# Patient Record
Sex: MALE | Race: WHITE | NOT HISPANIC OR LATINO | Employment: OTHER | URBAN - METROPOLITAN AREA
[De-identification: names, ages, dates, MRNs, and addresses within clinical notes are randomized per-mention and may not be internally consistent; named-entity substitution may affect disease eponyms.]

---

## 2017-01-13 ENCOUNTER — AMBULATORY - HEALTHEAST (OUTPATIENT)
Dept: LAB | Facility: CLINIC | Age: 77
End: 2017-01-13

## 2017-01-13 ENCOUNTER — COMMUNICATION - HEALTHEAST (OUTPATIENT)
Dept: INTERNAL MEDICINE | Facility: CLINIC | Age: 77
End: 2017-01-13

## 2017-01-13 DIAGNOSIS — I26.99 PULMONARY EMBOLISM (H): ICD-10-CM

## 2017-02-03 ENCOUNTER — COMMUNICATION - HEALTHEAST (OUTPATIENT)
Dept: INTERNAL MEDICINE | Facility: CLINIC | Age: 77
End: 2017-02-03

## 2017-02-03 DIAGNOSIS — I26.99 PULMONARY EMBOLISM (H): ICD-10-CM

## 2017-02-08 ENCOUNTER — AMBULATORY - HEALTHEAST (OUTPATIENT)
Dept: LAB | Facility: CLINIC | Age: 77
End: 2017-02-08

## 2017-02-08 ENCOUNTER — COMMUNICATION - HEALTHEAST (OUTPATIENT)
Dept: INTERNAL MEDICINE | Facility: CLINIC | Age: 77
End: 2017-02-08

## 2017-02-08 DIAGNOSIS — I26.99 PULMONARY EMBOLISM (H): ICD-10-CM

## 2017-02-21 ENCOUNTER — COMMUNICATION - HEALTHEAST (OUTPATIENT)
Dept: INTERNAL MEDICINE | Facility: CLINIC | Age: 77
End: 2017-02-21

## 2017-02-21 DIAGNOSIS — I26.99 PULMONARY EMBOLISM (H): ICD-10-CM

## 2017-03-08 ENCOUNTER — COMMUNICATION - HEALTHEAST (OUTPATIENT)
Dept: INTERNAL MEDICINE | Facility: CLINIC | Age: 77
End: 2017-03-08

## 2017-03-08 ENCOUNTER — AMBULATORY - HEALTHEAST (OUTPATIENT)
Dept: LAB | Facility: CLINIC | Age: 77
End: 2017-03-08

## 2017-03-08 DIAGNOSIS — I26.99 PULMONARY EMBOLISM (H): ICD-10-CM

## 2017-04-01 ENCOUNTER — HOSPITAL ENCOUNTER (OUTPATIENT)
Dept: ULTRASOUND IMAGING | Facility: CLINIC | Age: 77
Discharge: HOME | End: 2017-04-01
Attending: FAMILY MEDICINE

## 2017-04-01 ENCOUNTER — OFFICE VISIT - HEALTHEAST (OUTPATIENT)
Dept: FAMILY MEDICINE | Facility: CLINIC | Age: 77
End: 2017-04-01

## 2017-04-01 DIAGNOSIS — R21 RASH: ICD-10-CM

## 2017-04-01 DIAGNOSIS — R60.0 LEG EDEMA, LEFT: ICD-10-CM

## 2017-04-01 DIAGNOSIS — Z79.01 ON WARFARIN THERAPY: ICD-10-CM

## 2017-04-10 ENCOUNTER — COMMUNICATION - HEALTHEAST (OUTPATIENT)
Dept: INTERNAL MEDICINE | Facility: CLINIC | Age: 77
End: 2017-04-10

## 2017-04-26 ENCOUNTER — COMMUNICATION - HEALTHEAST (OUTPATIENT)
Dept: INTERNAL MEDICINE | Facility: CLINIC | Age: 77
End: 2017-04-26

## 2017-04-27 ENCOUNTER — OFFICE VISIT - HEALTHEAST (OUTPATIENT)
Dept: INTERNAL MEDICINE | Facility: CLINIC | Age: 77
End: 2017-04-27

## 2017-04-27 DIAGNOSIS — R60.9 EDEMA: ICD-10-CM

## 2017-04-27 DIAGNOSIS — I10 ESSENTIAL HYPERTENSION: ICD-10-CM

## 2017-04-27 DIAGNOSIS — I26.99 PULMONARY EMBOLISM (H): ICD-10-CM

## 2017-05-01 ENCOUNTER — AMBULATORY - HEALTHEAST (OUTPATIENT)
Dept: LAB | Facility: CLINIC | Age: 77
End: 2017-05-01

## 2017-05-01 ENCOUNTER — COMMUNICATION - HEALTHEAST (OUTPATIENT)
Dept: INTERNAL MEDICINE | Facility: CLINIC | Age: 77
End: 2017-05-01

## 2017-05-01 DIAGNOSIS — I26.99 PULMONARY EMBOLISM (H): ICD-10-CM

## 2017-05-05 ENCOUNTER — OFFICE VISIT - HEALTHEAST (OUTPATIENT)
Dept: NURSING | Facility: CLINIC | Age: 77
End: 2017-05-05

## 2017-05-05 DIAGNOSIS — I10 ESSENTIAL HYPERTENSION WITH GOAL BLOOD PRESSURE LESS THAN 140/90: ICD-10-CM

## 2017-05-05 DIAGNOSIS — I26.99 OTHER PULMONARY EMBOLISM WITHOUT ACUTE COR PULMONALE, UNSPECIFIED CHRONICITY (H): ICD-10-CM

## 2017-05-15 ENCOUNTER — OFFICE VISIT - HEALTHEAST (OUTPATIENT)
Dept: INTERNAL MEDICINE | Facility: CLINIC | Age: 77
End: 2017-05-15

## 2017-05-15 ENCOUNTER — COMMUNICATION - HEALTHEAST (OUTPATIENT)
Dept: INTERNAL MEDICINE | Facility: CLINIC | Age: 77
End: 2017-05-15

## 2017-05-15 DIAGNOSIS — I10 ESSENTIAL HYPERTENSION: ICD-10-CM

## 2017-05-15 DIAGNOSIS — I26.99 PULMONARY EMBOLISM (H): ICD-10-CM

## 2017-05-30 ENCOUNTER — COMMUNICATION - HEALTHEAST (OUTPATIENT)
Dept: INTERNAL MEDICINE | Facility: CLINIC | Age: 77
End: 2017-05-30

## 2017-05-30 DIAGNOSIS — I26.99 PULMONARY EMBOLISM (H): ICD-10-CM

## 2017-06-02 ENCOUNTER — COMMUNICATION - HEALTHEAST (OUTPATIENT)
Dept: INTERNAL MEDICINE | Facility: CLINIC | Age: 77
End: 2017-06-02

## 2017-06-12 ENCOUNTER — AMBULATORY - HEALTHEAST (OUTPATIENT)
Dept: LAB | Facility: CLINIC | Age: 77
End: 2017-06-12

## 2017-06-12 ENCOUNTER — COMMUNICATION - HEALTHEAST (OUTPATIENT)
Dept: INTERNAL MEDICINE | Facility: CLINIC | Age: 77
End: 2017-06-12

## 2017-06-12 DIAGNOSIS — I26.99 PULMONARY EMBOLISM (H): ICD-10-CM

## 2017-07-17 ENCOUNTER — AMBULATORY - HEALTHEAST (OUTPATIENT)
Dept: LAB | Facility: CLINIC | Age: 77
End: 2017-07-17

## 2017-07-17 ENCOUNTER — COMMUNICATION - HEALTHEAST (OUTPATIENT)
Dept: INTERNAL MEDICINE | Facility: CLINIC | Age: 77
End: 2017-07-17

## 2017-07-17 DIAGNOSIS — I26.99 PULMONARY EMBOLISM (H): ICD-10-CM

## 2017-08-21 ENCOUNTER — COMMUNICATION - HEALTHEAST (OUTPATIENT)
Dept: NURSING | Facility: CLINIC | Age: 77
End: 2017-08-21

## 2017-08-21 ENCOUNTER — AMBULATORY - HEALTHEAST (OUTPATIENT)
Dept: LAB | Facility: CLINIC | Age: 77
End: 2017-08-21

## 2017-08-21 DIAGNOSIS — I26.99 PULMONARY EMBOLISM (H): ICD-10-CM

## 2017-09-04 ENCOUNTER — RECORDS - HEALTHEAST (OUTPATIENT)
Dept: ADMINISTRATIVE | Facility: OTHER | Age: 77
End: 2017-09-04

## 2017-09-11 ENCOUNTER — OFFICE VISIT - HEALTHEAST (OUTPATIENT)
Dept: PULMONOLOGY | Facility: OTHER | Age: 77
End: 2017-09-11

## 2017-09-11 DIAGNOSIS — G47.33 OBSTRUCTIVE SLEEP APNEA: ICD-10-CM

## 2017-09-11 DIAGNOSIS — I26.99 PULMONARY EMBOLISM (H): ICD-10-CM

## 2017-09-18 ENCOUNTER — COMMUNICATION - HEALTHEAST (OUTPATIENT)
Dept: INTERNAL MEDICINE | Facility: CLINIC | Age: 77
End: 2017-09-18

## 2017-09-18 ENCOUNTER — AMBULATORY - HEALTHEAST (OUTPATIENT)
Dept: LAB | Facility: CLINIC | Age: 77
End: 2017-09-18

## 2017-09-18 DIAGNOSIS — I26.99 PULMONARY EMBOLISM (H): ICD-10-CM

## 2017-10-25 ENCOUNTER — AMBULATORY - HEALTHEAST (OUTPATIENT)
Dept: LAB | Facility: CLINIC | Age: 77
End: 2017-10-25

## 2017-10-25 ENCOUNTER — COMMUNICATION - HEALTHEAST (OUTPATIENT)
Dept: INTERNAL MEDICINE | Facility: CLINIC | Age: 77
End: 2017-10-25

## 2017-10-25 DIAGNOSIS — I26.99 PULMONARY EMBOLISM (H): ICD-10-CM

## 2017-11-10 ENCOUNTER — COMMUNICATION - HEALTHEAST (OUTPATIENT)
Dept: INTERNAL MEDICINE | Facility: CLINIC | Age: 77
End: 2017-11-10

## 2017-11-25 ENCOUNTER — COMMUNICATION - HEALTHEAST (OUTPATIENT)
Dept: INTERNAL MEDICINE | Facility: CLINIC | Age: 77
End: 2017-11-25

## 2017-11-25 DIAGNOSIS — I26.99 PULMONARY EMBOLISM (H): ICD-10-CM

## 2017-11-25 DIAGNOSIS — I10 HYPERTENSION: ICD-10-CM

## 2017-12-04 ENCOUNTER — AMBULATORY - HEALTHEAST (OUTPATIENT)
Dept: LAB | Facility: CLINIC | Age: 77
End: 2017-12-04

## 2017-12-04 ENCOUNTER — COMMUNICATION - HEALTHEAST (OUTPATIENT)
Dept: INTERNAL MEDICINE | Facility: CLINIC | Age: 77
End: 2017-12-04

## 2017-12-04 DIAGNOSIS — I26.99 PULMONARY EMBOLISM (H): ICD-10-CM

## 2018-01-03 ENCOUNTER — COMMUNICATION - HEALTHEAST (OUTPATIENT)
Dept: INTERNAL MEDICINE | Facility: CLINIC | Age: 78
End: 2018-01-03

## 2018-01-03 ENCOUNTER — AMBULATORY - HEALTHEAST (OUTPATIENT)
Dept: LAB | Facility: CLINIC | Age: 78
End: 2018-01-03

## 2018-01-03 DIAGNOSIS — I26.99 PULMONARY EMBOLISM (H): ICD-10-CM

## 2018-01-03 LAB — INR PPP: 2.7 (ref 0.9–1.1)

## 2018-01-08 ENCOUNTER — RECORDS - HEALTHEAST (OUTPATIENT)
Dept: ADMINISTRATIVE | Facility: OTHER | Age: 78
End: 2018-01-08

## 2018-01-09 ENCOUNTER — COMMUNICATION - HEALTHEAST (OUTPATIENT)
Dept: INTERNAL MEDICINE | Facility: CLINIC | Age: 78
End: 2018-01-09

## 2018-01-09 DIAGNOSIS — I26.99 PULMONARY EMBOLISM (H): ICD-10-CM

## 2018-01-29 ENCOUNTER — AMBULATORY - HEALTHEAST (OUTPATIENT)
Dept: INTERNAL MEDICINE | Facility: CLINIC | Age: 78
End: 2018-01-29

## 2018-01-29 ENCOUNTER — OFFICE VISIT - HEALTHEAST (OUTPATIENT)
Dept: INTERNAL MEDICINE | Facility: CLINIC | Age: 78
End: 2018-01-29

## 2018-01-29 DIAGNOSIS — C61 MALIGNANT NEOPLASM OF PROSTATE (H): ICD-10-CM

## 2018-01-29 DIAGNOSIS — I10 HYPERTENSION: ICD-10-CM

## 2018-01-29 DIAGNOSIS — M89.9 DISORDER OF BONE AND CARTILAGE: ICD-10-CM

## 2018-01-29 DIAGNOSIS — G47.33 OBSTRUCTIVE SLEEP APNEA: ICD-10-CM

## 2018-01-29 DIAGNOSIS — M25.511 RIGHT SHOULDER PAIN: ICD-10-CM

## 2018-01-29 DIAGNOSIS — I26.99 PULMONARY EMBOLISM (H): ICD-10-CM

## 2018-01-29 DIAGNOSIS — M94.9 DISORDER OF BONE AND CARTILAGE: ICD-10-CM

## 2018-01-29 DIAGNOSIS — I10 ESSENTIAL HYPERTENSION: ICD-10-CM

## 2018-01-29 LAB
ALBUMIN SERPL-MCNC: 3.9 G/DL (ref 3.5–5)
ALBUMIN UR-MCNC: NEGATIVE MG/DL
ALP SERPL-CCNC: 100 U/L (ref 45–120)
ALT SERPL W P-5'-P-CCNC: 31 U/L (ref 0–45)
ANION GAP SERPL CALCULATED.3IONS-SCNC: 9 MMOL/L (ref 5–18)
APPEARANCE UR: CLEAR
AST SERPL W P-5'-P-CCNC: 26 U/L (ref 0–40)
BILIRUB SERPL-MCNC: 0.9 MG/DL (ref 0–1)
BILIRUB UR QL STRIP: NEGATIVE
BUN SERPL-MCNC: 13 MG/DL (ref 8–28)
CALCIUM SERPL-MCNC: 10.4 MG/DL (ref 8.5–10.5)
CHLORIDE BLD-SCNC: 101 MMOL/L (ref 98–107)
CO2 SERPL-SCNC: 31 MMOL/L (ref 22–31)
COLOR UR AUTO: YELLOW
CREAT SERPL-MCNC: 0.79 MG/DL (ref 0.7–1.3)
ERYTHROCYTE [DISTWIDTH] IN BLOOD BY AUTOMATED COUNT: 13.4 % (ref 11–14.5)
GFR SERPL CREATININE-BSD FRML MDRD: >60 ML/MIN/1.73M2
GLUCOSE BLD-MCNC: 106 MG/DL (ref 70–125)
GLUCOSE UR STRIP-MCNC: NEGATIVE MG/DL
HCT VFR BLD AUTO: 48.7 % (ref 40–54)
HGB BLD-MCNC: 16.2 G/DL (ref 14–18)
HGB UR QL STRIP: NEGATIVE
KETONES UR STRIP-MCNC: NEGATIVE MG/DL
LEUKOCYTE ESTERASE UR QL STRIP: NEGATIVE
MCH RBC QN AUTO: 29.4 PG (ref 27–34)
MCHC RBC AUTO-ENTMCNC: 33.4 G/DL (ref 32–36)
MCV RBC AUTO: 88 FL (ref 80–100)
NITRATE UR QL: NEGATIVE
PH UR STRIP: 5.5 [PH] (ref 5–8)
PLATELET # BLD AUTO: 233 THOU/UL (ref 140–440)
PMV BLD AUTO: 7.9 FL (ref 7–10)
POTASSIUM BLD-SCNC: 4.1 MMOL/L (ref 3.5–5)
PROT SERPL-MCNC: 6.8 G/DL (ref 6–8)
RBC # BLD AUTO: 5.53 MILL/UL (ref 4.4–6.2)
SODIUM SERPL-SCNC: 141 MMOL/L (ref 136–145)
SP GR UR STRIP: 1.02 (ref 1–1.03)
UROBILINOGEN UR STRIP-ACNC: NORMAL
WBC: 7 THOU/UL (ref 4–11)

## 2018-01-29 ASSESSMENT — MIFFLIN-ST. JEOR: SCORE: 1451.97

## 2018-01-31 ENCOUNTER — OFFICE VISIT - HEALTHEAST (OUTPATIENT)
Dept: PHYSICAL THERAPY | Facility: REHABILITATION | Age: 78
End: 2018-01-31

## 2018-01-31 DIAGNOSIS — M25.511 CHRONIC RIGHT SHOULDER PAIN: ICD-10-CM

## 2018-01-31 DIAGNOSIS — M77.8 DELTOID TENDONITIS OF RIGHT SHOULDER: ICD-10-CM

## 2018-01-31 DIAGNOSIS — M25.611 DECREASED RIGHT SHOULDER RANGE OF MOTION: ICD-10-CM

## 2018-01-31 DIAGNOSIS — G89.29 CHRONIC RIGHT SHOULDER PAIN: ICD-10-CM

## 2018-02-06 ENCOUNTER — AMBULATORY - HEALTHEAST (OUTPATIENT)
Dept: LAB | Facility: CLINIC | Age: 78
End: 2018-02-06

## 2018-02-06 ENCOUNTER — COMMUNICATION - HEALTHEAST (OUTPATIENT)
Dept: INTERNAL MEDICINE | Facility: CLINIC | Age: 78
End: 2018-02-06

## 2018-02-06 DIAGNOSIS — I26.99 PULMONARY EMBOLISM (H): ICD-10-CM

## 2018-02-06 LAB — INR PPP: 2.7 (ref 0.9–1.1)

## 2018-02-08 ENCOUNTER — OFFICE VISIT - HEALTHEAST (OUTPATIENT)
Dept: PHYSICAL THERAPY | Facility: REHABILITATION | Age: 78
End: 2018-02-08

## 2018-02-08 DIAGNOSIS — M25.511 CHRONIC RIGHT SHOULDER PAIN: ICD-10-CM

## 2018-02-08 DIAGNOSIS — M25.611 DECREASED RIGHT SHOULDER RANGE OF MOTION: ICD-10-CM

## 2018-02-08 DIAGNOSIS — M77.8 DELTOID TENDONITIS OF RIGHT SHOULDER: ICD-10-CM

## 2018-02-08 DIAGNOSIS — G89.29 CHRONIC RIGHT SHOULDER PAIN: ICD-10-CM

## 2018-02-15 ENCOUNTER — OFFICE VISIT - HEALTHEAST (OUTPATIENT)
Dept: PHYSICAL THERAPY | Facility: REHABILITATION | Age: 78
End: 2018-02-15

## 2018-02-15 DIAGNOSIS — M25.611 DECREASED RIGHT SHOULDER RANGE OF MOTION: ICD-10-CM

## 2018-02-15 DIAGNOSIS — M77.8 DELTOID TENDONITIS OF RIGHT SHOULDER: ICD-10-CM

## 2018-02-15 DIAGNOSIS — M25.511 CHRONIC RIGHT SHOULDER PAIN: ICD-10-CM

## 2018-02-15 DIAGNOSIS — G89.29 CHRONIC RIGHT SHOULDER PAIN: ICD-10-CM

## 2018-02-22 ENCOUNTER — OFFICE VISIT - HEALTHEAST (OUTPATIENT)
Dept: PHYSICAL THERAPY | Facility: REHABILITATION | Age: 78
End: 2018-02-22

## 2018-02-22 DIAGNOSIS — M25.511 CHRONIC RIGHT SHOULDER PAIN: ICD-10-CM

## 2018-02-22 DIAGNOSIS — G89.29 CHRONIC RIGHT SHOULDER PAIN: ICD-10-CM

## 2018-02-22 DIAGNOSIS — M77.8 DELTOID TENDONITIS OF RIGHT SHOULDER: ICD-10-CM

## 2018-02-22 DIAGNOSIS — M25.611 DECREASED RIGHT SHOULDER RANGE OF MOTION: ICD-10-CM

## 2018-03-01 ENCOUNTER — OFFICE VISIT - HEALTHEAST (OUTPATIENT)
Dept: PHYSICAL THERAPY | Facility: REHABILITATION | Age: 78
End: 2018-03-01

## 2018-03-01 DIAGNOSIS — G89.29 CHRONIC RIGHT SHOULDER PAIN: ICD-10-CM

## 2018-03-01 DIAGNOSIS — M25.511 CHRONIC RIGHT SHOULDER PAIN: ICD-10-CM

## 2018-03-01 DIAGNOSIS — M25.611 DECREASED RIGHT SHOULDER RANGE OF MOTION: ICD-10-CM

## 2018-03-01 DIAGNOSIS — M77.8 DELTOID TENDONITIS OF RIGHT SHOULDER: ICD-10-CM

## 2018-03-13 ENCOUNTER — OFFICE VISIT - HEALTHEAST (OUTPATIENT)
Dept: PHYSICAL THERAPY | Facility: REHABILITATION | Age: 78
End: 2018-03-13

## 2018-03-13 ENCOUNTER — COMMUNICATION - HEALTHEAST (OUTPATIENT)
Dept: INTERNAL MEDICINE | Facility: CLINIC | Age: 78
End: 2018-03-13

## 2018-03-13 ENCOUNTER — AMBULATORY - HEALTHEAST (OUTPATIENT)
Dept: LAB | Facility: CLINIC | Age: 78
End: 2018-03-13

## 2018-03-13 DIAGNOSIS — M25.611 DECREASED RIGHT SHOULDER RANGE OF MOTION: ICD-10-CM

## 2018-03-13 DIAGNOSIS — I26.99 PULMONARY EMBOLISM (H): ICD-10-CM

## 2018-03-13 DIAGNOSIS — M77.8 DELTOID TENDONITIS OF RIGHT SHOULDER: ICD-10-CM

## 2018-03-13 DIAGNOSIS — G89.29 CHRONIC RIGHT SHOULDER PAIN: ICD-10-CM

## 2018-03-13 DIAGNOSIS — M25.511 CHRONIC RIGHT SHOULDER PAIN: ICD-10-CM

## 2018-03-13 LAB — INR PPP: 2.2 (ref 0.9–1.1)

## 2018-03-29 ENCOUNTER — OFFICE VISIT - HEALTHEAST (OUTPATIENT)
Dept: PHYSICAL THERAPY | Facility: REHABILITATION | Age: 78
End: 2018-03-29

## 2018-03-29 DIAGNOSIS — G89.29 CHRONIC RIGHT SHOULDER PAIN: ICD-10-CM

## 2018-03-29 DIAGNOSIS — M25.611 DECREASED RIGHT SHOULDER RANGE OF MOTION: ICD-10-CM

## 2018-03-29 DIAGNOSIS — M77.8 DELTOID TENDONITIS OF RIGHT SHOULDER: ICD-10-CM

## 2018-03-29 DIAGNOSIS — M25.511 CHRONIC RIGHT SHOULDER PAIN: ICD-10-CM

## 2018-04-17 ENCOUNTER — COMMUNICATION - HEALTHEAST (OUTPATIENT)
Dept: INTERNAL MEDICINE | Facility: CLINIC | Age: 78
End: 2018-04-17

## 2018-04-17 ENCOUNTER — AMBULATORY - HEALTHEAST (OUTPATIENT)
Dept: LAB | Facility: CLINIC | Age: 78
End: 2018-04-17

## 2018-04-17 DIAGNOSIS — I26.99 PULMONARY EMBOLISM (H): ICD-10-CM

## 2018-04-17 LAB — INR PPP: 2.8 (ref 0.9–1.1)

## 2018-05-21 ENCOUNTER — AMBULATORY - HEALTHEAST (OUTPATIENT)
Dept: LAB | Facility: CLINIC | Age: 78
End: 2018-05-21

## 2018-05-21 ENCOUNTER — COMMUNICATION - HEALTHEAST (OUTPATIENT)
Dept: INTERNAL MEDICINE | Facility: CLINIC | Age: 78
End: 2018-05-21

## 2018-05-21 DIAGNOSIS — I26.99 PULMONARY EMBOLISM (H): ICD-10-CM

## 2018-05-21 LAB — INR PPP: 2.8 (ref 0.9–1.1)

## 2018-06-25 ENCOUNTER — COMMUNICATION - HEALTHEAST (OUTPATIENT)
Dept: INTERNAL MEDICINE | Facility: CLINIC | Age: 78
End: 2018-06-25

## 2018-06-25 ENCOUNTER — AMBULATORY - HEALTHEAST (OUTPATIENT)
Dept: LAB | Facility: CLINIC | Age: 78
End: 2018-06-25

## 2018-06-25 DIAGNOSIS — I26.99 PULMONARY EMBOLISM (H): ICD-10-CM

## 2018-06-25 LAB — INR PPP: 2.7 (ref 0.9–1.1)

## 2018-07-30 ENCOUNTER — COMMUNICATION - HEALTHEAST (OUTPATIENT)
Dept: INTERNAL MEDICINE | Facility: CLINIC | Age: 78
End: 2018-07-30

## 2018-07-30 ENCOUNTER — AMBULATORY - HEALTHEAST (OUTPATIENT)
Dept: LAB | Facility: CLINIC | Age: 78
End: 2018-07-30

## 2018-07-30 DIAGNOSIS — I26.99 PULMONARY EMBOLISM (H): ICD-10-CM

## 2018-07-30 LAB — INR PPP: 2.5 (ref 0.9–1.1)

## 2018-09-10 ENCOUNTER — AMBULATORY - HEALTHEAST (OUTPATIENT)
Dept: LAB | Facility: CLINIC | Age: 78
End: 2018-09-10

## 2018-09-10 ENCOUNTER — COMMUNICATION - HEALTHEAST (OUTPATIENT)
Dept: ANTICOAGULATION | Facility: CLINIC | Age: 78
End: 2018-09-10

## 2018-09-10 DIAGNOSIS — I26.99 PULMONARY EMBOLISM (H): ICD-10-CM

## 2018-09-10 LAB — INR PPP: 2.9 (ref 0.9–1.1)

## 2018-09-14 ENCOUNTER — COMMUNICATION - HEALTHEAST (OUTPATIENT)
Dept: INTERNAL MEDICINE | Facility: CLINIC | Age: 78
End: 2018-09-14

## 2018-09-14 ENCOUNTER — OFFICE VISIT - HEALTHEAST (OUTPATIENT)
Dept: INTERNAL MEDICINE | Facility: CLINIC | Age: 78
End: 2018-09-14

## 2018-09-14 DIAGNOSIS — M77.8 RIGHT SHOULDER TENDONITIS: ICD-10-CM

## 2018-09-14 DIAGNOSIS — I26.99 PULMONARY EMBOLISM (H): ICD-10-CM

## 2018-09-14 DIAGNOSIS — L60.0 INGROWN NAIL OF GREAT TOE OF RIGHT FOOT: ICD-10-CM

## 2018-09-14 DIAGNOSIS — I10 HYPERTENSION: ICD-10-CM

## 2018-09-14 LAB
ALBUMIN UR-MCNC: NEGATIVE MG/DL
ANION GAP SERPL CALCULATED.3IONS-SCNC: 8 MMOL/L (ref 5–18)
APPEARANCE UR: CLEAR
BACTERIA #/AREA URNS HPF: ABNORMAL HPF
BILIRUB UR QL STRIP: NEGATIVE
BUN SERPL-MCNC: 17 MG/DL (ref 8–28)
CALCIUM SERPL-MCNC: 10.3 MG/DL (ref 8.5–10.5)
CHLORIDE BLD-SCNC: 104 MMOL/L (ref 98–107)
CO2 SERPL-SCNC: 30 MMOL/L (ref 22–31)
COLOR UR AUTO: YELLOW
CREAT SERPL-MCNC: 0.94 MG/DL (ref 0.7–1.3)
GFR SERPL CREATININE-BSD FRML MDRD: >60 ML/MIN/1.73M2
GLUCOSE BLD-MCNC: 175 MG/DL (ref 70–125)
GLUCOSE UR STRIP-MCNC: NEGATIVE MG/DL
HGB UR QL STRIP: NEGATIVE
KETONES UR STRIP-MCNC: ABNORMAL MG/DL
LEUKOCYTE ESTERASE UR QL STRIP: NEGATIVE
MUCOUS THREADS #/AREA URNS LPF: ABNORMAL LPF
NITRATE UR QL: NEGATIVE
PH UR STRIP: 6 [PH] (ref 5–8)
POTASSIUM BLD-SCNC: 4 MMOL/L (ref 3.5–5)
RBC #/AREA URNS AUTO: ABNORMAL HPF
SODIUM SERPL-SCNC: 142 MMOL/L (ref 136–145)
SP GR UR STRIP: >=1.03 (ref 1–1.03)
SQUAMOUS #/AREA URNS AUTO: ABNORMAL LPF
UROBILINOGEN UR STRIP-ACNC: ABNORMAL
WBC #/AREA URNS AUTO: ABNORMAL HPF

## 2018-10-15 ENCOUNTER — COMMUNICATION - HEALTHEAST (OUTPATIENT)
Dept: LAB | Facility: CLINIC | Age: 78
End: 2018-10-15

## 2018-10-15 ENCOUNTER — AMBULATORY - HEALTHEAST (OUTPATIENT)
Dept: LAB | Facility: CLINIC | Age: 78
End: 2018-10-15

## 2018-10-15 ENCOUNTER — COMMUNICATION - HEALTHEAST (OUTPATIENT)
Dept: ANTICOAGULATION | Facility: CLINIC | Age: 78
End: 2018-10-15

## 2018-10-15 DIAGNOSIS — R31.29 MICROSCOPIC HEMATURIA: ICD-10-CM

## 2018-10-15 DIAGNOSIS — I26.99 PULMONARY EMBOLISM (H): ICD-10-CM

## 2018-10-15 LAB
ALBUMIN UR-MCNC: NEGATIVE MG/DL
APPEARANCE UR: CLEAR
BACTERIA #/AREA URNS HPF: ABNORMAL HPF
BILIRUB UR QL STRIP: NEGATIVE
COLOR UR AUTO: YELLOW
GLUCOSE UR STRIP-MCNC: NEGATIVE MG/DL
HGB UR QL STRIP: NEGATIVE
INR PPP: 2.2 (ref 0.9–1.1)
KETONES UR STRIP-MCNC: NEGATIVE MG/DL
LEUKOCYTE ESTERASE UR QL STRIP: NEGATIVE
MUCOUS THREADS #/AREA URNS LPF: ABNORMAL LPF
NITRATE UR QL: NEGATIVE
PH UR STRIP: 5.5 [PH] (ref 5–8)
RBC #/AREA URNS AUTO: ABNORMAL HPF
SP GR UR STRIP: >=1.03 (ref 1–1.03)
SQUAMOUS #/AREA URNS AUTO: ABNORMAL LPF
UROBILINOGEN UR STRIP-ACNC: ABNORMAL
WBC #/AREA URNS AUTO: ABNORMAL HPF

## 2018-10-24 ENCOUNTER — OFFICE VISIT - HEALTHEAST (OUTPATIENT)
Dept: FAMILY MEDICINE | Facility: CLINIC | Age: 78
End: 2018-10-24

## 2018-10-24 DIAGNOSIS — S29.9XXA CHEST WALL INJURY: ICD-10-CM

## 2018-11-14 ENCOUNTER — AMBULATORY - HEALTHEAST (OUTPATIENT)
Dept: LAB | Facility: CLINIC | Age: 78
End: 2018-11-14

## 2018-11-14 ENCOUNTER — COMMUNICATION - HEALTHEAST (OUTPATIENT)
Dept: ANTICOAGULATION | Facility: CLINIC | Age: 78
End: 2018-11-14

## 2018-11-14 DIAGNOSIS — I26.99 PULMONARY EMBOLISM (H): ICD-10-CM

## 2018-11-14 LAB — INR PPP: 4.6 (ref 0.9–1.1)

## 2018-11-17 ENCOUNTER — RECORDS - HEALTHEAST (OUTPATIENT)
Dept: ADMINISTRATIVE | Facility: OTHER | Age: 78
End: 2018-11-17

## 2018-11-26 ENCOUNTER — AMBULATORY - HEALTHEAST (OUTPATIENT)
Dept: LAB | Facility: CLINIC | Age: 78
End: 2018-11-26

## 2018-11-26 ENCOUNTER — COMMUNICATION - HEALTHEAST (OUTPATIENT)
Dept: ANTICOAGULATION | Facility: CLINIC | Age: 78
End: 2018-11-26

## 2018-11-26 DIAGNOSIS — I26.99 PULMONARY EMBOLISM (H): ICD-10-CM

## 2018-11-26 LAB — INR PPP: 3 (ref 0.9–1.1)

## 2018-12-05 ENCOUNTER — COMMUNICATION - HEALTHEAST (OUTPATIENT)
Dept: ANTICOAGULATION | Facility: CLINIC | Age: 78
End: 2018-12-05

## 2018-12-05 DIAGNOSIS — I26.99 PULMONARY EMBOLISM (H): ICD-10-CM

## 2018-12-10 ENCOUNTER — AMBULATORY - HEALTHEAST (OUTPATIENT)
Dept: LAB | Facility: CLINIC | Age: 78
End: 2018-12-10

## 2018-12-10 ENCOUNTER — COMMUNICATION - HEALTHEAST (OUTPATIENT)
Dept: ANTICOAGULATION | Facility: CLINIC | Age: 78
End: 2018-12-10

## 2018-12-10 DIAGNOSIS — I26.99 PULMONARY EMBOLISM (H): ICD-10-CM

## 2018-12-10 LAB — INR PPP: 2.8 (ref 0.9–1.1)

## 2018-12-20 ENCOUNTER — OFFICE VISIT - HEALTHEAST (OUTPATIENT)
Dept: PULMONOLOGY | Facility: OTHER | Age: 78
End: 2018-12-20

## 2018-12-20 DIAGNOSIS — I26.99 OTHER PULMONARY EMBOLISM WITHOUT ACUTE COR PULMONALE, UNSPECIFIED CHRONICITY (H): ICD-10-CM

## 2018-12-20 DIAGNOSIS — G47.33 OBSTRUCTIVE SLEEP APNEA: ICD-10-CM

## 2019-01-03 ENCOUNTER — AMBULATORY - HEALTHEAST (OUTPATIENT)
Dept: LAB | Facility: CLINIC | Age: 79
End: 2019-01-03

## 2019-01-03 ENCOUNTER — COMMUNICATION - HEALTHEAST (OUTPATIENT)
Dept: ANTICOAGULATION | Facility: CLINIC | Age: 79
End: 2019-01-03

## 2019-01-03 DIAGNOSIS — I26.99 PULMONARY EMBOLISM (H): ICD-10-CM

## 2019-01-03 LAB — INR PPP: 2.6 (ref 0.9–1.1)

## 2019-01-31 ENCOUNTER — COMMUNICATION - HEALTHEAST (OUTPATIENT)
Dept: ANTICOAGULATION | Facility: CLINIC | Age: 79
End: 2019-01-31

## 2019-01-31 ENCOUNTER — AMBULATORY - HEALTHEAST (OUTPATIENT)
Dept: LAB | Facility: CLINIC | Age: 79
End: 2019-01-31

## 2019-01-31 DIAGNOSIS — I26.99 PULMONARY EMBOLISM (H): ICD-10-CM

## 2019-01-31 LAB — INR PPP: 1.7 (ref 0.9–1.1)

## 2019-02-04 ENCOUNTER — COMMUNICATION - HEALTHEAST (OUTPATIENT)
Dept: NEUROSURGERY | Facility: CLINIC | Age: 79
End: 2019-02-04

## 2019-02-04 ENCOUNTER — PATIENT OUTREACH (OUTPATIENT)
Dept: CARE COORDINATION | Facility: CLINIC | Age: 79
End: 2019-02-04

## 2019-02-04 ENCOUNTER — COMMUNICATION - HEALTHEAST (OUTPATIENT)
Dept: SCHEDULING | Facility: CLINIC | Age: 79
End: 2019-02-04

## 2019-02-04 DIAGNOSIS — S06.5XAA SDH (SUBDURAL HEMATOMA) (H): ICD-10-CM

## 2019-02-04 NOTE — PROGRESS NOTES
Clinic Care Coordination Contact    Clinic Care Coordination Contact  OUTREACH    Referral Information:  Referral Source: IP Report    Primary Diagnosis: Injury/Fall    Chief Complaint   Patient presents with     Clinic Care Coordination - Post Hospital     DC'd from [facility] on 2/3/19      Chart Review Please        Universal Utilization: appropriate utilization  Clinic Utilization  Difficulty keeping appointments:: No  Compliance Concerns: No  No-Show Concerns: No    Clinical Concerns:  Current Medical Concerns:  Doing well since returning home.  His wife is watching him closely.  Slept well last night, appetite is fine, no pain, no mobility issues.     Current Behavioral Concerns: no concerns noted.     Education Provided to patient: Reviewed role of care coordinator.    Pain  Pain (GOAL):: No  Health Maintenance Reviewed: Up to date  Clinical Pathway: None    Medication Management:  No concerns identified.      Functional Status:  Dependent ADLs:: Independent, Ambulation-no assistive device  Dependent IADLs:: Independent  Bed or wheelchair confined:: No  Mobility Status: Independent  Fallen 2 or more times in the past year?: No  Any fall with injury in the past year?: Yes    Living Situation:  Current living arrangement:: I live in a private home with spouse  Type of residence:: Private home - stairs    Diet/Exercise/Sleep:  Diet:: Regular  Inadequate nutrition (GOAL):: No  Food Insecurity: No  Tube Feeding: No  Exercise:: Currently not exercising  Inadequate activity/exercise (GOAL):: No  Significant changes in sleep pattern (GOAL): No    Transportation:  Transportation concerns (GOAL):: No  Transportation means:: Regular car     Psychosocial:  Oriental orthodox or spiritual beliefs that impact treatment:: No  Mental health DX:: No  Mental health management concern (GOAL):: No  Informal Support system:: Children, Family, Spouse     Financial/Insurance:   Financial/Insurance concerns (GOAL):: No  [employer] for  Seniors, no financial concerns.      Resources and Interventions:  Current Resources:    ;   Community Resources: None  Supplies used at home:: None  Equipment Currently Used at Home: none    Advance Care Plan/Directive  Advanced Care Plans/Directives on file:: Yes  Type Advanced Care Plans/Directives: Advanced Directive - On File  Advanced Care Plan/Directive Status: Declined Further Information    Referrals Placed: None     Patient/Caregiver understanding: Has PCP appointment next week and talked to INR nurse this morning.  His kids will come over and take care of the snow.  His wife can provide support if needed. Denied need for care coordination.      Plan: CC will mail access letter and patient will call if concerns arise.  No further outreach by CC.    Theresa Cibuzar,   [facility]  [email or web address]  [phone]    Clinic Care Coordination Contact    Situation: Patient chart reviewed by care coordinator.    Background: Patient was in [facility] ED and transferred to [facility] for neurology follow up after fall.      Per chart review:  Admission Date: 2/3/2019    Discharge Provider: Nehal Moscoso Discharge Date: 2/4/2019   Diet: regular diet    Code Status: Prior   Activity: activity as tolerated           Condition at Discharge: Stable      REASON FOR PRESENTATION(See Admission Note for Details)   fall     PRINCIPAL & ACTIVE DISCHARGE DIAGNOSES      Principal Problem:    Subdural hematoma (H)  Active Problems:    Hypertension    Pulmonary embolism (H)    Obstructive sleep apnea       Assessment: Patient was discharged from hospital one hour ago.  Will call tomorrow. Has PCP appointment scheduled for 2-13.  Consent to contact on file for wife Barb.     Plan/Recommendations: Call patient to assess needs tomorrow.      Social Jazmin Hill  [facility]  [email or web address]  [phone]

## 2019-02-04 NOTE — LETTER
[place] CARE COORDINATION  [address] [address]    February 5, 2019    Edward Mendoza  [address] [address]      Dear Edward,    I am a clinic care coordinator who works with [facility] at [phone]. I wanted to thank you for spending the time to talk with me.  I wanted to introduce myself and provide you with my contact information so that you can call me with questions or concerns about your health care. Below is a description of clinic care coordination and how I can further assist you.     The clinic care coordinator is   who understand the health care system. The goal of clinic care coordination is to help you manage your health and improve access to the [facility] system in the most efficient manner. The  can assist you with financial, behavioral, psychosocial, chemical dependency, counseling, and/or psychiatric resources.    Please feel free to contact me at [phone], with any questions or concerns. We at [facility] are focused on providing you with the highest-quality healthcare experience possible and that all starts with you.     Sincerely,     Theresa Payan

## 2019-02-05 ENCOUNTER — COMMUNICATION - HEALTHEAST (OUTPATIENT)
Dept: ANTICOAGULATION | Facility: CLINIC | Age: 79
End: 2019-02-05

## 2019-02-05 ASSESSMENT — ACTIVITIES OF DAILY LIVING (ADL): DEPENDENT_IADLS:: INDEPENDENT

## 2019-02-06 ENCOUNTER — COMMUNICATION - HEALTHEAST (OUTPATIENT)
Dept: CARE COORDINATION | Facility: CLINIC | Age: 79
End: 2019-02-06

## 2019-02-13 ENCOUNTER — AMBULATORY - HEALTHEAST (OUTPATIENT)
Dept: LAB | Facility: CLINIC | Age: 79
End: 2019-02-13

## 2019-02-13 ENCOUNTER — OFFICE VISIT - HEALTHEAST (OUTPATIENT)
Dept: NEUROSURGERY | Facility: CLINIC | Age: 79
End: 2019-02-13

## 2019-02-13 ENCOUNTER — OFFICE VISIT - HEALTHEAST (OUTPATIENT)
Dept: INTERNAL MEDICINE | Facility: CLINIC | Age: 79
End: 2019-02-13

## 2019-02-13 ENCOUNTER — HOSPITAL ENCOUNTER (OUTPATIENT)
Dept: CT IMAGING | Facility: CLINIC | Age: 79
Discharge: HOME | End: 2019-02-13
Attending: SURGERY

## 2019-02-13 DIAGNOSIS — Z51.81 MEDICATION MONITORING ENCOUNTER: ICD-10-CM

## 2019-02-13 DIAGNOSIS — S06.5XAA SUBDURAL HEMATOMA (H): ICD-10-CM

## 2019-02-13 DIAGNOSIS — Z00.01 ENCOUNTER FOR GENERAL ADULT MEDICAL EXAMINATION WITH ABNORMAL FINDINGS: ICD-10-CM

## 2019-02-13 DIAGNOSIS — D17.30 LIPOMA OF SKIN AND SUBCUTANEOUS TISSUE: ICD-10-CM

## 2019-02-13 DIAGNOSIS — G47.33 OBSTRUCTIVE SLEEP APNEA: ICD-10-CM

## 2019-02-13 DIAGNOSIS — G89.29 CHRONIC RIGHT SHOULDER PAIN: ICD-10-CM

## 2019-02-13 DIAGNOSIS — S06.5XAA SDH (SUBDURAL HEMATOMA) (H): ICD-10-CM

## 2019-02-13 DIAGNOSIS — I10 ESSENTIAL HYPERTENSION: ICD-10-CM

## 2019-02-13 DIAGNOSIS — Z00.00 HEALTHCARE MAINTENANCE: ICD-10-CM

## 2019-02-13 DIAGNOSIS — Z86.711 HISTORY OF PULMONARY EMBOLISM: ICD-10-CM

## 2019-02-13 DIAGNOSIS — M25.511 CHRONIC RIGHT SHOULDER PAIN: ICD-10-CM

## 2019-02-13 LAB
ALBUMIN SERPL-MCNC: 4.2 G/DL (ref 3.5–5)
ALP SERPL-CCNC: 104 U/L (ref 45–120)
ALT SERPL W P-5'-P-CCNC: 25 U/L (ref 0–45)
ANION GAP SERPL CALCULATED.3IONS-SCNC: 10 MMOL/L (ref 5–18)
APTT PPP: 24 SECONDS (ref 24–37)
AST SERPL W P-5'-P-CCNC: 23 U/L (ref 0–40)
BILIRUB SERPL-MCNC: 0.9 MG/DL (ref 0–1)
BUN SERPL-MCNC: 21 MG/DL (ref 8–28)
CALCIUM SERPL-MCNC: 10.1 MG/DL (ref 8.5–10.5)
CHLORIDE BLD-SCNC: 102 MMOL/L (ref 98–107)
CHOLEST SERPL-MCNC: 208 MG/DL
CLOSURE TME COLL+EPINEP BLD: 80 SEC (ref 1–180)
CO2 SERPL-SCNC: 30 MMOL/L (ref 22–31)
CREAT SERPL-MCNC: 0.92 MG/DL (ref 0.7–1.3)
ERYTHROCYTE [DISTWIDTH] IN BLOOD BY AUTOMATED COUNT: 12.4 % (ref 11–14.5)
FASTING STATUS PATIENT QL REPORTED: ABNORMAL
GFR SERPL CREATININE-BSD FRML MDRD: >60 ML/MIN/1.73M2
GLUCOSE BLD-MCNC: 113 MG/DL (ref 70–125)
HCT VFR BLD AUTO: 47.9 % (ref 40–54)
HDLC SERPL-MCNC: 59 MG/DL
HGB BLD-MCNC: 16.3 G/DL (ref 14–18)
INR PPP: 0.93 (ref 0.9–1.1)
LDLC SERPL CALC-MCNC: 124 MG/DL
MCH RBC QN AUTO: 30.4 PG (ref 27–34)
MCHC RBC AUTO-ENTMCNC: 34.1 G/DL (ref 32–36)
MCV RBC AUTO: 89 FL (ref 80–100)
PLATELET # BLD AUTO: 238 THOU/UL (ref 140–440)
PMV BLD AUTO: 7.8 FL (ref 7–10)
POTASSIUM BLD-SCNC: 4.4 MMOL/L (ref 3.5–5)
PROT SERPL-MCNC: 6.8 G/DL (ref 6–8)
RBC # BLD AUTO: 5.36 MILL/UL (ref 4.4–6.2)
SODIUM SERPL-SCNC: 142 MMOL/L (ref 136–145)
TRIGL SERPL-MCNC: 124 MG/DL
WBC: 8 THOU/UL (ref 4–11)

## 2019-02-13 ASSESSMENT — MIFFLIN-ST. JEOR
SCORE: 1456.96
SCORE: 1456.96

## 2019-02-14 ENCOUNTER — COMMUNICATION - HEALTHEAST (OUTPATIENT)
Dept: INTERNAL MEDICINE | Facility: CLINIC | Age: 79
End: 2019-02-14

## 2019-02-14 ENCOUNTER — COMMUNICATION - HEALTHEAST (OUTPATIENT)
Dept: ANTICOAGULATION | Facility: CLINIC | Age: 79
End: 2019-02-14

## 2019-02-21 ENCOUNTER — OFFICE VISIT - HEALTHEAST (OUTPATIENT)
Dept: INTERNAL MEDICINE | Facility: CLINIC | Age: 79
End: 2019-02-21

## 2019-02-21 DIAGNOSIS — M25.511 CHRONIC RIGHT SHOULDER PAIN: ICD-10-CM

## 2019-02-21 DIAGNOSIS — S06.5XAA SUBDURAL HEMATOMA (H): ICD-10-CM

## 2019-02-21 DIAGNOSIS — I10 HYPERTENSION: ICD-10-CM

## 2019-02-21 DIAGNOSIS — G89.29 CHRONIC RIGHT SHOULDER PAIN: ICD-10-CM

## 2019-02-21 DIAGNOSIS — G47.33 OBSTRUCTIVE SLEEP APNEA: ICD-10-CM

## 2019-02-21 DIAGNOSIS — Z86.711 HISTORY OF PULMONARY EMBOLISM: ICD-10-CM

## 2019-03-01 ENCOUNTER — HOSPITAL ENCOUNTER (OUTPATIENT)
Dept: CT IMAGING | Facility: HOSPITAL | Age: 79
Discharge: HOME | End: 2019-03-01
Attending: SURGERY

## 2019-03-01 ENCOUNTER — OFFICE VISIT - HEALTHEAST (OUTPATIENT)
Dept: NEUROSURGERY | Facility: CLINIC | Age: 79
End: 2019-03-01

## 2019-03-01 DIAGNOSIS — S06.5XAA SUBDURAL HEMATOMA (H): ICD-10-CM

## 2019-03-01 ASSESSMENT — MIFFLIN-ST. JEOR: SCORE: 1475.1

## 2019-03-04 ENCOUNTER — COMMUNICATION - HEALTHEAST (OUTPATIENT)
Dept: ANTICOAGULATION | Facility: CLINIC | Age: 79
End: 2019-03-04

## 2019-03-06 ENCOUNTER — COMMUNICATION - HEALTHEAST (OUTPATIENT)
Dept: INTERNAL MEDICINE | Facility: CLINIC | Age: 79
End: 2019-03-06

## 2019-03-08 ENCOUNTER — OFFICE VISIT - HEALTHEAST (OUTPATIENT)
Dept: INTERNAL MEDICINE | Facility: CLINIC | Age: 79
End: 2019-03-08

## 2019-03-08 DIAGNOSIS — S06.5XAA SUBDURAL HEMATOMA (H): ICD-10-CM

## 2019-03-08 DIAGNOSIS — G47.33 OBSTRUCTIVE SLEEP APNEA: ICD-10-CM

## 2019-03-08 DIAGNOSIS — I10 ESSENTIAL HYPERTENSION: ICD-10-CM

## 2019-03-08 DIAGNOSIS — Z86.711 HISTORY OF PULMONARY EMBOLISM: ICD-10-CM

## 2019-03-15 ENCOUNTER — AMBULATORY - HEALTHEAST (OUTPATIENT)
Dept: LAB | Facility: CLINIC | Age: 79
End: 2019-03-15

## 2019-03-15 ENCOUNTER — COMMUNICATION - HEALTHEAST (OUTPATIENT)
Dept: ANTICOAGULATION | Facility: CLINIC | Age: 79
End: 2019-03-15

## 2019-03-15 DIAGNOSIS — I26.99 PULMONARY EMBOLISM (H): ICD-10-CM

## 2019-03-15 LAB — INR PPP: 1.7 (ref 0.9–1.1)

## 2019-03-22 ENCOUNTER — AMBULATORY - HEALTHEAST (OUTPATIENT)
Dept: LAB | Facility: CLINIC | Age: 79
End: 2019-03-22

## 2019-03-22 ENCOUNTER — COMMUNICATION - HEALTHEAST (OUTPATIENT)
Dept: ANTICOAGULATION | Facility: CLINIC | Age: 79
End: 2019-03-22

## 2019-03-22 DIAGNOSIS — I26.99 PULMONARY EMBOLISM (H): ICD-10-CM

## 2019-03-22 LAB — INR PPP: 2.2 (ref 0.9–1.1)

## 2019-04-03 ENCOUNTER — COMMUNICATION - HEALTHEAST (OUTPATIENT)
Dept: ANTICOAGULATION | Facility: CLINIC | Age: 79
End: 2019-04-03

## 2019-04-03 ENCOUNTER — AMBULATORY - HEALTHEAST (OUTPATIENT)
Dept: LAB | Facility: CLINIC | Age: 79
End: 2019-04-03

## 2019-04-03 DIAGNOSIS — I26.99 PULMONARY EMBOLISM (H): ICD-10-CM

## 2019-04-03 LAB — INR PPP: 2.4 (ref 0.9–1.1)

## 2019-04-22 ENCOUNTER — AMBULATORY - HEALTHEAST (OUTPATIENT)
Dept: LAB | Facility: CLINIC | Age: 79
End: 2019-04-22

## 2019-04-22 ENCOUNTER — COMMUNICATION - HEALTHEAST (OUTPATIENT)
Dept: ANTICOAGULATION | Facility: CLINIC | Age: 79
End: 2019-04-22

## 2019-04-22 DIAGNOSIS — I26.99 PULMONARY EMBOLISM (H): ICD-10-CM

## 2019-04-22 LAB — INR PPP: 2.3 (ref 0.9–1.1)

## 2019-04-25 ENCOUNTER — RECORDS - HEALTHEAST (OUTPATIENT)
Dept: ADMINISTRATIVE | Facility: OTHER | Age: 79
End: 2019-04-25

## 2019-05-21 ENCOUNTER — AMBULATORY - HEALTHEAST (OUTPATIENT)
Dept: LAB | Facility: CLINIC | Age: 79
End: 2019-05-21

## 2019-05-21 ENCOUNTER — COMMUNICATION - HEALTHEAST (OUTPATIENT)
Dept: ANTICOAGULATION | Facility: CLINIC | Age: 79
End: 2019-05-21

## 2019-05-21 DIAGNOSIS — I26.99 PULMONARY EMBOLISM (H): ICD-10-CM

## 2019-05-21 LAB — INR PPP: 2 (ref 0.9–1.1)

## 2019-06-17 ENCOUNTER — AMBULATORY - HEALTHEAST (OUTPATIENT)
Dept: LAB | Facility: CLINIC | Age: 79
End: 2019-06-17

## 2019-06-17 ENCOUNTER — COMMUNICATION - HEALTHEAST (OUTPATIENT)
Dept: ANTICOAGULATION | Facility: CLINIC | Age: 79
End: 2019-06-17

## 2019-06-17 DIAGNOSIS — I26.99 PULMONARY EMBOLISM (H): ICD-10-CM

## 2019-06-17 LAB — INR PPP: 2.4 (ref 0.9–1.1)

## 2019-07-22 ENCOUNTER — COMMUNICATION - HEALTHEAST (OUTPATIENT)
Dept: ANTICOAGULATION | Facility: CLINIC | Age: 79
End: 2019-07-22

## 2019-07-22 ENCOUNTER — RECORDS - HEALTHEAST (OUTPATIENT)
Dept: ADMINISTRATIVE | Facility: OTHER | Age: 79
End: 2019-07-22

## 2019-07-22 ENCOUNTER — AMBULATORY - HEALTHEAST (OUTPATIENT)
Dept: LAB | Facility: CLINIC | Age: 79
End: 2019-07-22

## 2019-07-22 DIAGNOSIS — I26.99 PULMONARY EMBOLISM (H): ICD-10-CM

## 2019-07-22 LAB — INR PPP: 2 (ref 0.9–1.1)

## 2019-08-01 ENCOUNTER — RECORDS - HEALTHEAST (OUTPATIENT)
Dept: ADMINISTRATIVE | Facility: OTHER | Age: 79
End: 2019-08-01

## 2019-08-28 ENCOUNTER — AMBULATORY - HEALTHEAST (OUTPATIENT)
Dept: LAB | Facility: CLINIC | Age: 79
End: 2019-08-28

## 2019-08-28 ENCOUNTER — COMMUNICATION - HEALTHEAST (OUTPATIENT)
Dept: ANTICOAGULATION | Facility: CLINIC | Age: 79
End: 2019-08-28

## 2019-08-28 DIAGNOSIS — I26.99 PULMONARY EMBOLISM (H): ICD-10-CM

## 2019-08-28 LAB — INR PPP: 2.1 (ref 0.9–1.1)

## 2019-09-09 ENCOUNTER — OFFICE VISIT - HEALTHEAST (OUTPATIENT)
Dept: INTERNAL MEDICINE | Facility: CLINIC | Age: 79
End: 2019-09-09

## 2019-09-09 DIAGNOSIS — G47.33 OBSTRUCTIVE SLEEP APNEA: ICD-10-CM

## 2019-09-09 DIAGNOSIS — I10 ESSENTIAL HYPERTENSION: ICD-10-CM

## 2019-09-09 DIAGNOSIS — Z51.81 MEDICATION MONITORING ENCOUNTER: ICD-10-CM

## 2019-09-09 DIAGNOSIS — Z86.711 HISTORY OF PULMONARY EMBOLISM: ICD-10-CM

## 2019-09-09 DIAGNOSIS — Z86.79 HISTORY OF SUBDURAL HEMATOMA: ICD-10-CM

## 2019-09-30 ENCOUNTER — COMMUNICATION - HEALTHEAST (OUTPATIENT)
Dept: INTERNAL MEDICINE | Facility: CLINIC | Age: 79
End: 2019-09-30

## 2019-09-30 ENCOUNTER — AMBULATORY - HEALTHEAST (OUTPATIENT)
Dept: LAB | Facility: CLINIC | Age: 79
End: 2019-09-30

## 2019-09-30 DIAGNOSIS — I26.99 PULMONARY EMBOLISM (H): ICD-10-CM

## 2019-09-30 LAB — INR PPP: 1.8 (ref 0.9–1.1)

## 2019-10-01 ENCOUNTER — RECORDS - HEALTHEAST (OUTPATIENT)
Dept: ADMINISTRATIVE | Facility: OTHER | Age: 79
End: 2019-10-01

## 2019-10-14 ENCOUNTER — AMBULATORY - HEALTHEAST (OUTPATIENT)
Dept: LAB | Facility: CLINIC | Age: 79
End: 2019-10-14

## 2019-10-14 ENCOUNTER — COMMUNICATION - HEALTHEAST (OUTPATIENT)
Dept: ANTICOAGULATION | Facility: CLINIC | Age: 79
End: 2019-10-14

## 2019-10-14 DIAGNOSIS — I26.99 PULMONARY EMBOLISM (H): ICD-10-CM

## 2019-10-14 LAB — INR PPP: 2.2 (ref 0.9–1.1)

## 2019-10-15 ENCOUNTER — COMMUNICATION - HEALTHEAST (OUTPATIENT)
Dept: INTERNAL MEDICINE | Facility: CLINIC | Age: 79
End: 2019-10-15

## 2019-10-15 DIAGNOSIS — I26.99 PULMONARY EMBOLISM (H): ICD-10-CM

## 2019-10-28 ENCOUNTER — COMMUNICATION - HEALTHEAST (OUTPATIENT)
Dept: ANTICOAGULATION | Facility: CLINIC | Age: 79
End: 2019-10-28

## 2019-10-28 ENCOUNTER — AMBULATORY - HEALTHEAST (OUTPATIENT)
Dept: LAB | Facility: CLINIC | Age: 79
End: 2019-10-28

## 2019-10-28 DIAGNOSIS — I26.99 PULMONARY EMBOLISM (H): ICD-10-CM

## 2019-10-28 LAB — INR PPP: 1.8 (ref 0.9–1.1)

## 2019-11-07 ENCOUNTER — COMMUNICATION - HEALTHEAST (OUTPATIENT)
Dept: ANTICOAGULATION | Facility: CLINIC | Age: 79
End: 2019-11-07

## 2019-11-07 ENCOUNTER — AMBULATORY - HEALTHEAST (OUTPATIENT)
Dept: LAB | Facility: CLINIC | Age: 79
End: 2019-11-07

## 2019-11-07 DIAGNOSIS — I26.99 PULMONARY EMBOLISM (H): ICD-10-CM

## 2019-11-07 LAB — INR PPP: 2.1 (ref 0.9–1.1)

## 2019-11-21 ENCOUNTER — AMBULATORY - HEALTHEAST (OUTPATIENT)
Dept: LAB | Facility: CLINIC | Age: 79
End: 2019-11-21

## 2019-11-21 ENCOUNTER — COMMUNICATION - HEALTHEAST (OUTPATIENT)
Dept: ANTICOAGULATION | Facility: CLINIC | Age: 79
End: 2019-11-21

## 2019-11-21 DIAGNOSIS — I26.99 PULMONARY EMBOLISM (H): ICD-10-CM

## 2019-11-21 LAB — INR PPP: 2.1 (ref 0.9–1.1)

## 2019-11-27 ENCOUNTER — COMMUNICATION - HEALTHEAST (OUTPATIENT)
Dept: ANTICOAGULATION | Facility: CLINIC | Age: 79
End: 2019-11-27

## 2019-11-27 DIAGNOSIS — I26.99 PULMONARY EMBOLISM (H): ICD-10-CM

## 2019-12-16 ENCOUNTER — AMBULATORY - HEALTHEAST (OUTPATIENT)
Dept: LAB | Facility: CLINIC | Age: 79
End: 2019-12-16

## 2019-12-16 ENCOUNTER — COMMUNICATION - HEALTHEAST (OUTPATIENT)
Dept: ANTICOAGULATION | Facility: CLINIC | Age: 79
End: 2019-12-16

## 2019-12-16 DIAGNOSIS — I26.99 PULMONARY EMBOLISM (H): ICD-10-CM

## 2019-12-16 LAB — INR PPP: 1.9 (ref 0.9–1.1)

## 2019-12-30 ENCOUNTER — COMMUNICATION - HEALTHEAST (OUTPATIENT)
Dept: ANTICOAGULATION | Facility: CLINIC | Age: 79
End: 2019-12-30

## 2019-12-30 ENCOUNTER — AMBULATORY - HEALTHEAST (OUTPATIENT)
Dept: LAB | Facility: CLINIC | Age: 79
End: 2019-12-30

## 2019-12-30 DIAGNOSIS — I26.99 PULMONARY EMBOLISM (H): ICD-10-CM

## 2019-12-30 LAB — INR PPP: 2 (ref 0.9–1.1)

## 2020-01-07 ENCOUNTER — OFFICE VISIT - HEALTHEAST (OUTPATIENT)
Dept: PULMONOLOGY | Facility: OTHER | Age: 80
End: 2020-01-07

## 2020-01-07 DIAGNOSIS — G47.33 OBSTRUCTIVE SLEEP APNEA: ICD-10-CM

## 2020-01-07 DIAGNOSIS — Z86.711 HISTORY OF PULMONARY EMBOLISM: ICD-10-CM

## 2020-01-07 DIAGNOSIS — I26.99 OTHER PULMONARY EMBOLISM WITHOUT ACUTE COR PULMONALE, UNSPECIFIED CHRONICITY (H): ICD-10-CM

## 2020-01-13 ENCOUNTER — AMBULATORY - HEALTHEAST (OUTPATIENT)
Dept: LAB | Facility: CLINIC | Age: 80
End: 2020-01-13

## 2020-01-13 ENCOUNTER — COMMUNICATION - HEALTHEAST (OUTPATIENT)
Dept: ANTICOAGULATION | Facility: CLINIC | Age: 80
End: 2020-01-13

## 2020-01-13 DIAGNOSIS — I26.99 PULMONARY EMBOLISM (H): ICD-10-CM

## 2020-01-13 LAB — INR PPP: 2.1 (ref 0.9–1.1)

## 2020-02-12 ENCOUNTER — COMMUNICATION - HEALTHEAST (OUTPATIENT)
Dept: ANTICOAGULATION | Facility: CLINIC | Age: 80
End: 2020-02-12

## 2020-02-12 ENCOUNTER — AMBULATORY - HEALTHEAST (OUTPATIENT)
Dept: LAB | Facility: CLINIC | Age: 80
End: 2020-02-12

## 2020-02-12 DIAGNOSIS — I26.99 PULMONARY EMBOLISM (H): ICD-10-CM

## 2020-02-12 LAB — INR PPP: 2.2 (ref 0.9–1.1)

## 2020-03-02 ENCOUNTER — COMMUNICATION - HEALTHEAST (OUTPATIENT)
Dept: INTERNAL MEDICINE | Facility: CLINIC | Age: 80
End: 2020-03-02

## 2020-03-02 DIAGNOSIS — I10 HYPERTENSION: ICD-10-CM

## 2020-03-09 ENCOUNTER — OFFICE VISIT - HEALTHEAST (OUTPATIENT)
Dept: INTERNAL MEDICINE | Facility: CLINIC | Age: 80
End: 2020-03-09

## 2020-03-09 ENCOUNTER — COMMUNICATION - HEALTHEAST (OUTPATIENT)
Dept: INTERNAL MEDICINE | Facility: CLINIC | Age: 80
End: 2020-03-09

## 2020-03-09 ENCOUNTER — COMMUNICATION - HEALTHEAST (OUTPATIENT)
Dept: ANTICOAGULATION | Facility: CLINIC | Age: 80
End: 2020-03-09

## 2020-03-09 DIAGNOSIS — Z00.00 HEALTHCARE MAINTENANCE: ICD-10-CM

## 2020-03-09 DIAGNOSIS — Z86.79 HISTORY OF SUBDURAL HEMATOMA: ICD-10-CM

## 2020-03-09 DIAGNOSIS — I10 HYPERTENSION: ICD-10-CM

## 2020-03-09 DIAGNOSIS — Z86.711 HISTORY OF PULMONARY EMBOLISM: ICD-10-CM

## 2020-03-09 DIAGNOSIS — I26.99 PULMONARY EMBOLISM (H): ICD-10-CM

## 2020-03-09 DIAGNOSIS — G47.33 OBSTRUCTIVE SLEEP APNEA: ICD-10-CM

## 2020-03-09 DIAGNOSIS — R21 RASH: ICD-10-CM

## 2020-03-09 DIAGNOSIS — Z51.81 MEDICATION MONITORING ENCOUNTER: ICD-10-CM

## 2020-03-09 DIAGNOSIS — Z85.46 HISTORY OF PROSTATE CANCER: ICD-10-CM

## 2020-03-09 DIAGNOSIS — Z00.00 ROUTINE GENERAL MEDICAL EXAMINATION AT A HEALTH CARE FACILITY: ICD-10-CM

## 2020-03-09 LAB
ALBUMIN SERPL-MCNC: 4.1 G/DL (ref 3.5–5)
ALP SERPL-CCNC: 94 U/L (ref 45–120)
ALT SERPL W P-5'-P-CCNC: 30 U/L (ref 0–45)
ANION GAP SERPL CALCULATED.3IONS-SCNC: 10 MMOL/L (ref 5–18)
AST SERPL W P-5'-P-CCNC: 25 U/L (ref 0–40)
BILIRUB SERPL-MCNC: 0.9 MG/DL (ref 0–1)
BUN SERPL-MCNC: 18 MG/DL (ref 8–28)
CALCIUM SERPL-MCNC: 10.6 MG/DL (ref 8.5–10.5)
CHLORIDE BLD-SCNC: 100 MMOL/L (ref 98–107)
CHOLEST SERPL-MCNC: 206 MG/DL
CO2 SERPL-SCNC: 28 MMOL/L (ref 22–31)
CREAT SERPL-MCNC: 0.83 MG/DL (ref 0.7–1.3)
ERYTHROCYTE [DISTWIDTH] IN BLOOD BY AUTOMATED COUNT: 12.4 % (ref 11–14.5)
FASTING STATUS PATIENT QL REPORTED: YES
GFR SERPL CREATININE-BSD FRML MDRD: >60 ML/MIN/1.73M2
GLUCOSE BLD-MCNC: 109 MG/DL (ref 70–125)
HCT VFR BLD AUTO: 51.8 % (ref 40–54)
HDLC SERPL-MCNC: 69 MG/DL
HGB BLD-MCNC: 17.5 G/DL (ref 14–18)
INR PPP: 2.4 (ref 0.9–1.1)
LDLC SERPL CALC-MCNC: 114 MG/DL
MCH RBC QN AUTO: 30.3 PG (ref 27–34)
MCHC RBC AUTO-ENTMCNC: 33.7 G/DL (ref 32–36)
MCV RBC AUTO: 90 FL (ref 80–100)
PLATELET # BLD AUTO: 241 THOU/UL (ref 140–440)
PMV BLD AUTO: 8.2 FL (ref 7–10)
POTASSIUM BLD-SCNC: 4 MMOL/L (ref 3.5–5)
PROT SERPL-MCNC: 6.6 G/DL (ref 6–8)
PSA SERPL-MCNC: <0.1 NG/ML (ref 0–6.5)
RBC # BLD AUTO: 5.77 MILL/UL (ref 4.4–6.2)
SODIUM SERPL-SCNC: 138 MMOL/L (ref 136–145)
TRIGL SERPL-MCNC: 113 MG/DL
WBC: 6.8 THOU/UL (ref 4–11)

## 2020-03-09 RX ORDER — TRIAMCINOLONE ACETONIDE 1 MG/G
1 CREAM TOPICAL PRN
Status: SHIPPED | COMMUNITY
Start: 2020-02-24 | End: 2021-08-24

## 2020-03-09 ASSESSMENT — MIFFLIN-ST. JEOR: SCORE: 1442.44

## 2020-03-11 ENCOUNTER — COMMUNICATION - HEALTHEAST (OUTPATIENT)
Dept: INTERNAL MEDICINE | Facility: CLINIC | Age: 80
End: 2020-03-11

## 2020-03-11 DIAGNOSIS — I26.99 PULMONARY EMBOLISM (H): ICD-10-CM

## 2020-04-13 ENCOUNTER — AMBULATORY - HEALTHEAST (OUTPATIENT)
Dept: LAB | Facility: CLINIC | Age: 80
End: 2020-04-13

## 2020-04-13 ENCOUNTER — COMMUNICATION - HEALTHEAST (OUTPATIENT)
Dept: ANTICOAGULATION | Facility: CLINIC | Age: 80
End: 2020-04-13

## 2020-04-13 DIAGNOSIS — I26.99 PULMONARY EMBOLISM (H): ICD-10-CM

## 2020-04-13 LAB — INR PPP: 1.9 (ref 0.9–1.1)

## 2020-04-15 ENCOUNTER — COMMUNICATION - HEALTHEAST (OUTPATIENT)
Dept: INTERNAL MEDICINE | Facility: CLINIC | Age: 80
End: 2020-04-15

## 2020-04-15 DIAGNOSIS — I26.99 PULMONARY EMBOLISM (H): ICD-10-CM

## 2020-05-04 ENCOUNTER — COMMUNICATION - HEALTHEAST (OUTPATIENT)
Dept: ANTICOAGULATION | Facility: CLINIC | Age: 80
End: 2020-05-04

## 2020-05-04 ENCOUNTER — AMBULATORY - HEALTHEAST (OUTPATIENT)
Dept: LAB | Facility: CLINIC | Age: 80
End: 2020-05-04

## 2020-05-04 DIAGNOSIS — I26.99 PULMONARY EMBOLISM (H): ICD-10-CM

## 2020-05-04 LAB — INR PPP: 2.4 (ref 0.9–1.1)

## 2020-05-26 ENCOUNTER — AMBULATORY - HEALTHEAST (OUTPATIENT)
Dept: LAB | Facility: CLINIC | Age: 80
End: 2020-05-26

## 2020-05-26 ENCOUNTER — COMMUNICATION - HEALTHEAST (OUTPATIENT)
Dept: ANTICOAGULATION | Facility: CLINIC | Age: 80
End: 2020-05-26

## 2020-05-26 DIAGNOSIS — I26.99 PULMONARY EMBOLISM (H): ICD-10-CM

## 2020-05-26 LAB — INR PPP: 2.2 (ref 0.9–1.1)

## 2020-06-22 ENCOUNTER — AMBULATORY - HEALTHEAST (OUTPATIENT)
Dept: LAB | Facility: CLINIC | Age: 80
End: 2020-06-22

## 2020-06-22 ENCOUNTER — COMMUNICATION - HEALTHEAST (OUTPATIENT)
Dept: ANTICOAGULATION | Facility: CLINIC | Age: 80
End: 2020-06-22

## 2020-06-22 DIAGNOSIS — I26.99 PULMONARY EMBOLISM (H): ICD-10-CM

## 2020-06-22 LAB — INR PPP: 2.4 (ref 0.9–1.1)

## 2020-07-02 ENCOUNTER — RECORDS - HEALTHEAST (OUTPATIENT)
Dept: ADMINISTRATIVE | Facility: OTHER | Age: 80
End: 2020-07-02

## 2020-07-20 ENCOUNTER — COMMUNICATION - HEALTHEAST (OUTPATIENT)
Dept: ANTICOAGULATION | Facility: CLINIC | Age: 80
End: 2020-07-20

## 2020-07-20 ENCOUNTER — AMBULATORY - HEALTHEAST (OUTPATIENT)
Dept: LAB | Facility: CLINIC | Age: 80
End: 2020-07-20

## 2020-07-20 DIAGNOSIS — I26.99 PULMONARY EMBOLISM (H): ICD-10-CM

## 2020-07-20 LAB — INR PPP: 1.8 (ref 0.9–1.1)

## 2020-08-03 ENCOUNTER — COMMUNICATION - HEALTHEAST (OUTPATIENT)
Dept: ANTICOAGULATION | Facility: CLINIC | Age: 80
End: 2020-08-03

## 2020-08-03 ENCOUNTER — AMBULATORY - HEALTHEAST (OUTPATIENT)
Dept: LAB | Facility: CLINIC | Age: 80
End: 2020-08-03

## 2020-08-03 DIAGNOSIS — I26.99 PULMONARY EMBOLISM (H): ICD-10-CM

## 2020-08-03 LAB — INR PPP: 2 (ref 0.9–1.1)

## 2020-08-26 ENCOUNTER — AMBULATORY - HEALTHEAST (OUTPATIENT)
Dept: LAB | Facility: CLINIC | Age: 80
End: 2020-08-26

## 2020-08-26 ENCOUNTER — COMMUNICATION - HEALTHEAST (OUTPATIENT)
Dept: ANTICOAGULATION | Facility: CLINIC | Age: 80
End: 2020-08-26

## 2020-08-26 DIAGNOSIS — I26.99 PULMONARY EMBOLISM (H): ICD-10-CM

## 2020-08-26 LAB — INR PPP: 2.3 (ref 0.9–1.1)

## 2020-09-14 ENCOUNTER — OFFICE VISIT - HEALTHEAST (OUTPATIENT)
Dept: INTERNAL MEDICINE | Facility: CLINIC | Age: 80
End: 2020-09-14

## 2020-09-14 ENCOUNTER — RECORDS - HEALTHEAST (OUTPATIENT)
Dept: ADMINISTRATIVE | Facility: OTHER | Age: 80
End: 2020-09-14

## 2020-09-14 ENCOUNTER — COMMUNICATION - HEALTHEAST (OUTPATIENT)
Dept: ANTICOAGULATION | Facility: CLINIC | Age: 80
End: 2020-09-14

## 2020-09-14 DIAGNOSIS — I10 ESSENTIAL HYPERTENSION: ICD-10-CM

## 2020-09-14 DIAGNOSIS — G47.33 OBSTRUCTIVE SLEEP APNEA: ICD-10-CM

## 2020-09-14 DIAGNOSIS — Z86.711 HISTORY OF PULMONARY EMBOLISM: ICD-10-CM

## 2020-09-14 DIAGNOSIS — I26.99 PULMONARY EMBOLISM (H): ICD-10-CM

## 2020-09-14 DIAGNOSIS — R42 VERTIGO: ICD-10-CM

## 2020-09-14 DIAGNOSIS — R26.81 UNSTEADY GAIT: ICD-10-CM

## 2020-09-14 LAB — INR PPP: 2.2 (ref 0.9–1.1)

## 2020-10-08 ENCOUNTER — COMMUNICATION - HEALTHEAST (OUTPATIENT)
Dept: INTERNAL MEDICINE | Facility: CLINIC | Age: 80
End: 2020-10-08

## 2020-10-08 DIAGNOSIS — Z86.711 HISTORY OF PULMONARY EMBOLISM: ICD-10-CM

## 2020-10-14 ENCOUNTER — AMBULATORY - HEALTHEAST (OUTPATIENT)
Dept: LAB | Facility: CLINIC | Age: 80
End: 2020-10-14

## 2020-10-14 ENCOUNTER — COMMUNICATION - HEALTHEAST (OUTPATIENT)
Dept: ANTICOAGULATION | Facility: CLINIC | Age: 80
End: 2020-10-14

## 2020-10-14 DIAGNOSIS — I26.99 PULMONARY EMBOLISM (H): ICD-10-CM

## 2020-10-14 LAB — INR PPP: 2.2 (ref 0.9–1.1)

## 2020-10-29 ENCOUNTER — AMBULATORY - HEALTHEAST (OUTPATIENT)
Dept: ANTICOAGULATION | Facility: CLINIC | Age: 80
End: 2020-10-29

## 2020-10-29 DIAGNOSIS — I26.99 PULMONARY EMBOLISM (H): ICD-10-CM

## 2020-11-25 ENCOUNTER — AMBULATORY - HEALTHEAST (OUTPATIENT)
Dept: LAB | Facility: CLINIC | Age: 80
End: 2020-11-25

## 2020-11-25 ENCOUNTER — COMMUNICATION - HEALTHEAST (OUTPATIENT)
Dept: ANTICOAGULATION | Facility: CLINIC | Age: 80
End: 2020-11-25

## 2020-11-25 DIAGNOSIS — I26.99 PULMONARY EMBOLISM (H): ICD-10-CM

## 2020-11-25 LAB — INR PPP: 2.3 (ref 0.9–1.1)

## 2020-12-30 ENCOUNTER — COMMUNICATION - HEALTHEAST (OUTPATIENT)
Dept: ANTICOAGULATION | Facility: CLINIC | Age: 80
End: 2020-12-30

## 2020-12-30 ENCOUNTER — AMBULATORY - HEALTHEAST (OUTPATIENT)
Dept: LAB | Facility: CLINIC | Age: 80
End: 2020-12-30

## 2020-12-30 DIAGNOSIS — I26.99 PULMONARY EMBOLISM (H): ICD-10-CM

## 2020-12-30 LAB — INR PPP: 2.4 (ref 0.9–1.1)

## 2021-01-24 ENCOUNTER — RECORDS - HEALTHEAST (OUTPATIENT)
Dept: ADMINISTRATIVE | Facility: OTHER | Age: 81
End: 2021-01-24

## 2021-01-26 ENCOUNTER — OFFICE VISIT - HEALTHEAST (OUTPATIENT)
Dept: PULMONOLOGY | Facility: OTHER | Age: 81
End: 2021-01-26

## 2021-01-26 DIAGNOSIS — G47.33 OBSTRUCTIVE SLEEP APNEA: ICD-10-CM

## 2021-01-26 DIAGNOSIS — I26.99 OTHER PULMONARY EMBOLISM WITHOUT ACUTE COR PULMONALE, UNSPECIFIED CHRONICITY (H): ICD-10-CM

## 2021-01-27 ENCOUNTER — AMBULATORY - HEALTHEAST (OUTPATIENT)
Dept: PULMONOLOGY | Facility: OTHER | Age: 81
End: 2021-01-27

## 2021-02-03 ENCOUNTER — COMMUNICATION - HEALTHEAST (OUTPATIENT)
Dept: ANTICOAGULATION | Facility: CLINIC | Age: 81
End: 2021-02-03

## 2021-02-03 ENCOUNTER — AMBULATORY - HEALTHEAST (OUTPATIENT)
Dept: LAB | Facility: CLINIC | Age: 81
End: 2021-02-03

## 2021-02-03 DIAGNOSIS — I26.99 PULMONARY EMBOLISM (H): ICD-10-CM

## 2021-02-03 LAB — INR PPP: 2.6 (ref 0.9–1.1)

## 2021-02-04 ENCOUNTER — AMBULATORY - HEALTHEAST (OUTPATIENT)
Dept: NURSING | Facility: CLINIC | Age: 81
End: 2021-02-04

## 2021-02-05 ENCOUNTER — RECORDS - HEALTHEAST (OUTPATIENT)
Dept: ADMINISTRATIVE | Facility: OTHER | Age: 81
End: 2021-02-05

## 2021-02-25 ENCOUNTER — AMBULATORY - HEALTHEAST (OUTPATIENT)
Dept: NURSING | Facility: CLINIC | Age: 81
End: 2021-02-25

## 2021-03-01 ENCOUNTER — RECORDS - HEALTHEAST (OUTPATIENT)
Dept: ADMINISTRATIVE | Facility: OTHER | Age: 81
End: 2021-03-01

## 2021-03-11 ENCOUNTER — COMMUNICATION - HEALTHEAST (OUTPATIENT)
Dept: ANTICOAGULATION | Facility: CLINIC | Age: 81
End: 2021-03-11

## 2021-03-11 ENCOUNTER — OFFICE VISIT - HEALTHEAST (OUTPATIENT)
Dept: INTERNAL MEDICINE | Facility: CLINIC | Age: 81
End: 2021-03-11

## 2021-03-11 DIAGNOSIS — Z51.81 ENCOUNTER FOR THERAPEUTIC DRUG MONITORING: ICD-10-CM

## 2021-03-11 DIAGNOSIS — I10 BENIGN ESSENTIAL HYPERTENSION: ICD-10-CM

## 2021-03-11 DIAGNOSIS — Z85.828 HX OF SKIN CANCER, BASAL CELL: ICD-10-CM

## 2021-03-11 DIAGNOSIS — G47.33 OBSTRUCTIVE SLEEP APNEA: ICD-10-CM

## 2021-03-11 DIAGNOSIS — R53.83 FATIGUE, UNSPECIFIED TYPE: ICD-10-CM

## 2021-03-11 DIAGNOSIS — G62.9 PERIPHERAL POLYNEUROPATHY: ICD-10-CM

## 2021-03-11 DIAGNOSIS — Z86.711 HISTORY OF PULMONARY EMBOLISM: ICD-10-CM

## 2021-03-11 DIAGNOSIS — Z00.00 ROUTINE GENERAL MEDICAL EXAMINATION AT A HEALTH CARE FACILITY: ICD-10-CM

## 2021-03-11 DIAGNOSIS — H35.30 MACULAR DEGENERATION, UNSPECIFIED LATERALITY, UNSPECIFIED TYPE: ICD-10-CM

## 2021-03-11 DIAGNOSIS — I26.99 PULMONARY EMBOLISM (H): ICD-10-CM

## 2021-03-11 DIAGNOSIS — Z85.46 HISTORY OF PROSTATE CANCER: ICD-10-CM

## 2021-03-11 DIAGNOSIS — Z00.00 ENCOUNTER FOR WELLNESS EXAMINATION IN ADULT: ICD-10-CM

## 2021-03-11 LAB
ALBUMIN SERPL-MCNC: 4.3 G/DL (ref 3.5–5)
ALP SERPL-CCNC: 101 U/L (ref 45–120)
ALT SERPL W P-5'-P-CCNC: 31 U/L (ref 0–45)
ANION GAP SERPL CALCULATED.3IONS-SCNC: 16 MMOL/L (ref 5–18)
AST SERPL W P-5'-P-CCNC: 25 U/L (ref 0–40)
BILIRUB SERPL-MCNC: 0.7 MG/DL (ref 0–1)
BUN SERPL-MCNC: 18 MG/DL (ref 8–28)
CALCIUM SERPL-MCNC: 10.7 MG/DL (ref 8.5–10.5)
CHLORIDE BLD-SCNC: 102 MMOL/L (ref 98–107)
CHOLEST SERPL-MCNC: 223 MG/DL
CO2 SERPL-SCNC: 24 MMOL/L (ref 22–31)
CREAT SERPL-MCNC: 0.89 MG/DL (ref 0.7–1.3)
ERYTHROCYTE [DISTWIDTH] IN BLOOD BY AUTOMATED COUNT: 12.9 % (ref 11–14.5)
FASTING STATUS PATIENT QL REPORTED: YES
GFR SERPL CREATININE-BSD FRML MDRD: >60 ML/MIN/1.73M2
GLUCOSE BLD-MCNC: 112 MG/DL (ref 70–125)
HCT VFR BLD AUTO: 51.6 % (ref 40–54)
HDLC SERPL-MCNC: 68 MG/DL
HGB BLD-MCNC: 17.1 G/DL (ref 14–18)
INR PPP: 2.6 (ref 0.9–1.1)
LDLC SERPL CALC-MCNC: 129 MG/DL
MCH RBC QN AUTO: 29.1 PG (ref 27–34)
MCHC RBC AUTO-ENTMCNC: 33.1 G/DL (ref 32–36)
MCV RBC AUTO: 88 FL (ref 80–100)
PLATELET # BLD AUTO: 247 THOU/UL (ref 140–440)
PMV BLD AUTO: 9.8 FL (ref 7–10)
POTASSIUM BLD-SCNC: 3.9 MMOL/L (ref 3.5–5)
PROT SERPL-MCNC: 6.8 G/DL (ref 6–8)
PSA SERPL-MCNC: <0.1 NG/ML (ref 0–6.5)
RBC # BLD AUTO: 5.87 MILL/UL (ref 4.4–6.2)
SODIUM SERPL-SCNC: 142 MMOL/L (ref 136–145)
TRIGL SERPL-MCNC: 132 MG/DL
TSH SERPL DL<=0.005 MIU/L-ACNC: 1.95 UIU/ML (ref 0.3–5)
WBC: 6.5 THOU/UL (ref 4–11)

## 2021-03-11 RX ORDER — CHLORTHALIDONE 25 MG/1
TABLET ORAL
Qty: 45 TABLET | Refills: 3 | Status: ON HOLD | OUTPATIENT
Start: 2021-03-11 | End: 2022-01-31

## 2021-03-11 RX ORDER — WARFARIN SODIUM 5 MG/1
TABLET ORAL
Qty: 90 TABLET | Refills: 1 | Status: SHIPPED | OUTPATIENT
Start: 2021-03-11 | End: 2021-10-07

## 2021-03-11 RX ORDER — WARFARIN SODIUM 2.5 MG/1
TABLET ORAL
Qty: 30 TABLET | Refills: 1 | Status: SHIPPED | OUTPATIENT
Start: 2021-03-11 | End: 2022-04-11

## 2021-03-11 ASSESSMENT — MIFFLIN-ST. JEOR: SCORE: 1447.89

## 2021-03-12 ENCOUNTER — COMMUNICATION - HEALTHEAST (OUTPATIENT)
Dept: INTERNAL MEDICINE | Facility: CLINIC | Age: 81
End: 2021-03-12

## 2021-04-21 ENCOUNTER — COMMUNICATION - HEALTHEAST (OUTPATIENT)
Dept: ANTICOAGULATION | Facility: CLINIC | Age: 81
End: 2021-04-21

## 2021-04-21 ENCOUNTER — AMBULATORY - HEALTHEAST (OUTPATIENT)
Dept: LAB | Facility: CLINIC | Age: 81
End: 2021-04-21

## 2021-04-21 DIAGNOSIS — I26.99 PULMONARY EMBOLISM (H): ICD-10-CM

## 2021-04-21 LAB — INR PPP: 3.1 (ref 0.9–1.1)

## 2021-04-26 ENCOUNTER — RECORDS - HEALTHEAST (OUTPATIENT)
Dept: ADMINISTRATIVE | Facility: OTHER | Age: 81
End: 2021-04-26

## 2021-05-05 ENCOUNTER — AMBULATORY - HEALTHEAST (OUTPATIENT)
Dept: LAB | Facility: CLINIC | Age: 81
End: 2021-05-05

## 2021-05-05 ENCOUNTER — COMMUNICATION - HEALTHEAST (OUTPATIENT)
Dept: ANTICOAGULATION | Facility: CLINIC | Age: 81
End: 2021-05-05

## 2021-05-05 DIAGNOSIS — I26.99 PULMONARY EMBOLISM (H): ICD-10-CM

## 2021-05-05 LAB — INR PPP: 2.1 (ref 0.9–1.1)

## 2021-05-19 ENCOUNTER — AMBULATORY - HEALTHEAST (OUTPATIENT)
Dept: LAB | Facility: CLINIC | Age: 81
End: 2021-05-19

## 2021-05-19 ENCOUNTER — COMMUNICATION - HEALTHEAST (OUTPATIENT)
Dept: ANTICOAGULATION | Facility: CLINIC | Age: 81
End: 2021-05-19

## 2021-05-19 DIAGNOSIS — I26.99 PULMONARY EMBOLISM (H): ICD-10-CM

## 2021-05-19 LAB — INR PPP: 2.5 (ref 0.9–1.1)

## 2021-05-26 NOTE — TELEPHONE ENCOUNTER
ANTICOAGULATION  MANAGEMENT    Assessment     Today's INR result of 2.2 is Therapeutic (goal INR of 2.0-3.0)        Warfarin taken as previously instructed    No new diet changes affecting INR    No new medication/supplements affecting INR    Continues to tolerate warfarin with no reported s/s of bleeding or thromboembolism     Previous INR was Subtherapeutic    Plan:     Spoke with Edward regarding INR result and instructed:     Warfarin Dosing Instructions:  Continue current warfarin dose 5 mg daily. (0 % change)    Instructed patient to follow up no later than: 1-2 weeks.    Education provided: importance of therapeutic range, importance of following up for INR monitoring at instructed interval and importance of taking warfarin as instructed    Edward verbalizes understanding and agrees to warfarin dosing plan.    Instructed to call the [facility] for any changes, questions or concerns. (#[phone])   ?   Sussy Lane RN    Subjective/Objective:      Edward Mendoza, a 78 y.o. male is on warfarin.     Edward reports:     Home warfarin dose: verbally confirmed home dose with Edward and updated on anticoagulation calendar     Missed doses: No     Medication changes:  No     S/S of bleeding or thromboembolism:  No     New Injury or illness:  No     Changes in diet or alcohol consumption:  No     Upcoming surgery, procedure or cardioversion:  No    Anticoagulation Episode Summary     Current INR goal:   2.0-3.0   TTR:   81.9 % (3.2 y)   Next INR check:   4/5/2019   INR from last check:   2.20 (3/22/2019)   Weekly max warfarin dose:      Target end date:      INR check location:      Preferred lab:      Send INR reminders to:   ANTICOAGULATION POOL A (WBY,WBE,MID,RSC)    Indications    Pulmonary emboli (H) (Resolved) [I26.99]           Comments:            Anticoagulation Care Providers     Provider Role Specialty Phone number    Daria Christopher MD Referring Internal Medicine [phone]    Dari Carroll MD  [facility] Internal Medicine [phone]

## 2021-05-27 NOTE — TELEPHONE ENCOUNTER
ANTICOAGULATION  MANAGEMENT    Assessment     Today's INR result of 2.4 is Therapeutic (goal INR of 2.0-3.0)        Warfarin taken as previously instructed    No new diet changes affecting INR    No new medication/supplements affecting INR    Continues to tolerate warfarin with no reported s/s of bleeding or thromboembolism     Previous INR was Therapeutic    Plan:     Spoke with dEward regarding INR result and instructed:     Warfarin Dosing Instructions:  Continue current warfarin dose 5 mg daily. (0 % change)    Instructed patient to follow up no later than: 2-3 weeks.    Education provided: importance of therapeutic range, importance of following up for INR monitoring at instructed interval and importance of taking warfarin as instructed    Edward verbalizes understanding and agrees to warfarin dosing plan.    Instructed to call the [facility] for any changes, questions or concerns. (#[phone])   ?   Sussy Lane RN    Subjective/Objective:      Edward Mendoza, a 78 y.o. male is on warfarin.     Edward reports:     Home warfarin dose: verbally confirmed home dose with Edward and updated on anticoagulation calendar     Missed doses: No     Medication changes:  No     S/S of bleeding or thromboembolism:  No     New Injury or illness:  No     Changes in diet or alcohol consumption:  No     Upcoming surgery, procedure or cardioversion:  No    Anticoagulation Episode Summary     Current INR goal:   2.0-3.0   TTR:   82.1 % (3.3 y)   Next INR check:   4/24/2019   INR from last check:   2.40 (4/3/2019)   Weekly max warfarin dose:      Target end date:      INR check location:      Preferred lab:      Send INR reminders to:   ANTICOAGULATION POOL A (WBY,WBE,MID,RSC)    Indications    Pulmonary emboli (H) (Resolved) [I26.99]           Comments:            Anticoagulation Care Providers     Provider Role Specialty Phone number    Daria Christopher MD Referring Internal Medicine [phone]    Dari Carroll MD Responsible  Internal Medicine [phone]

## 2021-05-28 NOTE — TELEPHONE ENCOUNTER
ANTICOAGULATION  MANAGEMENT    Assessment     Today's INR result of 2.3 is Therapeutic (goal INR of 2.0-3.0)        Warfarin taken as previously instructed    No new diet changes affecting INR    No new medication/supplements affecting INR    Continues to tolerate warfarin with no reported s/s of bleeding or thromboembolism     Previous INR was Therapeutic    Plan:     Spoke with Edward regarding INR result and instructed:     Warfarin Dosing Instructions:  Continue current warfarin dose 5 mg daily. (0 % change)    Instructed patient to follow up no later than: one month    Education provided: importance of therapeutic range, importance of following up for INR monitoring at instructed interval and importance of taking warfarin as instructed    Edward verbalizes understanding and agrees to warfarin dosing plan.    Instructed to call the [facility] for any changes, questions or concerns. (#[phone])   ?   Sussy Lane RN    Subjective/Objective:      Edward Mendoza, a 78 y.o. male is on warfarin.     Edward reports:     Home warfarin dose: verbally confirmed home dose with Edward and updated on anticoagulation calendar     Missed doses: No     Medication changes:  No     S/S of bleeding or thromboembolism:  No     New Injury or illness:  No     Changes in diet or alcohol consumption:  No     Upcoming surgery, procedure or cardioversion:  No    Anticoagulation Episode Summary     Current INR goal:   2.0-3.0   TTR:   82.4 % (3.3 y)   Next INR check:   5/20/2019   INR from last check:   2.30 (4/22/2019)   Weekly max warfarin dose:      Target end date:      INR check location:      Preferred lab:      Send INR reminders to:   ANTICOAGULATION POOL A (WBY,WBE,MID,RSC)    Indications    Pulmonary emboli (H) (Resolved) [I26.99]           Comments:            Anticoagulation Care Providers     Provider Role Specialty Phone number    Daria Christopher MD Referring Internal Medicine [phone]    Dari Carroll MD Responsible  Internal Medicine [phone]

## 2021-05-29 NOTE — TELEPHONE ENCOUNTER
ANTICOAGULATION  MANAGEMENT    Assessment     Today's INR result of 2.4 is Therapeutic (goal INR of 2.0-3.0)        Warfarin taken as previously instructed    No new diet changes affecting INR    No new medication/supplements affecting INR    Continues to tolerate warfarin with no reported s/s of bleeding or thromboembolism     Previous INR was Therapeutic    Plan:     Spoke with Edward regarding INR result and instructed:     Warfarin Dosing Instructions:  Continue current warfarin dose 5 mg daily.  (0 % change)    Instructed patient to follow up no later than: 4-6 weeks.    Education provided: importance of therapeutic range, importance of following up for INR monitoring at instructed interval and importance of taking warfarin as instructed    Edward verbalizes understanding and agrees to warfarin dosing plan.    Instructed to call the [facility] for any changes, questions or concerns. (#[phone])   ?   Sussy Lane RN    Subjective/Objective:      Edward Mendoza, a 79 y.o. male is on warfarin.     Edward reports:     Home warfarin dose: verbally confirmed home dose with Edward and updated on anticoagulation calendar     Missed doses: No     Medication changes:  No     S/S of bleeding or thromboembolism:  No     New Injury or illness:  No     Changes in diet or alcohol consumption:  No     Upcoming surgery, procedure or cardioversion:  No    Anticoagulation Episode Summary     Current INR goal:   2.0-3.0   TTR:   83.1 % (3.5 y)   Next INR check:   7/29/2019   INR from last check:   2.40 (6/17/2019)   Weekly max warfarin dose:      Target end date:      INR check location:      Preferred lab:      Send INR reminders to:   [facility]PS    Indications    Pulmonary emboli (H) (Resolved) [I26.99]           Comments:            Anticoagulation Care Providers     Provider Role Specialty Phone number    Daria Christopher MD Referring Internal Medicine [phone]    Dari Carroll MD Responsible Internal Medicine  [phone]

## 2021-05-29 NOTE — TELEPHONE ENCOUNTER
ANTICOAGULATION  MANAGEMENT    Assessment     Today's INR result of 2.0 is Therapeutic (goal INR of 2.0-3.0)        Warfarin taken as previously instructed    No new diet changes affecting INR    No new medication/supplements affecting INR    Continues to tolerate warfarin with no reported s/s of bleeding or thromboembolism     Previous INR was Therapeutic    Plan:     Spoke with Ewdard regarding INR result and instructed:     Warfarin Dosing Instructions:  Continue current warfarin dose 5 mg daily.  (0 % change)    Instructed patient to follow up no later than: 4-6 weeks.    Education provided: importance of therapeutic range, importance of following up for INR monitoring at instructed interval and importance of taking warfarin as instructed    Edward verbalizes understanding and agrees to warfarin dosing plan.    Instructed to call the [facility] for any changes, questions or concerns. (#[phone])   ?   Sussy Lane RN    Subjective/Objective:      Edward Mendoza, a 78 y.o. male is on warfarin.     Edward reports:     Home warfarin dose: verbally confirmed home dose with edward and updated on anticoagulation calendar     Missed doses: No     Medication changes:  No     S/S of bleeding or thromboembolism:  No     New Injury or illness:  No     Changes in diet or alcohol consumption:  No     Upcoming surgery, procedure or cardioversion:  No    Anticoagulation Episode Summary     Current INR goal:   2.0-3.0   TTR:   82.8 % (3.4 y)   Next INR check:   7/2/2019   INR from last check:   2.00 (5/21/2019)   Weekly max warfarin dose:      Target end date:      INR check location:      Preferred lab:      Send INR reminders to:   ANTICOAGULATION POOL A (WBY,WBE,MID,RSC)    Indications    Pulmonary emboli (H) (Resolved) [I26.99]           Comments:            Anticoagulation Care Providers     Provider Role Specialty Phone number    Daria Christopher MD Referring Internal Medicine [phone]    Dari Carroll MD  [facility] Internal Medicine [phone]

## 2021-05-30 VITALS — WEIGHT: 175.9 LBS | BODY MASS INDEX: 26.05 KG/M2

## 2021-05-30 VITALS — BODY MASS INDEX: 25.95 KG/M2 | WEIGHT: 175.2 LBS

## 2021-05-30 NOTE — TELEPHONE ENCOUNTER
ANTICOAGULATION  MANAGEMENT    Assessment     Today's INR result of 2.0 is Therapeutic (goal INR of 2.0-3.0)        Warfarin taken as previously instructed    No new diet changes affecting INR    No new medication/supplements affecting INR    Continues to tolerate warfarin with no reported s/s of bleeding or thromboembolism     Previous INR was Therapeutic    Plan:     Spoke with Edward regarding INR result and instructed:     Warfarin Dosing Instructions:  Continue current warfarin dose 5 mg daily. (0 % change)    Instructed patient to follow up no later than: 4-6 weeks.    Education provided: importance of therapeutic range, importance of following up for INR monitoring at instructed interval and importance of taking warfarin as instructed    Edward verbalizes understanding and agrees to warfarin dosing plan.    Instructed to call the [facility] for any changes, questions or concerns. (#[phone])   ?   Sussy Lane RN    Subjective/Objective:      Edward Mendoza, a 79 y.o. male is on warfarin.     Edward reports:     Home warfarin dose: verbally confirmed home dose with Edward and updated on anticoagulation calendar     Missed doses: No     Medication changes:  No     S/S of bleeding or thromboembolism:  No     New Injury or illness:  No     Changes in diet or alcohol consumption:  No     Upcoming surgery, procedure or cardioversion:  No    Anticoagulation Episode Summary     Current INR goal:   2.0-3.0   TTR:   83.6 % (3.6 y)   Next INR check:   9/2/2019   INR from last check:   2.00 (7/22/2019)   Weekly max warfarin dose:      Target end date:      INR check location:      Preferred lab:      Send INR reminders to:   [facility]    Indications    Pulmonary emboli (H) (Resolved) [I26.99]           Comments:            Anticoagulation Care Providers     Provider Role Specialty Phone number    Dari Carroll MD Responsible Internal Medicine [phone]    Ascencion Calvillo MD Responsible Internal Medicine  [phone]

## 2021-05-31 VITALS — HEIGHT: 69 IN | WEIGHT: 167.9 LBS | BODY MASS INDEX: 24.87 KG/M2

## 2021-05-31 VITALS — BODY MASS INDEX: 25.56 KG/M2 | WEIGHT: 172.6 LBS

## 2021-05-31 NOTE — TELEPHONE ENCOUNTER
ANTICOAGULATION  MANAGEMENT    Assessment     Today's INR result of 2.1 is Therapeutic (goal INR of 2.0-3.0)        Warfarin taken as previously instructed    No new diet changes affecting INR    No new medication/supplements affecting INR    Continues to tolerate warfarin with no reported s/s of bleeding or thromboembolism     Previous INR was Therapeutic    Plan:     Spoke with Edward regarding INR result and instructed:     Warfarin Dosing Instructions:  Continue current warfarin dose 5 mg daily. (0 % change)    Instructed patient to follow up no later than: 4-6 weeks.    Education provided: importance of therapeutic range, importance of following up for INR monitoring at instructed interval and importance of taking warfarin as instructed    Edward verbalizes understanding and agrees to warfarin dosing plan.    Instructed to call the [facility] for any changes, questions or concerns. (#[phone])   ?   Sussy Lane RN    Subjective/Objective:      Edward Mendoza, a 79 y.o. male is on warfarin.     Edward reports:     Home warfarin dose: verbally confirmed home dose with Edward and updated on anticoagulation calendar     Missed doses: No     Medication changes:  No     S/S of bleeding or thromboembolism:  No     New Injury or illness:  No     Changes in diet or alcohol consumption:  No     Upcoming surgery, procedure or cardioversion:  No    Anticoagulation Episode Summary     Current INR goal:   2.0-3.0   TTR:   84.1 % (3.7 y)   Next INR check:   10/9/2019   INR from last check:   2.10 (8/28/2019)   Weekly max warfarin dose:      Target end date:      INR check location:      Preferred lab:      Send INR reminders to:   [facility]HEL[facility]    Indications    Pulmonary emboli (H) (Resolved) [I26.99]           Comments:            Anticoagulation Care Providers     Provider Role Specialty Phone number    Dari Carroll MD Responsible Internal Medicine [phone]    Ascencion Calvillo MD Responsible Internal Medicine  [phone]

## 2021-06-01 ENCOUNTER — RECORDS - HEALTHEAST (OUTPATIENT)
Dept: ADMINISTRATIVE | Facility: CLINIC | Age: 81
End: 2021-06-01

## 2021-06-01 VITALS — BODY MASS INDEX: 24.53 KG/M2 | WEIGHT: 166.1 LBS

## 2021-06-01 NOTE — PROGRESS NOTES
[facility] Follow Up Note    Edward Mendoza   79 y.o. male    Date of Visit: 9/9/2019    Chief Complaint   Patient presents with     Follow-up     6 month checkup     Subjective  Edward is here for routine checkup on hypertension history of sleep apnea.    He had slipped on the ice every 2019 with a subdural hematoma.  March 2019 CT scan did not show any residual.  He restarted on the warfarin.  He has not had any new falls.  No new heart attacks or mental status changes.    He is on lifelong anticoagulation for history of bilateral pulmonary embolism December 2015.  Pulmonary medicine did see him December 2018 and did give him a plan of lifelong anticoagulation.  See previous discussion and bleeding risk.  No familial hypercoagulable state.    August 28 INR was 2.1.    Hypertension has been well controlled without orthostasis.  No edema.  On chlorthalidone 12.5 mg a day.    I did review lab work from February 2019 with normal potassium, kidney labs and blood sugar.  Normal hemogram.     and HDL 59.  No plans for statin drug.    2015- stress test.  2016 normal echo.    No history of vascular disease diagnosed.  No chest pain or chest pressure.  No palpitations or history of arrhythmia.    He quit smoking in 1960.  No new cough.    Prostatectomy 2011 without recurrence.  January 2019 PSA level was 0.    Status post cholecystectomy.  No abdominal pain complaints.    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    He did get the Shingrix vaccine at the [facility].    Bowels are normal.  He has a family history with colon cancer with his father.  2012 colonoscopy negative and no further colonoscopies planned, see plan discussion February 2019 documented.    No complaints of his right shoulder pain, chronic.    Patient has been doing yard work, fairly active but does not go to the gym very often.    He does plan to go hunting this fall.    He said some mild short-term memory  issues such as walking into a room and forgetting why he is there but otherwise functioning well and denies that memory is a major issue.  He feels his CPAP machine is working well for sleep apnea.    No history of stroke.  No history of palpitations or atrial fibrillation.  Lives independently and managing his own medication.    PMHx:    Past Medical History:   Diagnosis Date     Broken ankle, left      Hypertension      Prostatitis      PSHx:    Past Surgical History:   Procedure Laterality Date     CATARACT EXTRACTION  12/07/2015     CHOLECYSTECTOMY       HERNIA REPAIR  1987     TN THORACOTOMY,MAJOR,EXPLOR/BIOPSY  1983    VATs that found lipoma on chest wall, was concern for cancer.      PROSTATECTOMY N/A     W/ Bilat Pelvic Lymphadenectomy     VITRECTOMY Left 06/15/2015     Immunizations:   Immunization History   Administered Date(s) Administered     DT (pediatric) 12/04/2000     Influenza [id number], 01/15/2010     Influenza high dose,seasonal,PF, 65+ yrs 10/26/2015, 10/06/2016, 10/10/2017, 08/15/2018     Influenza, Seasonal, Inj PF IIV3 09/08/2011, 10/08/2012, 10/10/2013, 10/13/2014     Pneumo Conj 13-V (2010&after) 10/13/2014     Pneumo Polysac 23-V 10/02/2003     Td,adult,historic,unspecified 01/06/2011     ZOSTER, LIVE 09/08/2011       ROS A comprehensive review of systems was performed and was otherwise negative    Medications, allergies, and problem list were reviewed and updated    Exam  /70 (Patient Site: Right Arm, Patient Position: Sitting, Cuff Size: Adult Regular)   Pulse 72   Wt 170 lb (77.1 kg)   BMI 25.10 kg/m    Alert and oriented x3.  No neurologic changes.  No jaundice.  Gait is normal.  Lungs are clear.  Heart is regular without murmur rub or gallop.  Abdomen is nontender no hepatomegaly.  No ankle edema.    Assessment/Plan  1. Essential hypertension  Well-controlled with low-dose chlorthalidone.    2. Obstructive sleep apnea  Compliant with CPAP    3. History of subdural hematoma  No  new fall.  No headache or evidence of residual    4. History of pulmonary embolism  Long-term warfarin, lifelong.  Saw pulmonary medicine in December of last year.    5. Medication monitoring encounter  We will defer lab work until his physical exam in the spring, as he is stable and lab work was normal in February.    No further colon cancer screening.    To get a flu shot this fall at local pharmacy.    Mild short-term memory issue appears within normal limits for age.  Follow-up if that worsens.    Return in about 6 months (around 3/9/2020) for Annual physical.   Patient Instructions   Continue current medication.    Follow-up for fasting physical exam/adult wellness visit in 6 months.    Continue regular walking.  A 20-minute walk 3-4 times a week is recommended.    Get your flu shot this fall.    INR recheck within the next 4 weeks.    Ascencion Calvillo MD        Current Outpatient Medications   Medication Sig Dispense Refill     betamethasone, augmented, (DIPROLENE-AF) 0.05 % cream 1 APPLICATION TWICE A DAY TOPICALLY AS NEEDED  0     chlorthalidone (HYGROTEN) 25 MG tablet Take A 1/2 TABLETS (12.5 MG TOTAL) BY MOUTH DAILY. You are due for your physical;call 24/7 45 tablet 3     cholecalciferol, vitamin D3, 1,000 unit tablet Take 1,000 Units by mouth daily.       fexofenadine (ALLEGRA) 180 MG tablet Take 180 mg by mouth daily.       fluorouracil (EFUDEX) 5 % cream 1 APPLICATION TWICE A DAY TOPICALLY TO SCALP AS NEEDED  0     multivitamin therapeutic tablet Take 1 tablet by mouth daily.       vit C/E/Zn/coppr/lutein/zeaxan (PRESERVISION AREDS-2 ORAL) Take 1 tablet by mouth 2 (two) times a day.              warfarin (COUMADIN) 5 MG tablet Take 1 tablet by mouth daily. Adjust dose based on INR results as directed. 30 tablet 11     No current facility-administered medications for this visit.      Allergies   Allergen Reactions     Lisinopril Cough     Ace Inhibitors Cough     Penicillins Unknown     childhood      Chocolate Flavor Other (See Comments)     Pt gets sinus congestion from eating chocolate.     Corn Other (See Comments)     Pt gets sinus congestion from eating corn.     Fish Containing Products Other (See Comments)     Pt gets sinus congestion from eating fish.     Levofloxacin Rash     Red line up the arm after IV administration     Nuts - Unspecified Other (See Comments)     Pt gets sinus congestion from eating nuts.     Ragweed Pollen Other (See Comments)     Sinus and chest congestion.     Social History     Tobacco Use     Smoking status: Former Smoker     Packs/day: 0.50     Years: 5.00     Pack years: 2.50     Last attempt to quit: 1960     Years since quittin.7     Smokeless tobacco: Never Used   Substance Use Topics     Alcohol use: No     Drug use: No

## 2021-06-01 NOTE — PATIENT INSTRUCTIONS - HE
Continue current medication.    Follow-up for fasting physical exam/adult wellness visit in 6 months.    Continue regular walking.  A 20-minute walk 3-4 times a week is recommended.    Get your flu shot this fall.    INR recheck within the next 4 weeks.

## 2021-06-01 NOTE — TELEPHONE ENCOUNTER
ANTICOAGULATION  MANAGEMENT    Assessment     Today's INR result of 1.8 is Subtherapeutic (goal INR of 2.0-3.0)        Warfarin taken as previously instructed    No new diet changes affecting INR    No new medication/supplements affecting INR    Continues to tolerate warfarin with no reported s/s of bleeding or thromboembolism     Previous INR was Therapeutic    Plan:     Left a detailed message for Edward regarding INR result and instructed:     Warfarin Dosing Instructions:  Continue current warfarin dose 5 mg daily.  (0 % change)    Instructed patient to follow up no later than: 1-2 weeks.    Education provided:     Instructed to call the [facility] for any changes, questions or concerns. (#[phone])   ?   Sussy Lane RN    Subjective/Objective:      Edward Mendoza, a 79 y.o. male is on warfarin.     Edward reports:     Home warfarin dose: as updated on anticoagulation calendar per template     Missed doses: No     Medication changes:  No     S/S of bleeding or thromboembolism:  No     New Injury or illness:  No     Changes in diet or alcohol consumption:  No     Upcoming surgery, procedure or cardioversion:  No    Anticoagulation Episode Summary     Current INR goal:   2.0-3.0   TTR:   70.6 %   Next INR check:   10/14/2019   INR from last check:   1.80! (9/30/2019)   Weekly max warfarin dose:      Target end date:      INR check location:      Preferred lab:      Send INR reminders to:   CHRISTA PHELPS    Indications    Pulmonary emboli (H) (Resolved) [I26.99]           Comments:            Anticoagulation Care Providers     Provider Role Specialty Phone number    Dari Carroll MD Responsible Internal Medicine [phone]    Ascencion Calvillo MD Responsible Internal Medicine [phone]

## 2021-06-02 VITALS — WEIGHT: 173 LBS | BODY MASS INDEX: 25.62 KG/M2 | HEIGHT: 69 IN

## 2021-06-02 VITALS — BODY MASS INDEX: 25.03 KG/M2 | WEIGHT: 169 LBS | HEIGHT: 69 IN

## 2021-06-02 VITALS — BODY MASS INDEX: 25.55 KG/M2 | WEIGHT: 173 LBS

## 2021-06-02 VITALS — BODY MASS INDEX: 25.4 KG/M2 | WEIGHT: 172 LBS

## 2021-06-02 VITALS — WEIGHT: 174 LBS | BODY MASS INDEX: 25.7 KG/M2

## 2021-06-02 VITALS — WEIGHT: 169 LBS | BODY MASS INDEX: 25.03 KG/M2 | HEIGHT: 69 IN

## 2021-06-02 VITALS — WEIGHT: 169.9 LBS | BODY MASS INDEX: 25.09 KG/M2

## 2021-06-02 NOTE — TELEPHONE ENCOUNTER
ANTICOAGULATION  MANAGEMENT    Assessment     Today's INR result of 1.8 is Subtherapeutic (goal INR of 2.0-3.0)        Warfarin taken as previously instructed    Increased greens/vitamin K intake may be affecting INR    No new medication/supplements affecting INR    Continues to tolerate warfarin with no reported s/s of bleeding or thromboembolism     Previous INR was Therapeutic    Plan:     Spoke with Edward regarding INR result and instructed:     Warfarin Dosing Instructions:  Take 7.5 mg today then continue current warfarin dose 5 mg daily.  (0 % change)    Instructed patient to follow up no later than: 1-2 weeks. He will be going out of town again on 11/8 for a couple of weeks.    Education provided: importance of consistent vitamin K intake, impact of vitamin K foods on INR, importance of therapeutic range, importance of following up for INR monitoring at instructed interval and importance of taking warfarin as instructed    Edward verbalizes understanding and agrees to warfarin dosing plan.    Instructed to call the [facility] for any changes, questions or concerns. (#[phone])   ?   Sussy Lane RN    Subjective/Objective:      Edward Mendoza, a 79 y.o. male is on warfarin.     Edward reports:     Home warfarin dose: verbally confirmed home dose with Edward and updated on anticoagulation calendar     Missed doses: No     Medication changes:  No     S/S of bleeding or thromboembolism:  No     New Injury or illness:  No     Changes in diet or alcohol consumption:  Yes: He did eat more greens.     Upcoming surgery, procedure or cardioversion:  No    Anticoagulation Episode Summary     Current INR goal:   2.0-3.0   TTR:   67.5 %   Next INR check:   11/11/2019   INR from last check:   1.80! (10/28/2019)   Weekly max warfarin dose:      Target end date:      INR check location:      Preferred lab:      Send INR reminders to:   CHRISTA PHELPS    Indications    Pulmonary emboli (H) (Resolved) [I26.99]            Comments:            Anticoagulation Care Providers     Provider Role Specialty Phone number    Dari Carroll MD Responsible Internal Medicine [phone]    Ascencion Calvillo MD Responsible Internal Medicine [phone]

## 2021-06-02 NOTE — TELEPHONE ENCOUNTER
ANTICOAGULATION  MANAGEMENT    Assessment     Today's INR result of 2.2 is Therapeutic (goal INR of 2.0-3.0)        Warfarin taken as previously instructed    No new diet changes affecting INR    No new medication/supplements affecting INR    Continues to tolerate warfarin with no reported s/s of bleeding or thromboembolism     Previous INR was Subtherapeutic    Plan:     Spoke with Edward regarding INR result and instructed:     Warfarin Dosing Instructions:  Continue current warfarin dose 5 mg daily.  (0 % change)    Instructed patient to follow up no later than: two weeks. He will be out of town next week.    Education provided: importance of consistent vitamin K intake, impact of vitamin K foods on INR, importance of therapeutic range, importance of following up for INR monitoring at instructed interval and importance of taking warfarin as instructed    Edward verbalizes understanding and agrees to warfarin dosing plan.    Instructed to call the [facility] for any changes, questions or concerns. (#[phone])   ?   Sussy aLne RN    Subjective/Objective:      Edward Mendoza, a 79 y.o. male is on warfarin.     Edward reports:     Home warfarin dose: verbally confirmed home dose with Edward and updated on anticoagulation calendar     Missed doses: No     Medication changes:  No     S/S of bleeding or thromboembolism:  No     New Injury or illness:  No     Changes in diet or alcohol consumption:  No     Upcoming surgery, procedure or cardioversion:  No    Anticoagulation Episode Summary     Current INR goal:   2.0-3.0   TTR:   68.7 %   Next INR check:   10/28/2019   INR from last check:   2.20 (10/14/2019)   Weekly max warfarin dose:      Target end date:      INR check location:      Preferred lab:      Send INR reminders to:   CHRISTA PHELPS    Indications    Pulmonary emboli (H) (Resolved) [I26.99]           Comments:            Anticoagulation Care Providers     Provider Role Specialty Phone number    Glen  Dari CRUZ MD Responsible Internal Medicine [phone]    Ascencion Calvillo MD Responsible Internal Medicine [phone]

## 2021-06-03 VITALS
WEIGHT: 170 LBS | BODY MASS INDEX: 25.1 KG/M2 | SYSTOLIC BLOOD PRESSURE: 128 MMHG | DIASTOLIC BLOOD PRESSURE: 70 MMHG | HEART RATE: 72 BPM

## 2021-06-03 VITALS
SYSTOLIC BLOOD PRESSURE: 126 MMHG | RESPIRATION RATE: 24 BRPM | BODY MASS INDEX: 25.03 KG/M2 | HEART RATE: 72 BPM | WEIGHT: 169.5 LBS | DIASTOLIC BLOOD PRESSURE: 74 MMHG | OXYGEN SATURATION: 98 %

## 2021-06-03 NOTE — TELEPHONE ENCOUNTER
ANTICOAGULATION  MANAGEMENT    Assessment     Today's INR result of 2.1 is Therapeutic (goal INR of 2.0-3.0)        Warfarin taken as previously instructed    No new diet changes affecting INR    No new medication/supplements affecting INR    Continues to tolerate warfarin with no reported s/s of bleeding or thromboembolism     Previous INR was Subtherapeutic    Plan:     Spoke with Edward regarding INR result and instructed:     Warfarin Dosing Instructions:  Continue current warfarin dose 5 mg daily  (0 % change)    Instructed patient to follow up no later than: two weeks    Education provided: importance of therapeutic range, importance of following up for INR monitoring at instructed interval and importance of taking warfarin as instructed    Edward verbalizes understanding and agrees to warfarin dosing plan.    Instructed to call the [facility] for any changes, questions or concerns. (#[phone])   ?   Sussy Lane RN    Subjective/Objective:      Edward Mendoza, a 79 y.o. male is on warfarin.     Edward reports:     Home warfarin dose: verbally confirmed home dose with Edward and updated on anticoagulation calendar     Missed doses: No     Medication changes:  No     S/S of bleeding or thromboembolism:  No     New Injury or illness:  No     Changes in diet or alcohol consumption:  No     Upcoming surgery, procedure or cardioversion:  No    Anticoagulation Episode Summary     Current INR goal:   2.0-3.0   TTR:   68.4 %   Next INR check:   11/21/2019   INR from last check:   2.10 (11/7/2019)   Weekly max warfarin dose:      Target end date:      INR check location:      Preferred lab:      Send INR reminders to:   CHRISTA PHELPS    Indications    Pulmonary emboli (H) (Resolved) [I26.99]           Comments:            Anticoagulation Care Providers     Provider Role Specialty Phone number    Dari Carroll MD Responsible Internal Medicine [phone]    Ascencion Calvillo MD Responsible Internal Medicine  [phone]

## 2021-06-03 NOTE — TELEPHONE ENCOUNTER
Anticoagulation Annual Referral Renewal Review    Edward Mendoza's chart reviewed for annual renewal of referral to anticoagulation monitoring.        Criteria for anticoagulation nurse and/or pharmacist renewal met   Warfarin indication: PE Yes , DVT/PE with previous provider documentation patient to be on extended anticoagulation   Current with INR monitoring/compliant Yes Yes   Date of last office visit 9/9/19 Yes, had office visit within last year   Time in Therapeutic Range (TTR) 80.0 % Yes, TTR > 60%       Edward Mendoza met all criteria for anticoagulation management program initiated renewal.  New INR standing orders and anticoagulation referral renewal placed.      Sussy Lane RN  11:14 AM

## 2021-06-04 VITALS
BODY MASS INDEX: 24.56 KG/M2 | DIASTOLIC BLOOD PRESSURE: 72 MMHG | SYSTOLIC BLOOD PRESSURE: 136 MMHG | WEIGHT: 165.8 LBS | HEIGHT: 69 IN | HEART RATE: 68 BPM

## 2021-06-04 NOTE — TELEPHONE ENCOUNTER
ANTICOAGULATION  MANAGEMENT    Assessment     Today's INR result of 1.9 is Subtherapeutic (goal INR of 2.0-3.0)        Warfarin taken as previously instructed    No new diet changes affecting INR    No new medication/supplements affecting INR    Continues to tolerate warfarin with no reported s/s of bleeding or thromboembolism     Previous INR was Therapeutic    Plan:     Spoke with Edward regarding INR result and instructed:     Warfarin Dosing Instructions:  Change warfarin dose to 7.5 mg daily on Mondays; and 5 mg daily rest of week  (7.1 % change)    Instructed patient to follow up no later than: 1-2 weeks.    Education provided: importance of consistent vitamin K intake, impact of vitamin K foods on INR, importance of therapeutic range, importance of following up for INR monitoring at instructed interval and importance of taking warfarin as instructed    Edward verbalizes understanding and agrees to warfarin dosing plan.    Instructed to call the [facility] for any changes, questions or concerns. (#[phone])   ?   Sussy Lane RN    Subjective/Objective:      Edward Mendoza, a 79 y.o. male is on warfarin.     Edward reports:     Home warfarin dose: verbally confirmed home dose with Edward and updated on anticoagulation calendar     Missed doses: No     Medication changes:  No     S/S of bleeding or thromboembolism:  No     New Injury or illness:  No     Changes in diet or alcohol consumption:  No     Upcoming surgery, procedure or cardioversion:  No    Anticoagulation Episode Summary     Current INR goal:   2.0-3.0   TTR:   70.3 % (1 y)   Next INR check:   12/30/2019   INR from last check:   1.90! (12/16/2019)   Weekly max warfarin dose:      Target end date:      INR check location:      Preferred lab:      Send INR reminders to:   CHRISTA PHELPS    Indications    Pulmonary emboli (H) (Resolved) [I26.99]           Comments:            Anticoagulation Care Providers     Provider Role Specialty Phone number     Dari Carroll MD Responsible Internal Medicine [phone]    Ascencion Calvillo MD Responsible Internal Medicine [phone]

## 2021-06-04 NOTE — TELEPHONE ENCOUNTER
ANTICOAGULATION  MANAGEMENT    Assessment     Today's INR result of 2.0 is Therapeutic (goal INR of 2.0-3.0)        Warfarin taken as previously instructed    No new diet changes affecting INR    No new medication/supplements affecting INR    Continues to tolerate warfarin with no reported s/s of bleeding or thromboembolism     Previous INR was Subtherapeutic    Plan:     Spoke with Edward regarding INR result and instructed:     Warfarin Dosing Instructions:  Continue current warfarin dose 7.5 mg daily on Mondays; and 5 mg daily rest of week  (0 % change)    Instructed patient to follow up no later than: 2 weeks.    Education provided: importance of therapeutic range, importance of following up for INR monitoring at instructed interval and importance of taking warfarin as instructed    Edward verbalizes understanding and agrees to warfarin dosing plan.    Instructed to call the [facility] for any changes, questions or concerns. (#[phone])   ?   Sussy Lane RN    Subjective/Objective:      Edward Mendoza, a 79 y.o. male is on warfarin.     Edward reports:     Home warfarin dose: verbally confirmed home dose with edward and updated on anticoagulation calendar     Missed doses: No     Medication changes:  No     S/S of bleeding or thromboembolism:  No     New Injury or illness:  No     Changes in diet or alcohol consumption:  No     Upcoming surgery, procedure or cardioversion:  No    Anticoagulation Episode Summary     Current INR goal:   2.0-3.0   TTR:   66.5 % (1 y)   Next INR check:   1/13/2020   INR from last check:   2.00 (12/30/2019)   Weekly max warfarin dose:      Target end date:      INR check location:      Preferred lab:      Send INR reminders to:   CHRISTA PHELPS    Indications    Pulmonary emboli (H) (Resolved) [I26.99]           Comments:            Anticoagulation Care Providers     Provider Role Specialty Phone number    Ascencion Calvillo MD [facility] Internal Medicine [phone]

## 2021-06-05 ENCOUNTER — RECORDS - HEALTHEAST (OUTPATIENT)
Dept: INTERNAL MEDICINE | Facility: CLINIC | Age: 81
End: 2021-06-05

## 2021-06-05 VITALS
HEART RATE: 80 BPM | DIASTOLIC BLOOD PRESSURE: 72 MMHG | HEIGHT: 69 IN | SYSTOLIC BLOOD PRESSURE: 136 MMHG | BODY MASS INDEX: 24.73 KG/M2 | WEIGHT: 167 LBS

## 2021-06-05 DIAGNOSIS — N63.0 LUMP OR MASS IN BREAST: ICD-10-CM

## 2021-06-05 NOTE — TELEPHONE ENCOUNTER
ANTICOAGULATION  MANAGEMENT    Assessment     Today's INR result of 2.1 is Therapeutic (goal INR of 2.0-3.0)        Warfarin taken as previously instructed    No new diet changes affecting INR    No new medication/supplements affecting INR    Continues to tolerate warfarin with no reported s/s of bleeding or thromboembolism     Previous INR was Therapeutic    Plan:     Spoke with Edward regarding INR result and instructed:     Warfarin Dosing Instructions:  Continue current warfarin dose 7.5 mg daily on Mondays; and 5 mg daily rest of week  (0 % change)    Instructed patient to follow up no later than: one month.    Education provided: importance of therapeutic range, importance of following up for INR monitoring at instructed interval and importance of taking warfarin as instructed    Edward verbalizes understanding and agrees to warfarin dosing plan.    Instructed to call the [facility] for any changes, questions or concerns. (#[phone])   ?   Sussy Lane RN    Subjective/Objective:      Edward RUTHIE Reji, a 79 y.o. male is on warfarin.     Edward reports:     Home warfarin dose: verbally confirmed home dose with Edward and updated on anticoagulation calendar     Missed doses: No     Medication changes:  No     S/S of bleeding or thromboembolism:  No     New Injury or illness:  No     Changes in diet or alcohol consumption:  No     Upcoming surgery, procedure or cardioversion:  No    Anticoagulation Episode Summary     Current INR goal:   2.0-3.0   TTR:   66.5 % (1 y)   Next INR check:   2/10/2020   INR from last check:   2.10 (1/13/2020)   Weekly max warfarin dose:      Target end date:      INR check location:      Preferred lab:      Send INR reminders to:   CHRISTA PHELPS    Indications    Pulmonary emboli (H) (Resolved) [I26.99]           Comments:            Anticoagulation Care Providers     Provider Role Specialty Phone number    Ascencion Calvillo MD [facility] Internal Medicine [phone]

## 2021-06-05 NOTE — PROGRESS NOTES
Pulmonary Clinic Follow Up    Cc: Severe JAS, submassive PE    HPI: 79yoM with history of submassive PE (thought secondary to immobility while hunting) now on life-long anticoagulation here for follow up of JAS.    IN early 2019 had fall with head trauma and subdural. Reversed and monitored, this has completely resolved and anticoagulation resumed.     The patient underwent PSG in 3/2016 with AHI of 48, unable to achieve REM sleep until after CPAP was placed. He was started on CPAP at 7cmH2O. He received a new machine in spring 2016.    Compliance:  AHI 3.1, Leak median 6.4, max 16.2. CPAP 7cmH2O, 29/30 days, 97%. He does not bring it with when hunting  He continues to feel refreshed on the days he uses his CPAP and no longer needs an afternoon nap.     Regarding PE: He had submassive PE in December 2015. RV was down. Repeat echo done 1/2016 found normal RV size and function. He has continued on coumadin and plans to stay on anticoagulation for the rest of his life. He continues to hunt and be immobilized for hours at a time so ongoing risk for DVT/PE    Current Outpatient Medications   Medication Sig     betamethasone, augmented, (DIPROLENE-AF) 0.05 % cream 1 APPLICATION TWICE A DAY TOPICALLY AS NEEDED     chlorthalidone (HYGROTEN) 25 MG tablet Take A 1/2 TABLETS (12.5 MG TOTAL) BY MOUTH DAILY. You are due for your physical;call 24/7     cholecalciferol, vitamin D3, 1,000 unit tablet Take 1,000 Units by mouth daily.     fexofenadine (ALLEGRA) 180 MG tablet Take 180 mg by mouth daily.     fluorouracil (EFUDEX) 5 % cream 1 APPLICATION TWICE A DAY TOPICALLY TO SCALP AS NEEDED     multivitamin therapeutic tablet Take 1 tablet by mouth daily.     vit C/E/Zn/coppr/lutein/zeaxan (PRESERVISION AREDS-2 ORAL) Take 1 tablet by mouth 2 (two) times a day.            warfarin (COUMADIN) 5 MG tablet Take 1 tablet by mouth daily. Adjust dose based on INR results as directed.     warfarin (COUMADIN/JANTOVEN) 5 MG tablet Take 1  tablet daily (5 mg) by mouth as directed. Adjust dose per INR results.     Vitals:    01/07/20 1124   BP: 126/74   Pulse: 72   Resp: 24   SpO2: 98%   RA  Gen: NAD, Mallampati IV  C/V: RRR, S1 and S2  Resp: Clear bilaterally  Ext: No edema  Neuro: grossly normal.     ASSESSMENT/PLAN:  1) JAS, severe  -RTC 1 year for annual follow up, call sooner if needed  -Continues to use his CPAP and feels refreshed on nights he uses it    2) Submassive PE, provoked but given how much clot burden, recommend life-long anticoagulation  -Continue Coumadin  -Long discussion regarding use of DOAC. He has no CKD or obesity. We have more experience with reversal of these new agents and less incidents of clot and bleeding compared with coumadin. I recommended he consider this given the difficulty keeping his coumadin therapeutic. He will think it over.     Carmina Crowder MD  Electronically signed on 1/7/2020 9:20 AM

## 2021-06-06 NOTE — TELEPHONE ENCOUNTER
RN cannot approve Refill Request    RN can NOT refill this medication Protocol failed and NO refill given       Lidia Stone, [facility] Triage/Med Refill 3/13/2020    Requested Prescriptions   Pending Prescriptions Disp Refills     warfarin ANTICOAGULANT (COUMADIN/JANTOVEN) 2.5 MG tablet [Pharmacy Med Name: WARFARIN SODIUM 2.5 MG TABLET] 30 tablet 1     Sig: TAKE 1 TABLET (2.5 MG) BY MOUTH ON MONDAYS. ADJUST DOSE BASED ON INR RESULTS AS DIRECTED.       Warfarin Refill Protocol  Failed - 3/11/2020 12:34 PM        Failed -  Route to appropriate pool/provider     Last Anticoagulation Summary:   Anticoagulation Episode Summary     Current INR goal:   2.0-3.0   TTR:   79.3 % (1 y)   Next INR check:   4/20/2020   INR from last check:   2.40 (3/9/2020)   Weekly max warfarin dose:      Target end date:      INR check location:      Preferred lab:      Send INR reminders to:   CHRISTA PHELPS    Indications    Pulmonary emboli (H) (Resolved) [I26.99]           Comments:            Anticoagulation Care Providers     Provider Role Specialty Phone number    Ascencion Calvillo MD [facility] Internal Medicine [phone]                Passed - Provider visit in last year     Last office visit with prescriber/PCP: 9/9/2019 Ascencion Calvillo MD OR same dept: 9/9/2019 Ascencion Calvillo MD OR same specialty: 9/9/2019 Ascencion Calvillo MD  Last physical: 3/9/2020 Last MTM visit: Visit date not found    Next appt within 3 mo: Visit date not found Next physical within 3 mo: Visit date not found  Prescriber OR PCP: Ascencion Calvillo MD  Last diagnosis associated with med order: 1. Pulmonary embolism (H)  - warfarin ANTICOAGULANT (COUMADIN/JANTOVEN) 2.5 MG tablet [Pharmacy Med Name: WARFARIN SODIUM 2.5 MG TABLET]; Take 1 tablet (2.5  mg) by mouth on Mondays. Adjust dose based on INR results as directed.  Dispense: 30 tablet; Refill: 1    If protocol passes may refill for 6 months if within 3 months of last provider visit (or a total of 9  months).

## 2021-06-06 NOTE — TELEPHONE ENCOUNTER
RN cannot approve Refill Request    RN can NOT refill this medication Protocol failed and NO refill given.    Lidia Stone, [facility] Triage/Med Refill 3/2/2020    Requested Prescriptions   Pending Prescriptions Disp Refills     chlorthalidone (HYGROTEN) 25 MG tablet 45 tablet 3     Sig: Take A 1/2 TABLETS (12.5 MG TOTAL) BY MOUTH DAILY. You are due for your physical;call 24/7       Diuretics/Combination Diuretics Refill Protocol  Failed - 3/2/2020  3:26 PM        Failed - Serum Potassium in past 12 months      No results found for: LN-POTASSIUM          Failed - Serum Sodium in past 12 months      No results found for: LN-SODIUM          Failed - Serum Creatinine in past 12 months      Creatinine   Date Value Ref Range Status   02/13/2019 0.92 0.70 - 1.30 mg/dL Final             Passed - Visit with PCP or prescribing provider visit in past 12 months     Last office visit with prescriber/PCP: 9/9/2019 Ascencion Calvillo MD OR same dept: 9/9/2019 Ascencion Calvillo MD OR same specialty: 9/9/2019 Ascencion Calvillo MD  Last physical: 2/13/2019 Last MTM visit: Visit date not found   Next visit within 3 mo: Visit date not found  Next physical within 3 mo: Visit date not found  Prescriber OR PCP: Ascencion Calvillo MD  Last diagnosis associated with med order: 1. Hypertension  - chlorthalidone (HYGROTEN) 25 MG tablet; Take A 1/2 TABLETS (12.5 MG TOTAL) BY MOUTH DAILY. You are due for your physical;call 24/7  Dispense: 45 tablet; Refill: 3    If protocol passes may refill for 12 months if within 3 months of last provider visit (or a total of 15 months).             Passed - Blood pressure on file in past 12 months     BP Readings from Last 1 Encounters:   01/07/20 126/74

## 2021-06-06 NOTE — TELEPHONE ENCOUNTER
ANTICOAGULATION  MANAGEMENT    Assessment     Today's INR result of 2.2 is Therapeutic (goal INR of 2.0-3.0)        Warfarin taken as previously instructed    No new diet changes affecting INR    No new medication/supplements affecting INR    Continues to tolerate warfarin with no reported s/s of bleeding or thromboembolism     Previous INR was Therapeutic    Plan:     Spoke with Edward regarding INR result and instructed:     Warfarin Dosing Instructions:  Continue current warfarin dose 7.5 mg daily on Mondays; and 5 mg daily rest of week  (0 % change)    Instructed patient to follow up no later than: one month.    Education provided: importance of therapeutic range, importance of following up for INR monitoring at instructed interval and importance of taking warfarin as instructed    Edward verbalizes understanding and agrees to warfarin dosing plan.    Instructed to call the [facility] for any changes, questions or concerns. (#[phone])   ?   Sussy Lane RN    Subjective/Objective:      Edward RUTHIE Reji, a 79 y.o. male is on warfarin.     Edward reports:     Home warfarin dose: verbally confirmed home dose with Edward and updated on anticoagulation calendar     Missed doses: No     Medication changes:  No     S/S of bleeding or thromboembolism:  No     New Injury or illness:  No     Changes in diet or alcohol consumption:  No     Upcoming surgery, procedure or cardioversion:  No    Anticoagulation Episode Summary     Current INR goal:   2.0-3.0   TTR:   71.3 % (1 y)   Next INR check:   3/11/2020   INR from last check:   2.20 (2/12/2020)   Weekly max warfarin dose:      Target end date:      INR check location:      Preferred lab:      Send INR reminders to:   CHRISTA PHELPS    Indications    Pulmonary emboli (H) (Resolved) [I26.99]           Comments:            Anticoagulation Care Providers     Provider Role Specialty Phone number    Ascencion Calvillo MD [facility] Internal Medicine [phone]
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
## 2021-06-07 NOTE — TELEPHONE ENCOUNTER
ANTICOAGULATION  MANAGEMENT    Assessment     Today's INR result of 2.4 is Therapeutic (goal INR of 2.0-3.0)        Warfarin taken as previously instructed    No new diet changes affecting INR    No new medication/supplements affecting INR    Continues to tolerate warfarin with no reported s/s of bleeding or thromboembolism     Previous INR was Subtherapeutic    Plan:     Spoke with Edward regarding INR result and instructed:     Warfarin Dosing Instructions:  Continue current warfarin dose 7.5 mg daily on Mondays; and 5 mg daily rest of week  (0 % change)    Instructed patient to follow up no later than: 2-3 weeks.    Education provided: importance of therapeutic range, importance of following up for INR monitoring at instructed interval and importance of taking warfarin as instructed    Edward verbalizes understanding and agrees to warfarin dosing plan.    Instructed to call the [facility] for any changes, questions or concerns. (#[phone])   ?   Sussy Lane RN    Subjective/Objective:      Edward RUTHIE Reji, a 79 y.o. male is on warfarin.     Edward reports:     Home warfarin dose: verbally confirmed home dose with Edward and updated on anticoagulation calendar     Missed doses: No     Medication changes:  No     S/S of bleeding or thromboembolism:  No     New Injury or illness:  No     Changes in diet or alcohol consumption:  No     Upcoming surgery, procedure or cardioversion:  No    Anticoagulation Episode Summary     Current INR goal:   2.0-3.0   TTR:   78.0 % (1 y)   Next INR check:   5/26/2020   INR from last check:   2.40 (5/4/2020)   Weekly max warfarin dose:      Target end date:      INR check location:      Preferred lab:      Send INR reminders to:   CHRISTA PHELPS    Indications    Pulmonary emboli (H) (Resolved) [I26.99]           Comments:            Anticoagulation Care Providers     Provider Role Specialty Phone number    Acsencion Calvillo MD [facility] Internal Medicine [phone]

## 2021-06-07 NOTE — TELEPHONE ENCOUNTER
ANTICOAGULATION  MANAGEMENT    Assessment     Today's INR result of 1.9 is Subtherapeutic (goal INR of 2.0-3.0)        Warfarin taken as previously instructed    No new diet changes affecting INR    No new medication/supplements affecting INR    Continues to tolerate warfarin with no reported s/s of bleeding or thromboembolism     Previous INR was Therapeutic    Plan:     Spoke with Edward regarding INR result and instructed:     Warfarin Dosing Instructions:  Continue current warfarin dose 7.5 mg daily on Mondays; and 5 mg daily rest of week  (0 % change)    Instructed patient to follow up no later than: two weeks    Education provided: importance of therapeutic range, importance of following up for INR monitoring at instructed interval and importance of taking warfarin as instructed    Edward verbalizes understanding and agrees to warfarin dosing plan.    Instructed to call the [facility] for any changes, questions or concerns. (#[phone])   ?   Sussy Lane RN    Subjective/Objective:      Edward Mendoza, a 79 y.o. male is on warfarin.     Edward reports:     Home warfarin dose: verbally confirmed home dose with Edward and updated on anticoagulation calendar     Missed doses: No     Medication changes:  No     S/S of bleeding or thromboembolism:  No     New Injury or illness:  No     Changes in diet or alcohol consumption:  No     Upcoming surgery, procedure or cardioversion:  No    Anticoagulation Episode Summary     Current INR goal:   2.0-3.0   TTR:   79.1 % (1 y)   Next INR check:   4/27/2020   INR from last check:   1.90! (4/13/2020)   Weekly max warfarin dose:      Target end date:      INR check location:      Preferred lab:      Send INR reminders to:   CHRISTA PHELPS    Indications    Pulmonary emboli (H) (Resolved) [I26.99]           Comments:            Anticoagulation Care Providers     Provider Role Specialty Phone number    Ascencion Calvillo MD [facility] Internal Medicine [phone]

## 2021-06-07 NOTE — TELEPHONE ENCOUNTER
Refill request received from [facility] via fax for a refill of Warfarin. Order pended.

## 2021-06-08 NOTE — TELEPHONE ENCOUNTER
ANTICOAGULATION  MANAGEMENT    Assessment     Today's INR result of 2.2 is Therapeutic (goal INR of 2.0-3.0)        Warfarin taken as previously instructed    No new diet changes affecting INR    No new medication/supplements affecting INR    Continues to tolerate warfarin with no reported s/s of bleeding or thromboembolism     Previous INR was Therapeutic    Plan:     Spoke with Edward regarding INR result and instructed:     Warfarin Dosing Instructions:  Continue current warfarin dose 7.5 mg daily on Mondays; and 5 mg daily rest of week  (0 % change)    Instructed patient to follow up no later than: one month.    Education provided: importance of consistent vitamin K intake, impact of vitamin K foods on INR, importance of therapeutic range, importance of following up for INR monitoring at instructed interval and importance of taking warfarin as instructed    Edward verbalizes understanding and agrees to warfarin dosing plan.    Instructed to call the [facility] for any changes, questions or concerns. (#[phone])   ?   Sussy Lane RN    Subjective/Objective:      Edward Mendoza, a 79 y.o. male is on warfarin.     Edward reports:     Home warfarin dose: verbally confirmed home dose with Edward and updated on anticoagulation calendar     Missed doses: No     Medication changes:  No     S/S of bleeding or thromboembolism:  No     New Injury or illness:  No     Changes in diet or alcohol consumption:  No     Upcoming surgery, procedure or cardioversion:  No    Anticoagulation Episode Summary     Current INR goal:   2.0-3.0   TTR:   78.0 % (1 y)   Next INR check:   6/23/2020   INR from last check:   2.20 (5/26/2020)   Weekly max warfarin dose:      Target end date:      INR check location:      Preferred lab:      Send INR reminders to:   CHRISTA PHELPS    Indications    Pulmonary emboli (H) (Resolved) [I26.99]           Comments:            Anticoagulation Care Providers     Provider Role Specialty Phone number     Ascencion Calvillo MD [facility] Internal Medicine [phone]

## 2021-06-09 VITALS
BODY MASS INDEX: 24.25 KG/M2 | SYSTOLIC BLOOD PRESSURE: 136 MMHG | DIASTOLIC BLOOD PRESSURE: 74 MMHG | TEMPERATURE: 97.5 F | WEIGHT: 164.2 LBS | HEART RATE: 80 BPM

## 2021-06-09 NOTE — PROGRESS NOTES
Chief Complaint   Patient presents with     maria horse last week in left calf now rash & ankle swelli     History of Present Illness: Nursing notes reviewed. Patient noted some swelling to his medial left shin and ankle, and a rash that initially itched, starting last night. He has had similar symptoms in the past, but never this severe. The symptoms have improved after he applied an OTC skin treatment for itching to his left lower extremity. He also has some OTC Benadryl cream at home to use if needed. No recent injury to his left lower extremity. No new breathing concerns. He takes warfarin due to his prior history of pulmonary embolism. He also reports occasional maria horse like pain in his left calve, with pain and cramping sensations occurring at night. This is not present at time of exam.    Review of systems: See history of present illness, otherwise negative.     Current Outpatient Prescriptions   Medication Sig Dispense Refill     cholecalciferol, vitamin D3, 1,000 unit tablet Take 1,000 Units by mouth daily.       fexofenadine (ALLEGRA) 180 MG tablet Take 180 mg by mouth daily.       MULTIVITAMIN ORAL Take 1 tablet by mouth daily.       amLODIPine (NORVASC) 5 MG tablet Take 1 tablet (5 mg total) by mouth daily. 90 tablet 3     dabigatran etexilate (PRADAXA) 150 mg capsu Take 1 capsule (150 mg total) by mouth 2 (two) times a day. 180 capsule 3     warfarin (COUMADIN) 5 MG tablet Take 1-1 1/2 tablets (5-7.5 mg) by mouth daily as directed. Adjust dose per INR results. 120 tablet 1     No current facility-administered medications for this visit.        Past Medical History:   Diagnosis Date     Broken ankle, left      Prostatitis       Past Surgical History:   Procedure Laterality Date     CATARACT EXTRACTION  12/07/2015     CHOLECYSTECTOMY       HERNIA REPAIR  1987     VA THORACOTOMY,MAJOR,EXPLOR/BIOPSY  1983    VATs that found lipoma on chest wall, was concern for cancer.      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[employer], but mainly worked in an office     Social History Main Topics     Smoking status: Former Smoker     Packs/day: 0.50     Years: 5.00     Quit date: 1/1/1960     Smokeless tobacco: None     Alcohol use 8.4 oz/week     14 Cans of beer per week      Comment: about 2 beers a day     Drug use: No     Sexual activity: Not Asked     Other Topics Concern     None     Social History Narrative       History   Smoking Status     Former Smoker     Packs/day: 0.50     Years: 5.00     Quit date: 1/1/1960   Smokeless Tobacco     Not on file      Exam:   Blood pressure 122/64, pulse 80, temperature 97.9  F (36.6  C), temperature source Oral, resp. rate 20, weight 175 lb 3.2 oz (79.5 kg), SpO2 97 %.    EXAM:   General: Vital signs reviewed. Patient is in no acute appearing distress. Breathing is non labored appearing. Patient is alert and oriented x 3.   Exam of left lower extremity shows moderate edema to the medical malleolus region, and left medial/distal shin. He has a papular and urticarial like rash in this area of moderate intensity, which is a little warm to touch, and tender. No mid-calf tenderness with Homans test. No skin vesicles or scabs noted.    Recent Results (from the past 24 hour(s))   INR   Result Value Ref Range    INR 2.50 (H) 0.90 - 1.10   HM1 (CBC with Diff)   Result Value Ref Range    WBC 6.0 4.0 - 11.0 thou/uL    RBC 5.24 4.40 - 6.20 mill/uL    Hemoglobin 15.7 14.0 - 18.0 g/dL    Hematocrit 46.1 40.0 - 54.0 %    MCV 88 80 - 100 fL    MCH 29.9 27.0 - 34.0 pg    MCHC 34.1 32.0 - 36.0 g/dL    RDW 13.0 11.0 - 14.5 %    Platelets 201 140 - 440 thou/uL    MPV 7.6 7.0 - 10.0 fL    Neutrophils % 61 50 - 70 %    Lymphocytes % 23 20 - 40 %     Monocytes % 9 2 - 10 %    Eosinophils % 6 0 - 6 %    Basophils % 1 0 - 2 %    Neutrophils Absolute 3.6 2.0 - 7.7 thou/uL    Lymphocytes Absolute 1.4 0.8 - 4.4 thou/uL    Monocytes Absolute 0.6 0.0 - 0.9 thou/uL    Eosinophils Absolute 0.3 0.0 - 0.4 thou/uL    Basophils Absolute 0.0 0.0 - 0.2 thou/uL   Results from exam reviewed with patient via telephone while he was in the hospital after his ultrasound study.    Assessment/Plan   1. Leg edema, left  US Venous Leg Left    CANCELED: US Venous Insufficency Leg Left    CANCELED: US Venous Insufficency Leg Left   2. Rash  HM1(CBC and Differential)    HM1 (CBC with Diff)   3. On warfarin therapy  INR       Patient Instructions   No new treatment was recommended. He should consider what clothing he may have worn to cause his itchy, or consider any there environmental exposure which may have caused his itchy rash to lower leg. He can use Benadryl cream to his itchy skin as needed.     Sherman Centeno, DO

## 2021-06-09 NOTE — TELEPHONE ENCOUNTER
ANTICOAGULATION  MANAGEMENT    Assessment     Today's INR result of 2.4 is Therapeutic (goal INR of 2.0-3.0)        Warfarin taken as previously instructed    No new diet changes affecting INR    No new medication/supplements affecting INR    Continues to tolerate warfarin with no reported s/s of bleeding or thromboembolism     Previous INR was Therapeutic    Plan:     Spoke with Edward regarding INR result and instructed:     Warfarin Dosing Instructions:  Continue current warfarin dose 7.5 mg daily on Mondays; and 5 mg daily rest of week  (0 % change)    Instructed patient to follow up no later than: one month    Education provided: importance of consistent vitamin K intake, impact of vitamin K foods on INR, importance of therapeutic range, importance of following up for INR monitoring at instructed interval and importance of taking warfarin as instructed    Edward verbalizes understanding and agrees to warfarin dosing plan.    Instructed to call the [facility] for any changes, questions or concerns. (#[phone])   ?   Sussy Lane RN    Subjective/Objective:      Edward Mendoza, a 80 y.o. male is on warfarin.     Edward reports:     Home warfarin dose: verbally confirmed home dose with Edward and updated on anticoagulation calendar     Missed doses: No     Medication changes:  No     S/S of bleeding or thromboembolism:  No     New Injury or illness:  No     Changes in diet or alcohol consumption:  No-his wife wants him to eat more greens because they are healthy. He has not yet decided.      Upcoming surgery, procedure or cardioversion:  No    Anticoagulation Episode Summary     Current INR goal:   2.0-3.0   TTR:   78.0 % (1 y)   Next INR check:   7/20/2020   INR from last check:   2.40 (6/22/2020)   Weekly max warfarin dose:      Target end date:      INR check location:      Preferred lab:      Send INR reminders to:   CHRISTA PHELPS    Indications    Pulmonary emboli (H) (Resolved) [I26.99]            Comments:            Anticoagulation Care Providers     Provider Role Specialty Phone number    Ascencion Calvillo MD [facility] Internal Medicine [phone]

## 2021-06-09 NOTE — TELEPHONE ENCOUNTER
ANTICOAGULATION  MANAGEMENT    Assessment     Today's INR result of 1.8 is Subtherapeutic (goal INR of 2.0-3.0)        Warfarin taken as previously instructed    No new diet changes affecting INR    No new medication/supplements affecting INR    Continues to tolerate warfarin with no reported s/s of bleeding or thromboembolism     Previous INR was Therapeutic    Plan:     Spoke with Karyn regarding INR result and instructed:     Warfarin Dosing Instructions:  Continue current warfarin dose 7.5 mg daily on Mondays; and 5 mg daily rest of week  (0 % change)    Instructed patient to follow up no later than: two weeks    Education provided: importance of therapeutic range, importance of following up for INR monitoring at instructed interval and importance of taking warfarin as instructed    Karyn verbalizes understanding and agrees to warfarin dosing plan.    Instructed to call the [facility] for any changes, questions or concerns. (#[phone])   ?   Sussy Lane RN    Subjective/Objective:      Edward Mendoza, a 80 y.o. male is on warfarin.     Edward reports:     Home warfarin dose: verbally confirmed home dose with Karyn and updated on anticoagulation calendar     Missed doses: No     Medication changes:  No     S/S of bleeding or thromboembolism:  No     New Injury or illness:  No     Changes in diet or alcohol consumption:  No     Upcoming surgery, procedure or cardioversion:  No    Anticoagulation Episode Summary     Current INR goal:   2.0-3.0   TTR:   75.4 % (1 y)   Next INR check:   8/3/2020   INR from last check:   1.80! (7/20/2020)   Weekly max warfarin dose:      Target end date:      INR check location:      Preferred lab:      Send INR reminders to:   CHRISTA PHELPS    Indications    Pulmonary emboli (H) (Resolved) [I26.99]           Comments:            Anticoagulation Care Providers     Provider Role Specialty Phone number    Ascencion Calvillo MD [facility] Internal Medicine [phone]

## 2021-06-10 NOTE — TELEPHONE ENCOUNTER
ANTICOAGULATION  MANAGEMENT    Assessment     Today's INR result of 2.0 is Therapeutic (goal INR of 2.0-3.0)        missed a dose over a week ago    No new diet changes affecting INR    No new medication/supplements affecting INR    Continues to tolerate warfarin with no reported s/s of bleeding or thromboembolism     Previous INR was Subtherapeutic    Plan:     Spoke with Edward regarding INR result and instructed:     Warfarin Dosing Instructions:  Continue current warfarin dose 7.5 mg daily on Mondays; and 5 mg daily rest of week  (0 % change)    Instructed patient to follow up no later than: two weeks.    Education provided: importance of therapeutic range, importance of following up for INR monitoring at instructed interval and importance of taking warfarin as instructed    Yassine verbalizes understanding and agrees to warfarin dosing plan.    Instructed to call the [facility] for any changes, questions or concerns. (#[phone])   ?   Sussy Lane RN    Subjective/Objective:      Edward Mendoza, a 80 y.o. male is on warfarin.     Edward reports:     Home warfarin dose: verbally confirmed home dose with Yassine and updated on anticoagulation calendar     Missed doses: No he did over a week ago but not in past seven days.      Medication changes:  No     S/S of bleeding or thromboembolism:  No     New Injury or illness:  No     Changes in diet or alcohol consumption:  No     Upcoming surgery, procedure or cardioversion:  No    Anticoagulation Episode Summary     Current INR goal:   2.0-3.0   TTR:   71.6 % (1 y)   Next INR check:   8/17/2020   INR from last check:   2.00 (8/3/2020)   Weekly max warfarin dose:      Target end date:      INR check location:      Preferred lab:      Send INR reminders to:   CHRISTA PHELPS    Indications    Pulmonary emboli (H) (Resolved) [I26.99]           Comments:            Anticoagulation Care Providers     Provider Role Specialty Phone number    Ascencion Calvillo MD  [facility] Internal Medicine [phone]

## 2021-06-10 NOTE — PROGRESS NOTES
"Clinic Note    Assessment:     1. Essential hypertension     2. Edema     3. Pulmonary embolism  Ambulatory referral to Medication Management     Assessment and Plan:  #1 poorly controlled hypertension not on any medicine at this time.  I think the best option for him at this time with the recent edema and this question of \"chronic stasis dermatitis\"'s is likely to be chlorthalidone 12.5 mg daily.  He agrees to start this and follow-up with me appropriately in 2-3 weeks.  Will check a BMP at that time make sure his potassium is okay and see if his legs are doing fine what his blood pressure is.  He is to check his blood pressure 3 times per week and follow-up with me with his machine in 2 weeks.  Avoid salt and salty foods.  He thinks he Ardie is as well.    #2  Edema- there is no edema today.  I do not have a good explanation for the fluctuation but usually that has been related to fluid and salt indiscretion.  Certainly does not need a fluid restriction at this point.  The left leg is the one in which she has had an injury previously and this is the one that has edema at times.  It is likely related to venous insufficiency.  At this time I do not see what I would call stasis dermatitis.  There is some pigment change there however.    3.  Anticoagulation for previous pulmonary embolism-we had this previous discussion about Pradaxa versus Coumadin and thought we have decided on Pradaxa but he elected to stay with Coumadin.  Encouraged that he needs to let me know what his decisions are when he makes them so that I am aware of what he is doing.  I did suggest that he see our [facility] specialists to be able to help with this.  They will discuss and evaluate his pros and cons side effects and risks of the medications.  I appreciate their input ahead of time.     Patient Instructions   Avoid salt and salty foods    Start chlorthalidone 12.5 mg daily which is one half tablet every morning    Check blood pressure 3 times per " week and bring her machine when you return.    See our medication management team for discussion of Coumadin versus Pradaxa        Return in about 2 weeks (around 5/11/2017) for high bp.         Subjective:      Edward Mendoza is a 76 y.o. male comes in for follow-up of his hypertension.  It turns out that he did not follow the directions and changed his mind on a couple of factors.  I showed him that we hand him out a sheet with instructions every visit and if he decides to stop medication he is to let me know and not change this on his own.  It is important that he communicate these issues with us.  He agrees that he will do this in the future.  He went off of the Norvasc because he thought his blood pressure was under good control.  However recently has come up.  He had an episode of peripheral edema in this left leg and was seen by physician and determined to have stasis dermatitis.  He states that he has swelling in this left leg in all likelihood because he had some previous injury years ago in his left leg and it is the only one it swells on occasion.  He states that he does not add salt to anything and that is following a low-sodium diet.  Denies any dyspnea chest pain pressure.    The following portions of the patient's history were reviewed and updated as appropriate: Past medical history, allergies, recent medications changes.  Lab work.  Visit with the doctor about edema recently and his INRs in my previous note and discussion about what we are planning on doing.    Review of Systems:    Negative with the exception of above.  No bleeding difficulties or problems.  History   Smoking Status     Former Smoker     Packs/day: 0.50     Years: 5.00     Quit date: 1/1/1960   Smokeless Tobacco     Not on file         Objective:     Vitals:    04/27/17 1652   BP: 152/78   Pulse: 72   Weight: 175 lb 14.4 oz (79.8 kg)       Exam:  Vital signs are stable with the exception of his blood pressure being elevated.  I  repeated the blood pressure was 150/70.  He has  There are normal heart sounds  Lung sounds are normal  Extremities show no edema and there are some pigment changes in the left lower extremity and minimal erythema right at the ankle.  There is no warmth.  Mental status is normal and intact.  Psych-affect normal.  Seems clear mentally and making appropriate decisions.      Patient Active Problem List   Diagnosis     Prostate Cancer     Hypertension     Osteopenia     Pulmonary embolism     Obstructive sleep apnea     Current Outpatient Prescriptions on File Prior to Visit   Medication Sig Dispense Refill     cholecalciferol, vitamin D3, 1,000 unit tablet Take 1,000 Units by mouth daily.       fexofenadine (ALLEGRA) 180 MG tablet Take 180 mg by mouth daily.       warfarin (COUMADIN) 5 MG tablet Take 1-1 1/2 tablets (5-7.5 mg) by mouth daily as directed. Adjust dose per INR results. 120 tablet 1     [DISCONTINUED] amLODIPine (NORVASC) 5 MG tablet Take 1 tablet (5 mg total) by mouth daily. 90 tablet 3     [DISCONTINUED] dabigatran etexilate (PRADAXA) 150 mg capsu Take 1 capsule (150 mg total) by mouth 2 (two) times a day. 180 capsule 3     [DISCONTINUED] MULTIVITAMIN ORAL Take 1 tablet by mouth daily.       No current facility-administered medications on file prior to visit.          Kyle Landers    4/27/2017

## 2021-06-10 NOTE — TELEPHONE ENCOUNTER
ANTICOAGULATION  MANAGEMENT    Assessment     Today's INR result of 2.3 is Therapeutic (goal INR of 2.0-3.0)        Missed dose(s) may be affecting INR    No new diet changes affecting INR    No new medication/supplements affecting INR    Continues to tolerate warfarin with no reported s/s of bleeding or thromboembolism     Previous INR was Therapeutic    Plan:     Left detailed message for Edward regarding INR result and instructed:     Warfarin Dosing Instructions:  Continue current warfarin dose 7.5 mg daily on Mondays; and 5 mg daily rest of week  (0 % change)    Instructed patient to follow up no later than: 2-3 weeks       Instructed to call the [facility] for any changes, questions or concerns. (#[phone])   ?   Maritza Martines RN    Subjective/Objective:      Edward Mendoza, a 80 y.o. male is on warfarin.     Edward reports:     Home warfarin dose: as updated on anticoagulation calendar per template     Missed doses: Yes: 8/18 one dose      Medication changes:  No     S/S of bleeding or thromboembolism:  No     New Injury or illness:  No     Changes in diet or alcohol consumption:  No     Upcoming surgery, procedure or cardioversion:  No    Anticoagulation Episode Summary     Current INR goal:   2.0-3.0   TTR:   71.6 % (1 y)   Next INR check:   9/16/2020   INR from last check:   2.30 (8/26/2020)   Weekly max warfarin dose:      Target end date:      INR check location:      Preferred lab:      Send INR reminders to:   CHRISTA PHELPS    Indications    Pulmonary emboli (H) (Resolved) [I26.99]           Comments:            Anticoagulation Care Providers     Provider Role Specialty Phone number    Ascencion Calvillo MD [facility] Internal Medicine [phone]

## 2021-06-10 NOTE — PROGRESS NOTES
MTM Encounter    ASSESSMENT AND PLAN    1. Pulmonary Embolism  Appropriately on anticoagulation with warfarin for history of PE, but he is interested in other options. PCP referred him to speak with me for education on Pradaxa (dabigatran). Pradaxa is a direct thrombin inhibitor. It does not interact with any of the patient's medications and he has adequate renal function, so no dose adjustment would be required (150 mg BID). Explained differences between the medications to the patient, including twice a day dosing with Pradaxa, but no need for routine monitoring as with warfarin. Efficacy is considered similar to warfarin (non-inferiority in trials), although current CHEST guidelines do recommend in patients with DVT or PE and no cancer, as long-term anticoagulant therapy, dabigatran, rivaroxaban, apixaban, or edoxaban over vitamin K antagonist (VKA) therapy. This is a 2B recommendation - weak recommendation: benefits and risks closely balanced and/or uncertain with moderate-quality evidence.    Pradaxa is the one novel anticoagulant that does have an approved antidote, idarucizumab, unlike the direct factor Xa inhibitors (Xarelto and Eliquis). There are differences in adverse effects between warfarin and Pradaxa. In evaluating more than 134,000 Medicare patients ?65 years of age, new users of Pradaxa were shown to have a lower risk of clot-related strokes, bleeding in the brain, and death compared to new patients taking warfarin. The MI risk was similar for both medications. Pradaxa users had an increased risk of major GI bleeding vs. the warfarin users. Dyspepsia can be a common side effect for patients to be aware of with Pradaxa use. Cost is also a big difference between the medications, as Pradaxa is only available brand name.   In switching from warfarin to Pradaxa, it is recommended to stop warfarin and start Pradaxa after the INR is <2.   Plan   1. Education on anticoagulants - warfarin vs. Pradaxa. I don't  have a strong recommendation either way - both have essentially similar efficacy, and his INR has been stable within goal range, so it comes down to considering cost, medication adherence with BID dosing, convenience with/without INR testing, bleeding risks, etc.   He will discuss his decision further with PCP at follow-up.     2. Essential hypertension   Blood pressure has improved since addition of chlorthalidone. BP today is 118/58 mm Hg. He will be following up with PCP in two weeks for lab check to monitor electrolytes.         SUBJECTIVE AND OBJECTIVE  Edward Mendoza is a 76 y.o. male here for a medication therapy management (MTM) appointment.     1. Pulmonary Embolism  Edward is taking warfarin for chronic anticoagulation due to past PE in December 2015. He admits to occasionally missing doses, but his wife helps remind him and he's gotten better at medication adherence. He denies any s/sx of bleeding. His current dose is 5 mg on Tues, Thurs, Sat and 7.5 mg the rest of the week. He is interested in hearing about Pradaxa, an alternative oral anticoagulant.     Lab Results   Component Value Date    INR 2.40 (H) 05/01/2017    INR 2.50 (H) 04/01/2017    INR 2.40 (H) 03/08/2017       2. Essential hypertension   Edward was recently started on chlorthalidone 12.5 mg daily for uncontrolled hypertension and edema. He denies any swelling today. He cannot tolerate ACEi due to cough.      BP Readings from Last 3 Encounters:   05/05/17 118/58   04/27/17 152/78   04/01/17 122/64             Edward's medication list was reviewed with them, discussing reason for use, directions for use, and potential side effects of each medication as needed. Indication, safety, efficacy, and convenience was assessed for all medications addressed above.  No environmental factors were noted currently affecting patient.  This care plan was communicated via EMR with his primary care provider, Kyle Landers MD, who is the authorizing prescriber for  this visit.  Direct supervision was available by either the patient's PCP or other available provider.    Time Spent: 45 minutes  Time and complexity billing metrics are included in the doc-flowsheet linked to this visit.       Liz Ochoa, PharmD, BCACP    Current Outpatient Prescriptions   Medication Sig Dispense Refill     chlorthalidone (HYGROTEN) 25 MG tablet Take 0.5 tablets (12.5 mg total) by mouth daily. 45 tablet 0     cholecalciferol, vitamin D3, 1,000 unit tablet Take 1,000 Units by mouth daily.       fexofenadine (ALLEGRA) 180 MG tablet Take 180 mg by mouth daily.       warfarin (COUMADIN) 5 MG tablet Take 1-1 1/2 tablets (5-7.5 mg) by mouth daily as directed. Adjust dose per INR results. 120 tablet 1     No current facility-administered medications for this visit.

## 2021-06-10 NOTE — PROGRESS NOTES
ASSESSMENT/PLAN:  1. Essential hypertension  Well-controlled on current meds and will continue same.  His blood pressure may have been elevated related to diet and will need to monitor over time.  If it drops again and he is lightheaded word stays between 95 and 105 he can certainly contact me and we will contemplate stopping again.  He should be exercising more as well.  Fatigue and muscle weakness does not appear to be related to this medicine as he was have any symptoms prior to starting the chlorthalidone again.  He is not on a statin medication.  Suggested more activity please see the suggestions below.  - Basic Metabolic Panel    Anticoagulation discussion once again.  He did meet with Liz Suarez as well to discuss this and he will stick with Coumadin for now.    Patient Instructions   Stay active! Aim for aerobic exercise 3-4 times per week.     If you notice your blood pressure is consistently running low, let Dr. Landers know.       Return in about 6 months (around 11/15/2017) for Annual physical.    CHIEF COMPLAINT:  Chief Complaint   Patient presents with     Hypertension     started the chlorthalidone 12.5 mg daily - machine - 127/79       HISTORY OF PRESENT ILLNESS:  Edward Mendoza is a 76 y.o. male presenting to the clinic today for hypertension. His blood pressure was 128/72 in clinic today. He was started on 12.5 mg of chlorthalidone once daily as of an office visit on 4/27/17. He has been checking his blood pressure at home and his cuff is checked against out sphygmometer in clinic today. He has been on amlodipine and chlorthalidone in the past. He has been avoiding salt and salty foods.    Pulmonary Embolism: He met with Liz Ochoa, Pharm.D regarding medications for anticoagulation. He is undecided whether or not he wants to take Coumadin or Pradaxa. He states that he is out and about a lot and wants to make sure that he is on the safest medication for him.     REVIEW OF SYSTEMS:   He does his best  "to exercise weekly, however, familial circumstances at home have prevented him from going as much as he would like.   Comprehensive review of systems negative except as noted above.    PFSH:  Reviewed as above.     TOBACCO USE:  History   Smoking Status     Former Smoker     Packs/day: 0.50     Years: 5.00     Quit date: 1/1/1960   Smokeless Tobacco     Never Used       VITALS:  Vitals:    05/15/17 1004   BP: 128/72   Pulse: 72     Wt Readings from Last 3 Encounters:   04/27/17 175 lb 14.4 oz (79.8 kg)   04/01/17 175 lb 3.2 oz (79.5 kg)   11/09/16 165 lb 3.2 oz (74.9 kg)     Estimated body mass index is 26.05 kg/(m^2) as calculated from the following:    Height as of 11/9/16: 5' 8.9\" (1.75 m).    Weight as of 4/27/17: 175 lb 14.4 oz (79.8 kg).    PHYSICAL EXAM:  General Appearance: Alert, cooperative, no distress, appears stated age.  Psychiatric:  He has a normal mood and affect.     Notes Reviewed, additional history from source other than patient (2 TOTAL): Reviewed med management pharmacy note from 5/5/17; PE, Pradaxa vs. Coumadin.    Accessed Care Everywhere, Requested Records, Consult with Physician (1 TOTAL): None.     Radiology tests summarized or ordered (XR, CT, MRI, DXA, US): None.    Labs reviewed or ordered (1 TOTAL): Ordered BMP lab today.     Medicine tests reviewed or ordered (ECG, echocardiogram, colonoscopy, EGD, venous US) (1 TOTAL): None.    Independent review of ECG or XR (2 EACH): None.      The visit lasted a total of 11 minutes face to face with the patient. Over 50% of the time was spent counseling and educating the patient about HTN.    IAllyn, am scribing for and in the presence of, Dr. Landers.    I, Dr. Landers, personally performed the services described in this documentation, as scribed by Allyn Benedict in my presence, and it is both accurate and complete.    MEDICATIONS:  Current Outpatient Prescriptions   Medication Sig Dispense Refill     chlorthalidone (HYGROTEN) " 25 MG tablet Take 0.5 tablets (12.5 mg total) by mouth daily. 45 tablet 0     cholecalciferol, vitamin D3, 1,000 unit tablet Take 1,000 Units by mouth daily.       fexofenadine (ALLEGRA) 180 MG tablet Take 180 mg by mouth daily.       warfarin (COUMADIN) 5 MG tablet Take 1-1 1/2 tablets (5-7.5 mg) by mouth daily as directed. Adjust dose per INR results. 120 tablet 1     No current facility-administered medications for this visit.      Total data points: 3

## 2021-06-11 NOTE — PATIENT INSTRUCTIONS - HE
Return to clinic for further evaluation if you continue to have the vertigo, spinning spells.    For the mild unsteadiness, I suspect that is associated with peripheral neuropathy.  Wear good shoes at all times.  You can use a cane or walker to help with your unsteadiness.    Wear your CPAP machine every night, whenever you are sleeping.  You can get a referral to the sleep clinic if you have more daytime sleepiness.    If you are seeing blood pressures less than 110/60 or more low blood pressure feeling in the afternoon, contact me to consider adjustment of your chlorthalidone.  Continue on current medications at this time.    Follow-up in the spring for an adult wellness visit physical exam with fasting labs.    INR today.

## 2021-06-11 NOTE — PROGRESS NOTES
[facility] clinic Follow Up Note    Edward Mendoza   80 y.o. male    Date of Visit: 9/14/2020    Chief Complaint   Patient presents with     Follow-up     6 month blood pressure checkup     Subjective  Edward is here for follow-up on hypertension, sleep apnea and other medical issues.  He also had a complaint of a new episode of vertigo.  He had a spell 4 to 5 days ago he woke up in the morning and had less than a minute of a spinning sensation.  It resolved, he went to the bathroom and he again had less than a minute of a spinning sensation.  No nausea or other neurologic changes.  No headache.  No recurrence of the vertigo.    He has some mild unsteadiness but no falls.  He has likely some early mild peripheral neuropathy developing.    He has sleep apnea, is using the CPAP every night but not all night.  He has not wanted to go to the sleep clinic in the past for that.    No palpitations or history of arrhythmia.    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    He had a fall with subdural hemorrhage last year but no recurrent falls.  No headache.  No other underlying neurologic changes.    His blood pressure has been controlled.  He states in the morning sometimes it is on the low side near 110/60 but denies orthostasis or lower blood pressures during the day but has not checked his blood pressure generally during the day.    In clinic it is been well controlled generally in the 130s over 60s on chlorthalidone 12.5 mg a day.    No lower extremity edema or increasing shortness of breath.    Remains active with walking outside.    No foot sores.    No chest pain or chest pressure.    March 2020  and HDL 69 without medication for cholesterol.    Dry macular degeneration but stable on ophthalmology check from July 2020.  I did review that note today.  Eye pressures were okay and given a 1 year follow-up.    No new cough.  Quit smoking in 1960.    Prostatectomy in 2011.  His March  2020 PSA was less than 0.1.    Colonoscopy -2012.  No further planned with age.    No rectal complaints.  He had a sebaceous cyst to the 8 o'clock position in his rectum noted at the physical.        PMHx:    Past Medical History:   Diagnosis Date     Broken ankle, left      Hypertension      Prostatitis      PSHx:    Past Surgical History:   Procedure Laterality Date     CATARACT EXTRACTION  12/07/2015     CHOLECYSTECTOMY       HERNIA REPAIR  1987     [place] THORACOTOMY,MAJOR,EXPLOR/BIOPSY  1983    VATs that found lipoma on chest wall, was concern for cancer.      PROSTATECTOMY N/A     W/ Bilat Pelvic Lymphadenectomy     VITRECTOMY Left 06/15/2015     Immunizations:   Immunization History   Administered Date(s) Administered     DT (pediatric) 12/04/2000     Influenza R4i6-12, 01/15/2010     Influenza high dose,seasonal,PF, 65+ yrs 10/26/2015, 10/06/2016, 10/10/2017, 08/15/2018, 10/01/2019     Influenza, Seasonal, Inj PF IIV3 09/08/2011, 10/08/2012, 10/10/2013, 10/13/2014     Pneumo Conj 13-V (2010&after) 10/13/2014     Pneumo Polysac 23-V 10/02/2003     Td,adult,historic,unspecified 01/06/2011     ZOSTER, LIVE 09/08/2011       ROS A comprehensive review of systems was performed and was otherwise negative    Medications, allergies, and problem list were reviewed and updated    Exam  /74 (Patient Site: Right Arm, Patient Position: Sitting, Cuff Size: Adult Regular)   Pulse 80   Temp 97.5  F (36.4  C) (Other) Comment (Src): forehead  Wt 164 lb 3.2 oz (74.5 kg)   BMI 24.25 kg/m    Appears well.  No neurologic changes.  Gait within normal limits.  No parkinsonian type symptoms.  Lungs are clear.  Heart is regular without murmur.  No ankle edema.  No ataxia or nystagmus.    Assessment/Plan  1. Hypertension  Controlled.  Continue chlorthalidone 12.5 mg a day.  He does have lower blood pressures or worsening orthostasis, follow-up for further evaluation and consideration of every other day chlorthalidone or other  changes.    Otherwise follow-up in 6 months for physical exam.    2. History of pulmonary embolism  Long-term warfarin.  INR check today.    History of subdural hemorrhage last year.  No new falls.    3. Obstructive sleep apnea  Mostly compliant with CPAP.  Does not always wear the CPAP all night.  I stressed the importance of compliance with CPAP.  He declined referral to the sleep clinic.  He still has some intermittent moderate sleepiness days.    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  He declined getting a cane or walker.  I stressed the importance of compliance with CPAP.  Further evaluation if worsening.    4. Vertigo  2 minor episodes of fairly classic vertigo, lasting less than 1 minute.  Is not had previous episodes.  No other new neurologic changes.    Last head CT scan March 2019 with resolution of the subdural blood and no changes at that time.  Follow-up if worsening recurrent symptoms.    5. Unsteady gait  Mild peripheral neuropathy.  Gait within normal limits on today's exam.    History of dry macular degeneration and follow-up next summer with ophthalmology.    Sebaceous cyst rectum, no complaints today.    No evidence of recurrent prostate cancer.  Could recheck PSA next year then no further plans for surveillance after that.    Patient declined a flu shot.    Return in about 6 months (around 3/14/2021) for Annual physical.   Patient Instructions   Return to clinic for further evaluation if you continue to have the vertigo, spinning spells.    For the mild unsteadiness, I suspect that is associated with peripheral neuropathy.  Wear good shoes at all times.  You can use a cane or walker to help with your unsteadiness.    Wear your CPAP machine every night, whenever you are sleeping.  You can get a referral to the sleep clinic if you have more daytime sleepiness.    If you are seeing blood pressures less than 110/60 or more low blood pressure feeling in the afternoon, contact me to  consider adjustment of your chlorthalidone.  Continue on current medications at this time.    Follow-up in the spring for an adult wellness visit physical exam with fasting labs.    INR today.    Ascencion Calvillo MD        Current Outpatient Medications   Medication Sig Dispense Refill     betamethasone, augmented, (DIPROLENE-AF) 0.05 % cream 1 APPLICATION TWICE A DAY TOPICALLY AS NEEDED  0     chlorthalidone (HYGROTEN) 25 MG tablet Take A 1/2 TABLETS (12.5 MG TOTAL) BY MOUTH DAILY. 45 tablet 3     cholecalciferol, vitamin D3, 1,000 unit tablet Take 1,000 Units by mouth daily.       fexofenadine (ALLEGRA) 180 MG tablet Take 180 mg by mouth daily.       fluorouracil (EFUDEX) 5 % cream 1 APPLICATION TWICE A DAY TOPICALLY TO SCALP AS NEEDED  0     multivitamin therapeutic tablet Take 1 tablet by mouth daily.       triamcinolone (KENALOG) 0.1 % cream Apply 1 application topically as needed.        vit C/E/Zn/coppr/lutein/zeaxan (PRESERVISION AREDS-2 ORAL) Take 1 tablet by mouth 2 (two) times a day.              warfarin ANTICOAGULANT (COUMADIN/JANTOVEN) 2.5 MG tablet Take 1 tablet by mouth on Monday's. Adjust dose based on INR results as directed. 30 tablet 1     warfarin ANTICOAGULANT (COUMADIN/JANTOVEN) 5 MG tablet Take 1 tablet daily (5 mg) by mouth as directed. Adjust dose per INR results. 90 tablet 1     No current facility-administered medications for this visit.      Allergies   Allergen Reactions     Lisinopril Cough     Ace Inhibitors Cough     Penicillins Unknown     childhood     Chocolate Flavor Other (See Comments)     Pt gets sinus congestion from eating chocolate.     Corn Other (See Comments)     Pt gets sinus congestion from eating corn.     Fish Containing Products Other (See Comments)     Pt gets sinus congestion from eating fish.     Levofloxacin Rash     Red line up the arm after IV administration     Nuts - Unspecified Other (See Comments)     Pt gets sinus congestion from eating nuts.     Ragweed  Pollen Other (See Comments)     Sinus and chest congestion.     Social History     Tobacco Use     Smoking status: Former Smoker     Packs/day: 0.50     Years: 5.00     Pack years: 2.50     Last attempt to quit: 1960     Years since quittin.7     Smokeless tobacco: Never Used   Substance Use Topics     Alcohol use: No     Drug use: No

## 2021-06-11 NOTE — TELEPHONE ENCOUNTER
ANTICOAGULATION  MANAGEMENT    Assessment     Today's INR result of 2.2 is Therapeutic (goal INR of 2.0-3.0)        Warfarin taken as previously instructed    No new diet changes affecting INR    No new medication/supplements affecting INR    Continues to tolerate warfarin with no reported s/s of bleeding or thromboembolism     Previous INR was Therapeutic    Plan:     Spoke on phone with Edward regarding INR result and instructed:     Warfarin Dosing Instructions:  Continue current warfarin dose 7.5 mg daily on Mondays; and 5 mg daily rest of week  (0 % change)    Instructed patient to follow up no later than: 4 weeks.    Education provided: importance of therapeutic range, importance of following up for INR monitoring at instructed interval and importance of taking warfarin as instructed    Edward verbalizes understanding and agrees to warfarin dosing plan.    Instructed to call the [facility] for any changes, questions or concerns. (#[phone])   ?   Sussy Lane RN    Subjective/Objective:      Edward Mendoza, a 80 y.o. male is on warfarin.     Edward reports:     Home warfarin dose: verbally confirmed home dose with Edward and updated on anticoagulation calendar     Missed doses: No     Medication changes:  No     S/S of bleeding or thromboembolism:  No     New Injury or illness:  Yes-seen MD for some dizziness.     Changes in diet or alcohol consumption:  No     Upcoming surgery, procedure or cardioversion:  No    Anticoagulation Episode Summary     Current INR goal:   2.0-3.0   TTR:   73.4 % (1 y)   Next INR check:   10/12/2020   INR from last check:   2.20 (9/14/2020)   Weekly max warfarin dose:      Target end date:      INR check location:      Preferred lab:      Send INR reminders to:   CHRISTA PHELPS    Indications    Pulmonary emboli (H) (Resolved) [I26.99]           Comments:            Anticoagulation Care Providers     Provider Role Specialty Phone number    Ascencion Calvillo MD Responsible  Internal Medicine [phone]

## 2021-06-12 NOTE — PROGRESS NOTES
Anticoagulation Annual Referral Renewal Review    Edward Mendoza's chart reviewed for annual renewal of referral to anticoagulation monitoring.        Criteria for anticoagulation nurse and/or pharmacist renewal met   Warfarin indication: PE Yes , DVT/PE with previous provider documentation patient to be on extended anticoagulation   Current with INR monitoring/compliant Yes Yes   Date of last office visit 9/14/20 Yes, had office visit within last year   Time in Therapeutic Range (TTR) 79.6 % Yes, TTR > 60%       Edward Mendoza met all criteria for anticoagulation management program initiated renewal.  New INR standing orders and anticoagulation referral renewal placed.      Sussy Lane, RN  3:25 PM

## 2021-06-12 NOTE — TELEPHONE ENCOUNTER
Routed to the anticoagulation pool    Deya Patino, RN     [facility] Medication Refill and Triage Nurse  7:56 AM  10/8/2020

## 2021-06-12 NOTE — PROGRESS NOTES
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    The patient underwent PSG in 3/2016 with AHI of 48, unable to achieve REM sleep until after CPAP was placed. He was started on CPAP at 7cmH2O. He received a new machine in spring 2016.    Compliance:24/30 days >4 hours (80%). He wasn't sure why he missed some days, says he usually wears it every night. AHI 2.8, Leak 5. Issues with new nasal pillows causing leak and moving frequently. He has had AHI as high as 7 on Sunday so he doesn't like it and will switch back to his other Nasal CPAP mask. He continues to feel refreshed on the days he uses his CPAP and no longer needs an afternoon nap.     Regarding PE: He had submassive PE in December 2015. RV was down. Repeat echo done 1/2016 found normal RV size and function. He has continued on coumadin and plans to stay on anticoagulation for the rest of his life. Did meet with Pharmacist to discuss Novel anticoagulants versus coumadin and decided to stick with coumadin.     Current Outpatient Prescriptions   Medication Sig     chlorthalidone (HYGROTEN) 25 MG tablet Take 0.5 tablets (12.5 mg total) by mouth daily.     cholecalciferol, vitamin D3, 1,000 unit tablet Take 1,000 Units by mouth daily.     fexofenadine (ALLEGRA) 180 MG tablet Take 180 mg by mouth daily.     warfarin (COUMADIN) 2.5 MG tablet Take 1 tablet (2.5 mg) by mouth daily. Adjust dose based on INR results as directed.     warfarin (COUMADIN) 5 MG tablet Take 1-1 1/2 tablets (5-7.5 mg) by mouth daily as directed. Adjust dose per INR results.     Vitals:    09/11/17 0801   BP: 142/78   Pulse: 72   Resp: 20   SpO2: 98%   RA  Gen: NAD, Mallampati IV  C/V: RRR, S1 and S2  Resp: Clear bilaterally  Ext: No edema  Neuro: grossly normal.     ASSESSMENT/PLAN:  1) JAS, severe  -Change back to previous mask,  well treated currently  -RTC 1 year for annual follow up, call sooner if needed    2) Submassive PE, provoked but given how much clot burden, recommend life-long anticoagulation  -Continue Coumadin    Carmina Crowder MD  Electronically signed on 9/11/2017 9:20 AM

## 2021-06-12 NOTE — TELEPHONE ENCOUNTER
ANTICOAGULATION  MANAGEMENT    Assessment     Today's INR result of 2.2 is Therapeutic (goal INR of 2.0-3.0)        Warfarin taken as previously instructed    No new diet changes affecting INR    No new medication/supplements affecting INR    Continues to tolerate warfarin with no reported s/s of bleeding or thromboembolism     Previous INR was Therapeutic    Plan:     Spoke on phone with Edward regarding INR result and instructed:     Warfarin Dosing Instructions:  Continue current warfarin dose 7.5 mg daily on Mondays; and 5 mg daily rest of week  (0 % change)    Instructed patient to follow up no later than: 6 weeks.    Education provided: importance of therapeutic range, importance of following up for INR monitoring at instructed interval and importance of taking warfarin as instructed    Edward verbalizes understanding and agrees to warfarin dosing plan.    Instructed to call the [facility] for any changes, questions or concerns. (#[phone])   ?   Sussy Lane RN    Subjective/Objective:      Edward Mendoza, a 80 y.o. male is on warfarin.     Edward reports:     Home warfarin dose: verbally confirmed home dose with Edward and updated on anticoagulation calendar     Missed doses: No     Medication changes:  No     S/S of bleeding or thromboembolism:  No     New Injury or illness:  No     Changes in diet or alcohol consumption:  No     Upcoming surgery, procedure or cardioversion:  No    Anticoagulation Episode Summary     Current INR goal:  2.0-3.0   TTR:  79.6 % (1 y)   Next INR check:  11/25/2020   INR from last check:  2.20 (10/14/2020)   Weekly max warfarin dose:     Target end date:     INR check location:     Preferred lab:     Send INR reminders to:  CHRISTA PHELPS    Indications    Pulmonary emboli (H) (Resolved) [I26.99]           Comments:           Anticoagulation Care Providers     Provider Role Specialty Phone number    Ascencion Calvillo MD [facility] [phone]

## 2021-06-14 NOTE — PATIENT INSTRUCTIONS - HE
https://[place].gov/[place]id19/vaccine/find-vaccine/index.jsp    This is the link for the Pre-registration. Patients who are eligible should receive a call when they have a slot for you.

## 2021-06-14 NOTE — PROGRESS NOTES
DME Provider: [facility]  Date Faxed: 1/27/21  Ordering Provider: Dr. Crowder  Equipment ordered: CPAP supplies

## 2021-06-14 NOTE — PROGRESS NOTES
Edward Mendoza is a 80 y.o. male who is being evaluated via a billable video visit.      How would you like to obtain your AVS? MyChart.  If dropped from the video visit, the video invitation should be resent by: Text to cell phone: [phone]  Will anyone else be joining your video visit? 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     IN early 2019 had fall with head trauma and subdural. Reversed and monitored, this has completely resolved and anticoagulation resumed.      The patient underwent PSG in 3/2016 with AHI of 48, unable to achieve REM sleep until after CPAP was placed. He was started on CPAP at 7cmH2O. He received a new machine in spring 2016.    Does feel benefit from wearing CPAP and uses it religiously.  Notices recently elevated AHI over past few weeks. Was fine the rest of the year. Notes he has not replaced his mask or hose for 2 years. Didn't want to be sold anything by the [employer] so didn't touch base with them.    30/30 days >4 hours, 100%  CPAP 7cmH2O  AHI 4.7 average but 3 days ago, had 15  Leaks 95th % 18  Nasal mask          Objective       Vitals:  No vitals were obtained today due to virtual visit.    Physical Exam  Gen: pleasant male in no distress speaking in complete sentences  Resp: no audible wheezing or stridor.    ASSESSMENT/PLAN:  1) JAS, severe  -RTC 1 year for annual follow up, call sooner if needed  -Continues to use his CPAP and feels refreshed on nights he uses it  -Defers new machine currently  -recommended new equipment every 6 months for seal as well as sanitary reasons. He will reach out to [facility].      2) Submassive PE, provoked but given how much clot burden, recommend life-long anticoagulation  -Continue Coumadin    Video-Visit  Details    Type of service:  Video Visit    Video End Time (time video stopped): 10:43 AM  Originating Location (pt. Location): Home    Distant Location (provider location):  [facility]     Platform used for Video Visit: Aidan

## 2021-06-14 NOTE — TELEPHONE ENCOUNTER
ANTICOAGULATION  MANAGEMENT    Assessment     Today's INR result of 2.6 is Therapeutic (goal INR of 2.0-3.0)        Warfarin taken as previously instructed    No new diet changes affecting INR    No new medication/supplements affecting INR    Continues to tolerate warfarin with no reported s/s of bleeding or thromboembolism     Previous INR was Therapeutic    Plan:     Spoke on phone with Edward regarding INR result and instructed:      Warfarin Dosing Instructions:  Continue current warfarin dose 7. mg daily on Mon; and 5 mg daily rest of week  (0 % change)    Instructed patient to follow up no later than: 6 weeks    Education provided: importance of notifying clinic for changes in medications, monitoring for bleeding signs and symptoms and monitoring for clotting signs and symptoms    Edward verbalizes understanding and agrees to warfarin dosing plan.    Instructed to call the [facility] for any changes, questions or concerns. (#[phone])   ?   Deya Pineda RN    Subjective/Objective:      Edward Mendoza, a 80 y.o. male is on warfarin. Edward Leon reports:     Home warfarin dose: verbally confirmed home dose with Edward and updated on anticoagulation calendar     Missed doses: No     Medication changes:  No     S/S of bleeding or thromboembolism:  No     New Injury or illness:  No     Changes in diet or alcohol consumption:  No     Upcoming surgery, procedure or cardioversion:  No    Anticoagulation Episode Summary     Current INR goal:  2.0-3.0   TTR:  90.5 % (1 y)   Next INR check:  3/17/2021   INR from last check:  2.60 (2/3/2021)   Weekly max warfarin dose:     Target end date:     INR check location:     Preferred lab:     Send INR reminders to:  CHRISTA PHELPS    Indications    Pulmonary emboli (H) (Resolved) [I26.99]           Comments:           Anticoagulation Care Providers     Provider Role Specialty Phone number    Ascencion Calvillo MD [facility] [phone]        Deya  [place] RN, BSN  Anticoagulation Clinic

## 2021-06-14 NOTE — TELEPHONE ENCOUNTER
ANTICOAGULATION  MANAGEMENT    Assessment     Today's INR result of 2.4 is Therapeutic (goal INR of 2.0-3.0)        Warfarin taken as previously instructed    No new diet changes affecting INR    No new medication/supplements affecting INR    Continues to tolerate warfarin with no reported s/s of bleeding or thromboembolism     Previous INR was Therapeutic    Plan:     Spoke on phone with Edward regarding INR result and instructed:     Warfarin Dosing Instructions:  Continue current warfarin dose 7.5 mg daily on Mondays; and 5 mg daily rest of week  (0 % change)    Instructed patient to follow up no later than: 6 weeks.    Education provided: importance of therapeutic range, importance of following up for INR monitoring at instructed interval and importance of taking warfarin as instructed    Edward verbalizes understanding and agrees to warfarin dosing plan.    Instructed to call the [facility] for any changes, questions or concerns. (#[phone])   ?   Sussy Lane RN    Subjective/Objective:      Edward Mendoza, a 80 y.o. male is on warfarin.     Edward reports:     Home warfarin dose: verbally confirmed home dose with Edward and updated on anticoagulation calendar     Missed doses: No     Medication changes:  No     S/S of bleeding or thromboembolism:  No     New Injury or illness:  No     Changes in diet or alcohol consumption:  No     Upcoming surgery, procedure or cardioversion:  No    Anticoagulation Episode Summary     Current INR goal:  2.0-3.0   TTR:  90.5 % (1 y)   Next INR check:  2/10/2021   INR from last check:  2.40 (12/30/2020)   Weekly max warfarin dose:     Target end date:     INR check location:     Preferred lab:     Send INR reminders to:  CHRISTA PHELPS    Indications    Pulmonary emboli (H) (Resolved) [I26.99]           Comments:           Anticoagulation Care Providers     Provider Role Specialty Phone number    Ascencion Calvillo MD [facility] [phone]

## 2021-06-15 NOTE — TELEPHONE ENCOUNTER
ANTICOAGULATION  MANAGEMENT    Assessment     Today's INR result of 2.6 is Therapeutic (goal INR of 2.0-3.0)        Warfarin taken as previously instructed    No new diet changes affecting INR    No new medication/supplements affecting INR    Continues to tolerate warfarin with no reported s/s of bleeding or thromboembolism     Previous INR was Therapeutic    Plan:     Spoke on phone with Edward regarding INR result and instructed:      Warfarin Dosing Instructions:  Continue current warfarin dose 7.5 mg daily on Mondays; and 5 mg daily rest of week  (0 % change)    Instructed patient to follow up no later than: 8 weeks    Education provided: importance of therapeutic range, importance of following up for INR monitoring at instructed interval and importance of taking warfarin as instructed    Edward verbalizes understanding and agrees to warfarin dosing plan.    Instructed to call the [facility] for any changes, questions or concerns. (#[phone])   ?   Sussy Lane RN    Subjective/Objective:      Edward Mendoza, a 80 y.o. male is on warfarin. Edward Leon reports:     Home warfarin dose: verbally confirmed home dose with Edward and updated on anticoagulation calendar     Missed doses: No     Medication changes:  No     S/S of bleeding or thromboembolism:  No     New Injury or illness:  No     Changes in diet or alcohol consumption:  No     Upcoming surgery, procedure or cardioversion:  No    Anticoagulation Episode Summary     Current INR goal:  2.0-3.0   TTR:  90.5 % (1 y)   Next INR check:  5/6/2021   INR from last check:  2.60 (3/11/2021)   Weekly max warfarin dose:     Target end date:     INR check location:     Preferred lab:     Send INR reminders to:  CHRISTA PHELPS    Indications    Pulmonary emboli (H) (Resolved) [I26.99]           Comments:           Anticoagulation Care Providers     Provider Role Specialty Phone number    Ascencion Calvillo MD [facility] [phone]

## 2021-06-15 NOTE — PROGRESS NOTES
Assessment and Plan:     1. Hypertension  Well-controlled on current meds will continue same  - chlorthalidone (HYGROTEN) 25 MG tablet; Take A 1/2 TABLETS (12.5 MG TOTAL) BY MOUTH DAILY. You are due for your physical;call 24/7  Dispense: 45 tablet; Refill: 3    2. Pulmonary embolism  The distant past and on Coumadin.  No concerns or problems at this time.  - warfarin (COUMADIN) 2.5 MG tablet; Take 1 tablet (2.5 mg) by mouth daily on Mondays, Wednesdays, Fridays and Saturdays. Adjust dose per INR results.  Dispense: 60 tablet; Refill: 3  - warfarin (COUMADIN) 5 MG tablet; Take 1 tablet daily (5 mg) by mouth as directed. Adjust dose per INR results.  Dispense: 90 tablet; Refill: 3    3. Hypertension  Well-controlled  - Comprehensive Metabolic Panel  - HM2(CBC w/o Differential)  - Urinalysis    4. Obstructive sleep apnea  He is using his CPAP.    5. Osteopenia  No emergent matters or screening necessary at this time    6. Prostate Cancer  Following up with urology but should be through with follow-up soon.  - Comprehensive Metabolic Panel  - HM2(CBC w/o Differential)      The patient's current medical problems were reviewed.    I have had an Advance Directives discussion with the patient.  The following health maintenance schedule was reviewed with the patient and provided in printed form in the after visit summary:   Health Maintenance   Topic Date Due     INFLUENZA VACCINE RULE BASED (1) 08/01/2017     FALL RISK ASSESSMENT  01/29/2019     TD 18+ HE  01/06/2021     COLONOSCOPY  12/21/2022     ADVANCE DIRECTIVES DISCUSSED WITH PATIENT  01/29/2023     PNEUMOCOCCAL POLYSACCHARIDE VACCINE AGE 65 AND OVER  Completed     PNEUMOCOCCAL CONJUGATE VACCINE FOR ADULTS (PCV13 OR PREVNAR)  Completed     ZOSTER VACCINE  Completed        Subjective:   Chief Complaint: Edward Mendoza is an 77 y.o. male here for an Annual Wellness visit.   HPI:  He also has complaints of shoulder pain.     Shoulder Pain: His right shoulder has been  bothering him. He was seen by a chiropractor and an orthopedist, both of whom did X-rays but did not see anything. This has been bothering him for ten years, but it is slightly better now than it used to be. He inquires if he could get an MRI of the shoulder. He is unable to throw a ball. Taking Tylenol and putting heat on the shoulder helps the pain. Physical therapy seemed to make the pain worse.     Hx Prostate Cancer: He is seven years out from his prostate cancer and prostatectomy. He was told he did not need to be seen again for a year. His PSA's have all been undetectable.     JAS: He has sleep apnea and was recently seen by pulmonology.     Hypertension: His blood pressure is in a good range today.     Health Maintenance: His immunizations are up to date. He no longer needs colonoscopies.     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 He gets constipated when he eats cheese and has been constipated for the last week or so. He does not take a fiber supplement.   Please see above.  The rest of the review of systems are negative for all systems.    PFSH:  He attempted to go biking for exercise a while back but he fell and hurt his ribs. He is S/P cholecystectomy. He has not been exercising much lately because of his shoulder pain.     Patient Care Team:  Kyle Landers MD as PCP - General  Armando Jaquez MD as Physician (Urology)  Lidia Krause MD as Physician (Ophthalmology)     Patient Active Problem List   Diagnosis     Prostate Cancer     Hypertension     Osteopenia     Pulmonary embolism     Obstructive sleep apnea     Past Medical History:   Diagnosis Date     Broken ankle, left      Prostatitis       Past Surgical History:   Procedure Laterality Date     CATARACT EXTRACTION  12/07/2015     CHOLECYSTECTOMY       HERNIA REPAIR  1987     VA THORACOTOMY,MAJOR,EXPLOR/BIOPSY  1983    VATs that found lipoma on chest wall, was  "concern for cancer.      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 You are due for your physical;call 24/7 45 tablet 3     fexofenadine (ALLEGRA) 180 MG tablet Take 180 mg by mouth daily.       warfarin (COUMADIN) 2.5 MG tablet Take 1 tablet (2.5 mg) by mouth daily on Mondays, Wednesdays, Fridays and Saturdays. Adjust dose per INR results. 60 tablet 3     warfarin (COUMADIN) 5 MG tablet Take 1 tablet daily (5 mg) by mouth as directed. Adjust dose per INR results. 90 tablet 3     No current facility-administered medications for this visit.       Objective:   Vital Signs:   Visit Vitals     /62     Pulse 63     Ht 5' 9\" (1.753 m)     Wt 167 lb 14.4 oz (76.2 kg)     BMI 24.79 kg/m2        VisionScreening:  No exam data present     PHYSICAL EXAM  General Appearance: Alert, cooperative, no distress, appears stated age.  HEENT: " EMOI, fundi not observed, TMs normal, mouth and throat without lesions.  Neck: Supple without adenopathy or thyromegaly.  Back: No CVA tenderness or spinous process pain.  Lungs: Clear to auscultation bilaterally, good air movement.  Heart: Regular rate and rhythm, S1 and S2 normal, no murmur or bruit.  Abdomen: Soft, non-tender, no HSM or masses.   Genitourinary: Not done - follows with urology.   Rectal exam:  Not done - follows with urology.   Musculoskeletal: Significantly diminished ROM right shoulder. Tenderness at the insertion of the deltoid onto the humerus.   Extremities: No CCE, pulses II/IV and symmetric,   Skin: Right thoracotomy scar. RUQ scar. No worrisome lesions noted.   Lymph nodes: Cervical, supraclavicular, groin, and axillary nodes normal.  Neurologic: CNII-XII intact, some weakness in the right arm, strength is otherwise V/V and symmetric, DTRs absent in lower extremities I/IV in upper extremities, sensory grossly intact  Psychiatric:  He has a normal mood and affect.    Assessment Results 1/29/2018   Activities of Daily Living No help needed   Instrumental Activities of Daily Living No help needed   Mini Cog Total Score 4   Some recent data might be hidden     A Mini-Cog score of 0-2 suggests the possibility of dementia, score of 3-5 suggests no dementia    Identified Health Risks:     Patient's advanced directive was discussed and I am comfortable with the patient's wishes.    ADDITIONAL HISTORY SUMMARIZED (2): Reviewed January 2016 [facility] note regarding prostate cancer. Reviewed 2012 colonoscopy regarding 10 year clearance.   DECISION TO OBTAIN EXTRA INFORMATION (1): None.   RADIOLOGY TESTS (1): None.  LABS (1): Labs from [facility] and from 5/15/2017 reviewed. Labs ordered.   MEDICINE TESTS (1): None.  INDEPENDENT REVIEW (2 each): None.     The visit lasted a total of 18 minutes face to face with the patient. Over 50% of the time was spent counseling and educating the patient  about general health maintenance.    I, Randy Vega, am scribing for and in the presence of, Dr. Landers.    I, Zuhair  , personally performed the services described in this documentation, as scribed by Randy Vega in my presence, and it is both accurate and complete.    Dragon dictation was used for this note.  Speech recognition errors are a possibility.    Total data points: 3    The billing for this visit has been dictated to me, the physician, by our medical group and is out of my control. Any action taken against me by the patient or other group should be taken up with those who forced this process to be used by all clinicians within the group.

## 2021-06-15 NOTE — PROGRESS NOTES
Assessment and Plan:   Patient instructions:  Contact your sleep clinic to reevaluate your CPAP and mask.  Make sure your CPAP machine is working at peak effectiveness.  If your CPAP machine is not working well, you will be more fatigued and sleepy, and can damage nerves and contribute to peripheral neuropathy and memory loss.  Make sure you are wearing your CPAP every night.    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    See dermatology in 6 months for skin checkup, per the recommendation.    Walk and stretch on a daily basis.  You can request a referral to physical therapy if needed.    Routine follow-up with me in 6 months.      1. Encounter for wellness examination in adult  Patient has some chronic musculoskeletal pains with chronic right shoulder DJD.  Some left gluteal hip tightness and some neck DJD that is now better after some chiropractor adjustment.  He did get some intermittent positional vertigo getting out of bed occasionally after the neck adjustments, but that has resolved.  No falls.    He did confirm full CODE STATUS wishes.  - Full code    He did complete the COVID-19 vaccine series last month.    Family history of colon cancer with father but 2012 colonoscopy negative and no further colonoscopy planned with age.    Usually fairly active with walking on a regular basis.  He quit smoking in 1960.  March 2020  with HDL 69, not planning statin.  2015 - stress echo.  No no chest pain or exertional symptoms.    2. History of pulmonary embolism  Chronic warfarin since pulmonary embolism in 2015.    He did have a subdural hemorrhage with fall in 2019.  No new headache.  No new falls.  I did explain bleeding risk of warfarin.  If he does have a fall with head trauma he was told to go directly to the emergency room for evaluation.    He does have increasing unsteady gait with his peripheral neuropathy and further falls, may need to  consider discontinuation of warfarin.    No calf tenderness or edema or evidence of new pulmonary embolism.    He denies any blood in stool or other bleeding issues.      - warfarin ANTICOAGULANT (COUMADIN/JANTOVEN) 2.5 MG tablet; Take 1 tablet by mouth on Monday's. Adjust dose based on INR results as directed.  Dispense: 30 tablet; Refill: 1  - warfarin ANTICOAGULANT (COUMADIN/JANTOVEN) 5 MG tablet; Take 1 tablet daily (5 mg) by mouth as directed. Adjust dose per INR results.  Dispense: 90 tablet; Refill: 1  - INR    3. Benign essential hypertension  Well-controlled blood pressures in the 130s over 70s.  Occasional systolics in the 140s, but I will plan to have him improve regular exercise this spring, he is been less active over the winter.    He also has some concerns about the fit of his CPAP machine, and he will get that addressed.    He denies orthostatic type dizziness.  - chlorthalidone (HYGROTEN) 25 MG tablet; Take A 1/2 TABLETS (12.5 MG TOTAL) BY MOUTH DAILY.  Dispense: 45 tablet; Refill: 3  - Lipid Cascade    4. Obstructive sleep apnea  He has some concerns about the fit of his new mask.  He is unclear if the current CPAP machine is adequately fitting.  He does have some increased daytime sleepiness over the winter.  He sleeps up to 10 hours a night.  He denies significant daytime sleepiness but some fatigue.    No new palpitations or history of atrial fibrillation.  No increasing shortness of breath or edema or heart failure history.    No memory concerns.    He does have some mild peripheral neuropathy on his feet with decreased sensation on the balls of his feet.  No pain.  I did discuss the connection between peripheral neuropathy and sleep apnea.  He denies any alcohol.    5. Fatigue, unspecified type  We will address his CPAP for.  - Thyroid Stimulating Hormone (TSH)    6. Peripheral polyneuropathy  He has some cracking of his toe skin.  Patient was told to monitor his feet visually every evening.   He has some mild calluses, but not preulcerative.  He was told to use moisturizer cream on skin on a regular basis.    Address CPAP but as above.    7. History of prostate cancer  2011 prostatectomy.  PSA was 0 last year.  No new urinary symptoms.  No evidence of recurrence.  We will check PSA today, but not planning further PSA monitoring as its been over 10 years.  - PSA (Prostatic-Specific Antigen), Diagnostic    8. Hx of skin cancer, basal cell  He did get seen by dermatology earlier this month.  Biopsy of the left neck was benign.  He had a number of AK's frozen.  No follow-up in 6 months with dermatology.    He has sebaceous cyst of his back and was injected, that scarred down now.  He states he had a sebaceous cyst at his rectum in the past but that is also resolved, no issues there now.    9. Macular degeneration, unspecified laterality, unspecified type.  Reported as dry macular degeneration and is not using treatment  Routine eye exam will be due this summer.  He denies vision changes.    10. Encounter for therapeutic drug monitoring  =  - Comprehensive Metabolic Panel  - HM2(CBC w/o Differential)    11. Routine general medical examination at a health care facility       The patient's current medical problems were reviewed.    I have had an Advance Directives discussion with the patient.  The following health maintenance schedule was reviewed with the patient and provided in printed form in the after visit summary:   Health Maintenance Due   Topic Date Due     ZOSTER VACCINES (2 of 3) 11/03/2011     TD 18+ HE  01/06/2021        Subjective:   Chief Complaint: Edward Mendoza is an 80 y.o. male here for an Annual Wellness visit.   HPI: Lives independently.  No falls.    History outlined as above.    No new headache.  No mouth sores or swallowing issues.  Diet is stable, just gained 3 pounds over the winter but somewhat less active.    Issues with sleep apnea, peripheral neuropathy and hypertension as above.    No  abdominal pain or change in bowels.  No falls.  No new cough.    He had fallen off a dock many years ago with a left lateral abdominal wall fascia injury, that stable.    Review of Systems: Otherwise negative.  Please see above.  The rest of the review of systems are negative for all systems.    Patient Care Team:  Ascencion Calvillo MD as PCP - General (Internal Medicine)  Armando Jaquez MD as Physician (Urology)  Lidia Krause MD as Physician (Ophthalmology)  Ascencion Calvillo MD as Assigned PCP  Carmina Crowder MD as Assigned Pulmonology Provider     Patient Active Problem List   Diagnosis     Prostate Cancer     Hypertension     Osteopenia     Pulmonary embolism (H)     Obstructive sleep apnea     Right shoulder tendonitis     Subdural hematoma (H)     Chronic right shoulder pain     Lipoma of skin and subcutaneous tissue     Past Medical History:   Diagnosis Date     Broken ankle, left      Hypertension      Prostatitis       Past Surgical History:   Procedure Laterality Date     CATARACT EXTRACTION  12/07/2015     CHOLECYSTECTOMY       HERNIA REPAIR  1987     DE THORACOTOMY,MAJOR,EXPLOR/BIOPSY  1983    VATs that found lipoma on chest wall, was concern for cancer.      PROSTATECTOMY N/A     W/ Bilat Pelvic Lymphadenectomy     VITRECTOMY Left 06/15/2015      Family History   Problem Relation Age of Onset     Breast cancer Sister 55     Aortic aneurysm Brother      Sleep apnea Brother      Sleep apnea Sister      Colon cancer Father      Lung cancer Father      Dementia Maternal Grandfather      Pneumonia Paternal Grandfather       Social History     Socioeconomic History     Marital status:      Spouse name: Not on file     Number of children: Not on file     Years of education: Not on file     Highest education level: Not on file   Occupational History     Comment: Retired, 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[employer], but mainly worked in an office   Social Needs     Financial resource strain: Not on file     Food  insecurity     Worry: Not on file     Inability: Not on file     Transportation needs     Medical: Not on file     Non-medical: Not on file   Tobacco Use     Smoking status: Former Smoker     Packs/day: 0.50     Years: 5.00     Pack years: 2.50     Quit date: 1960     Years since quittin.2     Smokeless tobacco: Never Used   Substance and Sexual Activity     Alcohol use: No     Drug use: No     Sexual activity: Not on file   Lifestyle     Physical activity     Days per week: Not on file     Minutes per session: Not on file     Stress: Not on file   Relationships     Social connections     Talks on phone: Not on file     Gets together: Not on file     Attends [employer] service: Not on file     Active member of club or organization: Not on file     Attends meetings of clubs or organizations: Not on file     Relationship status: Not on file     Intimate partner violence     Fear of current or ex partner: Not on file     Emotionally abused: Not on file     Physically abused: Not on file     Forced sexual activity: Not on file   Other Topics Concern     Not on file   Social History Narrative     Not on file      Current Outpatient Medications   Medication Sig Dispense Refill     chlorthalidone (HYGROTEN) 25 MG tablet Take A 1/2 TABLETS (12.5 MG TOTAL) BY MOUTH DAILY. 45 tablet 3     cholecalciferol, vitamin D3, 1,000 unit tablet Take 1,000 Units by mouth daily.       fexofenadine (ALLEGRA) 180 MG tablet Take 180 mg by mouth daily.       multivitamin therapeutic tablet Take 1 tablet by mouth daily.       triamcinolone (KENALOG) 0.1 % cream Apply 1 application topically as needed.        vit C/E/Zn/coppr/lutein/zeaxan (PRESERVISION AREDS-2 ORAL) Take 1 tablet by mouth 2 (two) times a day.              warfarin ANTICOAGULANT (COUMADIN/JANTOVEN) 2.5 MG tablet Take 1 tablet by mouth on . Adjust dose based on INR results as directed. 30 tablet 1     warfarin ANTICOAGULANT (COUMADIN/JANTOVEN) 5 MG tablet Take 1  "tablet daily (5 mg) by mouth as directed. Adjust dose per INR results. 90 tablet 1     No current facility-administered medications for this visit.       Objective:   Vital Signs:   Visit Vitals  /72   Pulse 80   Ht 5' 9\" (1.753 m)   Wt 167 lb (75.8 kg)   BMI 24.66 kg/m           VisionScreening:  No exam data present     PHYSICAL EXAM  Alert and oriented x3.  Good mood and affect.  Clock face drawing normal and word recall normal.  Mobility exam is normal.  Gait is normal, no parkinsonian signs.  Pupils and irises equal and reactive.  Extraocular muscles intact.  No jaundice or conjunctivitis.  External ears and nose exam is normal.  Tympanic membranes are normal with hearing aid in right ear.  Scar on left neck is well-healed.  No cervical or supraclavicular or axillary adenopathy.  No JVD and no carotid bruits.  No thyromegaly or nodularity.  Lungs are clear to auscultation with good respiratory excursion.  Spine is straight.  Scar down small sebaceous cyst on his left posterior back without evidence of infection.  There is also a small sebaceous cyst on the right posterior shoulder with some very mild inflammation, he denies tenderness.  Sclerotic lesions on scalp with evidence of recent cryotherapy.  Heart is regular without murmur rub or gallop.  +1 pedal pulses and no ankle edema.  Toe skin was somewhat thickened and dry, cracked on the left great toe but no evidence of secondary infection.  Some calluses on the right great toe.  Abdomen is nonobese nontender no hepatosplenomegaly and no pulsatile abdominal mass.  He denies any  issues    Assessment Results 3/11/2021   Activities of Daily Living No help needed   Instrumental Activities of Daily Living No help needed   Mini Cog Total Score 5   Some recent data might be hidden     A Mini-Cog score of 0-2 suggests the possibility of dementia, score of 3-5 suggests no dementia    Identified Health Risks:     He is at risk for lack of exercise and has been " provided with information to increase physical activity for the benefit of his well-being.  The patient was provided with written information regarding signs of hearing loss.  Patient's advanced directive was discussed and I am comfortable with the patient's wishes.

## 2021-06-15 NOTE — PROGRESS NOTES
[facility] Daily Progress     Patient Name: Edward Mendoza  Date: 2/8/2018  Visit #: 2/12  Referral Diagnosis: R shoulder pain  Referring provider: Kyle Landers MD  Visit Diagnosis:     ICD-10-CM    1. Chronic right shoulder pain M25.511     G89.29    2. Decreased right shoulder range of motion M25.611    3. Deltoid tendonitis of right shoulder M75.81        Assessment:     Patient is a 77 y.o. male that presents with signs and symptoms consistent with chronic R shoulder pain secondary to Adhesive capsulitis right as well as tendonitis at insertion of the deltoid on the humerus per PT order. Patient demonstrates impairments including decreased right shoulder range of motion, joint mobility and flexibility with increased pain and impaired strength leading to impaired functional mobility. Patient's functional limitations include reaching overhead, lifting objects, performing daily activities, and laying on his right side.    Today patient reports he is feeling a little bit better since last visit, still demonstrates decreased range of motion in R shoulder, and is compensating with thoracolumbar flexion at end range of shoulder motion. Patient trialed shoulder isometrics today, however was unable to feel them so did not add to HEP. Patient will continue therapy 1x/week until feeling better and progress is met.    HEP/POC compliance is  good .  Patient demonstrates understanding/independence with home program.  Patient is appropriate to continue with skilled physical therapy intervention, as indicated by initial plan of care.    Goal Status:  Pt. will be independent with home exercise program in : 4 weeks  Pt. will improve posture : and demonstrate posture with minimal to no cuing;in sitting;in standing;in 6 weeks  Patient will reach / maintain arm movement: forward;overhead;for home chores;for dressing;with full ROM;with less pain;with less difficulty;in 6 weeks  Patient will perform repetitive movement  in : arm;forearm motions;for home;with less pain;with less difficulty;in 4 weeks  Patient will decrease : SPADI score;for improved quality of function;for improved quality of life;in 6 weeks  Pt will: demonstrate decreased TTP of anterior shoulder joint with improved AROM by 4 weeks.      Plan / Patient Education:     Continue with initial plan of care.  Progress with home program as tolerated.    Plan for next visit: pulleys, wall walks w/ hold, sidelying ER, scapular stabilization, quadruped    Subjective:     Pain Rating: discomfort   Patient reports he is feeling better, he is able to reach his arm up higher than normal. Still trying not to lay on his R side because that makes it worse. No difficulty with the exercises, he adapts exercises as needed.     Functional limitations are described as occurring with:   lifting  reaching at shoulder height and overhead  performing routine daily activities  Sleeping - if he lays on his R shoulder, it becomes stiff but no pain  transitional movements getting in  chair and getting out of  chair    Objective:     Shoulder/Elbow ROM             Date: 1/31/2018 2/8/18      Shoulder and Elbow ROM ( ) AROM in degrees AROM in degrees AROM in degrees    Right Left Right Left Right Left   Shoulder Flexion (0-180 ) 122 P around 90deg   133 P at end range - P with coming down   Does           Shoulder Abduction (0-180 ) 130 popping and stiffness   136 popping and pain when he comes down          Shoulder Extension (0-60 )               Shoulder ER (0-90 ) 60    60               Treatment Today       Patient Education: Patient was educated on continuing plan of care, progress and review of current HEP. Patient educated on importance of consistency with exercise and therapy, as well as activity modification in order to see change and improvements. Patient demonstrated and verbalized understanding.     Manual Therapy:  STM to anterior R shoulder joint, pectorals, subscap and lats  PROM  shoulder flexion in supine with grade 2-3 shoulder mobilizations    Exercises:  Exercise #1: codman's exercise - 2 minutes, 30 seconds each direction  Comment #1: ER doorway stretch - hold 10 sec x 3  Exercise #2: AAROM supine flexion and standing Abduction - 10 reps with 3 second hold  Comment #2: scapular retraction - hold 5 sec perform all day long  Exercise #3: lat stretch at parallel bars - hold 20 sec x 2  Comment #3: trialed shoulder isometrics - patient reported he did not feel any difference or activation      TREATMENT MINUTES COMMENTS   Evaluation     Self-care/ Home management     Manual therapy 15 STM to anterior R shoulder joint, pectorals, subscap and lats  PROM shoulder flexion in supine with grade 2-3 shoulder mobilizations   Neuromuscular Re-education     Therapeutic Activity     Therapeutic Exercises 15 See above flowsheet   Gait training     Modality__________________                Total 30    Blank areas are intentional and mean the treatment did not include these items.       Tamra Ramachandran, PT  2/8/2018

## 2021-06-15 NOTE — PROGRESS NOTES
[facility]   Shoulder Initial Evaluation    Patient Name: Edward Mendoza  Date of evaluation: 1/31/2018  Referral Diagnosis:  Right shoulder pain  Referring provider: Kyle Landers MD  Visit Diagnosis:     ICD-10-CM    1. Chronic right shoulder pain M25.511     G89.29    2. Decreased right shoulder range of motion M25.611    3. Deltoid tendonitis of right shoulder M75.81        Assessment:      Patient is a 77 y.o. male that presents with signs and symptoms consistent with chronic R shoulder pain secondary to Adhesive capsulitis right as well as tendonitis at insertion of the deltoid on the humerus per PT order. Patient demonstrates impairments including decreased right shoulder range of motion, joint mobility and flexibility with increased pain and impaired strength leading to impaired functional mobility. Patient's functional limitations include reaching overhead, lifting objects, performing daily activities, and laying on his right side. Today patient responded well to manual therapy and therapeutic exercise.    Patient educated on and demonstrated understanding of nature of impairment, plan of care, patient role and HEP. Patient compliant with PT and prognosis is good. Patient would benefit from skilled PT to progress and improve above impairments.      The POC is dynamic and will be modified on an ongoing basis.  Patient will return to clinic if symptoms persist.  Barriers to achieving goals as noted in the assessment section may affect outcome.  Prognosis to achieve goals is  fair   Pt. is appropriate for skilled PT intervention as outlined in the Plan of Care (POC).  Pt. is a good candidate for skilled PT services to improve pain levels and function.    Goals:  Pt. will be independent with home exercise program in : 4 weeks  Pt. will improve posture : and demonstrate posture with minimal to no cuing;in sitting;in standing;in 6 weeks  Patient will reach / maintain arm movement:  forward;overhead;for home chores;for dressing;with full ROM;with less pain;with less difficulty;in 6 weeks  Patient will perform repetitive movement in : arm;forearm motions;for home;with less pain;with less difficulty;in 4 weeks  Patient will decrease : SPADI score;for improved quality of function;for improved quality of life;in 6 weeks  Pt will: demonstrate decreased TTP of anterior shoulder joint with improved AROM by 4 weeks.    Patient's expectations/goals are realistic.    Barriers to Learning or Achieving Goals:  Non- adherence to the home exercise program.  Chronicity of the problem.       Plan / Patient Instructions:      Plan of Care:   Authorization / Certification Start Date: 01/31/18  Authorization / Certification End Date: 04/30/18  Authorization / Certification Number of Visits: 12  Communication with: Referral Source  Patient Related Instruction: Nature of Condition;Treatment plan and rationale;Self Care instruction;Basis of treatment;Body mechanics;Posture;Next steps;Expected outcome  Times per Week: 1-2  Number of Weeks: 6  Number of Visits: 12  Therapeutic Exercise: ROM;Stretching;Strengthening  Neuromuscular Reeducation: kinesio tape;posture;TNE  Manual Therapy: soft tissue mobilization;myofascial release;joint mobilization;muscle energy  Modalities: TENS;ultrasound;iontophoresis  Carrying, Moving and Handling Objects Goal Status (): CI    POC and pathology of condition were reviewed with patient.  Pt. is in agreement with the Plan of Care  A Home Exercise Program (HEP) was initiated today.  Pt. was instructed in exercises by PT and patient was given a handout with detailed instructions.    Plan for next visit: review HEP, lat stretch at parallel bars, shoulder isometrics, STM to anterior shoulder joint, wall walks with hold, pulleys  .   Subjective:       History of Present Illness:    Edward is a 77 y.o. male who presents to therapy today with complaints of R shoulder pain. Date of onset is  about 10 years ago and duration of symptoms is when he tries to do activity. Onset was gradual and symptoms are intermittent, getting better and not improving. Patient reports he hasn't been able to throw for about the last 10 years. He reports his son in law is a chiropractor and had said the joint and orthopedic doctor said there isn't anything wrong with the joint. Shoveling snow sometimes seems to help it but sometimes seems to hurt them. He can't lay on it, it hurts the most in the morning, the motion is restricted for sure, once he loosens up in the morning, he has a hard time taking a drink of water. He reports  a constant  history of similar symptoms. He describes their previous level of function as not limited. He doesn't feel that he has the strength he used to, he had been doing heavy weight training up until about 6 years ago. He used to play baseball a lot, he feels limited by stiffness and pain. He had been doing rehab about a year ago but he quit because it increased his pain symptoms.     Pain Ratin  Pain rating at best: 1  Pain description: aching, burning, dull, soreness and weakness    Functional limitations are described as occurring with:   lifting  reaching at shoulder height and overhead  performing routine daily activities  Sleeping - if he lays on his R shoulder, it becomes stiff but no pain  transitional movements getting in  chair and getting out of  chair    Patient reports benefit from:  anti-inflammatory, heat, massage       Objective:      Note: Items left blank indicates the item was not performed or not indicated at the time of the evaluation.    Patient Outcome Measures :    Shoulder Pain and Disability Index (SPADI) in %: 33   Scores range from 0-100%, where a score of 0% represents minimal pain and maximal function. The minimal clincically important difference is a score reduction of 10%.    Shoulder Examination  1. Chronic right shoulder pain     2. Decreased right shoulder  range of motion     3. Deltoid tendonitis of right shoulder       Involved side: Right  Posture Observation:      General sitting posture is  fair.  General standing posture is normal.  Cervical Clearing: Normal - neck stiffness when he turns to the L side    Joint Assessment:  Sternoclavicular Joint Assessment: WNL  Acromioclavicular Joint Assessment: WNL  Scapulothoracic Joint Assessment: WNL.  Glenohumeral Joint Assessment:Hypomobile., anterior capsule tightness    Flexibility/Tissue Extensibility: decreased of bilateral UT R>L, anterior R shoulder joint and deltoid tendon  Palpation: TTP of R pectoral, supraspinatus and deltoid attachment at humerus       Shoulder/Elbow ROM    Date: 1/31/2018     Shoulder and Elbow ROM ( )   AROM in degrees AROM in degrees AROM in degrees    Right Left Right Left Right Left   Shoulder Flexion (0-180 ) 122 P around 90deg        Shoulder Abduction (0-180 ) 130 popping and stiffness        Shoulder Extension (0-60 )         Shoulder ER (0-90 ) 60        Shoulder IR (0-70 )         Shoulder IR/Ext         Elbow Flexion (150 )         Elbow Extension (0 )          PROM in degrees PROM in degrees PROM in degrees    Right Left Right Left Right Left   Shoulder Flexion (0-180 )         Shoulder Abduction (0-180 )         Shoulder Extension (0-60 )         Shoulder ER (0-90 )         Shoulder IR (0-70 )         Elbow Flexion (150 )         Elbow Extension (0 )           Shoulder/Elbow Strength WFL unless noted  Date: 1/31/2018     Shoulder/Elbow Strength (/5)  Manual Muscle Test (MMT) MMT MMT MMT    Right Left Right Left Right Left   Shoulder Flexion 5        Supraspinatus         Shoulder Abduction 4 P        Shoulder Extension         Shoulder External Rotation 4 P        Shoulder Internal Rotation 4P        Elbow Flexion 5        Elbow Extension 5        Other:         Other:           Shoulder Special Tests   Impingement Cluster Right (+/-) Left (+/-) Rotator Cuff Tests Right (+/-)  Left (+/-)   Oh-Phi   Drop Arm Sign     Painful Arc +  Hornblowers     Infraspinatus Test   ERLS     AC Tests Right (+/-) Left (+/-) IRLS     Active Compression   Labral Tests Right (+/-) Left (+/-)   Crossbody Adduction -  Biceps Load Test II     AC Resisted Extension   Jerk Test     GH Instability Tests Right (+/-) Left (+/-) Dolores Test     Sulcus Sign   Biceps Tests Right (+/-) Left (+/-)   Apprehension   Speed     Relocation   Cristian castillo     Other:   Other:     Other:   Other:           Treatment Today      Patient Education: Patient educated on plan of care, prognosis, PT/patient role and HEP. Patient educated on impairments related to condition and reproduction of symptoms. Patient instructed to focus on the small goals and this may be a long process to recovery, and that exercises at home are just as important as coming to therapy. Patient was educated on importance of exercise consistency and . Patient demonstrated and verbalized understanding.     Manual therapy:  None performed today    Exercises:  Exercise #1: codman's exercise - 2 minutes, 30 seconds each direction  Comment #1: ER doorway stretch - hold 10 sec x 3  Exercise #2: AAROM supine flexion and standing Abduction - 10 reps with 3 second hold  Comment #2: scapular retraction - hold 5 sec perform all day long      TREATMENT MINUTES COMMENTS   Evaluation 20    Self-care/ Home management     Manual therapy     Neuromuscular Re-education     Therapeutic Activity     Therapeutic Exercises 25 See flowsheet; patient education and continuing plan of care   Gait training     Modality__________________                Total 45    Blank areas are intentional and mean the treatment did not include these items.     PT Evaluation Code: (Please list factors)  Patient History/Comorbidities: HTN, osteopenia, R shoulder pain  Examination: decreased R shoulder range of motion, joint mobility and increased pain  Clinical Presentation: uncomplicated  Clinical  Decision Making: low    Patient History/  Comorbidities Examination  (body structures and functions, activity limitations, and/or participation restrictions) Clinical Presentation Clinical Decision Making (Complexity)   No documented Comorbidities or personal factors 1-2 Elements Stable and/or uncomplicated Low   1-2 documented comorbidities or personal factor 3 Elements Evolving clinical presentation with changing characteristics Moderate   3-4 documented comorbidities or personal factors 4 or more Unstable and unpredictable High                Tamra Ramachandran, PT  1/31/2018  9:00 AM

## 2021-06-16 ENCOUNTER — AMBULATORY - HEALTHEAST (OUTPATIENT)
Dept: LAB | Facility: CLINIC | Age: 81
End: 2021-06-16

## 2021-06-16 ENCOUNTER — COMMUNICATION - HEALTHEAST (OUTPATIENT)
Dept: ANTICOAGULATION | Facility: CLINIC | Age: 81
End: 2021-06-16

## 2021-06-16 DIAGNOSIS — I26.99 PULMONARY EMBOLISM (H): ICD-10-CM

## 2021-06-16 PROBLEM — M77.8 RIGHT SHOULDER TENDONITIS: Status: ACTIVE | Noted: 2018-09-14

## 2021-06-16 PROBLEM — G89.29 CHRONIC RIGHT SHOULDER PAIN: Status: ACTIVE | Noted: 2019-02-13

## 2021-06-16 PROBLEM — S06.5XAA SUBDURAL HEMATOMA (H): Status: ACTIVE | Noted: 2019-02-03

## 2021-06-16 PROBLEM — D17.30 LIPOMA OF SKIN AND SUBCUTANEOUS TISSUE: Status: ACTIVE | Noted: 2019-02-13

## 2021-06-16 PROBLEM — M25.511 CHRONIC RIGHT SHOULDER PAIN: Status: ACTIVE | Noted: 2019-02-13

## 2021-06-16 LAB — INR PPP: 2.6 (ref 0.9–1.1)

## 2021-06-16 NOTE — PROGRESS NOTES
[facility] Daily Progress     Patient Name: Edward Mendoza  Date: 3/1/2018  Visit #: 5/12  Referral Diagnosis: R shoulder pain  Referring provider: Kyle Landers MD  Visit Diagnosis:     ICD-10-CM    1. Chronic right shoulder pain M25.511     G89.29    2. Decreased right shoulder range of motion M25.611    3. Deltoid tendonitis of right shoulder M75.81        Assessment:     Patient is a 77 y.o. male that presents with signs and symptoms consistent with chronic R shoulder pain secondary to Adhesive capsulitis right as well as tendonitis at insertion of the deltoid on the humerus per PT order. Patient demonstrates impairments including decreased right shoulder range of motion, joint mobility and flexibility with increased pain and impaired strength leading to impaired functional mobility. Patient's functional limitations include reaching overhead, lifting objects, performing daily activities, and laying on his right side.    Today patient reports he is feeling a little bit better since last visit, still demonstrates decreased range of motion in R shoulder, and is compensating with thoracolumbar flexion at end range of shoulder motion. Patient will continue therapy 1x/week until feeling better and progress is met.    HEP/POC compliance is  good .  Patient demonstrates understanding/independence with home program.  Patient is appropriate to continue with skilled physical therapy intervention, as indicated by initial plan of care.    Goal Status:  Pt. will be independent with home exercise program in : 4 weeks  Pt. will improve posture : and demonstrate posture with minimal to no cuing;in sitting;in standing;in 6 weeks  Patient will reach / maintain arm movement: forward;overhead;for home chores;for dressing;with full ROM;with less pain;with less difficulty;in 6 weeks  Patient will perform repetitive movement in : arm;forearm motions;for home;with less pain;with less difficulty;in 4 weeks  Patient will  decrease : SPADI score;for improved quality of function;for improved quality of life;in 6 weeks  Pt will: demonstrate decreased TTP of anterior shoulder joint with improved AROM by 4 weeks.      Plan / Patient Education:     Continue with initial plan of care.  Progress with home program as tolerated.    Plan for next visit: wall walks w/ hold, scapular stabilization, quadruped, sidelying STM to scapula    Subjective:     Pain Rating: discomfort, no pain but he can feel it  Patient feels that the new exercise hurt him for a day but then he took it out and he felt pretty good, he feels comfortable doing them now.      Functional limitations are described as occurring with:   lifting  reaching at shoulder height and overhead  performing routine daily activities  Sleeping - if he lays on his R shoulder, it becomes stiff but no pain  transitional movements getting in  chair and getting out of  chair    Objective:     Shoulder/Elbow ROM             Date: 1/31/2018 2/8/18      Shoulder and Elbow ROM ( ) AROM in degrees AROM in degrees AROM in degrees    Right Left Right Left Right Left   Shoulder Flexion (0-180 ) 122 P around 90deg   133 P at end range - P with coming down   Does           Shoulder Abduction (0-180 ) 130 popping and stiffness   136 popping and pain when he comes down          Shoulder Extension (0-60 )               Shoulder ER (0-90 ) 60    60               Treatment Today       Patient Education: Patient was educated on continuing plan of care, progress and review of current HEP. Patient educated on importance of consistency with exercise and therapy, as well as activity modification in order to see change and improvements. Patient demonstrated and verbalized understanding.     Manual Therapy:  STM to anterior R shoulder joint, pectorals, subscap and lats  PROM shoulder flexion in supine with grade 2-3 shoulder mobilizations    Exercises:  Exercise #1: codman's exercise - 2 minutes, 30 seconds each  direction  Comment #1: ER doorway stretch - hold 10 sec x 3  Exercise #2: AAROM supine flexion and standing Abduction - 10 reps with 3 second hold  Comment #2: scapular retraction - hold 5 sec perform all day long  Exercise #3: lat stretch at parallel bars - hold 20 sec x 2  Comment #3: trialed shoulder isometrics - patient reported he did not feel any difference or activation  Exercise #4: sidelying ER - 1-2lbs, 10-20 reps   Comment #4: pec doorway stretch - hold 30 sec x 2      TREATMENT MINUTES COMMENTS   Evaluation     Self-care/ Home management     Manual therapy 10 STM to anterior R shoulder joint, pectorals, subscap and lats  PROM shoulder flexion in supine with grade 2-3 shoulder mobilizations   Neuromuscular Re-education     Therapeutic Activity     Therapeutic Exercises 20 See above flowsheet; arm bike 3 minutes, pulleys flexion, abduction  Added theraband exercises for strengthening   Gait training     Modality__________________                Total 30    Blank areas are intentional and mean the treatment did not include these items.       Tamra Ramachandran, PT  3/1/2018

## 2021-06-16 NOTE — PROGRESS NOTES
[facility] Daily Progress     Patient Name: Edward Mendoza  Date: 3/13/2018  Visit #: 6/12  Referral Diagnosis: R shoulder pain  Referring provider: Kyle Landers MD  Visit Diagnosis:     ICD-10-CM    1. Chronic right shoulder pain M25.511     G89.29    2. Decreased right shoulder range of motion M25.611    3. Deltoid tendonitis of right shoulder M75.81        Assessment:     Patient is a 77 y.o. male that presents with signs and symptoms consistent with chronic R shoulder pain secondary to Adhesive capsulitis right as well as tendonitis at insertion of the deltoid on the humerus per PT order. Patient demonstrates impairments including decreased right shoulder range of motion, joint mobility and flexibility with increased pain and impaired strength leading to impaired functional mobility. Patient's functional limitations include reaching overhead, lifting objects, performing daily activities, and laying on his right side.    Today patient reports he is feeling a little bit better since last visit, still demonstrates decreased range of motion in R shoulder, and is compensating with thoracolumbar flexion at end range of shoulder motion. Patient will continue therapy 1x/week until feeling better and progress is met.    HEP/POC compliance is  good .  Patient demonstrates understanding/independence with home program.  Patient is appropriate to continue with skilled physical therapy intervention, as indicated by initial plan of care.    Goal Status:  Pt. will be independent with home exercise program in : 4 weeks  Pt. will improve posture : and demonstrate posture with minimal to no cuing;in sitting;in standing;in 6 weeks  Patient will reach / maintain arm movement: forward;overhead;for home chores;for dressing;with full ROM;with less pain;with less difficulty;in 6 weeks  Patient will perform repetitive movement in : arm;forearm motions;for home;with less pain;with less difficulty;in 4 weeks  Patient will  decrease : SPADI score;for improved quality of function;for improved quality of life;in 6 weeks  Pt will: demonstrate decreased TTP of anterior shoulder joint with improved AROM by 4 weeks.      Plan / Patient Education:     Continue with initial plan of care.  Progress with home program as tolerated.    Plan for next visit: wall walks w/ hold, scapular stabilization, quadruped, sidelying STM to scapula    Subjective:     Pain Rating: discomfort, no pain but he can feel it  Patient feels that his pain is no longer in deltoid area, but now a generalized pain. Feeling it more in the back of his arm, he feels maybe he over did it because then a day of rest helped. He has been able to lay on his R shoulder no problem the other night. Sleeping has been going well.     Functional limitations are described as occurring with:   lifting  reaching at shoulder height and overhead  performing routine daily activities  Sleeping - if he lays on his R shoulder, it becomes stiff but no pain  transitional movements getting in  chair and getting out of  chair    Objective:     Shoulder/Elbow ROM             Date: 1/31/2018 2/8/18      Shoulder and Elbow ROM ( ) AROM in degrees AROM in degrees AROM in degrees    Right Left Right Left Right Left   Shoulder Flexion (0-180 ) 122 P around 90deg   133 P at end range - P with coming down   Does           Shoulder Abduction (0-180 ) 130 popping and stiffness   136 popping and pain when he comes down          Shoulder Extension (0-60 )               Shoulder ER (0-90 ) 60    60               Treatment Today       Patient Education: Patient was educated on continuing plan of care, progress and review of current HEP. Patient educated on importance of consistency with exercise and therapy, as well as activity modification in order to see change and improvements. Patient demonstrated and verbalized understanding.     Manual Therapy:  STM to anterior R shoulder joint, pectorals, subscap and  lats  PROM shoulder flexion in supine with grade 2-3 shoulder mobilizations    Exercises:  Exercise #1: codman's exercise - 2 minutes, 30 seconds each direction  Comment #1: ER doorway stretch - hold 10 sec x 3  Exercise #2: AAROM supine flexion and standing Abduction - 10 reps with 3 second hold  Comment #2: scapular retraction - hold 5 sec perform all day long  Exercise #3: lat stretch at parallel bars - hold 20 sec x 2  Comment #3: trialed shoulder isometrics - patient reported he did not feel any difference or activation  Exercise #4: sidelying ER - 1-2lbs, 10-20 reps   Comment #4: pec doorway stretch - hold 30 sec x 2      TREATMENT MINUTES COMMENTS   Evaluation     Self-care/ Home management     Manual therapy 20 STM to anterior R shoulder joint, pectorals, subscap and lats  PROM shoulder flexion in supine with grade 2-3 shoulder mobilizations   Neuromuscular Re-education     Therapeutic Activity     Therapeutic Exercises 10 See above flowsheet; arm bike 3 minutes, pulleys flexion, abduction  Added posterior capsule stretch   Gait training     Modality__________________                Total 30    Blank areas are intentional and mean the treatment did not include these items.       Tamra Ramachandran, PT  3/13/2018

## 2021-06-16 NOTE — TELEPHONE ENCOUNTER
Telephone Encounter by Thaddeus Shaw RN at 1/31/2019  1:17 PM     Author: Thaddeus Shaw RN Service: -- Author Type: Registered Nurse    Filed: 1/31/2019  1:40 PM Encounter Date: 1/31/2019 Status: Attested    : Thaddeus Shaw RN (Registered Nurse) Cosigner: Dari Carroll MD at 2/4/2019 11:10 AM    Attestation signed by Dari Carroll MD at 2/4/2019 11:10 AM    Agree with the above plan.     Dari Carroll MD                  ANTICOAGULATION  MANAGEMENT    Assessment     Today's INR result of 1.7 is Therapeutic (goal INR of 2.0-3.0)        Missed dose(s) may be affecting INR missed tues    No new diet changes affecting INR    No new medication/supplements affecting INR    Continues to tolerate warfarin with no reported s/s of bleeding or thromboembolism     Previous INR was Therapeutic    Plan:     Spoke with Edward regarding INR result and instructed:     Warfarin Dosing Instructions:  have 7.5 mg tonight to boost then continue current warfarin dose 7.5 mg daily on sun/wed/fri; and 5 mg daily rest of week  (0 % change)    Instructed patient to follow up no later than: two weeks with ov      Edward verbalizes understanding and agrees to warfarin dosing plan.    Instructed to call the [facility] for any changes, questions or concerns. (#[phone])   ?   Thaddeus Shaw RN    Subjective/Objective:      Edward RUTHIE Reji, a 78 y.o. male is on warfarin.     Edward reports:     Home warfarin dose: as updated on anticoagulation calendar per template     Missed doses: tue     Medication changes:  No     S/S of bleeding or thromboembolism:  No     New Injury or illness:  No     Changes in diet or alcohol consumption:  No     Upcoming surgery, procedure or cardioversion:  No    Anticoagulation Episode Summary     Current INR goal:   2.0-3.0   TTR:   85.0 % (3.1 y)   Next INR check:   2/14/2019   INR from last check:   1.70! (1/31/2019)   Weekly max warfarin dose:      Target end date:      INR check location:      Preferred lab:       Send INR reminders to:   ANTICOAGULATION POOL A ([facility])    Indications    Pulmonary emboli (H) (Resolved) [I26.99]           Comments:            Anticoagulation Care Providers     Provider Role Specialty Phone number    Daria Christopher MD Referring Internal Medicine [phone]    Dari Carroll MD Responsible Internal Medicine [phone]

## 2021-06-16 NOTE — TELEPHONE ENCOUNTER
Telephone Encounter by Sussy Lane RN at 3/9/2020 11:24 AM     Author: Sussy Lane RN Service: -- Author Type: Registered Nurse    Filed: 3/9/2020 11:27 AM Encounter Date: 3/9/2020 Status: Attested    : Sussy Lane RN (Registered Nurse) Cosigner: Ascencion Calvillo MD at 3/9/2020 12:05 PM    Attestation signed by Ascencion Calvillo MD at 3/9/2020 12:05 PM    -                ANTICOAGULATION  MANAGEMENT    Assessment     Today's INR result of 2.4 is Therapeutic (goal INR of 2.0-3.0)        Warfarin taken as previously instructed    No new diet changes affecting INR    No new medication/supplements affecting INR    Continues to tolerate warfarin with no reported s/s of bleeding or thromboembolism     Previous INR was Therapeutic    Plan:     Spoke with Karyn regarding INR result and instructed:     Warfarin Dosing Instructions:  Continue current warfarin dose 7.5 mg daily on Mondays; and 5 mg daily rest of week  (0 % change)    Instructed patient to follow up no later than: 4-6 weeks.    Education provided: importance of therapeutic range, importance of following up for INR monitoring at instructed interval and importance of taking warfarin as instructed    Karyn verbalizes understanding and agrees to warfarin dosing plan.    Instructed to call the [facility] for any changes, questions or concerns. (#[phone])   ?   Sussy Lane RN    Subjective/Objective:      Edward RUTHIE Mendoza, a 79 y.o. male is on warfarin.     Edward reports:     Home warfarin dose: verbally confirmed home dose with Karyn and updated on anticoagulation calendar     Missed doses: No     Medication changes:  No     S/S of bleeding or thromboembolism:  No     New Injury or illness:  No     Changes in diet or alcohol consumption:  No     Upcoming surgery, procedure or cardioversion:  No    Anticoagulation Episode Summary     Current INR goal:   2.0-3.0   TTR:   78.4 % (1 y)   Next INR check:   4/20/2020   INR from  last check:   2.40 (3/9/2020)   Weekly max warfarin dose:      Target end date:      INR check location:      Preferred lab:      Send INR reminders to:   CHRISTA PHELPS    Indications    Pulmonary emboli (H) (Resolved) [I26.99]           Comments:            Anticoagulation Care Providers     Provider Role Specialty Phone number    Ascencion Calvillo MD [facility] Internal Medicine [phone]

## 2021-06-16 NOTE — TELEPHONE ENCOUNTER
Telephone Encounter by Sussy Lane RN at 1/3/2019 10:39 AM     Author: Sussy Lane RN Service: -- Author Type: Registered Nurse    Filed: 1/3/2019 11:00 AM Encounter Date: 1/3/2019 Status: Attested    : Sussy Lane RN (Registered Nurse) Cosigner: Dari Carroll MD at 1/3/2019  2:40 PM    Attestation signed by Dari Carroll MD at 1/3/2019  2:40 PM    Agree with the above plan.     Dari Carroll MD                  ANTICOAGULATION  MANAGEMENT    Assessment     Today's INR result of 2.6 is Therapeutic (goal INR of 2.0-3.0)        Warfarin taken as previously instructed    No new diet changes affecting INR    OTC medications that will not interact     Continues to tolerate warfarin with no reported s/s of bleeding or thromboembolism     Previous INR was Therapeutic    Plan:     Spoke with Galo regarding INR result and instructed:     Warfarin Dosing Instructions:  Continue current warfarin dose 7.5 mg daily on Sundays, Wednesdays and Fridays; and 5 mg daily rest of week  (0 % change)    Instructed patient to follow up no later than: 2-4 weeks pending how he feels.    Education provided: importance of therapeutic range, importance of following up for INR monitoring at instructed interval and importance of taking warfarin as instructed    Galo verbalizes understanding and agrees to warfarin dosing plan.    Instructed to call the [facility] for any changes, questions or concerns. (#[phone])   ?   Sussy Lane RN    Subjective/Objective:      Galo RUTHIE Reji, a 78 y.o. male is on warfarin.     Galo reports:     Home warfarin dose: verbally confirmed home dose with Galo and updated on anticoagulation calendar     Missed doses: No     Medication changes:  Yes: taking some OTC medications for a cold.     S/S of bleeding or thromboembolism:  No     New Injury or illness:  Yes: cold, sinus issues past few days.     Changes in diet or alcohol consumption:  No     Upcoming surgery,  procedure or cardioversion:  No    Anticoagulation Episode Summary     Current INR goal:   2.0-3.0   TTR:   85.5 % (3 y)   Next INR check:   1/31/2019   INR from last check:   2.60 (1/3/2019)   Weekly max warfarin dose:      Target end date:      INR check location:      Preferred lab:      Send INR reminders to:   ANTICOAGULATION POOL A (WBY,WBE,MID,RSC)    Indications    Pulmonary emboli (H) (Resolved) [I26.99]           Comments:            Anticoagulation Care Providers     Provider Role Specialty Phone number    Daria Christopher MD Referring Internal Medicine [phone]    Dari Carroll MD Responsible Internal Medicine [phone]

## 2021-06-16 NOTE — PROGRESS NOTES
[facility] Daily Progress     Patient Name: Edward Mendoza  Date: 2/15/2018  Visit #: 3/12  Referral Diagnosis: R shoulder pain  Referring provider: Kyle Landers MD  Visit Diagnosis:     ICD-10-CM    1. Chronic right shoulder pain M25.511     G89.29    2. Decreased right shoulder range of motion M25.611    3. Deltoid tendonitis of right shoulder M75.81        Assessment:     Patient is a 77 y.o. male that presents with signs and symptoms consistent with chronic R shoulder pain secondary to Adhesive capsulitis right as well as tendonitis at insertion of the deltoid on the humerus per PT order. Patient demonstrates impairments including decreased right shoulder range of motion, joint mobility and flexibility with increased pain and impaired strength leading to impaired functional mobility. Patient's functional limitations include reaching overhead, lifting objects, performing daily activities, and laying on his right side.    Today patient reports he is feeling a little bit better since last visit, still demonstrates decreased range of motion in R shoulder, and is compensating with thoracolumbar flexion at end range of shoulder motion. Patient will continue therapy 1x/week until feeling better and progress is met.    HEP/POC compliance is  good .  Patient demonstrates understanding/independence with home program.  Patient is appropriate to continue with skilled physical therapy intervention, as indicated by initial plan of care.    Goal Status:  Pt. will be independent with home exercise program in : 4 weeks  Pt. will improve posture : and demonstrate posture with minimal to no cuing;in sitting;in standing;in 6 weeks  Patient will reach / maintain arm movement: forward;overhead;for home chores;for dressing;with full ROM;with less pain;with less difficulty;in 6 weeks  Patient will perform repetitive movement in : arm;forearm motions;for home;with less pain;with less difficulty;in 4 weeks  Patient will  decrease : SPADI score;for improved quality of function;for improved quality of life;in 6 weeks  Pt will: demonstrate decreased TTP of anterior shoulder joint with improved AROM by 4 weeks.      Plan / Patient Education:     Continue with initial plan of care.  Progress with home program as tolerated.    Plan for next visit: pulleys, wall walks w/ hold, scapular stabilization, quadruped, sidelying STM to scapula    Subjective:     Pain Rating: discomfort, no pain but he can feel it  During the week, he started straightening the elbow and at first it hurt, but then it got better. He was doing it 2x/day but that seemed like it was too much so he went back down to 1x/day. Taking tylonol before he goes to bed seems to make a difference.     Functional limitations are described as occurring with:   lifting  reaching at shoulder height and overhead  performing routine daily activities  Sleeping - if he lays on his R shoulder, it becomes stiff but no pain  transitional movements getting in  chair and getting out of  chair    Objective:     Shoulder/Elbow ROM             Date: 1/31/2018 2/8/18      Shoulder and Elbow ROM ( ) AROM in degrees AROM in degrees AROM in degrees    Right Left Right Left Right Left   Shoulder Flexion (0-180 ) 122 P around 90deg   133 P at end range - P with coming down   Does           Shoulder Abduction (0-180 ) 130 popping and stiffness   136 popping and pain when he comes down          Shoulder Extension (0-60 )               Shoulder ER (0-90 ) 60    60               Treatment Today       Patient Education: Patient was educated on continuing plan of care, progress and review of current HEP. Patient educated on importance of consistency with exercise and therapy, as well as activity modification in order to see change and improvements. Patient demonstrated and verbalized understanding.     Manual Therapy:  STM to anterior R shoulder joint, pectorals, subscap and lats  PROM shoulder flexion in  supine with grade 2-3 shoulder mobilizations    Exercises:  Exercise #1: codman's exercise - 2 minutes, 30 seconds each direction  Comment #1: ER doorway stretch - hold 10 sec x 3  Exercise #2: AAROM supine flexion and standing Abduction - 10 reps with 3 second hold  Comment #2: scapular retraction - hold 5 sec perform all day long  Exercise #3: lat stretch at parallel bars - hold 20 sec x 2  Comment #3: trialed shoulder isometrics - patient reported he did not feel any difference or activation      TREATMENT MINUTES COMMENTS   Evaluation     Self-care/ Home management     Manual therapy 20 STM to anterior R shoulder joint, pectorals, subscap and lats  PROM shoulder flexion in supine with grade 2-3 shoulder mobilizations   Neuromuscular Re-education     Therapeutic Activity     Therapeutic Exercises 10 See above flowsheet   Gait training     Modality__________________                Total 30    Blank areas are intentional and mean the treatment did not include these items.       Tamra Ramachandran, PT  2/15/2018

## 2021-06-16 NOTE — PROGRESS NOTES
[facility] Daily Progress     Patient Name: Edward Mendoza  Date: 2/22/2018  Visit #: 4/12  Referral Diagnosis: R shoulder pain  Referring provider: Kyle Landers MD  Visit Diagnosis:     ICD-10-CM    1. Chronic right shoulder pain M25.511     G89.29    2. Decreased right shoulder range of motion M25.611    3. Deltoid tendonitis of right shoulder M75.81        Assessment:     Patient is a 77 y.o. male that presents with signs and symptoms consistent with chronic R shoulder pain secondary to Adhesive capsulitis right as well as tendonitis at insertion of the deltoid on the humerus per PT order. Patient demonstrates impairments including decreased right shoulder range of motion, joint mobility and flexibility with increased pain and impaired strength leading to impaired functional mobility. Patient's functional limitations include reaching overhead, lifting objects, performing daily activities, and laying on his right side.    Today patient reports he is feeling a little bit better since last visit, still demonstrates decreased range of motion in R shoulder, and is compensating with thoracolumbar flexion at end range of shoulder motion. Patient will continue therapy 1x/week until feeling better and progress is met.    HEP/POC compliance is  good .  Patient demonstrates understanding/independence with home program.  Patient is appropriate to continue with skilled physical therapy intervention, as indicated by initial plan of care.    Goal Status:  Pt. will be independent with home exercise program in : 4 weeks  Pt. will improve posture : and demonstrate posture with minimal to no cuing;in sitting;in standing;in 6 weeks  Patient will reach / maintain arm movement: forward;overhead;for home chores;for dressing;with full ROM;with less pain;with less difficulty;in 6 weeks  Patient will perform repetitive movement in : arm;forearm motions;for home;with less pain;with less difficulty;in 4 weeks  Patient will  decrease : SPADI score;for improved quality of function;for improved quality of life;in 6 weeks  Pt will: demonstrate decreased TTP of anterior shoulder joint with improved AROM by 4 weeks.      Plan / Patient Education:     Continue with initial plan of care.  Progress with home program as tolerated.    Plan for next visit: wall walks w/ hold, scapular stabilization, quadruped, sidelying STM to scapula    Subjective:     Pain Rating: discomfort, no pain but he can feel it  He feels that he has increased flexibility, he is still feeling some achiness in the biceps area. He was doing it 2x/day but that seemed like it was too much so he went back down to 1x/day.     Functional limitations are described as occurring with:   lifting  reaching at shoulder height and overhead  performing routine daily activities  Sleeping - if he lays on his R shoulder, it becomes stiff but no pain  transitional movements getting in  chair and getting out of  chair    Objective:     Shoulder/Elbow ROM             Date: 1/31/2018 2/8/18      Shoulder and Elbow ROM ( ) AROM in degrees AROM in degrees AROM in degrees    Right Left Right Left Right Left   Shoulder Flexion (0-180 ) 122 P around 90deg   133 P at end range - P with coming down   Does           Shoulder Abduction (0-180 ) 130 popping and stiffness   136 popping and pain when he comes down          Shoulder Extension (0-60 )               Shoulder ER (0-90 ) 60    60               Treatment Today       Patient Education: Patient was educated on continuing plan of care, progress and review of current HEP. Patient educated on importance of consistency with exercise and therapy, as well as activity modification in order to see change and improvements. Patient demonstrated and verbalized understanding.     Manual Therapy:  STM to anterior R shoulder joint, pectorals, subscap and lats  PROM shoulder flexion in supine with grade 2-3 shoulder mobilizations    Exercises:  Exercise #1:  codman's exercise - 2 minutes, 30 seconds each direction  Comment #1: ER doorway stretch - hold 10 sec x 3  Exercise #2: AAROM supine flexion and standing Abduction - 10 reps with 3 second hold  Comment #2: scapular retraction - hold 5 sec perform all day long  Exercise #3: lat stretch at parallel bars - hold 20 sec x 2  Comment #3: trialed shoulder isometrics - patient reported he did not feel any difference or activation  Exercise #4: sidelying ER - 1-2lbs, 10-20 reps       TREATMENT MINUTES COMMENTS   Evaluation     Self-care/ Home management     Manual therapy 20 STM to anterior R shoulder joint, pectorals, subscap and lats  PROM shoulder flexion in supine with grade 2-3 shoulder mobilizations   Neuromuscular Re-education     Therapeutic Activity     Therapeutic Exercises 10 See above flowsheet; arm bike 3 minutes, pulleys flexion, abduction   Gait training     Modality__________________                Total 30    Blank areas are intentional and mean the treatment did not include these items.       Tamra Ramachandran, PT  2/22/2018

## 2021-06-17 NOTE — TELEPHONE ENCOUNTER
ANTICOAGULATION  MANAGEMENT    Assessment     Today's INR result of 2.1 is Therapeutic (goal INR of 2.0-3.0)        Warfarin taken as previously instructed    No new diet changes affecting INR    No new medication/supplements affecting INR    Continues to tolerate warfarin with no reported s/s of bleeding or thromboembolism     Previous INR was Supratherapeutic    Plan:     Spoke on phone with Edward regarding INR result and instructed:      Warfarin Dosing Instructions:  Continue current warfarin dose 7.5 mg daily on Mon; and 5 mg daily rest of week      Instructed patient to follow up no later than: 2 weeks    Education provided: target INR goal and significance of current INR result    Edward verbalizes understanding and agrees to warfarin dosing plan.    Instructed to call the [facility] for any changes, questions or concerns. (#[phone])   ?   Nicole Chow RN    Subjective/Objective:      Edward Mendoza, a 80 y.o. male is on warfarin. Edward Leon reports:     Home warfarin dose: as updated on anticoagulation calendar per template     Missed doses: No     Medication changes:  No     S/S of bleeding or thromboembolism:  No     New Injury or illness:  No     Changes in diet or alcohol consumption:  No     Upcoming surgery, procedure or cardioversion:  No    Anticoagulation Episode Summary     Current INR goal:  2.0-3.0   TTR:  91.0 % (1 y)   Next INR check:  5/19/2021   INR from last check:  2.10 (5/5/2021)   Weekly max warfarin dose:     Target end date:     INR check location:     Preferred lab:     Send INR reminders to:  CHRISTA PHELPS    Indications    Pulmonary emboli (H) (Resolved) [I26.99]           Comments:           Anticoagulation Care Providers     Provider Role Specialty Phone number    Ascencion Calvillo MD [facility] Internal Medicine [phone]

## 2021-06-17 NOTE — TELEPHONE ENCOUNTER
ANTICOAGULATION  MANAGEMENT    Assessment     Today's INR result of 2.5 is Therapeutic (goal INR of 2.0-3.0)        Warfarin taken as previously instructed    No new diet changes affecting INR    No new medication/supplements affecting INR    Continues to tolerate warfarin with no reported s/s of bleeding or thromboembolism     Previous INR was Therapeutic    Plan:     Spoke on phone with Edward regarding INR result and instructed:      Warfarin Dosing Instructions:  Continue current warfarin dose 7.5 mg daily on Mondays; and 5 mg daily rest of week  (0 % change)    Instructed patient to follow up no later than: 4 weeks.    Education provided: importance of therapeutic range and target INR goal and significance of current INR result    Edward verbalizes understanding and agrees to warfarin dosing plan.    Instructed to call the [facility] for any changes, questions or concerns. (#[phone])   ?   Sussy Lane RN    Subjective/Objective:      Edward Mendoza, a 80 y.o. male is on warfarin. Edward Leon reports:     Home warfarin dose: verbally confirmed home dose with Edward and updated on anticoagulation calendar     Missed doses: No     Medication changes:  No     S/S of bleeding or thromboembolism:  No     New Injury or illness:  No     Changes in diet or alcohol consumption:  No     Upcoming surgery, procedure or cardioversion:  No    Anticoagulation Episode Summary     Current INR goal:  2.0-3.0   TTR:  91.0 % (1 y)   Next INR check:  6/16/2021   INR from last check:  2.50 (5/19/2021)   Weekly max warfarin dose:     Target end date:     INR check location:     Preferred lab:     Send INR reminders to:  CHRISTA PHELPS    Indications    Pulmonary emboli (H) (Resolved) [I26.99]           Comments:           Anticoagulation Care Providers     Provider Role Specialty Phone number    Ascencion Calvillo MD [facility] Internal Medicine [phone]

## 2021-06-17 NOTE — PROGRESS NOTES
[facility] Discharge Summary  Patient Name: Edward Mendoza  Date: 4/26/2018  Referral Diagnosis: R shoulder pain  Referring provider: Kyle Landers MD  Visit Diagnosis:   1. Chronic right shoulder pain     2. Decreased right shoulder range of motion     3. Deltoid tendonitis of right shoulder         Goals:  Pt. will be independent with home exercise program in : 4 weeks  Pt. will improve posture : and demonstrate posture with minimal to no cuing;in sitting;in standing;in 6 weeks  Patient will reach / maintain arm movement: forward;overhead;for home chores;for dressing;with full ROM;with less pain;with less difficulty;in 6 weeks  Patient will perform repetitive movement in : arm;forearm motions;for home;with less pain;with less difficulty;in 4 weeks  Patient will decrease : SPADI score;for improved quality of function;for improved quality of life;in 6 weeks  Pt will: demonstrate decreased TTP of anterior shoulder joint with improved AROM by 4 weeks.    Patient was seen for 7 visits for physical therapy of R shoulder pain from 1/31/18 to 3/29/18 with no follow up appointments.   The patient met goals and has demonstrated understanding of and independence in the home program for self-care, and progression to next steps.  He will initiate contact if questions or concerns arise.  The patient reports feeling better and did not wish to schedule further therapy at this time.    Therapy will be discontinued at this time.  The patient will need a new referral to resume physical therapy treatment. Please see below for patient's current status.    Thank you for your referral.  Tamra Ramachandran, PT, DPT  4/26/2018   8:40 AM      [facility] Daily Progress     Patient Name: Edward Mendoza  Date: 3/29/2018  Visit #: 7/12  Referral Diagnosis: R shoulder pain  Referring provider: Kyle Landers MD  Visit Diagnosis:     ICD-10-CM    1. Chronic right shoulder pain M25.511     G89.29    2. Decreased right  shoulder range of motion M25.611    3. Deltoid tendonitis of right shoulder M75.81        Assessment:     Patient is a 77 y.o. male that presents with signs and symptoms consistent with chronic R shoulder pain secondary to Adhesive capsulitis right as well as tendonitis at insertion of the deltoid on the humerus per PT order. Patient demonstrates impairments including decreased right shoulder range of motion, joint mobility and flexibility with increased pain and impaired strength leading to impaired functional mobility. Patient's functional limitations include reaching overhead, lifting objects, performing daily activities, and laying on his right side.    Today patient reports he is feeling a little bit better since last visit 3/13, still demonstrates decreased range of motion in R shoulder, and is compensating with thoracolumbar extension at end range of shoulder motion. Patient will trial independence.     HEP/POC compliance is  good .  Patient demonstrates understanding/independence with home program.  Patient is appropriate to continue with skilled physical therapy intervention, as indicated by initial plan of care.    Goal Status:  Pt. will be independent with home exercise program in : 4 weeks  Pt. will improve posture : and demonstrate posture with minimal to no cuing;in sitting;in standing;in 6 weeks  Patient will reach / maintain arm movement: forward;overhead;for home chores;for dressing;with full ROM;with less pain;with less difficulty;in 6 weeks  Patient will perform repetitive movement in : arm;forearm motions;for home;with less pain;with less difficulty;in 4 weeks  Patient will decrease : SPADI score;for improved quality of function;for improved quality of life;in 6 weeks  Pt will: demonstrate decreased TTP of anterior shoulder joint with improved AROM by 4 weeks.      Plan / Patient Education:     Continue with initial plan of care.  Progress with home program as tolerated.    Plan for next visit:  wall walks w/ hold, scapular stabilization, quadruped, sidelying STM to scapula    Subjective:     Pain Rating: discomfort, no pain but he can feel it  He feels that it is all getting better, but he had hit a wall so he has been pushing his exercises into the pain and he has noticed a difference in progress. He does lay off for a day or two because he was so sore from pushing into the pain.     Functional limitations are described as occurring with:   lifting  reaching at shoulder height and overhead  performing routine daily activities  Sleeping - if he lays on his R shoulder, it becomes stiff but no pain  transitional movements getting in  chair and getting out of  chair    Objective:     Shoulder/Elbow ROM             Date: 1/31/2018 2/8/18      Shoulder and Elbow ROM ( ) AROM in degrees AROM in degrees AROM in degrees    Right Left Right Left Right Left   Shoulder Flexion (0-180 ) 122 P around 90deg   133 P at end range - P with coming down   Does     120 with thoracic extension      Shoulder Abduction (0-180 ) 130 popping and stiffness   136 popping and pain when he comes down    138      Shoulder Extension (0-60 )               Shoulder ER (0-90 ) 60    60               Treatment Today       Patient Education: Patient was educated on continuing plan of care, progress and review of current HEP. Patient educated on importance of consistency with exercise and therapy, as well as activity modification in order to see change and improvements. Patient demonstrated and verbalized understanding.     Manual Therapy:  STM to anterior R shoulder joint, pectorals, subscap and lats  PROM shoulder flexion in supine with grade 2-3 shoulder mobilizations    Exercises:  Exercise #1: codman's exercise - 2 minutes, 30 seconds each direction  Comment #1: ER doorway stretch - hold 10 sec x 3  Exercise #2: AAROM supine flexion and standing Abduction - 10 reps with 3 second hold  Comment #2: scapular retraction - hold 5 sec perform  all day long  Exercise #3: lat stretch at parallel bars - hold 20 sec x 2  Comment #3: trialed shoulder isometrics - patient reported he did not feel any difference or activation  Exercise #4: sidelying ER - 1-2lbs, 10-20 reps   Comment #4: pec doorway stretch - hold 30 sec x 2      TREATMENT MINUTES COMMENTS   Evaluation     Self-care/ Home management     Manual therapy 20 STM to anterior R shoulder joint, pectorals, subscap and lats  PROM shoulder flexion in supine with grade 2-3 shoulder mobilizations   Neuromuscular Re-education     Therapeutic Activity     Therapeutic Exercises 10 See above flowsheet; arm bike 3 minutes, pulleys flexion, abduction  Added shoulder strengthening exercises   Gait training     Modality__________________                Total 30    Blank areas are intentional and mean the treatment did not include these items.       Tamra Ramachandran, PT  3/29/2018

## 2021-06-17 NOTE — TELEPHONE ENCOUNTER
Telephone Encounter by Sussy Lane RN at 4/21/2021 11:25 AM     Author: Sussy Lane RN Service: -- Author Type: Registered Nurse    Filed: 4/21/2021 11:38 AM Encounter Date: 4/21/2021 Status: Signed    : Sussy Lane RN (Registered Nurse)       ANTICOAGULATION  MANAGEMENT    Assessment     Today's INR result of 3.1 is Supratherapeutic (goal INR of 2.0-3.0)        Warfarin taken as previously instructed    Decreased greens/vitamin K intake may be affecting INR    No new medication/supplements affecting INR    Continues to tolerate warfarin with no reported s/s of bleeding or thromboembolism     Previous INR was Therapeutic    Plan:     Spoke on phone with Edward regarding INR result and instructed:      Warfarin Dosing Instructions:  Continue current warfarin dose 7.5 mg daily on Mondays; and 5 mg daily rest of week  (0 % change)    Instructed patient to follow up no later than: two weeks    Education provided: importance of therapeutic range and target INR goal and significance of current INR result    Edward verbalizes understanding and agrees to warfarin dosing plan.    Instructed to call the [facility] for any changes, questions or concerns. (#[phone])   ?   Sussy Lane RN    Subjective/Objective:      Edward HENDRICKS Reji, a 80 y.o. male is on warfarin. Edward Leon reports:     Home warfarin dose: verbally confirmed home dose with Edward and updated on anticoagulation calendar     Missed doses: No     Medication changes:  No     S/S of bleeding or thromboembolism:  No     New Injury or illness:  No     Changes in diet or alcohol consumption:  Yes: he has eaten less greens. He will get back to his usual     Upcoming surgery, procedure or cardioversion:  No    Anticoagulation Episode Summary     Current INR goal:  2.0-3.0   TTR:  91.4 % (1 y)   Next INR check:  5/5/2021   INR from last check:  3.10 (4/21/2021)   Weekly max warfarin dose:     Target end date:     INR check  location:     Preferred lab:     Send INR reminders to:  CHRISTA PHELPS    Indications    Pulmonary emboli (H) (Resolved) [I26.99]           Comments:           Anticoagulation Care Providers     Provider Role Specialty Phone number    Ascencion Calvillo MD [facility] [phone]

## 2021-06-17 NOTE — PATIENT INSTRUCTIONS - HE
Patient Instructions by Ascencion Calvillo MD at 2/13/2019 10:00 AM     Author: Ascencion Calvillo MD Service: -- Author Type: Physician    Filed: 2/13/2019 10:53 AM Encounter Date: 2/13/2019 Status: Addendum    : Ascencion Calvillo MD (Physician)    Related Notes: Original Note by Ascencion Calvillo MD (Physician) filed at 2/13/2019 10:53 AM       You will receive a letter with results from today's lab work.    See me next week to remove the staples from your scalp and for a follow-up appointment.    Continue on current medications.    You can restart your warfarin once the neurosurgery clinic is you clearance to restart it.  When you restarted, you will restart at your usual dose.    Regular walking exercises recommended 3-4 times a week, for at least 20 minutes.      Patient Education   Signs of Hearing Loss  Hearing loss is a problem shared by many people. In fact, it is one of the most common health conditions, particularly as people age. Most people over age 65 have some hearing loss, and by age 80, almost everyone does. Because hearing loss usually occurs slowly over the years, you may not realize your hearing ability has gotten worse.       Have your hearing checked  Contact your [facility] care provider if you:    Have to strain to hear normal conversation.    Have to watch other peoples faces very carefully to follow what theyre saying.    Need to ask people to repeat what theyve said.    Often misunderstand what people are saying.    Turn the volume of the television or radio up so high that others complain.    Feel that people are mumbling when theyre talking to you.    Find that the effort to hear leaves you feeling tired and irritated.    Notice, when using the phone, that you hear better with 1 ear than the other.    5036-5680 [employer]. [address] [place] [address], [place] [address]. All rights reserved. This information is not intended as a substitute for professional medical care. Always follow  your healthcare professional's instructions.         Patient Education   Preventing Falls in the Home  As you get older, falls are more likely. Thats because your reaction time slows. Your muscles and joints may also get stiffer, making them less flexible. Illness, medications, and vision changes can also affect your balance. A fall could leave you unable to live on your own. To make your home safer, follow these tips:    Floors    Put nonskid pads under area rugs.    Remove throw rugs.    Replace worn floor coverings.    Tack carpets firmly to each step on carpeted stairs. Put nonskid strips on the edges of uncarpeted stairs.    Keep floors and stairs free of clutter and cords.    Arrange furniture so there are clear pathways.    Clean up any spills right away.    Bathrooms    Install grab bars in the tub or shower.    Apply nonskid strips or put a nonskid rubber mat in the tub or shower.    Sit on a bath chair to bathe.    Use bathmats with nonskid backing.    Lighting    Keep a flashlight in each room.    Put a nightlight along the pathway between the bedroom and the bathroom.    5621-7928 [employer]. [address] [place] [address]. All rights reserved. This information is not intended as a substitute for professional medical care. Always follow your healthcare professional's instructions.           Advance Directive  Patients advance directive was discussed and I am comfortable with the patients wishes.  Patient Education   Personalized Prevention Plan  You are due for the preventive services outlined below.  Your care team is available to assist you in scheduling these services.  If you have already completed any of these items, please share that information with your care team to update in your medical record.  Health Maintenance   Topic Date Due   ? ZOSTER VACCINES (2 of 3) 11/03/2011   ? FALL RISK ASSESSMENT  01/29/2019   ? TD 18+ HE  01/06/2021   ? COLONOSCOPY  12/21/2022   ?  ADVANCE DIRECTIVES DISCUSSED WITH PATIENT  01/29/2023   ? PNEUMOCOCCAL POLYSACCHARIDE VACCINE AGE 65 AND OVER  Completed   ? INFLUENZA VACCINE RULE BASED  Completed   ? PNEUMOCOCCAL CONJUGATE VACCINE FOR ADULTS (PCV13 OR PREVNAR)  Completed

## 2021-06-18 NOTE — PATIENT INSTRUCTIONS - HE
Patient Instructions by Ascencion Calvillo MD at 3/11/2021  8:40 AM     Author: Ascencion Calvillo MD Service: -- Author Type: Physician    Filed: 3/11/2021  9:21 AM Encounter Date: 3/11/2021 Status: Addendum    : Ascencion Calvillo MD (Physician)    Related Notes: Original Note by Ascencion Calvillo MD (Physician) filed at 3/11/2021  9:21 AM       Contact your sleep clinic to reevaluate your CPAP and mask.  Make sure your CPAP machine is working at peak effectiveness.  If your CPAP machine is not working well, you will be more fatigued and sleepy, and can damage nerves and contribute to peripheral neuropathy and memory loss.  Make sure you are wearing your CPAP every night.    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    See dermatology in 6 months for skin checkup, per the recommendation.    Walk and stretch on a daily basis.  You can request a referral to physical therapy if needed.    Routine follow-up with me in 6 months.      Patient Education     Exercise for a Healthier Heart  You may wonder how you can improve the health of your heart. If youre thinking about exercise, youre on the right track. You dont need to become an athlete, but you do need a certain amount of brisk exercise to help strengthen your heart. If you have been diagnosed with a heart condition, your doctor may recommend exercise to help stabilize your condition. To help make exercise a habit, choose safe, fun activities.       Be sure to check with your health care provider before starting an exercise program.    Why exercise?  Exercising regularly offers many healthy rewards. It can help you do all of the following:    Improve your blood cholesterol levels to help prevent further heart trouble    Lower your blood pressure to help prevent a stroke or heart attack    Control diabetes, or reduce your risk of getting this disease    Improve your heart and lung function    Reach and maintain a  healthy weight    Make your muscles stronger and more limber so you can stay active    Prevent falls and fractures by slowing the loss of bone mass (osteoporosis)    Manage stress better  Exercise tips  Ease into your routine. Set small goals. Then build on them.  Exercise on most days. Aim for a total of 150 or more minutes of moderate to  vigorous intensity activity each week. Consider 40 minutes, 3 to 4 times a week. For best results, activity should last for 40 minutes on average. It is OK to work up to the 40 minute period over time. Examples of moderate-intensity activity is walking one mile in 15 minutes or 30 to 45 minutes of yard work.  Step up your daily activity level. Along with your exercise program, try being more active throughout the day. Walk instead of drive. Do more household tasks or yard work.  Choose one or more activities you enjoy. Walking is one of the easiest things you can do. You can also try swimming, riding a bike, or taking an exercise class.  Stop exercising and call your doctor if you:    Have chest pain or feel dizzy or lightheaded    Feel burning, tightness, pressure, or heaviness in your chest, neck, shoulders, back, or arms    Have unusual shortness of breath    Have increased joint or muscle pain    Have palpitations or an irregular heartbeat      9036-5914 [employer]. [address] [place] [address]. All rights reserved. This information is not intended as a substitute for professional medical care. Always follow your healthcare professional's instructions.         Patient Education   Signs of Hearing Loss  Hearing loss is a problem shared by many people. In fact, it is one of the most common health conditions, particularly as people age. Most people over age 65 have some hearing loss, and by age 80, almost everyone does. Because hearing loss usually occurs slowly over the years, you may not realize your hearing ability has gotten worse.       Have your  hearing checked  Contact your [facility] care provider if you:    Have to strain to hear normal conversation.    Have to watch other peoples faces very carefully to follow what theyre saying.    Need to ask people to repeat what theyve said.    Often misunderstand what people are saying.    Turn the volume of the television or radio up so high that others complain.    Feel that people are mumbling when theyre talking to you.    Find that the effort to hear leaves you feeling tired and irritated.    Notice, when using the phone, that you hear better with 1 ear than the other.    9327-1152 [employer]. [address] [place] [address]. All rights reserved. This information is not intended as a substitute for professional medical care. Always follow your healthcare professional's instructions.           Advance Directive  Patients advance directive was discussed and I am comfortable with the patients wishes.  Patient Education   Personalized Prevention Plan  You are due for the preventive services outlined below.  Your care team is available to assist you in scheduling these services.  If you have already completed any of these items, please share that information with your care team to update in your medical record.  Health Maintenance Due   Topic Date Due   ? ZOSTER VACCINES (2 of 3) 11/03/2011   ? TD 18+ HE  01/06/2021

## 2021-06-18 NOTE — LETTER
Letter by Ascencion Calvillo MD at      Author: Ascencion Calvillo MD Service: -- Author Type: --    Filed:  Encounter Date: 2/14/2019 Status: (Other)       Edward Mendoza  [address] [facility] [address]             February 14, 2019         Dear Mr. Mendoza,    Below are the results from your recent visit:    Resulted Orders   Comprehensive Metabolic Panel   Result Value Ref Range    Sodium 142 136 - 145 mmol/L    Potassium 4.4 3.5 - 5.0 mmol/L    Chloride 102 98 - 107 mmol/L    CO2 30 22 - 31 mmol/L    Anion Gap, Calculation 10 5 - 18 mmol/L    Glucose 113 70 - 125 mg/dL    BUN 21 8 - 28 mg/dL    Creatinine 0.92 0.70 - 1.30 mg/dL    GFR MDRD Af Amer >60 >60 mL/min/1.73m2    GFR MDRD Non Af Amer >60 >60 mL/min/1.73m2    Bilirubin, Total 0.9 0.0 - 1.0 mg/dL    Calcium 10.1 8.5 - 10.5 mg/dL    Protein, Total 6.8 6.0 - 8.0 g/dL    Albumin 4.2 3.5 - 5.0 g/dL    Alkaline Phosphatase 104 45 - 120 U/L    AST 23 0 - 40 U/L    ALT 25 0 - 45 U/L    Narrative    Fasting Glucose reference range is 70-99 mg/dL per  American Diabetes Association (ADA) guidelines.   HM2(CBC w/o Differential)   Result Value Ref Range    WBC 8.0 4.0 - 11.0 thou/uL    RBC 5.36 4.40 - 6.20 mill/uL    Hemoglobin 16.3 14.0 - 18.0 g/dL    Hematocrit 47.9 40.0 - 54.0 %    MCV 89 80 - 100 fL    MCH 30.4 27.0 - 34.0 pg    MCHC 34.1 32.0 - 36.0 g/dL    RDW 12.4 11.0 - 14.5 %    Platelets 238 140 - 440 thou/uL    MPV 7.8 7.0 - 10.0 fL   Lipid Cascade   Result Value Ref Range    Cholesterol 208 (H) <=199 mg/dL    Triglycerides 124 <=149 mg/dL    HDL Cholesterol 59 >=40 mg/dL    LDL Calculated 124 <=129 mg/dL    Patient Fasting > 8hrs? Unknown        Kidney and liver tests are normal.  Blood sugar is normal.    Hemoglobin and blood counts are normal.    Excellent cholesterol levels.    No change in treatment plan.    Please call with questions or contact us using MyChart.    Sincerely,        Electronically signed by Ascencion Calvillo MD

## 2021-06-18 NOTE — PATIENT INSTRUCTIONS - HE
Patient Instructions by Ascencion Calvillo MD at 3/9/2020  9:20 AM     Author: Ascencion Calvillo MD Service: -- Author Type: Physician    Filed: 3/9/2020 10:05 AM Encounter Date: 3/9/2020 Status: Addendum    : Ascencion Calvillo MD (Physician)    Related Notes: Original Note by Ascencion Calvillo MD (Physician) filed at 3/9/2020  9:55 AM         Patient Education   Understanding USDA MyPlate  The USDA (US Department of Agriculture) has guidelines to help you make healthy food choices. These are called MyPlate. MyPlate shows the food groups that make up healthy meals using the image of a place setting. Before you eat, think about the healthiest choices for what to put onto your plate or into your cup or bowl. To learn more about building a healthy plate, visit www.choosemyplate.gov.       The Food Groups    Fruits: Any fruit or 100% fruit juice counts as part of the Fruit Group. Fruits may be fresh, canned, frozen, or dried, and may be whole, cut-up, or pureed. Make half your plate fruits and vegetables.    Vegetables: Any vegetable or 100% vegetable juice counts as a member of the Vegetable Group. Vegetables may be fresh, frozen, canned, or dried. They can be served raw or cooked and may be whole, cut-up, or mashed. Make half your plate fruits and vegetables.     Grains: All foods made from grains are part of the Grains Group. These include wheat, rice, oats, cornmeal, and barley such as bread, pasta, oatmeal, cereal, tortillas, and grits. Grains should be no more than a quarter of your plate. At least half of your grains should be whole grains.    Protein: This group includes meat, poultry, seafood, beans and peas, eggs, processed soy products (like tofu), nuts (including nut butters), and seeds. Make protein choices no more than a quarter of your plate. Meat and poultry choices should be lean or low fat.    Dairy: All fluid milk products and foods made from milk that contain calcium, like yogurt and cheese are part of  the Dairy Group. (Foods that have little calcium, such as cream, butter, and cream cheese, are not part of the group.) Most dairy choices should be low-fat or fat-free.    Oils: These are fats that are liquid at room temperature. They include canola, corn, olive, soybean, and sunflower oil. Foods that are mainly oil include mayonnaise, certain salad dressings, and soft margarines. You should have only 5 to 7 teaspoons of oils a day. You probably already get this much from the food you eat.  Use [employer] to Help Build Your Meals  The [employer] can help you plan and track your meals and activity. You can look up individual foods to see or compare their nutritional value. You can get guidelines for what and how much you should eat. You can compare your food choices. And you can assess personal physical activities and see ways you can improve. Go to www.[employer].gov/Squabblercker/.    6243-2273 [employer]. [address] [place] [address]. All rights reserved. This information is not intended as a substitute for professional medical care. Always follow your healthcare professional's instructions.             Advance Directive  Patients advance directive was discussed and I am comfortable with the patients wishes.  Patient Education   Personalized Prevention Plan  You are due for the preventive services outlined below.  Your care team is available to assist you in scheduling these services.  If you have already completed any of these items, please share that information with your care team to update in your medical record.  Health Maintenance   Topic Date Due   ? ZOSTER VACCINES (2 of 3) 11/03/2011   ? INFLUENZA VACCINE RULE BASED (1) 08/01/2019   ? FALL RISK ASSESSMENT  02/13/2020   ? MEDICARE ANNUAL WELLNESS VISIT  02/13/2020   ? TD 18+ HE  01/06/2021   ? COLORECTAL CANCER SCREENING  12/21/2022   ? ADVANCE CARE PLANNING  02/13/2024   ? PNEUMOCOCCAL IMMUNIZATION 65+ LOW/MEDIUM RISK   Completed

## 2021-06-20 ENCOUNTER — HEALTH MAINTENANCE LETTER (OUTPATIENT)
Age: 81
End: 2021-06-20

## 2021-06-20 NOTE — PROGRESS NOTES
ASSESSMENT and Plan:  1. Hypertension  Well controlled. Continue   - chlorthalidone (HYGROTEN) 25 MG tablet; Take A 1/2 TABLETS (12.5 MG TOTAL) BY MOUTH DAILY. You are due for your physical;call 24/7  Dispense: 45 tablet; Refill: 3  - Basic Metabolic Panel  - Urinalysis-UC if Indicated  Recheck in 6 months.   2. Right shoulder tendonitis-chronic in nature. Continue home PT program for now. Discussed if he needs MRI; don't think it would change the plan at this time. He is agreeable to this to improve his ROM. Also recommended a little weight to increase muscle building     3. Ingrown nail of great toe of right foot  Let the nail grow out and cut across top. No signs of infection.  Recommended that he soak his foot and then put a little piece of cotton in between the nail and the skin and let the nail grow out.  If there is any drainage or redness or increasing pain he should call for antibiotics.    Patient Instructions     As a result of our visit today, here are the action plans for you:    1. Medication(s) to stop:   Medications Discontinued During This Encounter   Medication Reason     chlorthalidone (HYGROTEN) 25 MG tablet Reorder       2. Medication(s) to start or change:   Medications Ordered   Medications     chlorthalidone (HYGROTEN) 25 MG tablet     Sig: Take A 1/2 TABLETS (12.5 MG TOTAL) BY MOUTH DAILY. You are due for your physical;call 24/7     Dispense:  45 tablet     Refill:  3       3. Other recommendations for you:   continue your physical therapy program for your shoulder. Do a little weight for your arms and your legs to build muscles.     Dr. Carroll will let you know the results of your labs.   When you get home, you can send Dr. Carroll the name of the other medication you take that is not on your list.          Orders Placed This Encounter   Procedures     Basic Metabolic Panel     Urinalysis-UC if Indicated     Medications Discontinued During This Encounter   Medication Reason     chlorthalidone  (HYGROTEN) 25 MG tablet Reorder       Return in about 6 months (around 3/14/2019) for High Blood Pressure Recheck.    CHIEF COMPLAINT:  Chief Complaint   Patient presents with     Establish Care     Hypertension     Shoulder Pain     right shoulder for the last 15 years, went to ortho, chiropractor and physical therapy, still something wrong     Toe Pain       HISTORY OF PRESENT ILLNESS:  Edward is a 78 y.o. male presenting to the clinic today for establishment of care and has history of HTN and on chlorthalidone but his BP was low to 102 and he had taken himself off of it but then his BP was increasing again. He restarted it again at 12.5 mg daily. Also has right shoulder pain. Fell about 30 years ago and never bothered him. About 18 years ago, he started to not be able to throw a ball anymore with his right hand. Went to chiropractor and X-ray was taken and did not find anything; went to the orthopedics and could not find anything. Went to therapy for a while and it has helped but still sore. He felt a lump in right upper arm and since he has been doing his home therapy, the lump is better. He could not fully extend his arm without hurting. Also bothers him when he abducts his arm. No weakness in forearm and or hand. No numbness/tingling of arm or hand. Does not inhibit his ADLs but he has trouble throwing a ball with his grandkids. Occasionally he feels a pinch of pain and has some limitation of ROM. He feels like he is losing muscle strength.     REVIEW OF SYSTEMS:   11 system ROS were negative except as noted in HPI.     PFSH:  History   Smoking Status     Former Smoker     Packs/day: 0.50     Years: 5.00     Quit date: 1/1/1960   Smokeless Tobacco     Never Used       Family History   Problem Relation Age of Onset     Breast cancer Sister 55     Aortic aneurysm Brother      Sleep apnea Brother      Sleep apnea Sister      Colon cancer Father      Lung cancer Father      Dementia Maternal Grandfather       Pneumonia Paternal Grandfather        Social History     Social History     Marital status:      Spouse name: N/A     Number of children: N/A     Years of education: N/A     Occupational History           Retired, 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      PROSTATECTOMY N/A     W/ Bilat Pelvic Lymphadenectomy     VITRECTOMY Left 06/15/2015       Allergies   Allergen Reactions     Lisinopril Cough     Ace Inhibitors Cough     Penicillins Unknown     childhood     Chocolate Flavor Other (See Comments)     Pt gets sinus congestion from eating chocolate.     Corn Other (See Comments)     Pt gets sinus congestion from eating corn.     Fish Containing Products Other (See Comments)     Pt gets sinus congestion from eating fish.     Levofloxacin Rash     Red line up the arm after IV administration     Nuts - Unspecified Other (See Comments)     Pt gets sinus congestion from eating nuts.     Ragweed Pollen Other (See Comments)     Sinus and chest congestion.       Active Ambulatory Problems     Diagnosis Date Noted     Prostate Cancer      Hypertension      Osteopenia      Pulmonary embolism (H) 12/19/2015     Obstructive sleep apnea 04/20/2016     Resolved Ambulatory Problems     Diagnosis Date Noted     Prostate Cancer      Pulmonary emboli (H) 12/22/2015     Past Medical History:   Diagnosis Date     Broken ankle, left      Prostatitis         VITALS:  Vitals:    09/14/18 1028   BP: 132/72   Patient Site: Right Arm   Patient Position: Sitting   Cuff Size: Adult Regular   Pulse: 64   Weight: 166 lb 1.6 oz (75.3 kg)     Wt Readings from Last 3 Encounters:   09/14/18 166 lb 1.6 oz (75.3 kg)   01/29/18 167 lb 14.4 oz (76.2 kg)   09/11/17 172 lb 9.6 oz (78.3 kg)     Body mass index is 24.53 kg/(m^2).    PHYSICAL EXAM:  Constitutional:  In no distress.  Vitals:  Per nursing notes.  Ears: TMs and canals clear bilaterally.  Oropharynx:  Without posterior nasal drainage or thrush.  Neck:  Supple, thyroid not palpable and no LAD.  Cardiac:  Regular rate and rhythm without murmurs, rubs, or gallops. Carotids without bruits. Legs without edema. Palpation of the radial pulse regular.  Lungs: Clear bilaterally without wheezing or rales.  Respiratory effort normal.  Abdomen:   Bowel sounds positive, nontender, nondistended.  Neither liver nor spleen palpable. No masses.   Skin:   Without rash, bruise, or palpable lesions.  His right great toe at the lateral edge there is just a little piece of the nail that is poking into the flesh of the toe.  There is no drainage.  Little tender to palpation.  No redness.  No swelling.  Rheumatologic: Joints are normal except for his right shoulder has limited range of motion with external rotation and with abduction.  He does have some slight crepitus with range of motion.  He has no real pain to palpation other than slight tenderness to palpation of his posterior joint line.  He has good normal  strength.  Examination of the biceps muscles show slight apt atrophy of 1 of bellies of the bicep muscle.  Neurologic:  Cranial nerves II-XII intact. MS intact     Psychiatric:  Mood appropriate, memory intact.     MEDICATIONS:  Current Outpatient Prescriptions   Medication Sig Dispense Refill     chlorthalidone (HYGROTEN) 25 MG tablet Take A 1/2 TABLETS (12.5 MG TOTAL) BY MOUTH DAILY. You are due for your physical;call 24/7 45  tablet 3     cholecalciferol, vitamin D3, 1,000 unit tablet Take 1,000 Units by mouth daily.       fexofenadine (ALLEGRA) 180 MG tablet Take 180 mg by mouth daily.       multivitamin therapeutic tablet Take 1 tablet by mouth daily.       warfarin (COUMADIN) 2.5 MG tablet Take 1 tablet (2.5 mg) by mouth daily on Mondays, Wednesdays, Fridays and Saturdays. Adjust dose per INR results. 60 tablet 3     warfarin (COUMADIN) 5 MG tablet Take 1 tablet daily (5 mg) by mouth as directed. Adjust dose per INR results. 90 tablet 3     No current facility-administered medications for this visit.      Total time spent with patient and family was 25 minutes; greater than 50% of the time was on education regarding tendonitis and toe.     Dari Carroll MD

## 2021-06-20 NOTE — LETTER
Letter by Ascencion Calvillo MD at      Author: Ascencion Calvillo MD Service: -- Author Type: --    Filed:  Encounter Date: 3/9/2020 Status: (Other)         Edward Mendoza  [address] [facility] [address]             March 9, 2020         Dear Mr. Mendoza,    Below are the results from your recent visit:    Resulted Orders   Comprehensive Metabolic Panel   Result Value Ref Range    Sodium 138 136 - 145 mmol/L    Potassium 4.0 3.5 - 5.0 mmol/L    Chloride 100 98 - 107 mmol/L    CO2 28 22 - 31 mmol/L    Anion Gap, Calculation 10 5 - 18 mmol/L    Glucose 109 70 - 125 mg/dL    BUN 18 8 - 28 mg/dL    Creatinine 0.83 0.70 - 1.30 mg/dL    GFR MDRD Af Amer >60 >60 mL/min/1.73m2    GFR MDRD Non Af Amer >60 >60 mL/min/1.73m2    Bilirubin, Total 0.9 0.0 - 1.0 mg/dL    Calcium 10.6 (H) 8.5 - 10.5 mg/dL    Protein, Total 6.6 6.0 - 8.0 g/dL    Albumin 4.1 3.5 - 5.0 g/dL    Alkaline Phosphatase 94 45 - 120 U/L    AST 25 0 - 40 U/L    ALT 30 0 - 45 U/L    Narrative    Fasting Glucose reference range is 70-99 mg/dL per  American Diabetes Association (ADA) guidelines.   HM2(CBC w/o Differential)   Result Value Ref Range    WBC 6.8 4.0 - 11.0 thou/uL    RBC 5.77 4.40 - 6.20 mill/uL    Hemoglobin 17.5 14.0 - 18.0 g/dL    Hematocrit 51.8 40.0 - 54.0 %    MCV 90 80 - 100 fL    MCH 30.3 27.0 - 34.0 pg    MCHC 33.7 32.0 - 36.0 g/dL    RDW 12.4 11.0 - 14.5 %    Platelets 241 140 - 440 thou/uL    MPV 8.2 7.0 - 10.0 fL   PSA (Prostatic-Specific Antigen), Diagnostic   Result Value Ref Range    PSA <0.1 0.0 - 6.5 ng/mL    Narrative    Method is Abbott Prostate-Specific Antigen (PSA)  Standard-WHO 1st International (90:10)   Lipid Cascade   Result Value Ref Range    Cholesterol 206 (H) <=199 mg/dL    Triglycerides 113 <=149 mg/dL    HDL Cholesterol 69 >=40 mg/dL    LDL Calculated 114 <=129 mg/dL    Patient Fasting > 8hrs? Yes        Kidney and liver tests are normal.  Blood sugar is normal.    Calcium level is okay.    Hemoglobin and blood counts are  normal.    Prostate test is still 0.    Excellent cholesterol levels.    Labs look good.  No change in treatment plan.    Please call with questions or contact us using [employer].    Sincerely,        Electronically signed by Ascencion Calvillo MD

## 2021-06-21 NOTE — LETTER
Letter by Ascencion Calvillo MD at      Author: Ascencion Calvillo MD Service: -- Author Type: --    Filed:  Encounter Date: 3/12/2021 Status: (Other)         Edward Mendoza  [address] [facility] [address]             March 12, 2021         Dear Mr. Mendoza,    Below are the results from your recent visit:    Resulted Orders   Comprehensive Metabolic Panel   Result Value Ref Range    Sodium 142 136 - 145 mmol/L    Potassium 3.9 3.5 - 5.0 mmol/L    Chloride 102 98 - 107 mmol/L    CO2 24 22 - 31 mmol/L    Anion Gap, Calculation 16 5 - 18 mmol/L    Glucose 112 70 - 125 mg/dL    BUN 18 8 - 28 mg/dL    Creatinine 0.89 0.70 - 1.30 mg/dL    GFR MDRD Af Amer >60 >60 mL/min/1.73m2    GFR MDRD Non Af Amer >60 >60 mL/min/1.73m2    Bilirubin, Total 0.7 0.0 - 1.0 mg/dL    Calcium 10.7 (H) 8.5 - 10.5 mg/dL    Protein, Total 6.8 6.0 - 8.0 g/dL    Albumin 4.3 3.5 - 5.0 g/dL    Alkaline Phosphatase 101 45 - 120 U/L    AST 25 0 - 40 U/L    ALT 31 0 - 45 U/L    Narrative    Fasting Glucose reference range is 70-99 mg/dL per  American Diabetes Association (ADA) guidelines.   HM2(CBC w/o Differential)   Result Value Ref Range    WBC 6.5 4.0 - 11.0 thou/uL    RBC 5.87 4.40 - 6.20 mill/uL    Hemoglobin 17.1 14.0 - 18.0 g/dL    Hematocrit 51.6 40.0 - 54.0 %    MCV 88 80 - 100 fL    MCH 29.1 27.0 - 34.0 pg    MCHC 33.1 32.0 - 36.0 g/dL    RDW 12.9 11.0 - 14.5 %    Platelets 247 140 - 440 thou/uL    MPV 9.8 7.0 - 10.0 fL   PSA (Prostatic-Specific Antigen), Diagnostic   Result Value Ref Range    PSA <0.1 0.0 - 6.5 ng/mL    Narrative    Method is Abbott Prostate-Specific Antigen (PSA)  Standard-WHO 1st International (90:10)   Lipid Cascade   Result Value Ref Range    Cholesterol 223 (H) <=199 mg/dL    Triglycerides 132 <=149 mg/dL    HDL Cholesterol 68 >=40 mg/dL    LDL Calculated 129 <=129 mg/dL    Patient Fasting > 8hrs? Yes    Thyroid Stimulating Hormone (TSH)   Result Value Ref Range    TSH 1.95 0.30 - 5.00 uIU/mL       Kidney and liver tests are  normal.    Blood sugar is normal.    Calcium level is okay, and stable from previous.    Excellent cholesterol levels.    Thyroid test is normal.    PSA level is still undetectable.    Hemoglobin and blood counts are normal.    Labs look good.  No change in treatment plan.    Please call with questions or contact us using [employer].    Sincerely,        Electronically signed by Ascencion Calvillo MD

## 2021-06-21 NOTE — PROGRESS NOTES
Assessment/Plan:   Chest wall injury, right side  Large contusion and bruised ribs right side chest wall and flank after fall while hunting, tripped and landed on a large rock on his side. No new rib fractures on xray. Lungs clear. No visible blood in urine by history and abdomen benign on exam. Reviewed symptomatic measures and he will follow up as needed. Mild cough related to allergies. SUJIT AC given   - XR Ribs Right W PA Chest  - codeine-guaiFENesin (GUAIFENESIN AC)  mg/5 mL liquid; Take 5-10 mL by mouth every 6 (six) hours as needed for cough.  Dispense: 240 mL; Refill: 0  - tiZANidine (ZANAFLEX) 2 MG tablet; Take 1 tablet (2 mg total) by mouth every 6 (six) hours as needed (musc le spasm).  Dispense: 30 tablet; Refill: 0    Ice every couple hours to the right side for a couple days then alternate with heat  Tylenol 500mg - 1000mg every 4-6 hours as needed for pain max dose 4000mg per 24 hours  Tizanidine every 4 hours if needed for muscle spasm  Robitussin with codeine as needed for cough  Follow up if worse or no better, blood in urine, shortness of breath, cough or fever or abdominal pain.     Subjective:      Edward Mendoza is a 78 y.o. male who presents with pain along there right side chest and abdomen following a fall.  He is taking warfarin for history of PE. Yesterday he was in [place]. Hunting when he stepped in a hole and fell onto his right side, landing on a large rock. He denies head injury. He was able to get up and carry on but the pain worsened over a couple hours. He has chronic allergies and congestion with post nasal drainage and an occasional cough. It is very painful to cough at this time. Pain is also worse with a deep breath, no no blood in urine. No abdominal pain, N/V/D. No rash though a large bruise has developed. No fever.  No neck, back, hip, wrist and elbow and shoulder, knee and ankle pain. He drove back from [place] today since the pain was limiting his ability to  hunt. Former smoker.     Allergies   Allergen Reactions     Lisinopril Cough     Ace Inhibitors Cough     Penicillins Unknown     childhood     Chocolate Flavor Other (See Comments)     Pt gets sinus congestion from eating chocolate.     Corn Other (See Comments)     Pt gets sinus congestion from eating corn.     Fish Containing Products Other (See Comments)     Pt gets sinus congestion from eating fish.     Levofloxacin Rash     Red line up the arm after IV administration     Nuts - Unspecified Other (See Comments)     Pt gets sinus congestion from eating nuts.     Ragweed Pollen Other (See Comments)     Sinus and chest congestion.     Current Outpatient Prescriptions on File Prior to Visit   Medication Sig Dispense Refill     chlorthalidone (HYGROTEN) 25 MG tablet Take A 1/2 TABLETS (12.5 MG TOTAL) BY MOUTH DAILY. You are due for your physical;call 24/7 45 tablet 3     cholecalciferol, vitamin D3, 1,000 unit tablet Take 1,000 Units by mouth daily.       fexofenadine (ALLEGRA) 180 MG tablet Take 180 mg by mouth daily.       multivitamin therapeutic tablet Take 1 tablet by mouth daily.       warfarin (COUMADIN) 2.5 MG tablet Take 1 tablet (2.5 mg) by mouth daily on Mondays, Wednesdays, Fridays and Saturdays. Adjust dose per INR results. 60 tablet 3     warfarin (COUMADIN) 5 MG tablet Take 1 tablet daily (5 mg) by mouth as directed. Adjust dose per INR results. 90 tablet 3     No current facility-administered medications on file prior to visit.      Patient Active Problem List   Diagnosis     Prostate Cancer     Hypertension     Osteopenia     Pulmonary embolism (H)     Obstructive sleep apnea     Right shoulder tendonitis       Objective:     /62 (Patient Site: Right Arm, Patient Position: Sitting, Cuff Size: Adult Regular)  Pulse 69  Temp 98  F (36.7  C) (Oral)   Resp 16  Wt 172 lb (78 kg)  SpO2 97%  BMI 25.4 kg/m2    Physical  General Appearance: Alert, cooperative, no distress, moves gingerly  Head:  Normocephalic, without obvious abnormality, atraumatic  Eyes: Conjunctivae are normal.   Lungs: Clear to auscultation bilaterally, respirations unlabored - equal breath sounds, some pain with deep breaths right lower chest. No rub or loss of breath sounds.   Heart: Regular rate and rhythm  Abdomen: Soft, non-distended. Normal bowel sounds, nontender to palpate the abdomen itself. Tender over the lower right lateral ribs. There is a tender swelling RUQ consistent with a hematoma, no cellulitis or fluctuance. There is pain with palpation of the ribs right side.    Extremities: No lower extremity edema or calf pain, no hip or spine pain, no ankle or foot or knee pain. Normal neck range of motion, no wrist elbow or upper arm/shoulder pain.   Psychiatric: Patient has a normal mood and affect.      Rib xray, right and PA chest obtained and viewed by me - lungs clear, deferred rib detail to the radiologist.     Xr Ribs Right W Pa Chest    Result Date: 10/24/2018  XR RIBS RIGHT W PA CHEST 10/24/2018 6:04 PM INDICATION: Fell onto rock right side chest, on warfarin COMPARISON: 09/07/2016. FINDINGS: Chronic scarring of the lung apices similar to previous. Mild atelectasis or scarring at the left base. Normal heart size and pulmonary vascularity. Calcified plaque of the aorta. Old healed rib fractures on the right. No acute rib fracture detected. Degenerative changes of the glenohumeral joints, right worse than left. Postcholecystectomy.

## 2021-06-22 NOTE — PROGRESS NOTES
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    Went on hunting trip, fell on a rock and hurt his rib    The patient underwent PSG in 3/2016 with AHI of 48, unable to achieve REM sleep until after CPAP was placed. He was started on CPAP at 7cmH2O. He received a new machine in spring 2016.    Compliance:  AHI 3.4, Leak 4. CPAP 7cmH2O, 21/30 days, 70%. He does not bring it with when hunting  He continues to feel refreshed on the days he uses his CPAP and no longer needs an afternoon nap.     Regarding PE: He had submassive PE in December 2015. RV was down. Repeat echo done 1/2016 found normal RV size and function. He has continued on coumadin and plans to stay on anticoagulation for the rest of his life. He continues to hunt and be immobilized for hours at a time so ongoing risk for DVT/PE    Current Outpatient Medications   Medication Sig     chlorthalidone (HYGROTEN) 25 MG tablet Take A 1/2 TABLETS (12.5 MG TOTAL) BY MOUTH DAILY. You are due for your physical;call 24/7     cholecalciferol, vitamin D3, 1,000 unit tablet Take 1,000 Units by mouth daily.     fexofenadine (ALLEGRA) 180 MG tablet Take 180 mg by mouth daily.     multivitamin therapeutic tablet Take 1 tablet by mouth daily.     tiZANidine (ZANAFLEX) 2 MG tablet Take 1 tablet (2 mg total) by mouth every 6 (six) hours as needed (musc le spasm).     warfarin (COUMADIN) 2.5 MG tablet Take 1 tablet (2.5 mg) by mouth daily on Mondays, Wednesdays, Fridays and Saturdays. Adjust dose per INR results.     warfarin (COUMADIN) 5 MG tablet Take 1 tablet daily (5 mg) by mouth as directed. Adjust dose per INR results.     Vitals:    12/20/18 0834   BP: 128/72   Pulse: 80   Resp: 20   SpO2: 98%   RA  Gen: NAD, Mallampati IV  C/V: RRR, S1 and S2  Resp: Clear bilaterally  Ext: No edema  Neuro: grossly normal.      ASSESSMENT/PLAN:  1) JAS, severe  -RTC 1 year for annual follow up, call sooner if needed    2) Submassive PE, provoked but given how much clot burden, recommend life-long anticoagulation  -Continue Coumadin; ok from my end to switch to novel anticoagulants.     Carmina Crowder MD  Electronically signed on 12/20/2018 9:20 AM

## 2021-06-23 NOTE — PROGRESS NOTES
"TCM DISCHARGE FOLLOW UP CALL    Discharge Date:  2/4/2019  Reason for hospital stay (discharge diagnosis)::  Subdural Hematoma  Are you feeling better, the same or worse since your discharge?:  Patient is feeling better (States \"I'm doing well, no ill effects at all, getting around fine, & feeling alert.\")  Do you feel like you have a plan in the event of a health emergency?: Yes (Wife, adult children, call 911)    As part of your discharge plan, were  home care services ordered for you?: No    Did you receive any new medications, or was there a change to your medications?: Yes    Are you taking those medications, or do you have any established regiment?:  RN reviewed discharge medications w/pt.  Pt states he is taking Keppra 500 mg two times a day as prescribed, and has stopped warfarin as instructed.  Do you have any follow up visits scheduled with your PCP or Specialist?:  Yes, with PCP and Yes, with Specialist (Dr. Calvillo 2/13/19)  (RN) Is PCP appt scheduled soon enough (within 14 days of discharge date)?: Yes    Who are you seeing and when is it scheduled?:  Dr. Russell (neurosurgery) 2/13/19      Linda Egan RN Care Manager, Population Health     "

## 2021-06-23 NOTE — TELEPHONE ENCOUNTER
ANTICOAGULATION  MANAGEMENT: Discharge Continuity of Care Review    Hospital admission on  2/3/19 for fall with subdural hematoma.    Discharge disposition: Home    INR Results:       Recent labs: (last 7 days)     01/31/19  0949 02/03/19  0925 02/03/19  1128 02/03/19  1434   INR 1.70* 2.24* 1.07 1.05       Warfarin inpatient management: Held and reversed with Phytonadione IVPB 10 mg on 2/3/19. Warfarin resumed on ? Pending next CT result.    Warfarin discharge instructions: warfarin held until follow up visit on 7-10 days for next CT. Follow up on 2/13/19     Medication Changes Affecting Anticoagulation: Yes: stopping warfarin until further instructed.    Additional Factors Affecting Anticoagulation: No    Plan     Agree with discharge plan for follow up on 2/13/19 and CT 7-10 days.     Spoke with Edward    Anticoagulation calendar updated    Sussy Lane RN

## 2021-06-23 NOTE — TELEPHONE ENCOUNTER
Clinic Care Coordination Contact    Clinic Care Coordination Contact  OUTREACH    Referral Information:  Referral Source: IP Report    Primary Diagnosis: Injury/Fall    Chief Complaint   Patient presents with     Clinic Care Coordination - Post Hospital     DC'd from [facility] on 2/3/19      Chart Review Please        Universal Utilization: appropriate utilization  Clinic Utilization  Difficulty keeping appointments:: No  Compliance Concerns: No  No-Show Concerns: No    Clinical Concerns:  Current Medical Concerns:  Doing well since returning home.  His wife is watching him closely.  Slept well last night, appetite is fine, no pain, no mobility issues.     Current Behavioral Concerns: no concerns noted.     Education Provided to patient: Reviewed role of care coordinator.    Pain  Pain (GOAL):: No  Health Maintenance Reviewed: Up to date  Clinical Pathway: None    Medication Management:  No concerns identified.      Functional Status:  Dependent ADLs:: Independent, Ambulation-no assistive device  Dependent IADLs:: Independent  Bed or wheelchair confined:: No  Mobility Status: Independent  Fallen 2 or more times in the past year?: No  Any fall with injury in the past year?: Yes    Living Situation:  Current living arrangement:: I live in a private home with spouse  Type of residence:: Private home - stairs    Diet/Exercise/Sleep:  Diet:: Regular  Inadequate nutrition (GOAL):: No  Food Insecurity: No  Tube Feeding: No  Exercise:: Currently not exercising  Inadequate activity/exercise (GOAL):: No  Significant changes in sleep pattern (GOAL): No    Transportation:  Transportation concerns (GOAL):: No  Transportation means:: Regular car     Psychosocial:  Uatsdin or spiritual beliefs that impact treatment:: No  Mental health DX:: No  Mental health management concern (GOAL):: No  Informal Support system:: Children, Family, Spouse     Financial/Insurance:   Financial/Insurance concerns (GOAL):: No  UCARE for  Seniors, no financial concerns.      Resources and Interventions:  Current Resources:    ;   Community Resources: None  Supplies used at home:: None  Equipment Currently Used at Home: none    Advance Care Plan/Directive  Advanced Care Plans/Directives on file:: Yes  Type Advanced Care Plans/Directives: Advanced Directive - On File  Advanced Care Plan/Directive Status: Declined Further Information    Referrals Placed: None     Patient/Caregiver understanding: Has PCP appointment next week and talked to INR nurse this morning.  His kids will come over and take care of the snow.  His wife can provide support if needed. Denied need for care coordination.      Plan: CC will mail access letter and patient will call if concerns arise.  No further outreach by CC.    Theresa Cibuzar,   [facility]  [email or web address]  [phone]    Clinic Care Coordination Contact    Situation: Patient chart reviewed by care coordinator.    Background: Patient was in [facility] ED and transferred to [facility] for neurology follow up after fall.      Per chart review:  Admission Date: 2/3/2019    Discharge Provider: Nehal Moscoso Discharge Date: 2/4/2019   Diet: regular diet    Code Status: Prior   Activity: activity as tolerated           Condition at Discharge: Stable      REASON FOR PRESENTATION(See Admission Note for Details)   fall     PRINCIPAL & ACTIVE DISCHARGE DIAGNOSES      Principal Problem:    Subdural hematoma (H)  Active Problems:    Hypertension    Pulmonary embolism (H)    Obstructive sleep apnea       Assessment: Patient was discharged from hospital one hour ago.  Will call tomorrow. Has PCP appointment scheduled for 2-13.  Consent to contact on file for wife Barb.     Plan/Recommendations: Call patient to assess needs tomorrow.      Social Jazmin Hill  [facility]  [email or web address]  [phone]

## 2021-06-23 NOTE — TELEPHONE ENCOUNTER
PATIENT NAME:  Edward Mendoza  YOB: 1940  MRN: [id number]  SURGEON: Dr. Russell  DATE of CONSULT:  2-3-19  DIAGNOSIS:  fall, subdural hematoma on warfarin, he was reversed with kcentra and vitamin k. He has staPles over a posterior scalp laceration        FOLLOW-UP:    Post HOSPITAL CONSULT FU Visit: 7-10 DAYS   Post-op Provider: NP  DIAGNOSTICS:  radiology: CT scan: HEAD, WITHOUT  (ORDERED 2-4-19)  DISPOSITION:  HOME WITH WIFE 2-4-19    ADDITIONAL INSTRUCTIONS FOR MEDICAL STAFF:     -Keppra two times a day x 7 days total              -No aspirin, blood thinners, warfarin until seen for f/u

## 2021-06-24 NOTE — TELEPHONE ENCOUNTER
ANTICOAGULATION  MANAGEMENT PROGRAM    Edward Mendoza is overdue for INR check.  Reminder call made.    Patient continue to hold warfarin after subderal hematoma. Will discuss resumption at appointment with Dr Mcelroy on 3/8/19.    Sussy Lane RN

## 2021-06-24 NOTE — PROGRESS NOTES
Edward Mendoza is here for a post hospital visit regarding a SDH. He denies any concerns, states he is doing well.  Raquel Jiménez LPN

## 2021-06-24 NOTE — PATIENT INSTRUCTIONS - HE
Stay off warfarin.    Follow-up with neurosurgery on March 1 as planned with head CT scan.    See me the next week after neurosurgery for a checkup.    Keep scalp clean and dry.  You should not need any bandages after today.

## 2021-06-24 NOTE — PROGRESS NOTES
[facility] Follow Up Note    Edward Mendoza   78 y.o. male    Date of Visit: 2/21/2019    Chief Complaint   Patient presents with     Follow-up     Staple Removal     Subjective  Edward is here for follow-up after his initial establish care visit on February 13.    Patient had slipped on the ice on February 3 with small subdural hematoma.    Patient has a past history of pulmonary embolism after standing in a deer stand for a long period of time December 2015.    Initial head CT scan showed a 2-3 mm acute subdural hematoma in the interhemispheric falx, anteriorly.    On February 13 he had a follow-up with neurosurgery and repeat head CT scan.  The anterior hematoma resolved but new 2 mm posterior falx subdural, versus movement of the subdural hematoma from positioning.    He denies any headache.  Normal sensorium, no photophobia.  Eating normally.  Walking normally, does not feel unsteady on his feet.    Neurosurgery recommended he continue to hold the warfarin and recheck CT scan in 2 weeks on March 1.    No increased lower extremity edema.  No chest pain or increasing shortness of breath.    Hypertension still well controlled with chlorthalidone.  No orthostasis.    Chronic right shoulder pain is stable.    He did have a laceration on his scalp with staples, no bleeding or erythema or drainage from that.    His current CPAP machine is working well for sleep apnea.    History of prostatectomy in 2011, no evidence of recurrence    PMHx:    Past Medical History:   Diagnosis Date     Broken ankle, left      Hypertension      Prostatitis      PSHx:    Past Surgical History:   Procedure Laterality Date     CATARACT EXTRACTION  12/07/2015     CHOLECYSTECTOMY       HERNIA REPAIR  1987     AK THORACOTOMY,MAJOR,EXPLOR/BIOPSY  1983    VATs that found lipoma on chest wall, was concern for cancer.      PROSTATECTOMY N/A     W/ Bilat Pelvic Lymphadenectomy     VITRECTOMY Left 06/15/2015     Immunizations:    Immunization History   Administered Date(s) Administered     DT (pediatric) 12/04/2000     Influenza E6p2-53, 01/15/2010     Influenza high dose, seasonal 10/26/2015, 10/06/2016, 10/10/2017, 08/15/2018     Influenza, Seasonal, Inj PF IIV3 09/08/2011, 10/08/2012, 10/10/2013, 10/13/2014     Pneumo Conj 13-V (2010&after) 10/13/2014     Pneumo Polysac 23-V 10/02/2003     Td,adult,historic,unspecified 01/06/2011     ZOSTER, LIVE 09/08/2011       ROS A comprehensive review of systems was performed and was otherwise negative    Medications, allergies, and problem list were reviewed and updated    Exam  /70 (Patient Site: Right Arm, Patient Position: Sitting, Cuff Size: Adult Large)   Pulse 76   Resp 16   Wt 173 lb (78.5 kg)   BMI 25.55 kg/m    Scalp incision is well-healed.  Staples removed without complication.  No bleeding.  Sterile bandage with bacitracin ointment applied.    Lungs were clear.  Heart is regular without murmur.  No ankle edema.  Gait stable and no neurologic changes.    Assessment/Plan  1. Subdural hematoma (H)  Change in position of the hematoma but otherwise appears stable.    Recheck in 2 weeks prior to starting warfarin again.    Restart warfarin once he gets clearance from neurosurgery.    I will see him the week after the neurosurgery appointment, he can recheck an INR then if he is back on warfarin.    Directions for wound care of the scalp given below.    2. History of pulmonary embolism  1 previous DVT in 2015.  He will plan to stay off the Coumadin at this time.  No evidence of recurrent DVT or PE.    He had wanted to continue long-term warfarin despite bleeding risk, see previous discussion.    3. Hypertension  Controlled  - chlorthalidone (HYGROTEN) 25 MG tablet; Take A 1/2 TABLETS (12.5 MG TOTAL) BY MOUTH DAILY. You are due for your physical;call 24/7  Dispense: 45 tablet; Refill: 3    I reviewed the lab work from last visit with patient.  Excellent cholesterol levels with LDL of  124 and HDL 59.  Not planning statin drug.  Kidney labs and potassium and other labs are normal.    4. Chronic right shoulder pain  Stable 4.  If worsening can refer to orthopedic clinic for evaluation/physical therapy    5. Obstructive sleep apnea  Compliant with CPAP.    Yearly pulmonary clinic follow-up in December.    Return in about 2 weeks (around 3/7/2019) for Recheck.   Patient Instructions   Stay off warfarin.    Follow-up with neurosurgery on March 1 as planned with head CT scan.    See me the next week after neurosurgery for a checkup.    Keep scalp clean and dry.  You should not need any bandages after today.    Ascencion Calvillo MD        Current Outpatient Medications   Medication Sig Dispense Refill     chlorthalidone (HYGROTEN) 25 MG tablet Take A 1/2 TABLETS (12.5 MG TOTAL) BY MOUTH DAILY. You are due for your physical;call 24/7 45 tablet 3     cholecalciferol, vitamin D3, 1,000 unit tablet Take 1,000 Units by mouth daily.       fexofenadine (ALLEGRA) 180 MG tablet Take 180 mg by mouth daily.       multivitamin therapeutic tablet Take 1 tablet by mouth daily.       vit C/E/Zn/coppr/lutein/zeaxan (PRESERVISION AREDS-2 ORAL) Take 1 tablet by mouth 2 (two) times a day.              No current facility-administered medications for this visit.      Allergies   Allergen Reactions     Lisinopril Cough     Ace Inhibitors Cough     Penicillins Unknown     childhood     Chocolate Flavor Other (See Comments)     Pt gets sinus congestion from eating chocolate.     Corn Other (See Comments)     Pt gets sinus congestion from eating corn.     Fish Containing Products Other (See Comments)     Pt gets sinus congestion from eating fish.     Levofloxacin Rash     Red line up the arm after IV administration     Nuts - Unspecified Other (See Comments)     Pt gets sinus congestion from eating nuts.     Ragweed Pollen Other (See Comments)     Sinus and chest congestion.     Social History     Tobacco Use     Smoking status:  Former Smoker     Packs/day: 0.50     Years: 5.00     Pack years: 2.50     Last attempt to quit: 1960     Years since quittin.1     Smokeless tobacco: Never Used   Substance Use Topics     Alcohol use: No     Drug use: No

## 2021-06-24 NOTE — PROGRESS NOTES
Edward is here to f/u on head CT. He denies any neurological symptoms. Pt states he is feeling well.  YungCMA

## 2021-06-24 NOTE — TELEPHONE ENCOUNTER
Who is calling:  Patient  Reason for Call:  Patient was calling to speak with Sussy about going back on his warfarin. Patient said that he has been off of it and has gotten the okay to start again. Patient would like to discus this.   Date of last appointment with primary care: 02/21/19  Has the patient been recently seen:  Yes  Okay to leave a detailed message: Yes

## 2021-06-24 NOTE — PROGRESS NOTES
Patient is an 78-year-old man.  Approximately 10 days ago he slipped on ice and hit his head.  He had a small subdural hematoma.  The anterior subdural hematoma is resolved but he has developed a new posterior subdural hematoma.  There is no recent history of head injury.  He is not taking any anticoagulation medication.  Plan check coags and repeat CT in 2 weeks, sooner as needed patient and his wife are satisfied with the plan.  Total time 15 minutes, more than 50% spent counseling and/or coordinating care.

## 2021-06-24 NOTE — PROGRESS NOTES
Patient's coags are okay.  CT shows no residual blood.  He was instructed on activity, limitations, restrictions, complications to watch for.  He is going to return as needed.  He is going to talk to his primary doctor about resuming warfarin.   Total time 15 minutes, more than 50% spent counseling and/or coordinating care.

## 2021-06-24 NOTE — TELEPHONE ENCOUNTER
Spoke with Edward and explained that Dr Calvillo needs to be the one to say it is okay for restart the warfarin and at what does. He was cleared by neurosurgery to resume if okay with primary care. They were questioning why he is still on the warfarin with only having one episode of PE. We did discuss that he had a massive PE and this was part of reasoning he has stayed on the warfarin. He will discuss this with Dr Calvillo at his office visit on 3/8/19.

## 2021-06-24 NOTE — PROGRESS NOTES
Assessment and Plan:   You will receive a letter with results from today's lab work.    See me next week to remove the staples from your scalp and for a follow-up appointment.    Continue on current medications.    You can restart your warfarin once the neurosurgery clinic is you clearance to restart it.  When you restarted, you will restart at your usual dose.    Regular walking exercises recommended 3-4 times a week, for at least 20 minutes.    Patient did report a family history of colon cancer with his father, but his last colonoscopy was negative in 2012.  I did discuss option for another colonoscopy for surveillance, but also discussed risk of colonoscopy including perforation.  Also risk of being off the warfarin temporarily.  I discussed the risk of undiagnosed colon cancer with not doing a colonoscopy.  Patient accepts that risk and does not wish to proceed with further colonoscopy screening, now at the age of 78.    History of prostate cancer without evidence of recurrence.  Recent PSA check was 0.  He will recheck in 1 year with urology.    1. Healthcare maintenance  Low cardiovascular risk profile.  Active on a regular basis and no cardiac symptoms.    His main issue will be maintaining mobility, I recommended regular exercise at the gym and regular walking.  He does quite a bit of hunting.  Lives independently with wife at home.    Up-to-date on shots including flu shot.    2. Subdural hematoma (H)  Fell on the ice on February 3 with small subdural hematoma.  Warfarin on hold.    Has a follow-up CT scan today and sees neurosurgery later today.    Neurosurgery will give him clearance to restart the warfarin.    I will see him next week to remove the staples from his scalp.  He will keep the wound clean and dry.    3. Chronic right shoulder pain  He had fallen many years ago with possible injury.  He is to throw a ball frequently and may have caused a SLAP tear or chronic tendinitis.  Is relatively  stable.  He has not had cortisone shots.  He did do physical therapy last fall, does intermittent exercises.    If worsening pain, can refer back to orthopedics to consider a cortisone shot and/or further physical therapy.    4. Obstructive sleep apnea, diagnosed in 2016 sleep study  Compliant with CPAP.  Saw pulmonary clinic December 2018 with 1 year follow-up.  Denies significant daytime sleepiness.    5. Essential hypertension  Controlled with chlorthalidone 12.5 mg a day.  He has not checked his blood pressure recently.  Was 132/72 last September.    Denies lightheaded dizzy spells or orthostasis.    Low cardiac risk profile and excellent cholesterol back in 2016.  Not anticipating a statin  - Lipid Cascade    6. History of pulmonary embolism  Severe pulmonary embolism bilateral December 2015.  He was sitting in a deer stand for a long period of time which was likely precipitating cause.  He does sit in dear stands every fall in the future.    He plans to continue on warfarin long-term.  He does understand bleeding risk, which he did experience the subdural hematoma earlier this month.    We will plan to restart the warfarin once he gets clearance from neurosurgery.    7. Lipoma of skin and subcutaneous tissue  Chronic multiple lipomas, has had them removed in the past.  He does have a lipoma in his left abdomen that he wanted me to examine.    8. Medication monitoring encounter    - Comprehensive Metabolic Panel  - HM2(CBC w/o Differential)    9. Encounter for general adult medical examination with abnormal findings  Laceration on scalp with staples, healing well but the staples are not yet ready to come out.  No dehiscence or secondary infection.  He will keep clean and dry.    History of prostatectomy in 2011, no evidence of recurrent prostate cancer and PSA was 0-month ago with Dr. Jaquez.  Planning yearly checks.  He declined rectal exam.  He has urinary continence.        The patient's current medical  problems were reviewed.    I have had an Advance Directives discussion with the patient. full code  The following health maintenance schedule was reviewed with the patient and provided in printed form in the after visit summary:   Health Maintenance   Topic Date Due     ZOSTER VACCINES (2 of 3) 11/03/2011     FALL RISK ASSESSMENT  01/29/2019     TD 18+ HE  01/06/2021     COLONOSCOPY  12/21/2022     ADVANCE DIRECTIVES DISCUSSED WITH PATIENT  01/29/2023     PNEUMOCOCCAL POLYSACCHARIDE VACCINE AGE 65 AND OVER  Completed     INFLUENZA VACCINE RULE BASED  Completed     PNEUMOCOCCAL CONJUGATE VACCINE FOR ADULTS (PCV13 OR PREVNAR)  Completed        Subjective:   Chief Complaint: Edward Mendoza is an 78 y.o. male here for an Annual Wellness visit.   HPI: 78-year-old male, lives independently with wife.  Has been highly functional, used to go the gym on a regular basis, still Hun[place] regularly.    Hypertension has been well controlled with chlorthalidone.  He had a cough from lisinopril.  He is to use amlodipine, no side effects that I could see.    No chest pain or chest pressure.  No palpitations or history of arrhythmia.    No new cough or increasing shortness of breath.  Quit smoking in 1960.    No mouth sores or swallowing difficulty.    History of bilateral hernia repair, no recurrence.    Sleep study 2016 with sleep apnea and compliant with CPAP.  Minimal daytime sleepiness.    Occasional left neck pain, he thinks is from the CPAP strap.  That is positional, mainly bothersome at night.    Right shoulder tendinitis pain chronic, mainly just distal to the deltoid in the right upper arm.  No radicular type symptoms.    Patient slipped on the ice on February 3, hit his head with a subdural hematoma.  CT scan was stable and was discharged home, with warfarin on hold.  He has a CT scan of the head and neurosurgery appointment today.  No significant headache.  No vision symptoms.  No confusion or photophobia.  He has been eating  normal food without nausea.    He did have a mild left flank abdominal wall muscle strain with the fall that is slowly improving.    His bowels are normal.  Denies constipation or blood in stool.  He had a colonoscopy in 2012 that was negative.  No further colonoscopies recommended at that time.  He did have a father that had colon cancer, however.  Patient does not wish to have further colonoscopies at this time.    2015 pharmacologic nuclear medicine stress test was negative.  Ejection fraction 70%.  January 2016 echo normal and no heart valve problems.  Normal RV function at that time, that did recover after his pulmonary embolism.    February 3, normal creatinine, normal potassium and sodium.  Blood sugar 98.  Normal hemogram and platelets.    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    No significant alcohol.    Review of Systems:    Please see above.  The rest of the review of systems are negative for all systems.    Patient Care Team:  Dari Carroll MD as PCP - General (Internal Medicine)  Armando Jaquez MD as Physician (Urology)  Lidia Krause MD as Physician (Ophthalmology)     Patient Active Problem List   Diagnosis     Prostate Cancer     Hypertension     Osteopenia     Pulmonary embolism (H)     Obstructive sleep apnea     Right shoulder tendonitis     Subdural hematoma (H)     History of pulmonary embolism     Chronic right shoulder pain     Lipoma of skin and subcutaneous tissue     Past Medical History:   Diagnosis Date     Broken ankle, left      Hypertension      Prostatitis       Past Surgical History:   Procedure Laterality Date     CATARACT EXTRACTION  12/07/2015     CHOLECYSTECTOMY       HERNIA REPAIR  1987     SD THORACOTOMY,MAJOR,EXPLOR/BIOPSY  1983    VATs that found lipoma on chest wall, was concern for cancer.      PROSTATECTOMY N/A     W/ Bilat Pelvic Lymphadenectomy      VITRECTOMY Left 06/15/2015      Family History   Problem Relation Age of Onset     Breast cancer Sister 55     Aortic aneurysm Brother      Sleep apnea Brother      Sleep apnea Sister      Colon cancer Father      Lung cancer Father      Dementia Maternal Grandfather      Pneumonia Paternal Grandfather       Social History     Socioeconomic History     Marital status:      Spouse name: Not on file     Number of children: Not on file     Years of education: Not on file     Highest education level: Not on file   Social Needs     Financial resource strain: Not on file     Food insecurity - worry: Not on file     Food insecurity - inability: Not on file     Transportation needs - medical: Not on file     Transportation needs - non-medical: Not on file   Occupational History     Comment: Retired, 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 You are due for your physical;call  45 tablet 3     cholecalciferol, vitamin D3, 1,000 unit tablet Take 1,000 Units by mouth daily.       fexofenadine (ALLEGRA) 180 MG tablet Take 180 mg by mouth daily.       multivitamin therapeutic tablet Take 1 tablet by mouth daily.       vit C/E/Zn/coppr/lutein/zeaxan (PRESERVISION AREDS-2 ORAL) Take 1 tablet by mouth 2 (two) times a day.              No current facility-administered medications for this visit.       Objective:   Vital Signs:   Visit Vitals  /74 (Patient  "Site: Right Arm, Patient Position: Sitting, Cuff Size: Adult Regular)   Pulse 76   Resp 16   Ht 5' 9\" (1.753 m)   Wt 169 lb (76.7 kg)   BMI 24.96 kg/m         VisionScreening:  No exam data present     PHYSICAL EXAM  Laceration on posterior scalp with staples, no evidence of infection.  Patient is alert and oriented x3.  Good mood and affect.  Nonfocal neurologic exam.  Good mobility and able to clamp on the exam table.  Gait within normal limits.  He has hearing aids.  Tympanic membranes appear normal.  No significant cerumen.  Just mildly crowded pharynx.  No significant pharyngitis.  Remaining teeth in good condition.  No leukoplakia.  No cervical or supraclavicular or axillary adenopathy.  No JVD and no carotid bruits.  Lungs clear to auscultation with good respiratory excursion.  Heart is regular rate and rhythm no murmur rub or gallop.  +1 pedal pulses and no ankle edema.  Feet in good condition.  Abdomen is just mildly-moderate overweight, nontender.  No pulsatile mass.  No hepatosplenomegaly.  He does have a number of lipomas in his abdominal wall.  Including the left upper quadrant area where he wanted me to examine.  He has an outpouching of his abdominal fascia bilateral upper flanks consistent with his obesity.  It somewhat asymmetric and more on the left, but is not a deeper mass or tenderness.  I did not feel a distinct hernia.  He declined  exam.  He denies recurrent inguinal hernia.    Assessment Results 2/13/2019   Activities of Daily Living No help needed   Instrumental Activities of Daily Living No help needed   Mini Cog Total Score 5   Some recent data might be hidden     A Mini-Cog score of 0-2 suggests the possibility of dementia, score of 3-5 suggests no dementia    Identified Health Risks:     The patient was provided with written information regarding signs of hearing loss.  He is at risk for falling and has been provided with information to reduce the risk of falling at home.  Patient's " advanced directive was discussed and I am comfortable with the patient's wishes.

## 2021-06-24 NOTE — PROGRESS NOTES
[facility] Follow Up Note    Edward Mendoza   78 y.o. male    Date of Visit: 3/8/2019    Chief Complaint   Patient presents with     Follow-up     Head injury recheck; restart Coumadin     Subjective  Edward is returning to discuss his warfarin therapy after a recent subdural hematoma after falling on February 3.    Patient had a massive bilateral pulmonary embolism December 2015.  Was put on warfarin at that time and told to stay on warfarin lifelong.  He was seen by pulmonary medicine December 2018, with documentation for recommendation of lifelong anticoagulation.    He has not had a known previous DVT.  He had a previous left ankle fracture many years ago.    No familial history of hypercoagulable state.    History of prostatectomy 2011 but no recurrence.  No other malignancy history.    He does have a history of sleep apnea but controlled on CPAP.  Rotations are history of atrial fibrillation or stroke.    Well controlled on chlorthalidone.    Cholesterol levels have been okay and not on statin drug.   and HDL 59 last month.    Quit smoking in 1960.    No headache or neurologic symptoms.    I reviewed the CT scan of the head dated March 1 with patient.  No residual subdural hematoma.    Documentation from neurosurgery on March 1, gave him clearance to return to warfarin    Patient otherwise has a steady gait, is not had frequent falls.  He had slipped on the ice    PMHx:    Past Medical History:   Diagnosis Date     Broken ankle, left      Hypertension      Prostatitis      PSHx:    Past Surgical History:   Procedure Laterality Date     CATARACT EXTRACTION  12/07/2015     CHOLECYSTECTOMY       HERNIA REPAIR  1987     AR THORACOTOMY,MAJOR,EXPLOR/BIOPSY  1983    VATs that found lipoma on chest wall, was concern for cancer.      PROSTATECTOMY N/A     W/ Bilat Pelvic Lymphadenectomy     VITRECTOMY Left 06/15/2015     Immunizations:   Immunization History   Administered Date(s) Administered     DT  (pediatric) 12/04/2000     Influenza H0w6-56, 01/15/2010     Influenza high dose, seasonal 10/26/2015, 10/06/2016, 10/10/2017, 08/15/2018     Influenza, Seasonal, Inj PF IIV3 09/08/2011, 10/08/2012, 10/10/2013, 10/13/2014     Pneumo Conj 13-V (2010&after) 10/13/2014     Pneumo Polysac 23-V 10/02/2003     Td,adult,historic,unspecified 01/06/2011     ZOSTER, LIVE 09/08/2011       ROS A comprehensive review of systems was performed and was otherwise negative    Medications, allergies, and problem list were reviewed and updated    Exam  /78 (Patient Site: Right Arm, Patient Position: Sitting, Cuff Size: Adult Regular)   Pulse 80   Resp 16   Wt 174 lb (78.9 kg)   BMI 25.70 kg/m    Neurologically intact.  Scalp laceration is fully healed.  Heart is regular without murmur.  No ankle edema.  Lungs clear.  Mentation normal.    Assessment/Plan  1. Subdural hematoma (H)  Resolved.  Head CT scan reviewed with patient.    Steady gait, relatively low fall risk.    I did discuss risk of current subdural hematoma or other intracranial bleeding in the future with warfarin use.  Patient understands this risk and still wishes to resume warfarin.    2. History of pulmonary embolism  Only one documented episode of pulmonary embolism, he did have a previous ankle injury many years ago, however.    Pulmonary medicine did recommend lifelong anticoagulation to patient last year.    I did explain to patient that usual treatment would be 1 year of anticoagulation after a first pulmonary embolism.  Patient understands that lifelong anticoagulation would be outside this usual treatment course.    Patient accepts the risk of anticoagulation and wishes to proceed with restarting Coumadin with intention to stay on warfarin long-term.    Restart at slightly lower dose of warfarin than his usual dose.  Recheck INR in 1 week.  - warfarin (COUMADIN) 5 MG tablet; Take 1 tablet by mouth daily. Adjust dose based on INR results as directed.   Dispense: 30 tablet; Refill: 11    3. Essential hypertension  Controlled.  Chlorthalidone    4. Obstructive sleep apnea  Compliant with CPAP    Return for routine follow-up in the fall before hunting season.  Get his adult wellness visit physical exam next spring.    Return in about 6 months (around 9/8/2019) for Recheck.   Patient Instructions   Restart warfarin at 5 mg a day.    Set up an INR visit late next week in 6-7 days.    Routine follow-up with me in 6 months, if no other issues.    Ascencion Calvillo MD        Current Outpatient Medications   Medication Sig Dispense Refill     chlorthalidone (HYGROTEN) 25 MG tablet Take A 1/2 TABLETS (12.5 MG TOTAL) BY MOUTH DAILY. You are due for your physical;call 24/7 45 tablet 3     cholecalciferol, vitamin D3, 1,000 unit tablet Take 1,000 Units by mouth daily.       fexofenadine (ALLEGRA) 180 MG tablet Take 180 mg by mouth daily.       multivitamin therapeutic tablet Take 1 tablet by mouth daily.       vit C/E/Zn/coppr/lutein/zeaxan (PRESERVISION AREDS-2 ORAL) Take 1 tablet by mouth 2 (two) times a day.              warfarin (COUMADIN) 5 MG tablet Take 1 tablet by mouth daily. Adjust dose based on INR results as directed. 30 tablet 11     No current facility-administered medications for this visit.      Allergies   Allergen Reactions     Lisinopril Cough     Ace Inhibitors Cough     Penicillins Unknown     childhood     Chocolate Flavor Other (See Comments)     Pt gets sinus congestion from eating chocolate.     Corn Other (See Comments)     Pt gets sinus congestion from eating corn.     Fish Containing Products Other (See Comments)     Pt gets sinus congestion from eating fish.     Levofloxacin Rash     Red line up the arm after IV administration     Nuts - Unspecified Other (See Comments)     Pt gets sinus congestion from eating nuts.     Ragweed Pollen Other (See Comments)     Sinus and chest congestion.     Social History     Tobacco Use     Smoking status: Former  Smoker     Packs/day: 0.50     Years: 5.00     Pack years: 2.50     Last attempt to quit: 1960     Years since quittin.2     Smokeless tobacco: Never Used   Substance Use Topics     Alcohol use: No     Drug use: No

## 2021-06-24 NOTE — TELEPHONE ENCOUNTER
Have him stay off the warfarin until March 8 appointment.  We will discuss restarting the warfarin at that time.

## 2021-06-24 NOTE — TELEPHONE ENCOUNTER
Chart reviewed, INR results from yesterday were routed to [facility].    INR was checked at neuro appointment. Warfarin is on hold d/t subdural hematoma, INR result yesterday was low as expected. Patient will restart warfarin pending neuro clearance.     Per OV note 2/13/19 plan to repeat CT in 2 weeks.     Tracking calendar updated, will continue to monitor chart and resume monitoring when patient restarts warfarin.

## 2021-06-25 NOTE — TELEPHONE ENCOUNTER
ANTICOAGULATION  MANAGEMENT    Assessment     Today's INR result of 2.6 is Therapeutic (goal INR of 2.0-3.0)        Warfarin taken as previously instructed    No new diet changes affecting INR    No new medication/supplements affecting INR    Continues to tolerate warfarin with no reported s/s of bleeding or thromboembolism     Previous INR was Therapeutic    Plan:     Spoke on phone with Edward regarding INR result and instructed:      Warfarin Dosing Instructions:  Continue current warfarin dose 7.5 mg daily on Mondays; and 5 mg daily rest of week  (0 % change)    Instructed patient to follow up no later than: 5 weeks    Education provided: target INR goal and significance of current INR result    Edward verbalizes understanding and agrees to warfarin dosing plan.    Instructed to call the [facility] for any changes, questions or concerns. (#[phone])   ?   Bailey Rueda RN    Subjective/Objective:      Edward Mendoza, a 81 y.o. male is on warfarin. Edward Leon reports:     Home warfarin dose: as updated on anticoagulation calendar per template     Missed doses: No     Medication changes:  No     S/S of bleeding or thromboembolism:  No     New Injury or illness:  No     Changes in diet or alcohol consumption:  No     Upcoming surgery, procedure or cardioversion:  No    Anticoagulation Episode Summary     Current INR goal:  2.0-3.0   TTR:  91.0 % (1 y)   Next INR check:  7/21/2021   INR from last check:  2.60 (6/16/2021)   Weekly max warfarin dose:     Target end date:     INR check location:     Preferred lab:     Send INR reminders to:  CHRISTA PHELPS    Indications    Pulmonary emboli (H) (Resolved) [I26.99]           Comments:           Anticoagulation Care Providers     Provider Role Specialty Phone number    Ascencion Calvillo MD [facility] Internal Medicine [phone]

## 2021-06-25 NOTE — TELEPHONE ENCOUNTER
ANTICOAGULATION  MANAGEMENT    Assessment     Today's INR result of 1.7 is Subtherapeutic (goal INR of 2.0-3.0)        Warfarin taken as previously instructed    No new diet changes affecting INR    No new medication/supplements affecting INR    Continues to tolerate warfarin with no reported s/s of bleeding or thromboembolism     Previous INR was Subtherapeutic    Plan:     Spoke with Edward regarding INR result and instructed:     Warfarin Dosing Instructions:  Continue current warfarin dose 5 mg daily. (0 % change)    Instructed patient to follow up no later than: one week.    Education provided: importance of consistent vitamin K intake, impact of vitamin K foods on INR, importance of therapeutic range, importance of following up for INR monitoring at instructed interval and importance of taking warfarin as instructed    Edward verbalizes understanding and agrees to warfarin dosing plan.    Instructed to call the [facility] for any changes, questions or concerns. (#[phone])   ?   Sussy Lane RN    Subjective/Objective:      Edawrd Mendoza, a 78 y.o. male is on warfarin.     Edward reports:     Home warfarin dose: verbally confirmed home dose with Edward and updated on anticoagulation calendar     Missed doses: No     Medication changes:  No     S/S of bleeding or thromboembolism:  No     New Injury or illness:  No     Changes in diet or alcohol consumption:  No     Upcoming surgery, procedure or cardioversion:  No    Anticoagulation Episode Summary     Current INR goal:   2.0-3.0   TTR:   82.1 % (3.2 y)   Next INR check:   3/22/2019   INR from last check:   1.70! (3/15/2019)   Weekly max warfarin dose:      Target end date:      INR check location:      Preferred lab:      Send INR reminders to:   ANTICOAGULATION POOL A ([facility])    Indications    Pulmonary emboli (H) (Resolved) [I26.99]           Comments:            Anticoagulation Care Providers     Provider Role Specialty Phone number    Ashwin  Daria LUNSFORD MD Referring Internal Medicine [phone]    Dari Carroll MD Responsible Internal Medicine [phone]

## 2021-07-08 ENCOUNTER — RECORDS - HEALTHEAST (OUTPATIENT)
Dept: ADMINISTRATIVE | Facility: OTHER | Age: 81
End: 2021-07-08

## 2021-07-20 ENCOUNTER — TRANSFERRED RECORDS (OUTPATIENT)
Dept: HEALTH INFORMATION MANAGEMENT | Facility: CLINIC | Age: 81
End: 2021-07-20

## 2021-07-20 DIAGNOSIS — I26.99 PULMONARY EMBOLISM (H): Primary | ICD-10-CM

## 2021-07-21 ENCOUNTER — LAB (OUTPATIENT)
Dept: LAB | Facility: CLINIC | Age: 81
End: 2021-07-21
Payer: COMMERCIAL

## 2021-07-21 ENCOUNTER — ANTICOAGULATION THERAPY VISIT (OUTPATIENT)
Dept: ANTICOAGULATION | Facility: CLINIC | Age: 81
End: 2021-07-21

## 2021-07-21 DIAGNOSIS — I26.99 PULMONARY EMBOLISM (H): ICD-10-CM

## 2021-07-21 LAB — INR BLD: 2.4 (ref 0.9–1.1)

## 2021-07-21 PROCEDURE — 36416 COLLJ CAPILLARY BLOOD SPEC: CPT

## 2021-07-21 PROCEDURE — 85610 PROTHROMBIN TIME: CPT

## 2021-07-21 NOTE — PROGRESS NOTES
ANTICOAGULATION MANAGEMENT     Edward Mendoza JR 81 year old male is on warfarin with therapeutic INR result. (Goal INR 2.0-3.0)    Recent labs: (last 7 days)     07/21/21  0904   INR 2.4*       ASSESSMENT     Source(s): Patient/Caregiver Call       Warfarin doses taken: Warfarin taken as instructed    Diet: No new diet changes identified    New illness, injury, or hospitalization: No    Medication/supplement changes: None noted    Signs or symptoms of bleeding or clotting: No    Previous INR: Therapeutic last 2(+) visits    Additional findings: None     PLAN     Recommended plan for no diet, medication or health factor changes affecting INR     Dosing Instructions: Continue your current warfarin dose with next INR in 6 weeks       Summary  As of 7/21/2021    Full warfarin instructions:  7.5 mg every Mon; 5 mg all other days   Next INR check:  9/1/2021             Telephone call with Edward who verbalizes understanding and agrees to plan    Lab visit scheduled    Education provided: Importance of therapeutic range    Plan made per [facility] anticoagulation protocol    Sussy Lane RN  Anticoagulation Clinic  7/21/2021    _______________________________________________________________________     Anticoagulation Episode Summary     Current INR goal:  2.0-3.0   TTR:  93.7 % (1 y)   Target end date:     Send INR reminders to:  CHRISTA PHELPS       Comments:

## 2021-07-22 ENCOUNTER — TELEPHONE (OUTPATIENT)
Dept: INTERNAL MEDICINE | Facility: CLINIC | Age: 81
End: 2021-07-22

## 2021-07-22 NOTE — TELEPHONE ENCOUNTER
Reason for Call:  Pre op PCP     PCP: Ascencion Calvillo    Reason for visit: Pre op Rt Cataract 8/30 [facility]. Krause    Duration of symptoms: n/a    Have you been treated for this in the past? n/a    Additional comments: none    Can we leave a detailed message on this number? YES    Phone number patient can be reached at: Home number on file [phone] (home)    Best Time: any    Call taken on 7/22/2021 at 11:55 AM by Anais Rush

## 2021-08-24 ENCOUNTER — ANTICOAGULATION THERAPY VISIT (OUTPATIENT)
Dept: ANTICOAGULATION | Facility: CLINIC | Age: 81
End: 2021-08-24

## 2021-08-24 ENCOUNTER — OFFICE VISIT (OUTPATIENT)
Dept: INTERNAL MEDICINE | Facility: CLINIC | Age: 81
End: 2021-08-24
Payer: COMMERCIAL

## 2021-08-24 VITALS
WEIGHT: 167 LBS | HEIGHT: 69 IN | DIASTOLIC BLOOD PRESSURE: 80 MMHG | SYSTOLIC BLOOD PRESSURE: 134 MMHG | HEART RATE: 69 BPM | BODY MASS INDEX: 24.73 KG/M2 | OXYGEN SATURATION: 97 %

## 2021-08-24 DIAGNOSIS — G47.33 OBSTRUCTIVE SLEEP APNEA: ICD-10-CM

## 2021-08-24 DIAGNOSIS — Z86.711 HISTORY OF PULMONARY EMBOLISM: ICD-10-CM

## 2021-08-24 DIAGNOSIS — I10 ESSENTIAL HYPERTENSION: ICD-10-CM

## 2021-08-24 DIAGNOSIS — Z01.818 PREOP GENERAL PHYSICAL EXAM: Primary | ICD-10-CM

## 2021-08-24 DIAGNOSIS — H25.9 SENILE CATARACT OF RIGHT EYE, UNSPECIFIED AGE-RELATED CATARACT TYPE: ICD-10-CM

## 2021-08-24 LAB
ANION GAP SERPL CALCULATED.3IONS-SCNC: 11 MMOL/L (ref 5–18)
BUN SERPL-MCNC: 18 MG/DL (ref 8–28)
CALCIUM SERPL-MCNC: 10.2 MG/DL (ref 8.5–10.5)
CHLORIDE BLD-SCNC: 105 MMOL/L (ref 98–107)
CO2 SERPL-SCNC: 26 MMOL/L (ref 22–31)
CREAT SERPL-MCNC: 1.02 MG/DL (ref 0.7–1.3)
GFR SERPL CREATININE-BSD FRML MDRD: 68 ML/MIN/1.73M2
GLUCOSE BLD-MCNC: 88 MG/DL (ref 70–125)
INR BLD: 2.5 (ref 0.9–1.1)
POTASSIUM BLD-SCNC: 3.8 MMOL/L (ref 3.5–5)
SODIUM SERPL-SCNC: 142 MMOL/L (ref 136–145)

## 2021-08-24 PROCEDURE — 36416 COLLJ CAPILLARY BLOOD SPEC: CPT | Performed by: INTERNAL MEDICINE

## 2021-08-24 PROCEDURE — 80048 BASIC METABOLIC PNL TOTAL CA: CPT | Performed by: INTERNAL MEDICINE

## 2021-08-24 PROCEDURE — 36415 COLL VENOUS BLD VENIPUNCTURE: CPT | Performed by: INTERNAL MEDICINE

## 2021-08-24 PROCEDURE — 85610 PROTHROMBIN TIME: CPT | Performed by: INTERNAL MEDICINE

## 2021-08-24 PROCEDURE — 99214 OFFICE O/P EST MOD 30 MIN: CPT | Performed by: INTERNAL MEDICINE

## 2021-08-24 ASSESSMENT — MIFFLIN-ST. JEOR: SCORE: 1442.89

## 2021-08-24 NOTE — PATIENT INSTRUCTIONS

## 2021-08-24 NOTE — PROGRESS NOTES
Yassine Orr MD  [address] [address]  Phone: [phone]  Fax: [phone]  Primary Provider: Ascencion Calvillo  Pre-op Performing Provider: YASSINE ORR      PREOPERATIVE EVALUATION:  Today's date: 8/24/2021    Edward Mendoza JR is a 81 year old male who presents for a preoperative evaluation.    Surgical Information:  Surgery/Procedure: Right Cataract  Surgery Location: [facility] [place]  Surgeon: DR Krause  Surgery Date: 8-  Time of Surgery: ?  Where patient plans to recover: At home with family  Fax number for surgical facility: [phone]    Type of Anesthesia Anticipated: Local with MAC    Assessment & Plan     The proposed surgical procedure is considered LOW risk.    Preop general physical exam  Overall risk is low no contraindications to proceeding with cataract surgery and local with MAC anesthesia.    Senile cataract of right eye, unspecified age-related cataract type  Worsening vision with plans for right cataract surgery    Hypertension  Blood pressure well controlled.  Will check renal function electrolytes while on chlorthalidone.  He will hold his usual dose of the diuretic on the morning of surgery but can resume later in the day.  - Basic metabolic panel  (Ca, Cl, CO2, Creat, Gluc, K, Na, BUN)    History of pulmonary embolism  Chronically anticoagulated with warfarin.  INR is to be checked today.  He may continue to take his anticoagulation up until the day of surgery and continue thereafter    Obstructive sleep apnea  He wears CPAP nightly.               Medication Instructions:  Continue all of your usual medications up until the day of surgery    On the morning of surgery, hold the medications that you usually take in the morning including vitamins, Allegra, and chlorthalidone.  These can be restarted later in the day and make sure you continue with your  warfarin every night without missing any doses    RECOMMENDATION:  APPROVAL GIVEN to proceed with proposed procedure, without further diagnostic evaluation.                Subjective     HPI related to upcoming procedure: 81-year-old male with worsening vision with plans for right cataract extraction.    He has tolerated previous surgery and anesthesia without any complications    He does have a history of pulmonary embolism and is chronically anticoagulated with warfarin    Hypertension is well controlled with chlorthalidone.    No history of coronary artery disease or exertional chest pain    Preop Questions 8/22/2021   1. Have you ever had a heart attack or stroke? No   2. Have you ever had surgery on your heart or blood vessels, such as a stent placement, a coronary artery bypass, or surgery on an artery in your head, neck, heart, or legs? No   3. Do you have chest pain with activity? No   4. Do you have a history of  heart failure? No   5. Do you currently have a cold, bronchitis or symptoms of other infection? No   6. Do you have a cough, shortness of breath, or wheezing? No   7. Do you or anyone in your family have previous history of blood clots? No   8. Do you or does anyone in your family have a serious bleeding problem such as prolonged bleeding following surgeries or cuts? No   9. Have you ever had problems with anemia or been told to take iron pills? No   10. Have you had any abnormal blood loss such as black, tarry or bloody stools? No   11. Have you ever had a blood transfusion? No   12. Are you willing to have a blood transfusion if it is medically needed before, during, or after your surgery? Yes   13. Have you or any of your relatives ever had problems with anesthesia? No   14. Do you have sleep apnea, excessive snoring or daytime drowsiness? YES -sleep apnea using CPAP   14a. Do you have a CPAP machine? Yes   15. Do you have any artifical heart valves or other implanted medical devices like a  pacemaker, defibrillator, or continuous glucose monitor? No   16. Do you have artificial joints? No   17. Are you allergic to latex? No       Health Care Directive:  Patient does not have a Health Care Directive or Living Will:         Review of Systems  CONSTITUTIONAL: NEGATIVE for fever, chills, change in weight  RESP: NEGATIVE for significant cough or SOB  CV: NEGATIVE for chest pain, palpitations or peripheral edema  ROS otherwise negative    Patient Active Problem List    Diagnosis Date Noted     Chronic right shoulder pain 02/13/2019     Priority: Medium     Lipoma of skin and subcutaneous tissue 02/13/2019     Priority: Medium     Subdural hematoma (H) 02/03/2019     Priority: Medium     Hypertension      Priority: Medium     Started chlorthalidone 4/17 and seems to work well          Right shoulder tendonitis 09/14/2018     Priority: Medium     Osteopenia      Priority: Medium     Created by Conversion  Replacement Utility updated for latest IMO load         Obstructive sleep apnea 04/20/2016     Priority: Medium     Severe, AHI 48, CPAP 7cmH2O, PSG 3/2016         Pulmonary embolism (H) 12/19/2015     Priority: Medium     Bilateral extensive PE 12/19/15  Right DVT 12/19/15         Prostate Cancer      Priority: Medium     Created by Conversion          Past Medical History:   Diagnosis Date     Broken ankle, left, sequela      Hypertension      Prostatitis      Past Surgical History:   Procedure Laterality Date     C THORACOTOMY,MAJOR,EXPLOR/BIOPSY  1983    VATs that found lipoma on chest wall, was concern for cancer.      CATARACT EXTRACTION  12/07/2015     CHOLECYSTECTOMY       HERNIA REPAIR  1987     PROSTATECTOMY N/A     W/ Bilat Pelvic Lymphadenectomy     VITRECTOMY ANTERIOR Left 06/15/2015     Current Outpatient Medications   Medication Sig Dispense Refill     chlorthalidone (HYGROTEN) 25 MG tablet [CHLORTHALIDONE (HYGROTEN) 25 MG TABLET] Take A 1/2 TABLETS (12.5 MG TOTAL) BY MOUTH DAILY. 45 tablet 3      cholecalciferol, vitamin D3, 1,000 unit tablet [CHOLECALCIFEROL, VITAMIN D3, 1,000 UNIT TABLET] Take 1,000 Units by mouth daily.       fexofenadine (ALLEGRA) 180 MG tablet [FEXOFENADINE (ALLEGRA) 180 MG TABLET] Take 180 mg by mouth daily.       multivitamin therapeutic tablet [MULTIVITAMIN THERAPEUTIC TABLET] Take 1 tablet by mouth daily.       vit C/E/Zn/coppr/lutein/zeaxan (PRESERVISION AREDS-2 ORAL) [VIT C/E/ZN/COPPR/LUTEIN/ZEAXAN (PRESERVISION AREDS-2 ORAL)] Take 1 tablet by mouth 2 (two) times a day.              warfarin ANTICOAGULANT (COUMADIN/JANTOVEN) 2.5 MG tablet [WARFARIN ANTICOAGULANT (COUMADIN/JANTOVEN) 2.5 MG TABLET] Take 1 tablet by mouth on . Adjust dose based on INR results as directed. 30 tablet 1     warfarin ANTICOAGULANT (COUMADIN/JANTOVEN) 5 MG tablet [WARFARIN ANTICOAGULANT (COUMADIN/JANTOVEN) 5 MG TABLET] Take 1 tablet daily (5 mg) by mouth as directed. Adjust dose per INR results. 90 tablet 1       Allergies   Allergen Reactions     Lisinopril Cough     Ace Inhibitors Cough     Penicillins Unknown     childhood     Chocolate Flavor Other (See Comments)     Pt gets sinus congestion from eating chocolate.     Corn [Corn Oil] Other (See Comments)     Pt gets sinus congestion from eating corn.     Fish Containing Products [Fish-Derived Products] Other (See Comments)     Pt gets sinus congestion from eating fish.     Levofloxacin Rash     Red line up the arm after IV administration     Nuts - Unspecified [Nuts] Other (See Comments)     Pt gets sinus congestion from eating nuts.     Ragweed Pollen [Ragweeds] Other (See Comments)     Sinus and chest congestion.        Social History     Tobacco Use     Smoking status: Former Smoker     Packs/day: 0.50     Years: 5.00     Pack years: 2.50     Quit date: 1960     Years since quittin.6     Smokeless tobacco: Never Used   Substance Use Topics     Alcohol use: No     Family History   Problem Relation Age of Onset     Breast Cancer  "Sister 55.00     Aortic aneurysm Brother      Sleep Apnea Brother      Sleep Apnea Sister      Colon Cancer Father      Lung Cancer Father      Dementia Maternal Grandfather      Pneumonia Paternal Grandfather      History   Drug Use No         Objective     /80 (BP Location: Right arm, Patient Position: Sitting, Cuff Size: Adult Regular)   Pulse 69   Ht 1.753 m (5' 9\")   Wt 75.8 kg (167 lb)   SpO2 97%   BMI 24.66 kg/m      Physical Exam  GENERAL APPEARANCE: healthy, alert and no distress  HENT: Normal  RESP: lungs clear to auscultation - no rales, rhonchi or wheezes  CV: regular rate and rhythm, normal S1 S2, no S3 or S4 and no murmur, click or rub   ABDOMEN: soft, nontender, no HSM or masses and bowel sounds normal  NEURO: Normal strength and tone, sensory exam grossly normal, mentation intact and speech normal        Diagnostics:  Labs-  Potassium 3.8 creatinine 1.02      No EKG required for low risk surgery (cataract, skin procedure, breast biopsy, etc).    Revised Cardiac Risk Index (RCRI):  The patient has the following serious cardiovascular risks for perioperative complications:   - No serious cardiac risks = 0 points     RCRI Interpretation: 0 points: Class I (very low risk - 0.4% complication rate)           Signed Electronically by: Yassine Orr MD  Copy of this evaluation report is provided to requesting physician.    {Provider Resources  [facility] Guidelines  Revised Cardiac Risk Index :151701}  "

## 2021-08-24 NOTE — PROGRESS NOTES
ANTICOAGULATION MANAGEMENT     Edward Mendoza JR 81 year old male is on warfarin with therapeutic INR result. (Goal INR 2.0-3.0)    Recent labs: (last 7 days)     08/24/21  1607   INR 2.5*       ASSESSMENT     Source(s): Chart Review, Patient/Caregiver Call and Template       Warfarin doses taken: Warfarin taken as instructed    Diet: No new diet changes identified    New illness, injury, or hospitalization: No    Medication/supplement changes: None noted    Signs or symptoms of bleeding or clotting: No    Previous INR: Therapeutic last 2(+) visits    Additional findings: Upcoming surgery/procedure Catarct surgery 8/30, no warfarin hold needed.      PLAN     Recommended plan for no diet, medication or health factor changes affecting INR     Dosing Instructions: Continue your current warfarin dose with next INR in 6 weeks       Summary  As of 8/24/2021    Full warfarin instructions:  7.5 mg every Mon; 5 mg all other days   Next INR check:  10/5/21             Telephone call with Edward who verbalizes understanding and agrees to plan    Patient elected to schedule next visit 9/28/21    Education provided: Goal range and significance of current result    Plan made per [facility] anticoagulation protocol    Bailey Rueda RN  Anticoagulation Clinic  8/24/2021    _______________________________________________________________________     Anticoagulation Episode Summary     Current INR goal:  2.0-3.0   TTR:  97.4 % (1 y)   Target end date:     Send INR reminders to:  CHRISTA PHELPS       Comments:

## 2021-09-13 ENCOUNTER — OFFICE VISIT (OUTPATIENT)
Dept: INTERNAL MEDICINE | Facility: CLINIC | Age: 81
End: 2021-09-13
Payer: COMMERCIAL

## 2021-09-13 VITALS
HEART RATE: 76 BPM | SYSTOLIC BLOOD PRESSURE: 128 MMHG | DIASTOLIC BLOOD PRESSURE: 68 MMHG | BODY MASS INDEX: 24.2 KG/M2 | WEIGHT: 163.9 LBS

## 2021-09-13 DIAGNOSIS — I10 ESSENTIAL HYPERTENSION: Primary | ICD-10-CM

## 2021-09-13 DIAGNOSIS — Z86.711 HISTORY OF PULMONARY EMBOLISM: ICD-10-CM

## 2021-09-13 DIAGNOSIS — J32.9 CHRONIC SINUSITIS, UNSPECIFIED LOCATION: ICD-10-CM

## 2021-09-13 DIAGNOSIS — Z85.46 HISTORY OF PROSTATE CANCER: ICD-10-CM

## 2021-09-13 PROCEDURE — 99214 OFFICE O/P EST MOD 30 MIN: CPT | Performed by: INTERNAL MEDICINE

## 2021-09-13 RX ORDER — LATANOPROST 50 UG/ML
1 SOLUTION/ DROPS OPHTHALMIC AT BEDTIME
COMMUNITY
Start: 2021-07-30 | End: 2022-01-24

## 2021-09-13 NOTE — PATIENT INSTRUCTIONS
Consider a saline irrigation or Phyllis pot for nasal rinsing once or twice a day to reduce postnasal drip.  Continue Flonase and daily antihistamine with Allegra or Zyrtec.  Do not use any decongestants.    Schedule appointment with the ENT clinic to discuss sinusitis treatment further.    Make sure your CPAP mask and machine is working well.  If you feel you have a mask leak or problems with CPAP, contact your CPAP supply company or sleep clinic.    Continue to monitor blood pressure.  Goal blood pressure less than 135/85.  Higher blood pressures in the morning can signify problems with your CPAP machine.    Recheck your INR as planned later this month.    Get your COVID-19 booster vaccine as soon as possible this month or early October.    Get your flu shot later this fall.    See me for a adult wellness visit physical exam after March 11 of next year.

## 2021-09-13 NOTE — PROGRESS NOTES
[facility] clinic Follow Up Note    Edward Mendoza JR   81 year old male    Date of Visit: 9/13/2021    Chief Complaint   Patient presents with     Follow Up     BP Checkup     Subjective  Edward is an 81-year-old male here for blood pressure follow-up appointment, but he was just seen for a preop last month with a blood pressure of 134/80 and normal kidney labs at that visit.    He is on warfarin for history of pulmonary embolism in 2015.  No evidence of recurrence.  No new chest pain or shortness of breath.  He did have a subdural hemorrhage with fall in 2019.  No recent falls.  No new headache or neurologic changes.    He does have obstructive sleep apnea but highly compliant with CPAP.  He does not feel his mask is giving him problems.  He does occasionally note some higher blood pressures in the morning.  Denies significant daytime sleepiness.  He does have some peripheral neuropathy.  No history of palpitations or atrial fibrillation.    No increasing shortness of breath or lower extremity edema.    Stress echo -2015.  Active with regular walking.    He does complain of some increasing nasal congestion and postnasal drip, chronic.  Has had chronic sinusitis for many years.  Previous sinus surgery many years ago.  He is on Zyrtec and Flonase daily.  Use Phyllis pot.  No fever or purulent discharge.  No hemoptysis.    Patient did have cataract surgery on August 30 without complication.  Patient states he is also being managed for glaucoma with the eye doctor.    Past history of prostate cancer 2011 with prostatectomy without recurrence and PSA was less than 0.1 in March.    History of basal cell cancer skin.    I did review labs with creatinine which was normal in August 24.    PMHx:    Past Medical History:   Diagnosis Date     Broken ankle, left, sequela      Hypertension      Prostatitis      PSHx:    Past Surgical History:   Procedure Laterality Date     C THORACOTOMY,MAJOR,EXPLOR/BIOPSY  1983    VATs that  found lipoma on chest wall, was concern for cancer.      CATARACT EXTRACTION  12/07/2015     CHOLECYSTECTOMY       HERNIA REPAIR  1987     PROSTATECTOMY N/A     W/ Bilat Pelvic Lymphadenectomy     VITRECTOMY ANTERIOR Left 06/15/2015     Immunizations:   Immunization History   Administered Date(s) Administered     COVID-19,PF,Pfizer 02/04/2021, 02/25/2021     DT (PEDS <7y) 12/04/2000     FLU 6-35 months 09/08/2011, 10/08/2012, 10/10/2013, 10/13/2014     Influenza (H1N1) 01/15/2010     Influenza (High Dose) 3 valent vaccine 10/26/2015, 10/06/2016, 10/10/2017, 08/15/2018, 10/01/2019     Influenza, Quad, High Dose, Pf, 65yr+ (Fluzone HD) 10/05/2020, 10/06/2020     Pneumo Conj 13-V (2010&after) 10/13/2014     Pneumococcal 23 valent 10/02/2003     Td,adult,historic,unspecified 01/06/2011     Tdap (Adacel,Boostrix) 05/01/2019     Zoster vaccine, live 09/08/2011       ROS A comprehensive review of systems was performed and was otherwise negative    Medications, allergies, and problem list were reviewed and updated    Exam  /68   Pulse 76   Wt 74.3 kg (163 lb 14.4 oz)   BMI 24.20 kg/m    Lungs are clear.  Heart is regular without murmur.  No ankle edema.  Abdomen is thin nontender.  No cervical or supraclavicular adenopathy or neck mass.  No JVD.    Assessment/Plan  1. Essential hypertension  Well-controlled today.  Mostly well controlled blood pressures, occasionally high in the morning, possibly associated with his sleep apnea.  I did tell him to make sure his CPAP machine is working well and no leak.    Continue chlorthalidone 12.5 mg a day.  Follow-up in 6 months for his physical exam.  Kidney labs were normal last month    2. History of pulmonary embolism  Chronic warfarin.  INR therapeutic August 24.  No evidence of recurrence.  No bleeding issues.  No falls.    3. History of prostate cancer  No recurrence.  Recheck at physical next year.    4. Chronic sinusitis, unspecified location  Patient did want referral  to ENT.  I discussed Phyllis pot which she could add.  Continue antihistamine, discussed changing to Zyrtec.  Continue Flonase.  - Otolaryngology Referral; Future    We discussed immunizations with importance of getting the COVID-19 vaccine booster as soon as possible.  Patient was told to look into getting that on September 20      Return in about 6 months (around 3/13/2022) for Routine preventive.   Patient Instructions   Consider a saline irrigation or Phyllis pot for nasal rinsing once or twice a day to reduce postnasal drip.  Continue Flonase and daily antihistamine with Allegra or Zyrtec.  Do not use any decongestants.    Schedule appointment with the ENT clinic to discuss sinusitis treatment further.    Make sure your CPAP mask and machine is working well.  If you feel you have a mask leak or problems with CPAP, contact your CPAP supply company or sleep clinic.    Continue to monitor blood pressure.  Goal blood pressure less than 135/85.  Higher blood pressures in the morning can signify problems with your CPAP machine.    Recheck your INR as planned later this month.    Get your COVID-19 booster vaccine as soon as possible this month or early October.    Get your flu shot later this fall.    See me for a adult wellness visit physical exam after March 11 of next year.    Ascencion Calvillo MD, MD        Current Outpatient Medications   Medication Sig Dispense Refill     chlorthalidone (HYGROTEN) 25 MG tablet [CHLORTHALIDONE (HYGROTEN) 25 MG TABLET] Take A 1/2 TABLETS (12.5 MG TOTAL) BY MOUTH DAILY. 45 tablet 3     cholecalciferol, vitamin D3, 1,000 unit tablet [CHOLECALCIFEROL, VITAMIN D3, 1,000 UNIT TABLET] Take 1,000 Units by mouth daily.       fexofenadine (ALLEGRA) 180 MG tablet [FEXOFENADINE (ALLEGRA) 180 MG TABLET] Take 180 mg by mouth daily.       latanoprost (XALATAN) 0.005 % ophthalmic solution Place 1 drop into both eyes At Bedtime       multivitamin therapeutic tablet [MULTIVITAMIN THERAPEUTIC TABLET]  Take 1 tablet by mouth daily.       warfarin ANTICOAGULANT (COUMADIN/JANTOVEN) 2.5 MG tablet [WARFARIN ANTICOAGULANT (COUMADIN/JANTOVEN) 2.5 MG TABLET] Take 1 tablet by mouth on . Adjust dose based on INR results as directed. 30 tablet 1     warfarin ANTICOAGULANT (COUMADIN/JANTOVEN) 5 MG tablet [WARFARIN ANTICOAGULANT (COUMADIN/JANTOVEN) 5 MG TABLET] Take 1 tablet daily (5 mg) by mouth as directed. Adjust dose per INR results. 90 tablet 1     vit C/E/Zn/coppr/lutein/zeaxan (PRESERVISION AREDS-2 ORAL) [VIT C/E/ZN/COPPR/LUTEIN/ZEAXAN (PRESERVISION AREDS-2 ORAL)] Take 1 tablet by mouth 2 (two) times a day.        (Patient not taking: Reported on 2021)       Allergies   Allergen Reactions     Lisinopril Cough     Ace Inhibitors Cough     Penicillins Unknown     childhood     Chocolate Flavor Other (See Comments)     Pt gets sinus congestion from eating chocolate.     Corn [Corn Oil] Other (See Comments)     Pt gets sinus congestion from eating corn.     Fish Containing Products [Fish-Derived Products] Other (See Comments)     Pt gets sinus congestion from eating fish.     Levofloxacin Rash     Red line up the arm after IV administration     Nuts - Unspecified [Nuts] Other (See Comments)     Pt gets sinus congestion from eating nuts.     Ragweed Pollen [Ragweeds] Other (See Comments)     Sinus and chest congestion.     Social History     Tobacco Use     Smoking status: Former Smoker     Packs/day: 0.50     Years: 5.00     Pack years: 2.50     Quit date: 1960     Years since quittin.7     Smokeless tobacco: Never Used   Substance Use Topics     Alcohol use: No     Drug use: No

## 2021-09-17 ENCOUNTER — TRANSFERRED RECORDS (OUTPATIENT)
Dept: HEALTH INFORMATION MANAGEMENT | Facility: CLINIC | Age: 81
End: 2021-09-17

## 2021-09-20 ENCOUNTER — TRANSFERRED RECORDS (OUTPATIENT)
Dept: HEALTH INFORMATION MANAGEMENT | Facility: CLINIC | Age: 81
End: 2021-09-20

## 2021-09-28 ENCOUNTER — ANTICOAGULATION THERAPY VISIT (OUTPATIENT)
Dept: ANTICOAGULATION | Facility: CLINIC | Age: 81
End: 2021-09-28

## 2021-09-28 ENCOUNTER — LAB (OUTPATIENT)
Dept: LAB | Facility: CLINIC | Age: 81
End: 2021-09-28
Payer: COMMERCIAL

## 2021-09-28 LAB — INR BLD: 2.5 (ref 0.9–1.1)

## 2021-09-28 PROCEDURE — 36416 COLLJ CAPILLARY BLOOD SPEC: CPT

## 2021-09-28 PROCEDURE — 85610 PROTHROMBIN TIME: CPT

## 2021-09-28 NOTE — PROGRESS NOTES
ANTICOAGULATION MANAGEMENT     Edward Mendoza JR 81 year old male is on warfarin with therapeutic INR result. (Goal INR 2.0-3.0)    Recent labs: (last 7 days)     09/28/21  0940   INR 2.5*       ASSESSMENT     Source(s): Chart Review and Template       Warfarin doses taken: Missed dose(s) may be affecting INR    Diet: No new diet changes identified    New illness, injury, or hospitalization: No    Medication/supplement changes: None noted    Signs or symptoms of bleeding or clotting: No    Previous INR: Therapeutic last 2(+) visits    Additional findings: None     PLAN     Recommended plan for no diet, medication or health factor changes affecting INR     Dosing Instructions: Continue your current warfarin dose with next INR in 6 weeks       Summary  As of 9/28/2021    Full warfarin instructions:  7.5 mg every Mon; 5 mg all other days   Next INR check:  11/9/2021             Detailed voice message left for Edward with dosing instructions and follow up date.     Contact [phone] to schedule and with any changes, questions or concerns.     Education provided: None required    Plan made per [facility] anticoagulation protocol    Sussy Lane RN  Anticoagulation Clinic  9/28/2021    _______________________________________________________________________     Anticoagulation Episode Summary     Current INR goal:  2.0-3.0   TTR:  97.4 % (1 y)   Target end date:     Send INR reminders to:  CHRISTA PHELPS       Comments:

## 2021-10-04 ENCOUNTER — ANTICOAGULATION THERAPY VISIT (OUTPATIENT)
Dept: ANTICOAGULATION | Facility: CLINIC | Age: 81
End: 2021-10-04
Payer: COMMERCIAL

## 2021-10-04 ENCOUNTER — DOCUMENTATION ONLY (OUTPATIENT)
Dept: ANTICOAGULATION | Facility: CLINIC | Age: 81
End: 2021-10-04

## 2021-10-04 DIAGNOSIS — I26.99 PULMONARY EMBOLISM (H): Primary | ICD-10-CM

## 2021-10-04 DIAGNOSIS — Z91.199 FAILURE TO ATTEND APPOINTMENT: Primary | ICD-10-CM

## 2021-10-04 DIAGNOSIS — Z86.711 HISTORY OF PULMONARY EMBOLISM: ICD-10-CM

## 2021-10-04 NOTE — PROGRESS NOTES
ANTICOAGULATION MANAGEMENT      Edward Mendoza JR due for annual renewal of referral to anticoagulation monitoring. Order pended for your review and signature.      ANTICOAGULATION SUMMARY      Warfarin indication(s)     PE    Heart valve present?  NO       Current goal range   INR: 2.0-3.0     Goal appropriate for indication? Yes, INR 2-3 appropriate for hx of DVT, PE, hypercoagulable state, Afib, LVAD, or bileaflet AVR without risk factors     Current duration of therapy Indefinite/long term therapy   Time in Therapeutic Range (TTR)  (Goal > 60%) 97.4%       Office visit with referring provider's group within last year yes on 9/13/21       Sussy Lane RN

## 2021-10-07 DIAGNOSIS — Z86.711 HISTORY OF PULMONARY EMBOLISM: ICD-10-CM

## 2021-10-07 RX ORDER — WARFARIN SODIUM 5 MG/1
TABLET ORAL
Qty: 90 TABLET | Refills: 1 | Status: SHIPPED | OUTPATIENT
Start: 2021-10-07 | End: 2022-04-13

## 2021-10-07 NOTE — TELEPHONE ENCOUNTER
Pt states he has been having issues with filling this as [facility].    Yarely Virk on 10/7/2021 at 10:24 AM

## 2021-10-10 ENCOUNTER — HEALTH MAINTENANCE LETTER (OUTPATIENT)
Age: 81
End: 2021-10-10

## 2021-11-02 ENCOUNTER — LAB (OUTPATIENT)
Dept: LAB | Facility: CLINIC | Age: 81
End: 2021-11-02
Payer: COMMERCIAL

## 2021-11-02 ENCOUNTER — ANTICOAGULATION THERAPY VISIT (OUTPATIENT)
Dept: ANTICOAGULATION | Facility: CLINIC | Age: 81
End: 2021-11-02

## 2021-11-02 DIAGNOSIS — Z86.711 HISTORY OF PULMONARY EMBOLISM: Primary | ICD-10-CM

## 2021-11-02 LAB — INR BLD: 2.1 (ref 0.9–1.1)

## 2021-11-02 PROCEDURE — 36416 COLLJ CAPILLARY BLOOD SPEC: CPT

## 2021-11-02 PROCEDURE — 85610 PROTHROMBIN TIME: CPT

## 2021-11-02 NOTE — PROGRESS NOTES
ANTICOAGULATION MANAGEMENT     Edward Mendoza JR 81 year old male is on warfarin with therapeutic INR result. (Goal INR 2.0-3.0)    Recent labs: (last 7 days)     11/02/21  0937   INR 2.1*       ASSESSMENT     Source(s): Chart Review, Patient/Caregiver Call and Template       Warfarin doses taken: Warfarin taken as instructed    Diet: No new diet changes identified    New illness, injury, or hospitalization: No    Medication/supplement changes: None noted    Signs or symptoms of bleeding or clotting: No    Previous INR: Therapeutic last 2(+) visits    Additional findings: Upcoming surgery/procedure total shoulder on 1/4/22 Has pre op scheduled for 12/14. Will discussed hold instructions at that time.      PLAN     Recommended plan for no diet, medication or health factor changes affecting INR     Dosing Instructions: Continue your current warfarin dose with next INR in 6 weeks       Summary  As of 11/2/2021    Full warfarin instructions:  7.5 mg every Mon; 5 mg all other days   Next INR check:  12/14/2021             Telephone call with Edward who verbalizes understanding and agrees to plan    Check at provider office visit    Education provided: Importance of therapeutic range    Plan made per [facility] anticoagulation protocol    Sussy Lane RN  Anticoagulation Clinic  11/2/2021    _______________________________________________________________________     Anticoagulation Episode Summary     Current INR goal:  2.0-3.0   TTR:  97.4 % (1 y)   Target end date:  Indefinite   Send INR reminders to:  CHRISTA PHELPS    Indications    History of pulmonary embolism [Z86.711]           Comments:           Anticoagulation Care Providers     Provider Role Specialty Phone number    Ascencion Calvillo MD Referring Internal Medicine [phone]

## 2021-12-09 DIAGNOSIS — Z11.59 ENCOUNTER FOR SCREENING FOR OTHER VIRAL DISEASES: ICD-10-CM

## 2021-12-14 ENCOUNTER — OFFICE VISIT (OUTPATIENT)
Dept: INTERNAL MEDICINE | Facility: CLINIC | Age: 81
End: 2021-12-14
Payer: COMMERCIAL

## 2021-12-14 ENCOUNTER — ANTICOAGULATION THERAPY VISIT (OUTPATIENT)
Dept: ANTICOAGULATION | Facility: CLINIC | Age: 81
End: 2021-12-14

## 2021-12-14 VITALS
HEIGHT: 69 IN | BODY MASS INDEX: 24.59 KG/M2 | SYSTOLIC BLOOD PRESSURE: 122 MMHG | DIASTOLIC BLOOD PRESSURE: 64 MMHG | HEART RATE: 77 BPM | WEIGHT: 166 LBS | OXYGEN SATURATION: 96 %

## 2021-12-14 DIAGNOSIS — G47.33 OBSTRUCTIVE SLEEP APNEA ON CPAP: ICD-10-CM

## 2021-12-14 DIAGNOSIS — I10 ESSENTIAL HYPERTENSION: ICD-10-CM

## 2021-12-14 DIAGNOSIS — Z86.711 HISTORY OF PULMONARY EMBOLISM: ICD-10-CM

## 2021-12-14 DIAGNOSIS — M19.011 OSTEOARTHRITIS OF RIGHT SHOULDER, UNSPECIFIED OSTEOARTHRITIS TYPE: ICD-10-CM

## 2021-12-14 DIAGNOSIS — H40.9 GLAUCOMA, UNSPECIFIED GLAUCOMA TYPE, UNSPECIFIED LATERALITY: ICD-10-CM

## 2021-12-14 DIAGNOSIS — Z86.711 HISTORY OF PULMONARY EMBOLISM: Primary | ICD-10-CM

## 2021-12-14 DIAGNOSIS — Z01.818 PRE-OP EXAM: Primary | ICD-10-CM

## 2021-12-14 DIAGNOSIS — Z85.46 HISTORY OF PROSTATE CANCER: ICD-10-CM

## 2021-12-14 LAB
ERYTHROCYTE [DISTWIDTH] IN BLOOD BY AUTOMATED COUNT: 13.3 % (ref 10–15)
HCT VFR BLD AUTO: 48.1 % (ref 40–53)
HGB BLD-MCNC: 15.6 G/DL (ref 13.3–17.7)
INR BLD: 2.1 (ref 0.9–1.1)
MCH RBC QN AUTO: 29.1 PG (ref 26.5–33)
MCHC RBC AUTO-ENTMCNC: 32.4 G/DL (ref 31.5–36.5)
MCV RBC AUTO: 90 FL (ref 78–100)
PLATELET # BLD AUTO: 252 10E3/UL (ref 150–450)
RBC # BLD AUTO: 5.36 10E6/UL (ref 4.4–5.9)
WBC # BLD AUTO: 8.9 10E3/UL (ref 4–11)

## 2021-12-14 PROCEDURE — 80048 BASIC METABOLIC PNL TOTAL CA: CPT | Performed by: INTERNAL MEDICINE

## 2021-12-14 PROCEDURE — 99214 OFFICE O/P EST MOD 30 MIN: CPT | Performed by: INTERNAL MEDICINE

## 2021-12-14 PROCEDURE — 85610 PROTHROMBIN TIME: CPT | Performed by: INTERNAL MEDICINE

## 2021-12-14 PROCEDURE — 93010 ELECTROCARDIOGRAM REPORT: CPT | Performed by: INTERNAL MEDICINE

## 2021-12-14 PROCEDURE — 93005 ELECTROCARDIOGRAM TRACING: CPT | Performed by: INTERNAL MEDICINE

## 2021-12-14 PROCEDURE — 85027 COMPLETE CBC AUTOMATED: CPT | Performed by: INTERNAL MEDICINE

## 2021-12-14 PROCEDURE — 36415 COLL VENOUS BLD VENIPUNCTURE: CPT | Performed by: INTERNAL MEDICINE

## 2021-12-14 ASSESSMENT — MIFFLIN-ST. JEOR: SCORE: 1438.35

## 2021-12-14 NOTE — PROGRESS NOTES
[facility]  [address] [facility] [address]  Phone: [phone]  Fax: [phone]  Primary Provider: Cele Carreon  Pre-op Performing Provider: CELE CARREON      PREOPERATIVE EVALUATION:  Today's date: 12/14/2021    Edward Mendoza JR is a 81 year old male who presents for a preoperative evaluation.    Surgical Information:  Surgery/Procedure: Right shoulder surgery  Surgery Location: [facility]  Surgeon: Dr. Tobar  Surgery Date: 1/4/22  Time of Surgery:   Where patient plans to recover: At home with family  Fax number for surgical facility: [phone]    Type of Anesthesia Anticipated: General    Assessment & Plan     The proposed surgical procedure is considered INTERMEDIATE risk.    Pre-op exam  Patient instructions given to patient:  Stop warfarin 4 days prior to the surgery and do not take warfarin on day of surgery.  Restart warfarin day after surgery if no major bleeding complications.  Recheck your INR 5- 7 days after restarting your warfarin.    Get your COVID-19 test done prior to the surgery as instructed by your surgeon.    Do not take your chlorthalidone on the day of surgery.  You do not need to take your other vitamins on the day of surgery.  Nothing to eat after midnight for day of surgery.    Take your eyedrops the night before surgery as usual.    It is recommended that you bring your CPAP machine with you, in case needed in the recovery room.    Follow instructions from your surgeon about dental work after surgery, but is generally recommended you not have dental cleaning for least 2 months after the joint replacement.  And you will need antibiotic prophylaxis prior to any dental work after the joint replacement.    Follow-up with the surgeon for postoperative cares, and see me in the spring for your physical.    - EKG 12-lead, tracing only  - CBC with platelets  - Basic metabolic panel    Patient does still plan to undergo the  educational classes through the orthopedic clinic regarding his upcoming surgery and risks with surgery.  I did review some risk with patient including infection risk, blood clot risk, especially given his history of blood clots and he will be off warfarin temporarily, anesthesia risk, bleeding risks and other risks.  Patient accepts these risks and wishes to proceed with surgery.    Patient should not be managed with NSAIDs after surgery as he will go back on warfarin.  Avoid heavily sedating pain medications with his history of sleep apnea.    He does have a dental cleaning planned for later this week.  I did discuss need for dental prophylaxis after surgery, as instructed by his surgeon.    Osteoarthritis of right shoulder, unspecified osteoarthritis type  Progressive degenerative arthritis of the right shoulder over many years.  Slowly worsening over the past 6 months.  He did not improve with a cortisone shot in September of this year.    History of pulmonary embolism  Pulmonary embolism in 2015 on chronic warfarin.  No recent recurrence.  No leg swelling or evidence of current clot.  Does not need bridging warfarin.  Hold warfarin for 4 days and day of surgery, restart day after surgery if no major bleeding.  - INR    Obstructive sleep apnea on CPAP  Patient does not have severe sleep apnea, but I did recommend patient bring CPAP machine with him in case needed in the recovery room.    Glaucoma, unspecified glaucoma type, unspecified laterality  Take eyedrops at night as usual    History of prostate cancer  Prostatectomy in 2011, no evidence of recurrence.  PSA was 0 in March.    Essential hypertension  Chlorthalidone 12.5 mg every morning.  Blood pressure has been well controlled.  We will skip the chlorthalidone on day of surgery.    History of basal cell cancer of the skin.  Small skin lesion on right ear, but does appear to be a milium and benign.  Patient was told to monitor for any change in size or  shape and if there is any change to get evaluated by dermatology      RECOMMENDATION:  APPROVAL GIVEN to proceed with proposed procedure, without further diagnostic evaluation    Subjective     HPI related to upcoming procedure: Progressive right shoulder pain for many years, worse over the past 6 months.  Failed cortisone shot in September.    Patient is active with walking, but does not have a specific exercise routine.  He can walk up a flight of stairs at least 1 block without chest pain or dyspnea on exertion.    No history of coronary disease.  He had a negative stress echo in 2015.  Cholesterol levels well controlled earlier this year.  Blood pressure well controlled with chlorthalidone.    Non-smoker.    History of chronic sinusitis but without a current flare.  No purulent discharge or fever.  Uses Flonase Zyrtec and [place] pot.    He is fully immunized including booster.  No new cough or fever.    He has not had problems with anesthesia in the past.    Cataract surgery earlier this year without complications.  Glaucoma controlled.  History of macular degeneration.    History of basal cell cancer.  He had a small appears to be a milium in his right ear but has not been changing since he noticed that a number of months ago.    No history of GI bleeding, no new abdominal pain.  Bowels are regular.    No history of seizure.    No history of palpitations or atrial fibrillation.    Patient does have history of peripheral neuropathy with his sleep apnea, but denies alcohol use.    Patient did have a fall in 2019 with a subdural hemorrhage.    Patient lives independently      Preop Questions 8/22/2021   1. Have you ever had a heart attack or stroke? No   2. Have you ever had surgery on your heart or blood vessels, such as a stent placement, a coronary artery bypass, or surgery on an artery in your head, neck, heart, or legs? No   3. Do you have chest pain with activity? No   4. Do you have a history of  heart  failure? No   5. Do you currently have a cold, bronchitis or symptoms of other infection? No   6. Do you have a cough, shortness of breath, or wheezing? No   7. Do you or anyone in your family have previous history of blood clots? No   8. Do you or does anyone in your family have a serious bleeding problem such as prolonged bleeding following surgeries or cuts? No   9. Have you ever had problems with anemia or been told to take iron pills? No   10. Have you had any abnormal blood loss such as black, tarry or bloody stools? No   11. Have you ever had a blood transfusion? No   12. Are you willing to have a blood transfusion if it is medically needed before, during, or after your surgery? Yes   13. Have you or any of your relatives ever had problems with anesthesia? No   14. Do you have sleep apnea, excessive snoring or daytime drowsiness? YES -    14a. Do you have a CPAP machine? Yes   15. Do you have any artifical heart valves or other implanted medical devices like a pacemaker, defibrillator, or continuous glucose monitor? No   16. Do you have artificial joints? No   17. Are you allergic to latex? No     Health Care Directive:  Patient does not have a Health Care Directive or Living Will: Patient states has Advance Directive and will bring in a copy to clinic.    Preoperative Review of :   reviewed - no record of controlled substances prescribed.          Review of Systems  Constitutional, neuro, ENT, endocrine, pulmonary, cardiac, gastrointestinal, genitourinary, musculoskeletal, integument and psychiatric systems are negative, except as otherwise noted.    Patient Active Problem List    Diagnosis Date Noted     History of pulmonary embolism 10/04/2021     Priority: Medium     Chronic right shoulder pain 02/13/2019     Priority: Medium     Lipoma of skin and subcutaneous tissue 02/13/2019     Priority: Medium     Subdural hematoma (H) 02/03/2019     Priority: Medium     Hypertension      Priority: Medium      Started chlorthalidone 4/17 and seems to work well          Right shoulder tendonitis 09/14/2018     Priority: Medium     Osteopenia      Priority: Medium     Created by Conversion  Replacement Utility updated for latest IMO load         Obstructive sleep apnea 04/20/2016     Priority: Medium     Severe, AHI 48, CPAP 7cmH2O, PSG 3/2016         Pulmonary embolism (H) 12/19/2015     Priority: Medium     Bilateral extensive PE 12/19/15  Right DVT 12/19/15         Prostate Cancer      Priority: Medium     Created by Conversion          Past Medical History:   Diagnosis Date     Broken ankle, left, sequela      Hypertension      Prostatitis      Past Surgical History:   Procedure Laterality Date     C THORACOTOMY,MAJOR,EXPLOR/BIOPSY  1983    VATs that found lipoma on chest wall, was concern for cancer.      CATARACT EXTRACTION  12/07/2015     CHOLECYSTECTOMY       HERNIA REPAIR  1987     PROSTATECTOMY N/A     W/ Bilat Pelvic Lymphadenectomy     VITRECTOMY ANTERIOR Left 06/15/2015     Current Outpatient Medications   Medication Sig Dispense Refill     chlorthalidone (HYGROTEN) 25 MG tablet [CHLORTHALIDONE (HYGROTEN) 25 MG TABLET] Take A 1/2 TABLETS (12.5 MG TOTAL) BY MOUTH DAILY. 45 tablet 3     cholecalciferol, vitamin D3, 1,000 unit tablet [CHOLECALCIFEROL, VITAMIN D3, 1,000 UNIT TABLET] Take 1,000 Units by mouth daily.       fexofenadine (ALLEGRA) 180 MG tablet [FEXOFENADINE (ALLEGRA) 180 MG TABLET] Take 180 mg by mouth daily.       latanoprost (XALATAN) 0.005 % ophthalmic solution Place 1 drop into both eyes At Bedtime       multivitamin therapeutic tablet [MULTIVITAMIN THERAPEUTIC TABLET] Take 1 tablet by mouth daily.       vit C/E/Zn/coppr/lutein/zeaxan (PRESERVISION AREDS-2 ORAL) Take 1 tablet by mouth 2 times daily        warfarin ANTICOAGULANT (COUMADIN) 5 MG tablet [WARFARIN ANTICOAGULANT (COUMADIN/JANTOVEN) 5 MG TABLET] Take 1 tablet daily (5 mg) by mouth as directed. Adjust dose per INR results. 90 tablet 1  "    warfarin ANTICOAGULANT (COUMADIN/JANTOVEN) 2.5 MG tablet [WARFARIN ANTICOAGULANT (COUMADIN/JANTOVEN) 2.5 MG TABLET] Take 1 tablet by mouth on Monday's. Adjust dose based on INR results as directed. 30 tablet 1       Allergies   Allergen Reactions     Lisinopril Cough     Ace Inhibitors Cough     Penicillins Unknown     childhood     Chocolate Flavor Other (See Comments)     Pt gets sinus congestion from eating chocolate.     Corn [Corn Oil] Other (See Comments)     Pt gets sinus congestion from eating corn.     Fish Containing Products [Fish-Derived Products] Other (See Comments)     Pt gets sinus congestion from eating fish.     Levofloxacin Rash     Red line up the arm after IV administration     Nuts - Unspecified [Nuts] Other (See Comments)     Pt gets sinus congestion from eating nuts.     Ragweed Pollen [Ragweeds] Other (See Comments)     Sinus and chest congestion.        Social History     Tobacco Use     Smoking status: Former Smoker     Packs/day: 0.50     Years: 5.00     Pack years: 2.50     Quit date: 1960     Years since quittin.9     Smokeless tobacco: Never Used   Substance Use Topics     Alcohol use: No     Family History   Problem Relation Age of Onset     Breast Cancer Sister 55.00     Aortic aneurysm Brother      Sleep Apnea Brother      Sleep Apnea Sister      Colon Cancer Father      Lung Cancer Father      Dementia Maternal Grandfather      Pneumonia Paternal Grandfather      History   Drug Use No         Objective     /64 (BP Location: Right arm, Patient Position: Sitting, Cuff Size: Adult Regular)   Pulse 77   Ht 1.753 m (5' 9\")   Wt 75.3 kg (166 lb)   SpO2 96%   BMI 24.51 kg/m      Physical Exam  Alert and oriented x3.  Good mood and affect.  Normal cognition.  Gait within normal limits.  Pupils and irises equal and reactive.  No jaundice or conjunctivitis.  No cervical or supraclavicular adenopathy or neck mass.  No JVD or carotid bruits.  Lungs are clear to " auscultation with good respiratory excursion.  Heart is regular without murmur rub or gallop.  There is no ankle edema.  Abdomen is thin and nontender.  No hepatosplenomegaly.  No epigastric tenderness.  Skin of the shoulder appears intact without rash or bruising or dermatitis.  Normal  strength on right hand.  There is a small well-circumscribed lipid filled epidermal cyst on the right external ear on the top.  There is no central dimpling or irregularity of the border.  It appears to be more of a milium, low suspicion for basal cell cancer.    Recent Labs   Lab Test 11/02/21  0937 09/28/21  0940 08/24/21  1607 04/21/21  0944 03/11/21  0932 04/13/20  0818 03/09/20  1000   HGB  --   --   --   --  17.1  --  17.5   PLT  --   --   --   --  247  --  241   INR 2.1* 2.5* 2.5*   < > 2.60*   < > 2.40*   NA  --   --  142  --  142  --  138   POTASSIUM  --   --  3.8  --  3.9  --  4.0   CR  --   --  1.02  --  0.89  --  0.83    < > = values in this interval not displayed.        Diagnostics:  Labs pending at this time.  Results will be reviewed when available.  Recent Results (from the past 240 hour(s))   EKG 12-lead, tracing only    Collection Time: 12/14/21  5:22 PM   Result Value Ref Range    Systolic Blood Pressure  mmHg    Diastolic Blood Pressure  mmHg    Ventricular Rate 71 BPM    Atrial Rate 71 BPM    UT Interval 174 ms    QRS Duration 78 ms     ms    QTc 404 ms    P Axis 46 degrees    R AXIS 29 degrees    T Axis 12 degrees    Interpretation ECG       Sinus rhythm  Normal ECG  When compared with ECG of 19-DEC-2015 14:14,  Vent. rate has decreased BY  40 BPM  Nonspecific T wave abnormality has replaced inverted T waves in Inferior leads  T wave amplitude has increased in Anterior leads        EKG personally reviewed by me as above.    Revised Cardiac Risk Index (RCRI):  The patient has the following serious cardiovascular risks for perioperative complications:   - No serious cardiac risks = 0 points     RCRI  Interpretation: 0 points: Class I (very low risk - 0.4% complication rate)           Signed Electronically by: Ascencion Calvillo MD  Copy of this evaluation report is provided to requesting physician.

## 2021-12-14 NOTE — PATIENT INSTRUCTIONS
Stop warfarin 4 days prior to the surgery and do not take warfarin on day of surgery.  Restart warfarin day after surgery if no major bleeding complications.  Recheck your INR 5- 7 days after restarting your warfarin.    Get your COVID-19 test done prior to the surgery as instructed by your surgeon.    Do not take your chlorthalidone on the day of surgery.  You do not need to take your other vitamins on the day of surgery.  Nothing to eat after midnight for day of surgery.    Take your eyedrops the night before surgery as usual.    It is recommended that you bring your CPAP machine with you, in case needed in the recovery room.    Follow instructions from your surgeon about dental work after surgery, but is generally recommended you not have dental cleaning for least 2 months after the joint replacement.  And you will need antibiotic prophylaxis prior to any dental work after the joint replacement.    Follow-up with the surgeon for postoperative cares, and see me in the spring for your physical.

## 2021-12-14 NOTE — PROGRESS NOTES
ANTICOAGULATION MANAGEMENT     Edward Mendoza JR 81 year old male is on warfarin with therapeutic INR result. (Goal INR 2.0-3.0)    Recent labs: (last 7 days)     12/14/21  1756   INR 2.1*       ASSESSMENT     Source(s): Chart Review and Patient/Caregiver Call       Warfarin doses taken: Warfarin taken as instructed    Diet: No new diet changes identified    New illness, injury, or hospitalization: No    Medication/supplement changes: None noted    Signs or symptoms of bleeding or clotting: No    Previous INR: Therapeutic last 2(+) visits    Additional findings: Upcoming surgery/procedure 1/4 Shoulder Arthoplasy, pre op today. Per Dr Calvillo  Pre-op exam  Patient instructions given to patient:  Stop warfarin 4 days prior to the surgery and do not take warfarin on day of surgery.  Restart warfarin day after surgery if no major bleeding complications.  Recheck your INR 5- 7 days after restarting your warfarin.        PLAN     Recommended plan for no diet, medication or health factor changes affecting INR     Dosing Instructions: Continue your current warfarin dose until hold for surgery as below. Resume with usual dose with next INR in 5-7 days after resumption    Summary  As of 12/14/2021    Full warfarin instructions:  12/31: Hold; 1/1: Hold; 1/2: Hold; 1/3: Hold; 1/4: Hold; Otherwise 7.5 mg every Mon; 5 mg all other days   Next INR check:  1/12/2022             Telephone call with Edward who verbalizes understanding and agrees to plan    Lab visit scheduled    Education provided: None required    Plan made per [facility] anticoagulation protocol    Thaddeus Shaw RN  Anticoagulation Clinic  12/14/2021    _______________________________________________________________________     Anticoagulation Episode Summary     Current INR goal:  2.0-3.0   TTR:  97.4 % (1 y)   Target end date:  Indefinite   Send INR reminders to:  CHRISTA PHELPS    Indications    History of pulmonary embolism [Z86.711]           Comments:            Anticoagulation Care Providers     Provider Role Specialty Phone number    Ascencion Calvillo MD Referring Internal Medicine [phone]

## 2021-12-15 LAB
ANION GAP SERPL CALCULATED.3IONS-SCNC: 11 MMOL/L (ref 5–18)
ATRIAL RATE - MUSE: 71 BPM
BUN SERPL-MCNC: 22 MG/DL (ref 8–28)
CALCIUM SERPL-MCNC: 10.4 MG/DL (ref 8.5–10.5)
CHLORIDE BLD-SCNC: 104 MMOL/L (ref 98–107)
CO2 SERPL-SCNC: 28 MMOL/L (ref 22–31)
CREAT SERPL-MCNC: 1.11 MG/DL (ref 0.7–1.3)
DIASTOLIC BLOOD PRESSURE - MUSE: NORMAL MMHG
GFR SERPL CREATININE-BSD FRML MDRD: 61 ML/MIN/1.73M2
GLUCOSE BLD-MCNC: 118 MG/DL (ref 70–125)
INTERPRETATION ECG - MUSE: NORMAL
P AXIS - MUSE: 46 DEGREES
POTASSIUM BLD-SCNC: 4 MMOL/L (ref 3.5–5)
PR INTERVAL - MUSE: 174 MS
QRS DURATION - MUSE: 78 MS
QT - MUSE: 372 MS
QTC - MUSE: 404 MS
R AXIS - MUSE: 29 DEGREES
SODIUM SERPL-SCNC: 143 MMOL/L (ref 136–145)
SYSTOLIC BLOOD PRESSURE - MUSE: NORMAL MMHG
T AXIS - MUSE: 12 DEGREES
VENTRICULAR RATE- MUSE: 71 BPM

## 2021-12-20 ENCOUNTER — TRANSFERRED RECORDS (OUTPATIENT)
Dept: HEALTH INFORMATION MANAGEMENT | Facility: CLINIC | Age: 81
End: 2021-12-20
Payer: COMMERCIAL

## 2021-12-29 RX ORDER — TRIAMCINOLONE ACETONIDE 1 MG/G
CREAM TOPICAL DAILY PRN
COMMUNITY

## 2022-01-03 ENCOUNTER — TRANSFERRED RECORDS (OUTPATIENT)
Dept: HEALTH INFORMATION MANAGEMENT | Facility: CLINIC | Age: 82
End: 2022-01-03
Payer: COMMERCIAL

## 2022-01-12 ENCOUNTER — LAB (OUTPATIENT)
Dept: LAB | Facility: CLINIC | Age: 82
End: 2022-01-12
Payer: COMMERCIAL

## 2022-01-12 ENCOUNTER — ANTICOAGULATION THERAPY VISIT (OUTPATIENT)
Dept: ANTICOAGULATION | Facility: CLINIC | Age: 82
End: 2022-01-12

## 2022-01-12 DIAGNOSIS — Z86.711 HISTORY OF PULMONARY EMBOLISM: Primary | ICD-10-CM

## 2022-01-12 LAB — INR BLD: 2.4 (ref 0.9–1.1)

## 2022-01-12 PROCEDURE — 36416 COLLJ CAPILLARY BLOOD SPEC: CPT

## 2022-01-12 PROCEDURE — 85610 PROTHROMBIN TIME: CPT

## 2022-01-12 NOTE — PROGRESS NOTES
ANTICOAGULATION MANAGEMENT     Edward Mendoza JR 81 year old male is on warfarin with therapeutic INR result. (Goal INR 2.0-3.0)    Recent labs: (last 7 days)     01/12/22  0932   INR 2.4*       ASSESSMENT     Source(s): Chart Review, Patient/Caregiver Call and Template       Warfarin doses taken: Warfarin taken as instructed    Diet: No new diet changes identified    New illness, injury, or hospitalization: No    Medication/supplement changes: None noted    Signs or symptoms of bleeding or clotting: No    Previous INR: Therapeutic last 2(+) visits    Additional findings: Upcoming surgery/procedure total right shoulder is now resceduled for 2/1/21. Has a new pre op on 1/24 with Dr Kaplan. Previously he received hold orders per Dr Calvillo. Hold 4 day prior and day of surgery. Then resume and recheck INR in 5-7 days. Patient believes he will spend on night at the hospital.      PLAN     Recommended plan for no diet, medication or health factor changes affecting INR     Dosing Instructions: Continue your current warfarin dose with next INR in 2 weeks Will recheck at pre op prior to starting his hold.        Summary  As of 1/12/2022    Full warfarin instructions:  1/28: Hold; 1/29: Hold; 1/30: Hold; 1/31: Hold; 2/1: Hold; Otherwise 7.5 mg every Mon; 5 mg all other days   Next INR check:  1/26/2022             Telephone call with Edward who verbalizes understanding and agrees to plan    Check at provider office visit    Education provided: Importance of therapeutic range    Plan made per [facility] anticoagulation protocol    Sussy Lane, RN  Anticoagulation Clinic  1/12/2022    _______________________________________________________________________     Anticoagulation Episode Summary     Current INR goal:  2.0-3.0   TTR:  97.4 % (1 y)   Target end date:  Indefinite   Send INR reminders to:  CHRISTA PHELPS    Indications    History of pulmonary embolism [Z86.711]           Comments:           Anticoagulation Care Providers      Provider Role Specialty Phone number    Ascencion Calvillo MD Referring Internal Medicine [phone]

## 2022-01-18 DIAGNOSIS — Z11.59 ENCOUNTER FOR SCREENING FOR OTHER VIRAL DISEASES: Primary | ICD-10-CM

## 2022-01-24 ENCOUNTER — ANTICOAGULATION THERAPY VISIT (OUTPATIENT)
Dept: ANTICOAGULATION | Facility: CLINIC | Age: 82
End: 2022-01-24

## 2022-01-24 ENCOUNTER — OFFICE VISIT (OUTPATIENT)
Dept: FAMILY MEDICINE | Facility: CLINIC | Age: 82
End: 2022-01-24
Payer: COMMERCIAL

## 2022-01-24 VITALS
HEART RATE: 72 BPM | HEIGHT: 69 IN | BODY MASS INDEX: 24.29 KG/M2 | SYSTOLIC BLOOD PRESSURE: 130 MMHG | DIASTOLIC BLOOD PRESSURE: 72 MMHG | OXYGEN SATURATION: 98 % | WEIGHT: 164 LBS

## 2022-01-24 DIAGNOSIS — Z01.818 PREOP EXAMINATION: Primary | ICD-10-CM

## 2022-01-24 DIAGNOSIS — Z86.711 HISTORY OF PULMONARY EMBOLISM: Primary | ICD-10-CM

## 2022-01-24 DIAGNOSIS — G89.29 CHRONIC RIGHT SHOULDER PAIN: ICD-10-CM

## 2022-01-24 DIAGNOSIS — Z86.711 HISTORY OF PULMONARY EMBOLISM: ICD-10-CM

## 2022-01-24 DIAGNOSIS — M25.511 CHRONIC RIGHT SHOULDER PAIN: ICD-10-CM

## 2022-01-24 PROBLEM — M77.8 RIGHT SHOULDER TENDONITIS: Status: RESOLVED | Noted: 2018-09-14 | Resolved: 2022-01-24

## 2022-01-24 LAB
ANION GAP SERPL CALCULATED.3IONS-SCNC: 15 MMOL/L (ref 5–18)
BUN SERPL-MCNC: 19 MG/DL (ref 8–28)
CALCIUM SERPL-MCNC: 10.7 MG/DL (ref 8.5–10.5)
CHLORIDE BLD-SCNC: 105 MMOL/L (ref 98–107)
CO2 SERPL-SCNC: 24 MMOL/L (ref 22–31)
CREAT SERPL-MCNC: 0.95 MG/DL (ref 0.7–1.3)
ERYTHROCYTE [DISTWIDTH] IN BLOOD BY AUTOMATED COUNT: 12.9 % (ref 10–15)
GFR SERPL CREATININE-BSD FRML MDRD: 79 ML/MIN/1.73M2
GLUCOSE BLD-MCNC: 155 MG/DL (ref 70–125)
HCT VFR BLD AUTO: 49.1 % (ref 40–53)
HGB BLD-MCNC: 16.4 G/DL (ref 13.3–17.7)
INR BLD: 2.3 (ref 0.9–1.1)
MCH RBC QN AUTO: 29.5 PG (ref 26.5–33)
MCHC RBC AUTO-ENTMCNC: 33.4 G/DL (ref 31.5–36.5)
MCV RBC AUTO: 88 FL (ref 78–100)
PLATELET # BLD AUTO: 235 10E3/UL (ref 150–450)
POTASSIUM BLD-SCNC: 4.5 MMOL/L (ref 3.5–5)
RBC # BLD AUTO: 5.56 10E6/UL (ref 4.4–5.9)
SODIUM SERPL-SCNC: 144 MMOL/L (ref 136–145)
WBC # BLD AUTO: 6.3 10E3/UL (ref 4–11)

## 2022-01-24 PROCEDURE — 85027 COMPLETE CBC AUTOMATED: CPT | Performed by: FAMILY MEDICINE

## 2022-01-24 PROCEDURE — 36415 COLL VENOUS BLD VENIPUNCTURE: CPT | Performed by: FAMILY MEDICINE

## 2022-01-24 PROCEDURE — 36416 COLLJ CAPILLARY BLOOD SPEC: CPT | Performed by: FAMILY MEDICINE

## 2022-01-24 PROCEDURE — 99214 OFFICE O/P EST MOD 30 MIN: CPT | Performed by: FAMILY MEDICINE

## 2022-01-24 PROCEDURE — 80048 BASIC METABOLIC PNL TOTAL CA: CPT | Performed by: FAMILY MEDICINE

## 2022-01-24 PROCEDURE — 85610 PROTHROMBIN TIME: CPT | Performed by: FAMILY MEDICINE

## 2022-01-24 ASSESSMENT — MIFFLIN-ST. JEOR: SCORE: 1429.28

## 2022-01-24 NOTE — PROGRESS NOTES
ANTICOAGULATION MANAGEMENT     Edward Mendoza JR 81 year old male is on warfarin with therapeutic INR result. (Goal INR 2.0-3.0)    Recent labs: (last 7 days)     01/24/22  0952   INR 2.3*       ASSESSMENT     Source(s): Chart Review, Patient/Caregiver Call and Template       Warfarin doses taken: Warfarin taken as instructed    Diet: No new diet changes identified    New illness, injury, or hospitalization: No    Medication/supplement changes: None noted    Signs or symptoms of bleeding or clotting: No    Previous INR: Therapeutic last 2(+) visits    Additional findings: Upcoming surgery/procedure total right shoulder 2/1/22. Has been instructed to hold 4 days prior and day of. Rechecking INR 5-7 days after resumption.      PLAN     Recommended plan for no diet, medication or health factor changes affecting INR     Dosing Instructions: Continue your current warfarin dose with next INR in 5-7 days after resumption or as instructed at time of discharge.    Summary  As of 1/24/2022    Full warfarin instructions:  1/28: Hold; 1/29: Hold; 1/30: Hold; 1/31: Hold; 2/1: Hold; Otherwise 7.5 mg every Mon; 5 mg all other days   Next INR check:  2/9/2022             Telephone call with Edward who verbalizes understanding and agrees to plan and who agrees to plan and repeated back plan correctly    Patient offered & declined to schedule next visit    Education provided: Importance of therapeutic range and Importance of following up at instructed interval    Plan made per [facility] anticoagulation protocol    Sussy Lane RN  Anticoagulation Clinic  1/24/2022    _______________________________________________________________________     Anticoagulation Episode Summary     Current INR goal:  2.0-3.0   TTR:  97.4 % (1 y)   Target end date:  Indefinite   Send INR reminders to:  CHRISTA PHELPS    Indications    History of pulmonary embolism [Z86.711]           Comments:           Anticoagulation Care Providers     Provider Role  Specialty Phone number    Ascencion Calvillo MD Referring Internal Medicine [phone]

## 2022-01-24 NOTE — H&P (VIEW-ONLY)
[facility]  [address] [place] [id number]  Phone: [phone]  Fax: [phone]  Primary Provider: Ascencion Calvillo  Pre-op Performing Provider: ERNESTO FLORES        PREOPERATIVE EVALUATION:  Today's date: 1/24/2022    Edward Mendoza JR is a 81 year old male who presents for a preoperative evaluation.    Surgical Information:  Surgery/Procedure: Rt shoulder replacement, Reverse Total  Surgery Location: [facility]  Surgeon: Dr Tobar  Surgery Date: 2/1/22  Time of Surgery: na  Where patient plans to recover: At home with family  Fax number for surgical facility: Note does not need to be faxed, will be available electronically in Epic.    Type of Anesthesia Anticipated: General    Assessment & Plan     The proposed surgical procedure is considered LOW risk.    Preop examination  For right total reverse shoulder arthroplasty  - Basic metabolic panel  - CBC with platelets    Chronic right shoulder pain  Longstanding history of  - Basic metabolic panel  - CBC with platelets    PLAN:  1.  Twelve-lead EKG reviewed, from December 14, 2021, sinus rhythm  2.  CBC and basic metabolic profile reviewed, results normal see below.  3.  Patient's last dose of Coumadin will be on Thursday, January 20.  4.  He will resume Coumadin the day after surgery, and I told him to have his INR checked within a week after that.  5.  Patient will hold his chlorthalidone the morning of surgery.  6.  Patient is otherwise cleared for surgery       Risks and Recommendations:  The patient has the following additional risks and recommendations for perioperative complications:   - No identified additional risk factors other than previously addressed    Medication Instructions:  See under plan    RECOMMENDATION:  APPROVAL GIVEN to proceed with proposed procedure, without further diagnostic evaluation.       Subjective     HPI related to upcoming procedure: Who comes in for preoperative clearance  prior to right reverse total shoulder arthroplasty.  Patient reports about a 10-year history of right shoulder pain, secondary to osteoarthritis and rotator cuff pathology.  Patient has undergone physical therapy and cortisone injections none of which have provided significant or lasting relief.    Patient has had prior surgeries without difficulties or complications.  He reports no other change in his health status.    He does have underlying hypertension well controlled, he has a history of a pulmonary embolus on chronic Coumadin.    No other change in his health status      Preop Questions 1/21/2022   1. Have you ever had a heart attack or stroke? No   2. Have you ever had surgery on your heart or blood vessels, such as a stent placement, a coronary artery bypass, or surgery on an artery in your head, neck, heart, or legs? No   3. Do you have chest pain with activity? No   4. Do you have a history of  heart failure? No   5. Do you currently have a cold, bronchitis or symptoms of other infection? No   6. Do you have a cough, shortness of breath, or wheezing? No   7. Do you or anyone in your family have previous history of blood clots? YES -patient has had a history of a PE on Coumadin   8. Do you or does anyone in your family have a serious bleeding problem such as prolonged bleeding following surgeries or cuts? No   9. Have you ever had problems with anemia or been told to take iron pills? No   10. Have you had any abnormal blood loss such as black, tarry or bloody stools? No   11. Have you ever had a blood transfusion? No   12. Are you willing to have a blood transfusion if it is medically needed before, during, or after your surgery? Yes   13. Have you or any of your relatives ever had problems with anesthesia? No   14. Do you have sleep apnea, excessive snoring or daytime drowsiness? YES -patient has a known diagnosis of sleep apnea   14a. Do you have a CPAP machine? Yes   15. Do you have any artifical heart  valves or other implanted medical devices like a pacemaker, defibrillator, or continuous glucose monitor? No   16. Do you have artificial joints? No   17. Are you allergic to latex? No       Health Care Directive:  Patient does not have a Health Care Directive or Living Will: Advance Directive received and scanned. Click on Code in the patient header to view.    Preoperative Review of :   reviewed - no record of controlled substances prescribed.       Status of Chronic Conditions:  See problem list for active medical problems.  Problems all longstanding and stable, except as noted/documented.  See ROS for pertinent symptoms related to these conditions.      Review of Systems  CONSTITUTIONAL: NEGATIVE for fever, chills, change in weight  INTEGUMENTARY/SKIN: NEGATIVE for worrisome rashes, moles or lesions  EYES: NEGATIVE for vision changes or irritation  ENT/MOUTH: NEGATIVE for ear, mouth and throat problems  RESP: NEGATIVE for significant cough or SOB  CV: NEGATIVE for chest pain, palpitations or peripheral edema  GI: NEGATIVE for nausea, abdominal pain, heartburn, or change in bowel habits  : NEGATIVE for frequency, dysuria, or hematuria  MUSCULOSKELETAL: NEGATIVE for significant arthralgias or myalgia  NEURO: NEGATIVE for weakness, dizziness or paresthesias  ENDOCRINE: NEGATIVE for temperature intolerance, skin/hair changes  HEME: NEGATIVE for bleeding problems  PSYCHIATRIC: NEGATIVE for changes in mood or affect    Patient Active Problem List    Diagnosis Date Noted     History of pulmonary embolism 10/04/2021     Priority: Medium     Chronic right shoulder pain 02/13/2019     Priority: Medium     Lipoma of skin and subcutaneous tissue 02/13/2019     Priority: Medium     Subdural hematoma (H) 02/03/2019     Priority: Medium     Hypertension      Priority: Medium     Started chlorthalidone 4/17 and seems to work well          Right shoulder tendonitis 09/14/2018     Priority: Medium     Osteopenia       Priority: Medium     Created by Conversion  Replacement Utility updated for latest IMO load         Obstructive sleep apnea 04/20/2016     Priority: Medium     Severe, AHI 48, CPAP 7cmH2O, PSG 3/2016         Pulmonary embolism (H) 12/19/2015     Priority: Medium     Bilateral extensive PE 12/19/15  Right DVT 12/19/15         Prostate Cancer      Priority: Medium     Created by Conversion          Past Medical History:   Diagnosis Date     Broken ankle, left, sequela      Hypertension      Prostatitis      Past Surgical History:   Procedure Laterality Date     CATARACT EXTRACTION  12/07/2015     CHOLECYSTECTOMY       HERNIA REPAIR  1987     PROSTATECTOMY N/A     W/ Bilat Pelvic Lymphadenectomy     VITRECTOMY ANTERIOR Left 06/15/2015     [facility]C THORACOTOMY,MAJOR,EXPLOR/BIOPSY  1983    VATs that found lipoma on chest wall, was concern for cancer.      Current Outpatient Medications   Medication Sig Dispense Refill     chlorthalidone (HYGROTEN) 25 MG tablet [CHLORTHALIDONE (HYGROTEN) 25 MG TABLET] Take A 1/2 TABLETS (12.5 MG TOTAL) BY MOUTH DAILY. 45 tablet 3     cholecalciferol, vitamin D3, 1,000 unit tablet [CHOLECALCIFEROL, VITAMIN D3, 1,000 UNIT TABLET] Take 1,000 Units by mouth daily.       fexofenadine (ALLEGRA) 180 MG tablet [FEXOFENADINE (ALLEGRA) 180 MG TABLET] Take 180 mg by mouth daily.       multivitamin therapeutic tablet [MULTIVITAMIN THERAPEUTIC TABLET] Take 1 tablet by mouth daily.       triamcinolone (KENALOG) 0.1 % external cream Apply topically daily as needed for irritation       vit C/E/Zn/coppr/lutein/zeaxan (PRESERVISION AREDS-2 ORAL) Take 1 tablet by mouth 2 times daily        warfarin ANTICOAGULANT (COUMADIN) 5 MG tablet [WARFARIN ANTICOAGULANT (COUMADIN/JANTOVEN) 5 MG TABLET] Take 1 tablet daily (5 mg) by mouth as directed. Adjust dose per INR results. 90 tablet 1     warfarin ANTICOAGULANT (COUMADIN/JANTOVEN) 2.5 MG tablet [WARFARIN ANTICOAGULANT (COUMADIN/JANTOVEN) 2.5 MG TABLET] Take 1 tablet  by mouth on Monday's. Adjust dose based on INR results as directed. (Patient taking differently: Take 2.5 mg by mouth Additional 2.5 mg On ) 30 tablet 1       Allergies   Allergen Reactions     Lisinopril Cough     Ace Inhibitors Cough     Penicillins Unknown     childhood     Chocolate Flavor Other (See Comments)     Pt gets sinus congestion from eating chocolate.     Corn [Corn Oil] Other (See Comments)     Pt gets sinus congestion from eating corn.     Fish Containing Products [Fish-Derived Products] Other (See Comments)     Pt gets sinus congestion from eating fish.     Levofloxacin Rash     Red line up the arm after IV administration     Nuts - Unspecified [Nuts] Other (See Comments)     Pt gets sinus congestion from eating nuts.     Ragweed Pollen [Ragweeds] Other (See Comments)     Sinus and chest congestion.        Social History     Tobacco Use     Smoking status: Former Smoker     Packs/day: 0.50     Years: 5.00     Pack years: 2.50     Quit date: 1960     Years since quittin.1     Smokeless tobacco: Never Used   Substance Use Topics     Alcohol use: No     Family History   Problem Relation Age of Onset     Colon Cancer Father      Lung Cancer Father      Breast Cancer Sister 55     Sleep Apnea Sister      Aortic aneurysm Brother      Dementia Maternal Grandfather      Pneumonia Paternal Grandfather      History   Drug Use No         Objective     There were no vitals taken for this visit.    Physical Exam    GENERAL APPEARANCE: healthy, alert and no distress     EYES: EOMI,  PERRL     HENT: ear canals and TM's normal and nose and mouth without ulcers or lesions     NECK: no adenopathy, no asymmetry, masses, or scars and thyroid normal to palpation     RESP: lungs clear to auscultation - no rales, rhonchi or wheezes     CV: regular rates and rhythm, normal S1 S2, no S3 or S4 and no murmur, click or rub     ABDOMEN:  soft, nontender, no HSM or masses and bowel sounds normal     MS:  extremities normal- no gross deformities noted, no evidence of inflammation in joints, FROM in all extremities.     SKIN: no suspicious lesions or rashes     NEURO: Normal strength and tone, sensory exam grossly normal, mentation intact and speech normal     PSYCH: mentation appears normal. and affect normal/bright     LYMPHATICS: No cervical adenopathy    Recent Labs   Lab Test 01/12/22  0932 12/14/21  1756 09/28/21  0940 08/24/21  1607 04/21/21  0944 03/11/21  0932   HGB  --  15.6  --   --   --  17.1   PLT  --  252  --   --   --  247   INR 2.4* 2.1*   < > 2.5*   < > 2.60*   NA  --  143  --  142  --  142   POTASSIUM  --  4.0  --  3.8  --  3.9   CR  --  1.11  --  1.02  --  0.89    < > = values in this interval not displayed.        Diagnostics:  Recent Results (from the past 48 hour(s))   Basic metabolic panel    Collection Time: 01/24/22  9:37 AM   Result Value Ref Range    Sodium 144 136 - 145 mmol/L    Potassium 4.5 3.5 - 5.0 mmol/L    Chloride 105 98 - 107 mmol/L    Carbon Dioxide (CO2) 24 22 - 31 mmol/L    Anion Gap 15 5 - 18 mmol/L    Urea Nitrogen 19 8 - 28 mg/dL    Creatinine 0.95 0.70 - 1.30 mg/dL    Calcium 10.7 (H) 8.5 - 10.5 mg/dL    Glucose 155 (H) 70 - 125 mg/dL    GFR Estimate 79 >60 mL/min/1.73m2   CBC with platelets    Collection Time: 01/24/22  9:37 AM   Result Value Ref Range    WBC Count 6.3 4.0 - 11.0 10e3/uL    RBC Count 5.56 4.40 - 5.90 10e6/uL    Hemoglobin 16.4 13.3 - 17.7 g/dL    Hematocrit 49.1 40.0 - 53.0 %    MCV 88 78 - 100 fL    MCH 29.5 26.5 - 33.0 pg    MCHC 33.4 31.5 - 36.5 g/dL    RDW 12.9 10.0 - 15.0 %    Platelet Count 235 150 - 450 10e3/uL   INR point of care    Collection Time: 01/24/22  9:52 AM   Result Value Ref Range    INR 2.3 (H) 0.9 - 1.1      EKG: appears normal, NSR, normal axis, normal intervals, no acute ST/T changes c/w ischemia, no LVH by voltage criteria, unchanged from previous tracings    Revised Cardiac Risk Index (RCRI):  The patient has the following serious  cardiovascular risks for perioperative complications:   - No serious cardiac risks = 0 points     RCRI Interpretation: 0 points: Class I (very low risk - 0.4% complication rate)           Signed Electronically by: Everton Kaplan MD  Copy of this evaluation report is provided to requesting physician.

## 2022-01-24 NOTE — LETTER
January 25, 2022      Edward Mendoza JR  [address] [facility] [address]        Dear Mr.Erb WING,    We are writing to inform you of your test results.  Kidney tests are normal, blood counts are normal, no anemia.      Resulted Orders   Basic metabolic panel   Result Value Ref Range    Sodium 144 136 - 145 mmol/L    Potassium 4.5 3.5 - 5.0 mmol/L    Chloride 105 98 - 107 mmol/L    Carbon Dioxide (CO2) 24 22 - 31 mmol/L    Anion Gap 15 5 - 18 mmol/L    Urea Nitrogen 19 8 - 28 mg/dL    Creatinine 0.95 0.70 - 1.30 mg/dL    Calcium 10.7 (H) 8.5 - 10.5 mg/dL    Glucose 155 (H) 70 - 125 mg/dL    GFR Estimate 79 >60 mL/min/1.73m2      Comment:      Effective December 21, 2021 eGFRcr in adults is calculated using the 2021 CKD-EPI creatinine equation which includes age and gender (Cat et al., NE, DOI: 10.1056/TFOUud1783613)   CBC with platelets   Result Value Ref Range    WBC Count 6.3 4.0 - 11.0 10e3/uL    RBC Count 5.56 4.40 - 5.90 10e6/uL    Hemoglobin 16.4 13.3 - 17.7 g/dL    Hematocrit 49.1 40.0 - 53.0 %    MCV 88 78 - 100 fL    MCH 29.5 26.5 - 33.0 pg    MCHC 33.4 31.5 - 36.5 g/dL    RDW 12.9 10.0 - 15.0 %    Platelet Count 235 150 - 450 10e3/uL       If you have any questions or concerns, please call the clinic at the number listed above.       Sincerely,      Everton Kaplan MD

## 2022-01-24 NOTE — PROGRESS NOTES
[facility]  [address] [place] [id number]  Phone: [phone]  Fax: [phone]  Primary Provider: Ascencion Calvillo  Pre-op Performing Provider: ERNESTO FLORES        PREOPERATIVE EVALUATION:  Today's date: 1/24/2022    Edward Mendoza JR is a 81 year old male who presents for a preoperative evaluation.    Surgical Information:  Surgery/Procedure: Rt shoulder replacement, Reverse Total  Surgery Location: [facility]  Surgeon: Dr Tobar  Surgery Date: 2/1/22  Time of Surgery: na  Where patient plans to recover: At home with family  Fax number for surgical facility: Note does not need to be faxed, will be available electronically in Epic.    Type of Anesthesia Anticipated: General    Assessment & Plan     The proposed surgical procedure is considered LOW risk.    Preop examination  For right total reverse shoulder arthroplasty  - Basic metabolic panel  - CBC with platelets    Chronic right shoulder pain  Longstanding history of  - Basic metabolic panel  - CBC with platelets    PLAN:  1.  Twelve-lead EKG reviewed, from December 14, 2021, sinus rhythm  2.  CBC and basic metabolic profile reviewed, results normal see below.  3.  Patient's last dose of Coumadin will be on Thursday, January 20.  4.  He will resume Coumadin the day after surgery, and I told him to have his INR checked within a week after that.  5.  Patient will hold his chlorthalidone the morning of surgery.  6.  Patient is otherwise cleared for surgery       Risks and Recommendations:  The patient has the following additional risks and recommendations for perioperative complications:   - No identified additional risk factors other than previously addressed    Medication Instructions:  See under plan    RECOMMENDATION:  APPROVAL GIVEN to proceed with proposed procedure, without further diagnostic evaluation.       Subjective     HPI related to upcoming procedure: Who comes in for preoperative clearance  prior to right reverse total shoulder arthroplasty.  Patient reports about a 10-year history of right shoulder pain, secondary to osteoarthritis and rotator cuff pathology.  Patient has undergone physical therapy and cortisone injections none of which have provided significant or lasting relief.    Patient has had prior surgeries without difficulties or complications.  He reports no other change in his health status.    He does have underlying hypertension well controlled, he has a history of a pulmonary embolus on chronic Coumadin.    No other change in his health status      Preop Questions 1/21/2022   1. Have you ever had a heart attack or stroke? No   2. Have you ever had surgery on your heart or blood vessels, such as a stent placement, a coronary artery bypass, or surgery on an artery in your head, neck, heart, or legs? No   3. Do you have chest pain with activity? No   4. Do you have a history of  heart failure? No   5. Do you currently have a cold, bronchitis or symptoms of other infection? No   6. Do you have a cough, shortness of breath, or wheezing? No   7. Do you or anyone in your family have previous history of blood clots? YES -patient has had a history of a PE on Coumadin   8. Do you or does anyone in your family have a serious bleeding problem such as prolonged bleeding following surgeries or cuts? No   9. Have you ever had problems with anemia or been told to take iron pills? No   10. Have you had any abnormal blood loss such as black, tarry or bloody stools? No   11. Have you ever had a blood transfusion? No   12. Are you willing to have a blood transfusion if it is medically needed before, during, or after your surgery? Yes   13. Have you or any of your relatives ever had problems with anesthesia? No   14. Do you have sleep apnea, excessive snoring or daytime drowsiness? YES -patient has a known diagnosis of sleep apnea   14a. Do you have a CPAP machine? Yes   15. Do you have any artifical heart  valves or other implanted medical devices like a pacemaker, defibrillator, or continuous glucose monitor? No   16. Do you have artificial joints? No   17. Are you allergic to latex? No       Health Care Directive:  Patient does not have a Health Care Directive or Living Will: Advance Directive received and scanned. Click on Code in the patient header to view.    Preoperative Review of :   reviewed - no record of controlled substances prescribed.       Status of Chronic Conditions:  See problem list for active medical problems.  Problems all longstanding and stable, except as noted/documented.  See ROS for pertinent symptoms related to these conditions.      Review of Systems  CONSTITUTIONAL: NEGATIVE for fever, chills, change in weight  INTEGUMENTARY/SKIN: NEGATIVE for worrisome rashes, moles or lesions  EYES: NEGATIVE for vision changes or irritation  ENT/MOUTH: NEGATIVE for ear, mouth and throat problems  RESP: NEGATIVE for significant cough or SOB  CV: NEGATIVE for chest pain, palpitations or peripheral edema  GI: NEGATIVE for nausea, abdominal pain, heartburn, or change in bowel habits  : NEGATIVE for frequency, dysuria, or hematuria  MUSCULOSKELETAL: NEGATIVE for significant arthralgias or myalgia  NEURO: NEGATIVE for weakness, dizziness or paresthesias  ENDOCRINE: NEGATIVE for temperature intolerance, skin/hair changes  HEME: NEGATIVE for bleeding problems  PSYCHIATRIC: NEGATIVE for changes in mood or affect    Patient Active Problem List    Diagnosis Date Noted     History of pulmonary embolism 10/04/2021     Priority: Medium     Chronic right shoulder pain 02/13/2019     Priority: Medium     Lipoma of skin and subcutaneous tissue 02/13/2019     Priority: Medium     Subdural hematoma (H) 02/03/2019     Priority: Medium     Hypertension      Priority: Medium     Started chlorthalidone 4/17 and seems to work well          Right shoulder tendonitis 09/14/2018     Priority: Medium     Osteopenia       Priority: Medium     Created by Conversion  Replacement Utility updated for latest IMO load         Obstructive sleep apnea 04/20/2016     Priority: Medium     Severe, AHI 48, CPAP 7cmH2O, PSG 3/2016         Pulmonary embolism (H) 12/19/2015     Priority: Medium     Bilateral extensive PE 12/19/15  Right DVT 12/19/15         Prostate Cancer      Priority: Medium     Created by Conversion          Past Medical History:   Diagnosis Date     Broken ankle, left, sequela      Hypertension      Prostatitis      Past Surgical History:   Procedure Laterality Date     CATARACT EXTRACTION  12/07/2015     CHOLECYSTECTOMY       HERNIA REPAIR  1987     PROSTATECTOMY N/A     W/ Bilat Pelvic Lymphadenectomy     VITRECTOMY ANTERIOR Left 06/15/2015     ZC THORACOTOMY,MAJOR,EXPLOR/BIOPSY  1983    VATs that found lipoma on chest wall, was concern for cancer.      Current Outpatient Medications   Medication Sig Dispense Refill     chlorthalidone (HYGROTEN) 25 MG tablet [CHLORTHALIDONE (HYGROTEN) 25 MG TABLET] Take A 1/2 TABLETS (12.5 MG TOTAL) BY MOUTH DAILY. 45 tablet 3     cholecalciferol, vitamin D3, 1,000 unit tablet [CHOLECALCIFEROL, VITAMIN D3, 1,000 UNIT TABLET] Take 1,000 Units by mouth daily.       fexofenadine (ALLEGRA) 180 MG tablet [FEXOFENADINE (ALLEGRA) 180 MG TABLET] Take 180 mg by mouth daily.       multivitamin therapeutic tablet [MULTIVITAMIN THERAPEUTIC TABLET] Take 1 tablet by mouth daily.       triamcinolone (KENALOG) 0.1 % external cream Apply topically daily as needed for irritation       vit C/E/Zn/coppr/lutein/zeaxan (PRESERVISION AREDS-2 ORAL) Take 1 tablet by mouth 2 times daily        warfarin ANTICOAGULANT (COUMADIN) 5 MG tablet [WARFARIN ANTICOAGULANT (COUMADIN/JANTOVEN) 5 MG TABLET] Take 1 tablet daily (5 mg) by mouth as directed. Adjust dose per INR results. 90 tablet 1     warfarin ANTICOAGULANT (COUMADIN/JANTOVEN) 2.5 MG tablet [WARFARIN ANTICOAGULANT (COUMADIN/JANTOVEN) 2.5 MG TABLET] Take 1 tablet  by mouth on Monday's. Adjust dose based on INR results as directed. (Patient taking differently: Take 2.5 mg by mouth Additional 2.5 mg On ) 30 tablet 1       Allergies   Allergen Reactions     Lisinopril Cough     Ace Inhibitors Cough     Penicillins Unknown     childhood     Chocolate Flavor Other (See Comments)     Pt gets sinus congestion from eating chocolate.     Corn [Corn Oil] Other (See Comments)     Pt gets sinus congestion from eating corn.     Fish Containing Products [Fish-Derived Products] Other (See Comments)     Pt gets sinus congestion from eating fish.     Levofloxacin Rash     Red line up the arm after IV administration     Nuts - Unspecified [Nuts] Other (See Comments)     Pt gets sinus congestion from eating nuts.     Ragweed Pollen [Ragweeds] Other (See Comments)     Sinus and chest congestion.        Social History     Tobacco Use     Smoking status: Former Smoker     Packs/day: 0.50     Years: 5.00     Pack years: 2.50     Quit date: 1960     Years since quittin.1     Smokeless tobacco: Never Used   Substance Use Topics     Alcohol use: No     Family History   Problem Relation Age of Onset     Colon Cancer Father      Lung Cancer Father      Breast Cancer Sister 55     Sleep Apnea Sister      Aortic aneurysm Brother      Dementia Maternal Grandfather      Pneumonia Paternal Grandfather      History   Drug Use No         Objective     There were no vitals taken for this visit.    Physical Exam    GENERAL APPEARANCE: healthy, alert and no distress     EYES: EOMI,  PERRL     HENT: ear canals and TM's normal and nose and mouth without ulcers or lesions     NECK: no adenopathy, no asymmetry, masses, or scars and thyroid normal to palpation     RESP: lungs clear to auscultation - no rales, rhonchi or wheezes     CV: regular rates and rhythm, normal S1 S2, no S3 or S4 and no murmur, click or rub     ABDOMEN:  soft, nontender, no HSM or masses and bowel sounds normal     MS:  extremities normal- no gross deformities noted, no evidence of inflammation in joints, FROM in all extremities.     SKIN: no suspicious lesions or rashes     NEURO: Normal strength and tone, sensory exam grossly normal, mentation intact and speech normal     PSYCH: mentation appears normal. and affect normal/bright     LYMPHATICS: No cervical adenopathy    Recent Labs   Lab Test 01/12/22  0932 12/14/21  1756 09/28/21  0940 08/24/21  1607 04/21/21  0944 03/11/21  0932   HGB  --  15.6  --   --   --  17.1   PLT  --  252  --   --   --  247   INR 2.4* 2.1*   < > 2.5*   < > 2.60*   NA  --  143  --  142  --  142   POTASSIUM  --  4.0  --  3.8  --  3.9   CR  --  1.11  --  1.02  --  0.89    < > = values in this interval not displayed.        Diagnostics:  Recent Results (from the past 48 hour(s))   Basic metabolic panel    Collection Time: 01/24/22  9:37 AM   Result Value Ref Range    Sodium 144 136 - 145 mmol/L    Potassium 4.5 3.5 - 5.0 mmol/L    Chloride 105 98 - 107 mmol/L    Carbon Dioxide (CO2) 24 22 - 31 mmol/L    Anion Gap 15 5 - 18 mmol/L    Urea Nitrogen 19 8 - 28 mg/dL    Creatinine 0.95 0.70 - 1.30 mg/dL    Calcium 10.7 (H) 8.5 - 10.5 mg/dL    Glucose 155 (H) 70 - 125 mg/dL    GFR Estimate 79 >60 mL/min/1.73m2   CBC with platelets    Collection Time: 01/24/22  9:37 AM   Result Value Ref Range    WBC Count 6.3 4.0 - 11.0 10e3/uL    RBC Count 5.56 4.40 - 5.90 10e6/uL    Hemoglobin 16.4 13.3 - 17.7 g/dL    Hematocrit 49.1 40.0 - 53.0 %    MCV 88 78 - 100 fL    MCH 29.5 26.5 - 33.0 pg    MCHC 33.4 31.5 - 36.5 g/dL    RDW 12.9 10.0 - 15.0 %    Platelet Count 235 150 - 450 10e3/uL   INR point of care    Collection Time: 01/24/22  9:52 AM   Result Value Ref Range    INR 2.3 (H) 0.9 - 1.1      EKG: appears normal, NSR, normal axis, normal intervals, no acute ST/T changes c/w ischemia, no LVH by voltage criteria, unchanged from previous tracings    Revised Cardiac Risk Index (RCRI):  The patient has the following serious  cardiovascular risks for perioperative complications:   - No serious cardiac risks = 0 points     RCRI Interpretation: 0 points: Class I (very low risk - 0.4% complication rate)           Signed Electronically by: Everton Kaplan MD  Copy of this evaluation report is provided to requesting physician.

## 2022-01-28 ENCOUNTER — LAB (OUTPATIENT)
Dept: LAB | Facility: CLINIC | Age: 82
End: 2022-01-28
Attending: ORTHOPAEDIC SURGERY
Payer: COMMERCIAL

## 2022-01-28 DIAGNOSIS — Z11.59 ENCOUNTER FOR SCREENING FOR OTHER VIRAL DISEASES: ICD-10-CM

## 2022-01-28 PROCEDURE — U0003 INFECTIOUS AGENT DETECTION BY NUCLEIC ACID (DNA OR RNA); SEVERE ACUTE RESPIRATORY SYNDROME CORONAVIRUS 2 (SARS-COV-2) (CORONAVIRUS DISEASE [COVID-19]), AMPLIFIED PROBE TECHNIQUE, MAKING USE OF HIGH THROUGHPUT TECHNOLOGIES AS DESCRIBED BY CMS-2020-01-R: HCPCS

## 2022-01-28 PROCEDURE — U0005 INFEC AGEN DETEC AMPLI PROBE: HCPCS

## 2022-01-29 LAB — SARS-COV-2 RNA RESP QL NAA+PROBE: NEGATIVE

## 2022-01-30 NOTE — PHARMACY-ADMISSION MEDICATION HISTORY
Medication reconciliation interview completed by pre-admitting nurse     Last Reviewed by Kellee Schultz RN on 1/27/2022 at 12:34 PM    Reviewed by pharmacy. No further clarifications needed.       Prior to Admission medications    Medication Sig Last Dose Taking? Auth Provider   chlorthalidone (HYGROTEN) 25 MG tablet [CHLORTHALIDONE (HYGROTEN) 25 MG TABLET] Take A 1/2 TABLETS (12.5 MG TOTAL) BY MOUTH DAILY.  Yes Ascencion Calvillo MD   cholecalciferol, vitamin D3, 1,000 unit tablet [CHOLECALCIFEROL, VITAMIN D3, 1,000 UNIT TABLET] Take 1,000 Units by mouth daily.  Yes Provider, Historical   fexofenadine (ALLEGRA) 180 MG tablet [FEXOFENADINE (ALLEGRA) 180 MG TABLET] Take 180 mg by mouth daily.  Yes Provider, Historical   multivitamin therapeutic tablet [MULTIVITAMIN THERAPEUTIC TABLET] Take 1 tablet by mouth daily.  Yes Provider, Historical   triamcinolone (KENALOG) 0.1 % external cream Apply topically daily as needed for irritation  Yes Reported, Patient   vit C/E/Zn/coppr/lutein/zeaxan (PRESERVISION AREDS-2 ORAL) Take 1 tablet by mouth 2 times daily   Yes Provider, Historical   warfarin ANTICOAGULANT (COUMADIN) 5 MG tablet [WARFARIN ANTICOAGULANT (COUMADIN/JANTOVEN) 5 MG TABLET] Take 1 tablet daily (5 mg) by mouth as directed. Adjust dose per INR results.  Yes Ascencion Calvillo MD   warfarin ANTICOAGULANT (COUMADIN/JANTOVEN) 2.5 MG tablet [WARFARIN ANTICOAGULANT (COUMADIN/JANTOVEN) 2.5 MG TABLET] Take 1 tablet by mouth on Monday's. Adjust dose based on INR results as directed.  Patient taking differently: Take 2.5 mg by mouth Additional 2.5 mg On Mondays  Takes 5 mg daily, on Monday takes 7.5 mg  Yes Ascencion Calvillo MD

## 2022-01-31 DIAGNOSIS — I10 BENIGN ESSENTIAL HYPERTENSION: ICD-10-CM

## 2022-01-31 NOTE — TELEPHONE ENCOUNTER
Refill Request  Medication name: Pending Prescriptions:                       Disp   Refills    chlorthalidone (HYGROTON) 25 MG tablet    45 tab*3          Who prescribed the medication: Rajinder  Last refill on medication: 01/02/22  Requested Pharmacy: [facility]  Last appointment with PCP: 12/14/21  Next appointment: Appointment scheduled for 03/15/22

## 2022-02-01 ENCOUNTER — APPOINTMENT (OUTPATIENT)
Dept: GENERAL RADIOLOGY | Facility: CLINIC | Age: 82
End: 2022-02-01
Attending: ORTHOPAEDIC SURGERY
Payer: COMMERCIAL

## 2022-02-01 ENCOUNTER — ANESTHESIA (OUTPATIENT)
Dept: SURGERY | Facility: CLINIC | Age: 82
End: 2022-02-01
Payer: COMMERCIAL

## 2022-02-01 ENCOUNTER — ANESTHESIA EVENT (OUTPATIENT)
Dept: SURGERY | Facility: CLINIC | Age: 82
End: 2022-02-01
Payer: COMMERCIAL

## 2022-02-01 ENCOUNTER — HOSPITAL ENCOUNTER (OUTPATIENT)
Facility: CLINIC | Age: 82
Discharge: HOME | End: 2022-02-02
Attending: ORTHOPAEDIC SURGERY | Admitting: ORTHOPAEDIC SURGERY
Payer: COMMERCIAL

## 2022-02-01 DIAGNOSIS — M19.011 ARTHRITIS OF RIGHT SHOULDER REGION: Primary | ICD-10-CM

## 2022-02-01 LAB
ABO/RH(D): NORMAL
ANTIBODY SCREEN: NEGATIVE
INR PPP: 1.02 (ref 0.85–1.15)
SPECIMEN EXPIRATION DATE: NORMAL

## 2022-02-01 PROCEDURE — 250N000011 HC RX IP 250 OP 636: Performed by: ORTHOPAEDIC SURGERY

## 2022-02-01 PROCEDURE — 250N000009 HC RX 250: Performed by: ORTHOPAEDIC SURGERY

## 2022-02-01 PROCEDURE — 85610 PROTHROMBIN TIME: CPT | Performed by: ANESTHESIOLOGY

## 2022-02-01 PROCEDURE — 271N000001 HC OR GENERAL SUPPLY NON-STERILE: Performed by: ORTHOPAEDIC SURGERY

## 2022-02-01 PROCEDURE — 86850 RBC ANTIBODY SCREEN: CPT | Performed by: ANESTHESIOLOGY

## 2022-02-01 PROCEDURE — 250N000011 HC RX IP 250 OP 636: Performed by: NURSE ANESTHETIST, CERTIFIED REGISTERED

## 2022-02-01 PROCEDURE — 250N000013 HC RX MED GY IP 250 OP 250 PS 637: Performed by: ORTHOPAEDIC SURGERY

## 2022-02-01 PROCEDURE — 258N000003 HC RX IP 258 OP 636: Performed by: ANESTHESIOLOGY

## 2022-02-01 PROCEDURE — 86901 BLOOD TYPING SEROLOGIC RH(D): CPT | Performed by: ANESTHESIOLOGY

## 2022-02-01 PROCEDURE — 36415 COLL VENOUS BLD VENIPUNCTURE: CPT | Performed by: ANESTHESIOLOGY

## 2022-02-01 PROCEDURE — 250N000011 HC RX IP 250 OP 636: Performed by: PHYSICIAN ASSISTANT

## 2022-02-01 PROCEDURE — 710N000009 HC RECOVERY PHASE 1, LEVEL 1, PER MIN: Performed by: ORTHOPAEDIC SURGERY

## 2022-02-01 PROCEDURE — 250N000011 HC RX IP 250 OP 636: Performed by: ANESTHESIOLOGY

## 2022-02-01 PROCEDURE — 250N000009 HC RX 250: Performed by: NURSE ANESTHETIST, CERTIFIED REGISTERED

## 2022-02-01 PROCEDURE — 250N000013 HC RX MED GY IP 250 OP 250 PS 637: Performed by: PHYSICIAN ASSISTANT

## 2022-02-01 PROCEDURE — 999N000141 HC STATISTIC PRE-PROCEDURE NURSING ASSESSMENT: Performed by: ORTHOPAEDIC SURGERY

## 2022-02-01 PROCEDURE — 258N000003 HC RX IP 258 OP 636: Performed by: ORTHOPAEDIC SURGERY

## 2022-02-01 PROCEDURE — 258N000001 HC RX 258: Performed by: ORTHOPAEDIC SURGERY

## 2022-02-01 PROCEDURE — 360N000084 HC SURGERY LEVEL 4 W/ FLUORO, PER MIN: Performed by: ORTHOPAEDIC SURGERY

## 2022-02-01 PROCEDURE — 258N000003 HC RX IP 258 OP 636: Performed by: NURSE ANESTHETIST, CERTIFIED REGISTERED

## 2022-02-01 PROCEDURE — 370N000017 HC ANESTHESIA TECHNICAL FEE, PER MIN: Performed by: ORTHOPAEDIC SURGERY

## 2022-02-01 PROCEDURE — 272N000001 HC OR GENERAL SUPPLY STERILE: Performed by: ORTHOPAEDIC SURGERY

## 2022-02-01 PROCEDURE — C1713 ANCHOR/SCREW BN/BN,TIS/BN: HCPCS | Performed by: ORTHOPAEDIC SURGERY

## 2022-02-01 PROCEDURE — 999N000179 XR SURGERY CARM FLUORO LESS THAN 5 MIN W STILLS: Mod: TC

## 2022-02-01 PROCEDURE — C1776 JOINT DEVICE (IMPLANTABLE): HCPCS | Performed by: ORTHOPAEDIC SURGERY

## 2022-02-01 DEVICE — IMPLANTABLE DEVICE: Type: IMPLANTABLE DEVICE | Site: SHOULDER | Status: FUNCTIONAL

## 2022-02-01 RX ORDER — LIDOCAINE HYDROCHLORIDE 10 MG/ML
INJECTION, SOLUTION INFILTRATION; PERINEURAL PRN
Status: DISCONTINUED | OUTPATIENT
Start: 2022-02-01 | End: 2022-02-01

## 2022-02-01 RX ORDER — HYDROMORPHONE HCL IN WATER/PF 6 MG/30 ML
0.4 PATIENT CONTROLLED ANALGESIA SYRINGE INTRAVENOUS EVERY 5 MIN PRN
Status: DISCONTINUED | OUTPATIENT
Start: 2022-02-01 | End: 2022-02-01 | Stop reason: HOSPADM

## 2022-02-01 RX ORDER — PROPOFOL 10 MG/ML
INJECTION, EMULSION INTRAVENOUS PRN
Status: DISCONTINUED | OUTPATIENT
Start: 2022-02-01 | End: 2022-02-01

## 2022-02-01 RX ORDER — PHENYLEPHRINE HYDROCHLORIDE 10 MG/ML
INJECTION INTRAVENOUS PRN
Status: DISCONTINUED | OUTPATIENT
Start: 2022-02-01 | End: 2022-02-01

## 2022-02-01 RX ORDER — DEXAMETHASONE SODIUM PHOSPHATE 4 MG/ML
INJECTION, SOLUTION INTRA-ARTICULAR; INTRALESIONAL; INTRAMUSCULAR; INTRAVENOUS; SOFT TISSUE PRN
Status: DISCONTINUED | OUTPATIENT
Start: 2022-02-01 | End: 2022-02-01

## 2022-02-01 RX ORDER — TRAMADOL HYDROCHLORIDE 50 MG/1
50 TABLET ORAL EVERY 6 HOURS PRN
Status: DISCONTINUED | OUTPATIENT
Start: 2022-02-01 | End: 2022-02-02 | Stop reason: HOSPADM

## 2022-02-01 RX ORDER — ACETAMINOPHEN 325 MG/1
975 TABLET ORAL EVERY 8 HOURS
Status: DISCONTINUED | OUTPATIENT
Start: 2022-02-01 | End: 2022-02-02 | Stop reason: HOSPADM

## 2022-02-01 RX ORDER — NALOXONE HYDROCHLORIDE 0.4 MG/ML
0.4 INJECTION, SOLUTION INTRAMUSCULAR; INTRAVENOUS; SUBCUTANEOUS
Status: DISCONTINUED | OUTPATIENT
Start: 2022-02-01 | End: 2022-02-02 | Stop reason: HOSPADM

## 2022-02-01 RX ORDER — LIDOCAINE 40 MG/G
CREAM TOPICAL
Status: DISCONTINUED | OUTPATIENT
Start: 2022-02-01 | End: 2022-02-02 | Stop reason: HOSPADM

## 2022-02-01 RX ORDER — SODIUM CHLORIDE, SODIUM LACTATE, POTASSIUM CHLORIDE, CALCIUM CHLORIDE 600; 310; 30; 20 MG/100ML; MG/100ML; MG/100ML; MG/100ML
INJECTION, SOLUTION INTRAVENOUS CONTINUOUS
Status: DISCONTINUED | OUTPATIENT
Start: 2022-02-01 | End: 2022-02-01 | Stop reason: HOSPADM

## 2022-02-01 RX ORDER — BISACODYL 10 MG
10 SUPPOSITORY, RECTAL RECTAL DAILY PRN
Status: DISCONTINUED | OUTPATIENT
Start: 2022-02-01 | End: 2022-02-02 | Stop reason: HOSPADM

## 2022-02-01 RX ORDER — WARFARIN SODIUM 5 MG/1
5 TABLET ORAL
Status: DISCONTINUED | OUTPATIENT
Start: 2022-02-01 | End: 2022-02-01

## 2022-02-01 RX ORDER — CEFAZOLIN SODIUM/WATER 2 G/20 ML
2 SYRINGE (ML) INTRAVENOUS
Status: COMPLETED | OUTPATIENT
Start: 2022-02-01 | End: 2022-02-01

## 2022-02-01 RX ORDER — CEFAZOLIN SODIUM/WATER 2 G/20 ML
2 SYRINGE (ML) INTRAVENOUS SEE ADMIN INSTRUCTIONS
Status: DISCONTINUED | OUTPATIENT
Start: 2022-02-01 | End: 2022-02-01 | Stop reason: HOSPADM

## 2022-02-01 RX ORDER — SODIUM CHLORIDE, SODIUM LACTATE, POTASSIUM CHLORIDE, CALCIUM CHLORIDE 600; 310; 30; 20 MG/100ML; MG/100ML; MG/100ML; MG/100ML
INJECTION, SOLUTION INTRAVENOUS CONTINUOUS
Status: DISCONTINUED | OUTPATIENT
Start: 2022-02-01 | End: 2022-02-02 | Stop reason: HOSPADM

## 2022-02-01 RX ORDER — NEOSTIGMINE METHYLSULFATE 1 MG/ML
VIAL (ML) INJECTION PRN
Status: DISCONTINUED | OUTPATIENT
Start: 2022-02-01 | End: 2022-02-01

## 2022-02-01 RX ORDER — FENTANYL CITRATE 50 UG/ML
50 INJECTION, SOLUTION INTRAMUSCULAR; INTRAVENOUS EVERY 5 MIN PRN
Status: DISCONTINUED | OUTPATIENT
Start: 2022-02-01 | End: 2022-02-01 | Stop reason: HOSPADM

## 2022-02-01 RX ORDER — HYDROMORPHONE HCL IN WATER/PF 6 MG/30 ML
0.2 PATIENT CONTROLLED ANALGESIA SYRINGE INTRAVENOUS
Status: DISCONTINUED | OUTPATIENT
Start: 2022-02-01 | End: 2022-02-02 | Stop reason: HOSPADM

## 2022-02-01 RX ORDER — VANCOMYCIN HYDROCHLORIDE 1 G/20ML
INJECTION, POWDER, LYOPHILIZED, FOR SOLUTION INTRAVENOUS PRN
Status: DISCONTINUED | OUTPATIENT
Start: 2022-02-01 | End: 2022-02-01 | Stop reason: HOSPADM

## 2022-02-01 RX ORDER — CEFAZOLIN SODIUM 1 G/50ML
1 INJECTION, SOLUTION INTRAVENOUS EVERY 8 HOURS
Status: COMPLETED | OUTPATIENT
Start: 2022-02-01 | End: 2022-02-02

## 2022-02-01 RX ORDER — ACETAMINOPHEN 325 MG/1
650 TABLET ORAL EVERY 4 HOURS PRN
Status: DISCONTINUED | OUTPATIENT
Start: 2022-02-04 | End: 2022-02-02 | Stop reason: HOSPADM

## 2022-02-01 RX ORDER — CHLORTHALIDONE 25 MG/1
TABLET ORAL
Qty: 45 TABLET | Refills: 3 | Status: SHIPPED | OUTPATIENT
Start: 2022-02-01 | End: 2023-02-01

## 2022-02-01 RX ORDER — AMOXICILLIN 250 MG
1 CAPSULE ORAL 2 TIMES DAILY
Status: DISCONTINUED | OUTPATIENT
Start: 2022-02-01 | End: 2022-02-02 | Stop reason: HOSPADM

## 2022-02-01 RX ORDER — METOPROLOL TARTRATE 1 MG/ML
1-2 INJECTION, SOLUTION INTRAVENOUS EVERY 5 MIN PRN
Status: DISCONTINUED | OUTPATIENT
Start: 2022-02-01 | End: 2022-02-01 | Stop reason: HOSPADM

## 2022-02-01 RX ORDER — ACETAMINOPHEN 325 MG/1
975 TABLET ORAL ONCE
Status: COMPLETED | OUTPATIENT
Start: 2022-02-01 | End: 2022-02-01

## 2022-02-01 RX ORDER — TRANEXAMIC ACID 650 MG/1
1950 TABLET ORAL ONCE
Status: COMPLETED | OUTPATIENT
Start: 2022-02-01 | End: 2022-02-01

## 2022-02-01 RX ORDER — TRAMADOL HYDROCHLORIDE 50 MG/1
50 TABLET ORAL EVERY 6 HOURS PRN
Qty: 30 TABLET | Refills: 0 | Status: SHIPPED | OUTPATIENT
Start: 2022-02-01 | End: 2022-04-14

## 2022-02-01 RX ORDER — GLYCOPYRROLATE 0.2 MG/ML
INJECTION, SOLUTION INTRAMUSCULAR; INTRAVENOUS PRN
Status: DISCONTINUED | OUTPATIENT
Start: 2022-02-01 | End: 2022-02-01

## 2022-02-01 RX ORDER — NALOXONE HYDROCHLORIDE 0.4 MG/ML
0.2 INJECTION, SOLUTION INTRAMUSCULAR; INTRAVENOUS; SUBCUTANEOUS
Status: DISCONTINUED | OUTPATIENT
Start: 2022-02-01 | End: 2022-02-02 | Stop reason: HOSPADM

## 2022-02-01 RX ORDER — HYDROXYZINE HYDROCHLORIDE 10 MG/1
10 TABLET, FILM COATED ORAL EVERY 6 HOURS PRN
Status: DISCONTINUED | OUTPATIENT
Start: 2022-02-01 | End: 2022-02-02 | Stop reason: HOSPADM

## 2022-02-01 RX ORDER — HYDROXYZINE HYDROCHLORIDE 10 MG/1
10 TABLET, FILM COATED ORAL EVERY 6 HOURS PRN
Qty: 30 TABLET | Refills: 0 | Status: SHIPPED | OUTPATIENT
Start: 2022-02-01 | End: 2022-04-14

## 2022-02-01 RX ORDER — HYDROMORPHONE HCL IN WATER/PF 6 MG/30 ML
0.4 PATIENT CONTROLLED ANALGESIA SYRINGE INTRAVENOUS
Status: DISCONTINUED | OUTPATIENT
Start: 2022-02-01 | End: 2022-02-02 | Stop reason: HOSPADM

## 2022-02-01 RX ORDER — MAGNESIUM HYDROXIDE 1200 MG/15ML
LIQUID ORAL PRN
Status: DISCONTINUED | OUTPATIENT
Start: 2022-02-01 | End: 2022-02-01 | Stop reason: HOSPADM

## 2022-02-01 RX ORDER — LIDOCAINE 40 MG/G
CREAM TOPICAL
Status: DISCONTINUED | OUTPATIENT
Start: 2022-02-01 | End: 2022-02-01 | Stop reason: HOSPADM

## 2022-02-01 RX ORDER — OXYCODONE HYDROCHLORIDE 5 MG/1
5 TABLET ORAL EVERY 4 HOURS PRN
Status: DISCONTINUED | OUTPATIENT
Start: 2022-02-01 | End: 2022-02-01 | Stop reason: HOSPADM

## 2022-02-01 RX ORDER — POLYETHYLENE GLYCOL 3350 17 G/17G
17 POWDER, FOR SOLUTION ORAL DAILY
Status: DISCONTINUED | OUTPATIENT
Start: 2022-02-02 | End: 2022-02-02 | Stop reason: HOSPADM

## 2022-02-01 RX ORDER — EPHEDRINE SULFATE 50 MG/ML
INJECTION, SOLUTION INTRAVENOUS PRN
Status: DISCONTINUED | OUTPATIENT
Start: 2022-02-01 | End: 2022-02-01

## 2022-02-01 RX ORDER — FENTANYL CITRATE 50 UG/ML
INJECTION, SOLUTION INTRAMUSCULAR; INTRAVENOUS PRN
Status: DISCONTINUED | OUTPATIENT
Start: 2022-02-01 | End: 2022-02-01

## 2022-02-01 RX ORDER — ONDANSETRON 4 MG/1
4 TABLET, ORALLY DISINTEGRATING ORAL EVERY 6 HOURS PRN
Status: DISCONTINUED | OUTPATIENT
Start: 2022-02-01 | End: 2022-02-02 | Stop reason: HOSPADM

## 2022-02-01 RX ORDER — FENTANYL CITRATE 50 UG/ML
50 INJECTION, SOLUTION INTRAMUSCULAR; INTRAVENOUS
Status: DISCONTINUED | OUTPATIENT
Start: 2022-02-01 | End: 2022-02-01 | Stop reason: HOSPADM

## 2022-02-01 RX ORDER — MEPERIDINE HYDROCHLORIDE 25 MG/ML
12.5 INJECTION INTRAMUSCULAR; INTRAVENOUS; SUBCUTANEOUS
Status: DISCONTINUED | OUTPATIENT
Start: 2022-02-01 | End: 2022-02-01 | Stop reason: HOSPADM

## 2022-02-01 RX ORDER — AMOXICILLIN 250 MG
1-2 CAPSULE ORAL 2 TIMES DAILY
Qty: 30 TABLET | Refills: 0 | Status: SHIPPED | OUTPATIENT
Start: 2022-02-01 | End: 2022-04-14

## 2022-02-01 RX ORDER — ACETAMINOPHEN 160 MG
TABLET,DISINTEGRATING ORAL PRN
Status: DISCONTINUED | OUTPATIENT
Start: 2022-02-01 | End: 2022-02-01 | Stop reason: HOSPADM

## 2022-02-01 RX ORDER — ONDANSETRON 2 MG/ML
INJECTION INTRAMUSCULAR; INTRAVENOUS PRN
Status: DISCONTINUED | OUTPATIENT
Start: 2022-02-01 | End: 2022-02-01

## 2022-02-01 RX ORDER — ONDANSETRON 2 MG/ML
4 INJECTION INTRAMUSCULAR; INTRAVENOUS EVERY 6 HOURS PRN
Status: DISCONTINUED | OUTPATIENT
Start: 2022-02-01 | End: 2022-02-02 | Stop reason: HOSPADM

## 2022-02-01 RX ORDER — HYDRALAZINE HYDROCHLORIDE 20 MG/ML
2.5-5 INJECTION INTRAMUSCULAR; INTRAVENOUS EVERY 10 MIN PRN
Status: DISCONTINUED | OUTPATIENT
Start: 2022-02-01 | End: 2022-02-01 | Stop reason: HOSPADM

## 2022-02-01 RX ORDER — ONDANSETRON 4 MG/1
4 TABLET, ORALLY DISINTEGRATING ORAL EVERY 30 MIN PRN
Status: DISCONTINUED | OUTPATIENT
Start: 2022-02-01 | End: 2022-02-01 | Stop reason: HOSPADM

## 2022-02-01 RX ORDER — PROCHLORPERAZINE MALEATE 5 MG
5 TABLET ORAL EVERY 6 HOURS PRN
Status: DISCONTINUED | OUTPATIENT
Start: 2022-02-01 | End: 2022-02-02 | Stop reason: HOSPADM

## 2022-02-01 RX ORDER — ONDANSETRON 2 MG/ML
4 INJECTION INTRAMUSCULAR; INTRAVENOUS EVERY 30 MIN PRN
Status: DISCONTINUED | OUTPATIENT
Start: 2022-02-01 | End: 2022-02-01 | Stop reason: HOSPADM

## 2022-02-01 RX ADMIN — SODIUM CHLORIDE, POTASSIUM CHLORIDE, SODIUM LACTATE AND CALCIUM CHLORIDE: 600; 310; 30; 20 INJECTION, SOLUTION INTRAVENOUS at 08:42

## 2022-02-01 RX ADMIN — EPHEDRINE SULFATE 5 MG: 50 INJECTION, SOLUTION INTRAVENOUS at 08:05

## 2022-02-01 RX ADMIN — ACETAMINOPHEN 975 MG: 325 TABLET, FILM COATED ORAL at 13:50

## 2022-02-01 RX ADMIN — SODIUM CHLORIDE, POTASSIUM CHLORIDE, SODIUM LACTATE AND CALCIUM CHLORIDE 1000 ML: 600; 310; 30; 20 INJECTION, SOLUTION INTRAVENOUS at 06:52

## 2022-02-01 RX ADMIN — GLYCOPYRROLATE 0.2 MG: 0.2 INJECTION, SOLUTION INTRAMUSCULAR; INTRAVENOUS at 08:03

## 2022-02-01 RX ADMIN — CEFAZOLIN SODIUM 1 G: 1 INJECTION, SOLUTION INTRAVENOUS at 16:21

## 2022-02-01 RX ADMIN — PHENYLEPHRINE HYDROCHLORIDE 50 MCG: 10 INJECTION INTRAVENOUS at 07:56

## 2022-02-01 RX ADMIN — ONDANSETRON 4 MG: 2 INJECTION INTRAMUSCULAR; INTRAVENOUS at 13:11

## 2022-02-01 RX ADMIN — PHENYLEPHRINE HYDROCHLORIDE 0.2 MCG/KG/MIN: 10 INJECTION INTRAVENOUS at 08:08

## 2022-02-01 RX ADMIN — PROPOFOL 120 MG: 10 INJECTION, EMULSION INTRAVENOUS at 07:46

## 2022-02-01 RX ADMIN — ACETAMINOPHEN 975 MG: 325 TABLET, FILM COATED ORAL at 06:26

## 2022-02-01 RX ADMIN — TRANEXAMIC ACID 1950 MG: 650 TABLET ORAL at 06:22

## 2022-02-01 RX ADMIN — Medication 2 G: at 07:45

## 2022-02-01 RX ADMIN — LIDOCAINE HYDROCHLORIDE 50 MG: 10 INJECTION, SOLUTION INFILTRATION; PERINEURAL at 07:46

## 2022-02-01 RX ADMIN — WARFARIN SODIUM 7.5 MG: 5 TABLET ORAL at 18:34

## 2022-02-01 RX ADMIN — FENTANYL CITRATE 100 MCG: 50 INJECTION, SOLUTION INTRAMUSCULAR; INTRAVENOUS at 07:46

## 2022-02-01 RX ADMIN — EPHEDRINE SULFATE 5 MG: 50 INJECTION, SOLUTION INTRAVENOUS at 08:02

## 2022-02-01 RX ADMIN — SODIUM CHLORIDE, POTASSIUM CHLORIDE, SODIUM LACTATE AND CALCIUM CHLORIDE: 600; 310; 30; 20 INJECTION, SOLUTION INTRAVENOUS at 14:39

## 2022-02-01 RX ADMIN — ROCURONIUM BROMIDE 20 MG: 50 INJECTION, SOLUTION INTRAVENOUS at 08:06

## 2022-02-01 RX ADMIN — NEOSTIGMINE METHYLSULFATE 4 MG: 1 INJECTION, SOLUTION INTRAVENOUS at 10:13

## 2022-02-01 RX ADMIN — EPHEDRINE SULFATE 5 MG: 50 INJECTION, SOLUTION INTRAVENOUS at 08:07

## 2022-02-01 RX ADMIN — GLYCOPYRROLATE 0.6 MG: 0.2 INJECTION, SOLUTION INTRAMUSCULAR; INTRAVENOUS at 10:13

## 2022-02-01 RX ADMIN — DEXAMETHASONE SODIUM PHOSPHATE 4 MG: 4 INJECTION, SOLUTION INTRA-ARTICULAR; INTRALESIONAL; INTRAMUSCULAR; INTRAVENOUS; SOFT TISSUE at 07:46

## 2022-02-01 RX ADMIN — Medication 1 LOZENGE: at 15:08

## 2022-02-01 RX ADMIN — ONDANSETRON HYDROCHLORIDE 4 MG: 2 INJECTION, SOLUTION INTRAVENOUS at 08:07

## 2022-02-01 RX ADMIN — PHENYLEPHRINE HYDROCHLORIDE 50 MCG: 10 INJECTION INTRAVENOUS at 08:02

## 2022-02-01 RX ADMIN — ROCURONIUM BROMIDE 30 MG: 50 INJECTION, SOLUTION INTRAVENOUS at 07:46

## 2022-02-01 RX ADMIN — SODIUM CHLORIDE, POTASSIUM CHLORIDE, SODIUM LACTATE AND CALCIUM CHLORIDE: 600; 310; 30; 20 INJECTION, SOLUTION INTRAVENOUS at 07:42

## 2022-02-01 RX ADMIN — PHENYLEPHRINE HYDROCHLORIDE 100 MCG: 10 INJECTION INTRAVENOUS at 08:08

## 2022-02-01 RX ADMIN — MIDAZOLAM 2 MG: 1 INJECTION INTRAMUSCULAR; INTRAVENOUS at 07:21

## 2022-02-01 RX ADMIN — FENTANYL CITRATE 100 MCG: 50 INJECTION, SOLUTION INTRAMUSCULAR; INTRAVENOUS at 07:21

## 2022-02-01 RX ADMIN — ACETAMINOPHEN 975 MG: 325 TABLET, FILM COATED ORAL at 21:57

## 2022-02-01 RX ADMIN — HYDROMORPHONE HYDROCHLORIDE 0.5 MG: 1 INJECTION, SOLUTION INTRAMUSCULAR; INTRAVENOUS; SUBCUTANEOUS at 08:26

## 2022-02-01 ASSESSMENT — ACTIVITIES OF DAILY LIVING (ADL)
FALL_HISTORY_WITHIN_LAST_SIX_MONTHS: NO
TOILETING_ISSUES: NO
PATIENT_/_FAMILY_COMMUNICATION_STYLE: SPOKEN LANGUAGE (ENGLISH OR BILINGUAL)
DOING_ERRANDS_INDEPENDENTLY_DIFFICULTY: NO
DRESSING/BATHING_DIFFICULTY: NO
THE_FOLLOWING_AIDS_WERE_PROVIDED;: PATIENT DECLINED OFFER OF AUXILIARY AIDS
WALKING_OR_CLIMBING_STAIRS_DIFFICULTY: NO
DIFFICULTY_COMMUNICATING: NO
DIFFICULTY_EATING/SWALLOWING: NO
WERE_AUXILIARY_AIDS_OFFERED?: NO
VISION_MANAGEMENT: GLASSES HERE
DESCRIBE_HEARING_LOSS: BILATERAL HEARING LOSS
HEARING_DIFFICULTY_OR_DEAF: YES
PATIENT'S_PREFERRED_MEANS_OF_COMMUNICATION: VERBAL
CONCENTRATING,_REMEMBERING_OR_MAKING_DECISIONS_DIFFICULTY: NO
WEAR_GLASSES_OR_BLIND: YES
USE_OF_HEARING_ASSISTIVE_DEVICES: RIGHT HEARING AID
WHICH_OF_THE_ABOVE_FUNCTIONAL_RISKS_HAD_A_RECENT_ONSET_OR_CHANGE?: TOILETING;BATHING;DRESSING

## 2022-02-01 ASSESSMENT — LIFESTYLE VARIABLES: TOBACCO_USE: 1

## 2022-02-01 ASSESSMENT — MIFFLIN-ST. JEOR: SCORE: 1429.28

## 2022-02-01 NOTE — ANESTHESIA POSTPROCEDURE EVALUATION
Patient: Edward Mendoza JR    Procedure: Procedure(s):  Right reverse total shoulder arthroplasty       Diagnosis:Arthritis [M19.90]  Diagnosis Additional Information: No value filed.    Anesthesia Type:  General    Note:  Disposition: Outpatient   Postop Pain Control: Uneventful            Sign Out: Well controlled pain   PONV: No   Neuro/Psych: Uneventful            Sign Out: Acceptable/Baseline neuro status   Airway/Respiratory: Uneventful            Sign Out: AIRWAY IN SITU/Resp. Support   CV/Hemodynamics: Uneventful            Sign Out: Acceptable CV status; No obvious hypovolemia; No obvious fluid overload   Other NRE: NONE   DID A NON-ROUTINE EVENT OCCUR? No           Last vitals:  Vitals Value Taken Time   /87 02/01/22 1055   Temp 96.8  F (36  C) 02/01/22 1025   Pulse 84 02/01/22 1056   Resp 8 02/01/22 1056   SpO2 99 % 02/01/22 1056   Vitals shown include unvalidated device data.    Electronically Signed By: Jah Miller MD  February 1, 2022  10:58 AM

## 2022-02-01 NOTE — ANESTHESIA PREPROCEDURE EVALUATION
Anesthesia Pre-Procedure Evaluation    Patient: Edward Mendoza JR   MRN: [id number] : 1940        Preoperative Diagnosis: Arthritis [M19.90]    Procedure : Procedure(s):  Right reverse total shoulder arthroplasty          Past Medical History:   Diagnosis Date     Arthritis      Broken ankle, left, sequela      Hypertension      Prostatitis      Sleep apnea      Thrombosis       Past Surgical History:   Procedure Laterality Date     CATARACT EXTRACTION Bilateral 2015     CHOLECYSTECTOMY       HERNIA REPAIR Bilateral 1987    Inguinal     PROSTATECTOMY N/A     W/ Bilat Pelvic Lymphadenectomy     VITRECTOMY ANTERIOR Left 06/15/2015     ZZC THORACOTOMY,MAJOR,EXPLOR/BIOPSY  1983    VATs that found lipoma on chest wall, was concern for cancer.       Allergies   Allergen Reactions     Cats Other (See Comments)     Respiratory congestion     Lisinopril Cough     Latex Itching     Latex adhesive- caused itching     Ace Inhibitors Cough     Chocolate Flavor Other (See Comments)     Pt gets sinus congestion from eating chocolate.     Corn [Corn Oil] Other (See Comments)     Pt gets sinus congestion from eating corn.     Fish Containing Products [Fish-Derived Products] Other (See Comments)     Pt gets sinus congestion from eating fish.     Levofloxacin Rash     Red line up the arm after IV administration     Nuts - Unspecified [Nuts] Other (See Comments)     Pt gets sinus congestion from eating nuts.     Penicillins Unknown     childhood     Ragweed Pollen [Ragweeds] Other (See Comments)     Sinus and chest congestion.      Social History     Tobacco Use     Smoking status: Former Smoker     Packs/day: 0.50     Years: 5.00     Pack years: 2.50     Types: Cigarettes     Quit date: 1960     Years since quittin.1     Smokeless tobacco: Never Used   Substance Use Topics     Alcohol use: No      Wt Readings from Last 1 Encounters:   22 74.4 kg (164 lb)        Anesthesia Evaluation   Pt has had  prior anesthetic.         ROS/MED HX  ENT/Pulmonary:     (+) sleep apnea, uses CPAP, tobacco use, Past use,     Neurologic:       Cardiovascular:     (+) hypertension-----Taking blood thinners     METS/Exercise Tolerance:     Hematologic:     (+) History of blood clots,     Musculoskeletal:   (+) arthritis,     GI/Hepatic:    (-) GERD   Renal/Genitourinary:       Endo:       Psychiatric/Substance Use:       Infectious Disease:       Malignancy:   (+) Malignancy, History of Prostate.    Other:            Physical Exam    Airway        Mallampati: II   TM distance: > 3 FB   Neck ROM: limited     Respiratory Devices and Support         Dental           Cardiovascular          Rhythm and rate: regular and normal     Pulmonary           breath sounds clear to auscultation           OUTSIDE LABS:  CBC:   Lab Results   Component Value Date    WBC 6.3 01/24/2022    WBC 8.9 12/14/2021    HGB 16.4 01/24/2022    HGB 15.6 12/14/2021    HCT 49.1 01/24/2022    HCT 48.1 12/14/2021     01/24/2022     12/14/2021     BMP:   Lab Results   Component Value Date     01/24/2022     12/14/2021    POTASSIUM 4.5 01/24/2022    POTASSIUM 4.0 12/14/2021    CHLORIDE 105 01/24/2022    CHLORIDE 104 12/14/2021    CO2 24 01/24/2022    CO2 28 12/14/2021    BUN 19 01/24/2022    BUN 22 12/14/2021    CR 0.95 01/24/2022    CR 1.11 12/14/2021     (H) 01/24/2022     12/14/2021     COAGS:   Lab Results   Component Value Date    PTT 24 02/13/2019    INR 1.02 02/01/2022     POC: No results found for: BGM, HCG, HCGS  HEPATIC:   Lab Results   Component Value Date    ALBUMIN 4.3 03/11/2021    PROTTOTAL 6.8 03/11/2021    ALT 31 03/11/2021    AST 25 03/11/2021    ALKPHOS 101 03/11/2021    BILITOTAL 0.7 03/11/2021     OTHER:   Lab Results   Component Value Date    OLVIN 10.7 (H) 01/24/2022    TSH 1.95 03/11/2021       Anesthesia Plan    ASA Status:  3   NPO Status:  NPO Appropriate    Anesthesia Type: General.     - Airway: ETT    Induction: Intravenous.   Maintenance: Balanced.        Consents    Anesthesia Plan(s) and associated risks, benefits, and realistic alternatives discussed. Questions answered and patient/representative(s) expressed understanding.    - Discussed:     - Discussed with:  Patient         Postoperative Care    Pain management: IV analgesics, Oral pain medications, Peripheral nerve block (Single Shot).   PONV prophylaxis: Ondansetron (or other 5HT-3), Dexamethasone or Solumedrol     Comments:                Jha Miller MD

## 2022-02-01 NOTE — ANESTHESIA PROCEDURE NOTES
Brachial plexus (interscalene) Procedure Note    Pre-Procedure   Staff -        Anesthesiologist:  Jah Miller MD       Performed By: Anesthesiologist       Location: pre-op       Pre-Anesthestic Checklist: patient identified, IV checked, site marked, risks and benefits discussed, informed consent, monitors and equipment checked, pre-op evaluation, at physician/surgeon's request and post-op pain management  Timeout:       Correct Patient: Yes        Correct Procedure: Yes        Correct Site: Yes        Correct Position: Yes        Correct Laterality: Yes        Site Marked: Yes  Procedure Documentation  Procedure: Brachial plexus (interscalene)       Diagnosis: SHOULDER DJD       Patient Position: supine       Patient Prep/Sterile Barriers: sterile gloves, mask       Skin prep: Betadine (interscalene approach).       Needle Type: insulated and short bevel       Needle Gauge: 22.        Needle Length (Inches): 2        Ultrasound guided       1. Ultrasound was used to identify targeted nerve, plexus, vascular marker, or fascial plane and place a needle adjacent to it in real-time.       2. Ultrasound was used to visualize the spread of anesthetic in close proximity to the above referenced structure.       3. A permanent image is entered into the patient's record.    Assessment/Narrative         The placement was negative for: blood aspirated, painful injection and site bleeding       Paresthesias: No.     Bolus given via needle..        Secured via.        Insertion/Infusion Method: Single Shot       Complications: none       Injection made incrementally with aspirations every 5 mL.     Comments:  The surgeon has given a verbal order transferring care of this patient to me for the performance of a regional analgesia block for post-op pain control. It is requested of me because I am uniquely trained and qualified to perform this block and the surgeon is neither trained nor qualified to perform this  procedure.    30 ml 0.5% bupivacaine

## 2022-02-01 NOTE — PROGRESS NOTES
SPIRITUAL HEALTH SERVICES  [facility]  PRE-SURGERY VISIT    Pre-surgical visit with pt. and spouse, Karyn. Pt is Rastafarian.  Provided requested prayer.   Oriented to Spiritual Health Services and availability for emotional/spiritual support during hospital stay.         Ever Lazar MA  Staff   Pager: [phone]  Phone: [phone]

## 2022-02-01 NOTE — OP NOTE
[facility] Orthopedic Operative Note    Pre-operative diagnosis: Rotator cuff arthropathy with Osteoarthritis of the right shoulder   Post-operative diagnosis: same   Procedure: Reverse Total Shoulder Arthoplasty of the right shoulder   Surgeon: Abel Tobar MD   Assistant(s): VINAY Sierra who provided retraction and positioning assistance and was crucial for all parts of the case.   Anesthesia: General endotrachial anesthesia with scalene block.   Estimated blood loss: Less than 100 ml   Total IV fluids: (See anesthesia record)   Blood transfusion: No transfusion was given during surgery   Total urine output: (See anesthesia record)   Drains: None   Specimens: None   Implants: BIOMET COMPREHENSIVE REVERSE TOTAL SHOULDER SYSTEM SIZE 12 MICRO PRESS FIT HUMERAL STEM, STANDARD HUMERAL TRAY WITH NO OFFSET, STANDARD 40/36 HUMERAL INSERT AND 36 MM GLENOSPHERE WITH MINI LARGE SUPERIOR AUGMENTED GLENOID BASE PLATE      Findings: ARTHROPATHY SHOULDER   Complications: None   Condition: Stable       Activity: Activity as tolerated  Patient may move about with assist as indicated or with supervision     Indications:    Pre-Operative Antibiotics:    Post-Operative DVT Prophylaxis Arthropathy of the shoulder refractory to conservative treatment  Ancef 2 gram IV 30 minutes prior to incision along with 1.95 grams TRANSEXAMIC ACID    MG Q DAY     The patient was taken to the pre operative area, where a scalene block was placed in the  right shoulder.      The patient was then taken to the operating room where general anesthetic was obtained. A Green catheter was placed. They were placed in the beach chair position. The right arm was confirmed as the operative arm. Pre operative range of motion was external rotation 20, abduction  60 and elevation to 90 degrees. The arm was prepped and draped in the usual fashion. Timeout was performed, confirming the right shoulder as the operative shoulder. Approximately,  a 4 inch incision was made over the anterior aspect of the shoulder extending from the coracoid distal and laterally. Full-thickness skin flaps were created. The deltopectoral interval was identified and the cephalic vein was retracted medially with the pectoralis and the deltoid was retracted laterally. The subdeltoid space was entered and freed up. The conjoined tendon was identified and released off the subscapularis. The axillary nerve was palpated, identified and protected throughout the rest of the case. The biceps tendon was identified and tenodesed to the superior aspect of the pectoralis tendon. THE MAYKEL TISSUE PROTECTOR WAS INSERTED.  The subscapularis was then released from the humerus. The remaining rotator cuff was INTACT.  The subscapularis was tagged and retracted. The inferior aspect of the capsule was then released off the neck of the humerus, giving me excellent exposure. The osteophytes were removed.  The canal was reamed up to a size 11.  The humeral head was osteotomized in 30 degrees of retroversion using the cutting guide and the canal was broached to accept a size 12 humeral stem. With the trial in place, a metal cap was placed to protect the humerus throughout the rest of the glenoid exposure.   I then retracted the humeral head posteriorly. I circumferentially freed up the subscapularis. While retracting the axillary nerve inferiorly, I released the inferior capsule from its attachment on the inferior and posterior glenoid. I was able to obtain good visualization of the glenoid, which was completely devoid of any remaining articular cartilage. I then prepared it to accept the 25 MM LARGE augmented glenoid base plate securing it with 5 screws.  I then placed the real 36 mm glenosphere with 1.5 mm of inferior off set.  I then trialed the humerus with the NO offset base plate and STAMDARD humeral insert.  With the trial components in place, the shoulder was very stable and had excellent  range of motion. I confirmed the stem size and glenoid position with fluoroscopy. I then removed the trial components, prepared my real implants on the back table and then impacted the press-fit humeral stem with excellent fit and fill proximally and with good rotational stability. I placed the NON augmented humeral tray and STANDARD humeral insert.  I then reduced the shoulder.  I confirmed again the seating, sizing and orientation of the humeral implant with fluoroscopy. I was very satisfied. I then carefully repaired the subscapularis to the humerus WITH 1 STATAFIX. At the completion of the subscapularis repair, external rotation was 30, abduction 90, elevation 120 degrees. The wound was irrigated with copious normal saline. ONE gram of Vancomycin powder was placed in the sub deltoid and joint space.  The deltopectoral interval was then closed with interrupted 0 Vicryl and running 0-STRATAFIX plus suture. The skin was closed in layers with 0 vicryl in the deep tissues, followed by 2-0 Vicryl, 3-0 STRATAFIX and Steri-Strips.  The incision was covered with an Aquacel dressing.  The arm was placed in a sling. They were then awoken and transferred to the recovery room in stable condition.   There were no noted complications. Sponge and needle counts were correct.

## 2022-02-01 NOTE — PHARMACY-ANTICOAGULATION SERVICE
Clinical Pharmacy - Warfarin Dosing Consult     Pharmacy has been consulted to manage this patient s warfarin therapy.  Indication: DVT/PE Prophylaxis  Therapy Goal: INR 2-3  Warfarin Prior to Admission: Yes  Warfarin PTA Regimen: 7.5 mg Mondays, 5 mg AOD  Dose Comments: Has been on hold for surgery    INR   Date Value Ref Range Status   02/01/2022 1.02 0.85 - 1.15 Final   01/24/2022 2.3 (H) 0.9 - 1.1 Final       Recommend warfarin 7.5 mg today.  Pharmacy will monitor Edward Mendoza JR daily and order warfarin doses to achieve specified goal.      Please contact pharmacy as soon as possible if the warfarin needs to be held for a procedure or if the warfarin goals change.

## 2022-02-01 NOTE — PLAN OF CARE
Pt arrived from pacu on a cart, assisted into bed with air carol. IV infusing into left arm, O2 on at 3L NC piggybacked into capno tubing and all readings wnl. Aquacel dressing to the shoulder clean, dry and intact, block in effect so right arm numb but with movement and good pulses.Denied pain. Sling in place and ice bag to the incision. Pt c/o nausea, medicated with zofran. Scanned for 107 before sent up to the floor. Frequent VS started and pt instructed to do postop exercises with each check. Oriented to room, equipment, orders and floor routines. Probably discharge tomorrow after OT sessions.

## 2022-02-01 NOTE — ANESTHESIA CARE TRANSFER NOTE
Patient: Edward Menodza JR    Procedure: Procedure(s):  Right reverse total shoulder arthroplasty       Diagnosis: Arthritis [M19.90]  Diagnosis Additional Information: No value filed.    Anesthesia Type:   General     Note:    Oropharynx: oropharynx clear of all foreign objects  Level of Consciousness: awake  Oxygen Supplementation: nasal cannula    Independent Airway: airway patency satisfactory and stable  Dentition: dentition unchanged  Vital Signs Stable: post-procedure vital signs reviewed and stable  Report to RN Given: handoff report given  Patient transferred to: PACU    Handoff Report: Identifed the Patient, Identified the Reponsible Provider, Reviewed the pertinent medical history, Discussed the surgical course, Reviewed Intra-OP anesthesia mangement and issues during anesthesia, Set expectations for post-procedure period and Allowed opportunity for questions and acknowledgement of understanding      Vitals:  Vitals Value Taken Time   /78 02/01/22 1030   Temp 96.8  F (36  C) 02/01/22 1025   Pulse 71 02/01/22 1031   Resp 12 02/01/22 1031   SpO2 98 % 02/01/22 1031   Vitals shown include unvalidated device data.    Electronically Signed By: AJIT Ken CRNA  February 1, 2022  10:33 AM

## 2022-02-02 ENCOUNTER — APPOINTMENT (OUTPATIENT)
Dept: OCCUPATIONAL THERAPY | Facility: CLINIC | Age: 82
End: 2022-02-02
Attending: ORTHOPAEDIC SURGERY
Payer: COMMERCIAL

## 2022-02-02 VITALS
RESPIRATION RATE: 16 BRPM | HEART RATE: 62 BPM | DIASTOLIC BLOOD PRESSURE: 63 MMHG | BODY MASS INDEX: 24.29 KG/M2 | SYSTOLIC BLOOD PRESSURE: 132 MMHG | TEMPERATURE: 98.1 F | HEIGHT: 69 IN | WEIGHT: 164 LBS | OXYGEN SATURATION: 95 %

## 2022-02-02 LAB
FASTING STATUS PATIENT QL REPORTED: YES
GLUCOSE BLD-MCNC: 124 MG/DL (ref 70–99)
HGB BLD-MCNC: 14.6 G/DL (ref 13.3–17.7)
INR PPP: 1.08 (ref 0.85–1.15)

## 2022-02-02 PROCEDURE — 36415 COLL VENOUS BLD VENIPUNCTURE: CPT | Performed by: ORTHOPAEDIC SURGERY

## 2022-02-02 PROCEDURE — 250N000013 HC RX MED GY IP 250 OP 250 PS 637: Performed by: ORTHOPAEDIC SURGERY

## 2022-02-02 PROCEDURE — 82947 ASSAY GLUCOSE BLOOD QUANT: CPT | Performed by: ORTHOPAEDIC SURGERY

## 2022-02-02 PROCEDURE — 97530 THERAPEUTIC ACTIVITIES: CPT | Mod: GO | Performed by: REHABILITATION PRACTITIONER

## 2022-02-02 PROCEDURE — 97165 OT EVAL LOW COMPLEX 30 MIN: CPT | Mod: GO | Performed by: REHABILITATION PRACTITIONER

## 2022-02-02 PROCEDURE — 85018 HEMOGLOBIN: CPT | Performed by: ORTHOPAEDIC SURGERY

## 2022-02-02 PROCEDURE — 85610 PROTHROMBIN TIME: CPT | Performed by: ORTHOPAEDIC SURGERY

## 2022-02-02 PROCEDURE — 97535 SELF CARE MNGMENT TRAINING: CPT | Mod: GO | Performed by: REHABILITATION PRACTITIONER

## 2022-02-02 PROCEDURE — 258N000003 HC RX IP 258 OP 636: Performed by: ORTHOPAEDIC SURGERY

## 2022-02-02 PROCEDURE — 97110 THERAPEUTIC EXERCISES: CPT | Mod: GO | Performed by: REHABILITATION PRACTITIONER

## 2022-02-02 PROCEDURE — 250N000011 HC RX IP 250 OP 636: Performed by: ORTHOPAEDIC SURGERY

## 2022-02-02 RX ADMIN — CHLORTHALIDONE 12.5 MG: 25 TABLET ORAL at 09:12

## 2022-02-02 RX ADMIN — TRAMADOL HYDROCHLORIDE 50 MG: 50 TABLET, COATED ORAL at 11:52

## 2022-02-02 RX ADMIN — SENNOSIDES AND DOCUSATE SODIUM 1 TABLET: 50; 8.6 TABLET ORAL at 09:12

## 2022-02-02 RX ADMIN — ACETAMINOPHEN 975 MG: 325 TABLET, FILM COATED ORAL at 05:56

## 2022-02-02 RX ADMIN — TRAMADOL HYDROCHLORIDE 50 MG: 50 TABLET, COATED ORAL at 05:56

## 2022-02-02 RX ADMIN — ACETAMINOPHEN 975 MG: 325 TABLET, FILM COATED ORAL at 13:22

## 2022-02-02 RX ADMIN — HYDROXYZINE HYDROCHLORIDE 10 MG: 10 TABLET ORAL at 11:56

## 2022-02-02 RX ADMIN — CEFAZOLIN SODIUM 1 G: 1 INJECTION, SOLUTION INTRAVENOUS at 01:07

## 2022-02-02 RX ADMIN — SODIUM CHLORIDE, POTASSIUM CHLORIDE, SODIUM LACTATE AND CALCIUM CHLORIDE: 600; 310; 30; 20 INJECTION, SOLUTION INTRAVENOUS at 01:07

## 2022-02-02 NOTE — PLAN OF CARE
[id number] RN    Patient vital signs are at baseline: No, 2L NC  Patient able to ambulate as they were prior to admission or with assist devices provided by therapies during their stay:  Yes, SBA with gait belt  Patient MUST void prior to discharge:  Yes  Patient able to tolerate oral intake:  Yes  Pain has adequate pain control using Oral analgesics:  Yes, tramadol at 0600    A&O.  Some numbness in fingers.  Dressing CDI.  Will continue to monitor.

## 2022-02-02 NOTE — PROGRESS NOTES
[facility]  Daily Orthopedic Post-Op Note         Assessment and Plan:    Assessment:   Post-operative day #1  Procedure: right reverse TSA   Doing well.  Clean wound without signs of infection.  Pain well-controlled.  Tolerating physical therapy and rehabilitation well.  Nausea has resolved  DVT prophylaxis: coumadin  Pain: 5/10      Plan:   -Start or continue physical therapy  -Pain control measures  -discharge today     Assessment:  Stable post op R reverse TSA  Plan:  Mobilize  Disposition: Home  Anticipated date of discharge: today         Interval History:   Doing well.  Continues to improve.  Pain is well-controlled.  No fevers.                   Physical Exam:   127/60, 97.8, 63, 17  Dressing clean, dry and intact  Calves soft and non tender  Distal neurovascular exam normal           Data:      Hemoglobin:   Hemoglobin   Date Value Ref Range Status   02/02/2022 14.6 13.3 - 17.7 g/dL Final   01/24/2022 16.4 13.3 - 17.7 g/dL Final       Kylie Alicea PA-C   [phone]

## 2022-02-02 NOTE — PROGRESS NOTES
[facility]      OUTPATIENT OCCUPATIONAL THERAPY  EVALUATION  PLAN OF TREATMENT FOR OUTPATIENT REHABILITATION  (COMPLETE FOR INITIAL CLAIMS ONLY)  Patient's Last Name, First Name, M.I.  YOB: 1940  Edward Mendoza JR                          Provider's Name  [facility] Medical Record No.  [id number]                               Onset Date:  02/01/22   Start of Care Date:  02/02/22     Type:     ___PT   _X_OT   ___SLP Medical Diagnosis:  R reverse TSA                        OT Diagnosis:  decreased ADLs   Visits from SOC:  1   _________________________________________________________________________________  Plan of Treatment/Functional Goals    Planned Interventions: ADL retraining,transfer training,progressive activity/exercise   Goals: See Occupational Therapy Goals on Care Plan in [employer] electronic health record.    Therapy Frequency: 2x/day  Predicted Duration of Therapy Intervention: 1 day  _________________________________________________________________________________    I CERTIFY THE NEED FOR THESE SERVICES FURNISHED UNDER        THIS PLAN OF TREATMENT AND WHILE UNDER MY CARE     (Physician co-signature of this document indicates review and certification of the therapy plan).                Certification date from: 02/02/22, Certification date to: 02/02/22    Referring Physician: Abel Tobar MD            Initial Assessment        See Occupational Therapy evaluation dated 02/02/22 in Epic electronic health record.

## 2022-02-02 NOTE — PROGRESS NOTES
02/02/22 0731   Quick Adds   Type of Visit Initial Occupational Therapy Evaluation   Living Environment   People in home spouse   Current Living Arrangements house  (split level home)   Home Accessibility stairs within home   Number of Stairs, Within Home, Primary greater than 10 stairs  (to access bedroom and bathroom )   Transportation Anticipated family or friend will provide;car, drives self   Living Environment Comments Patient lives in a split level home with his spouse. No MIGUEL. Patient's bedroom and bathroom up flight of 14 stairs. Patient has a walk in shower with no shower chair or bars; elevated toilet with vanity on L side.    Self-Care   Usual Activity Tolerance good   Current Activity Tolerance moderate   Regular Exercise No   Activity/Exercise/Self-Care Comment Ind in all ADLs at baseline. Patient R hand dominant. No assistive device used for ambulation.   Instrumental Activities of Daily Living (IADL)   IADL Comments Spouse will assist with cooking, home management, shopping, driving.   Disability/Function   Hearing Difficulty or Deaf yes   Patient's preferred means of communication verbal   Describe hearing loss bilateral hearing loss   Use of hearing assistive devices right hearing aid   Wear Glasses or Blind yes   Vision Management glasses here   Concentrating, Remembering or Making Decisions Difficulty no   Difficulty Communicating no   Fall history within last six months no   Change in Functional Status Since Onset of Current Illness/Injury yes   General Information   Onset of Illness/Injury or Date of Surgery 02/01/22   Referring Physician Abel Tobar MD   Patient/Family Therapy Goal Statement (OT) return home    Additional Occupational Profile Info/Pertinent History of Current Problem POD #1 right reverse TSA. Per chart review: patient diagnosed with rotator cuff arthropathy with Osteoarthritis of the right shoulder.   Existing Precautions/Restrictions fall;shoulder;weight  bearing  (sling )   Right Upper Extremity (Weight-bearing Status) non weight-bearing (NWB)   General Observations and Info Patient supine in bed with R UE in sling and pillow supporting R UE. Spouse arrived mid session, engaged and supportive.   Cognitive Status Examination   Orientation Status orientation to person, place and time   Affect/Mental Status (Cognitive) WNL   Follows Commands WFL   Visual Perception   Visual Impairment/Limitations corrective lenses for reading   Sensory   Sensory Comments No deficits in R UE sensation; patient notes neuropathy of toes of B LE at baseline   Pain Assessment   Patient Currently in Pain Yes, see Vital Sign flowsheet   Integumentary/Edema   Integumentary/Edema no deficits were identifed   Posture   Posture not impaired   Range of Motion Comprehensive   Comment, General Range of Motion R UE ROM limited s/p R reverse TSA, otherwise WFL   Strength Comprehensive (MMT)   Comment, General Manual Muscle Testing (MMT) Assessment R UE strength limited s/p R reverse TSA   Muscle Tone Assessment   Muscle Tone Quick Adds No deficits were identified   Coordination   Coordination Comments R UE in sling s/p R reverse TSA, able to use R hand to A L as needed with light tasks   Bed Mobility   Bed Mobility No deficits identified   Comment (Bed Mobility) SBA supine <> sit   Transfers   Transfer Comments SBA sit <> stand   Balance   Balance Comments no LOB; general unsteadiness to be expected   Clinical Impression   Criteria for Skilled Therapeutic Interventions Met (OT) yes;meets criteria;skilled treatment is necessary   OT Diagnosis decreased ADLs   OT Problem List-Impairments impacting ADL activity tolerance impaired;range of motion (ROM);strength;post-surgical precautions;pain   Assessment of Occupational Performance 5 or more Performance Deficits   Identified Performance Deficits dressing, toileting, bathing, grooming/hygine, feeding, home management, using computer, driving   Planned  Therapy Interventions (OT) ADL retraining;transfer training;progressive activity/exercise   Clinical Decision Making Complexity (OT) low complexity   Therapy Frequency (OT) 2x/day   Predicted Duration of Therapy 1 day   Risk & Benefits of therapy have been explained evaluation/treatment results reviewed;care plan/treatment goals reviewed;current/potential barriers reviewed;participants voiced agreement with care plan;patient;spouse/significant other   OT Discharge Planning    OT Rationale for DC Rec defer to ortho MD- anticipate patient will meet needed goals for safe discharge home with spouse support.    Therapy Certification   Start of Care Date 02/02/22   Certification date from 02/02/22   Certification date to 02/02/22   Medical Diagnosis R reverse TSA   Total Evaluation Time (Minutes)   Total Evaluation Time (Minutes) 8

## 2022-02-02 NOTE — PHARMACY-ANTICOAGULATION SERVICE
Clinical Pharmacy- Warfarin Discharge Note    Warfarin PTA Regimen: 7.5 mg Mondays, 5 mg AOD      Anticoagulation Dose History     Recent Dosing and Labs Latest Ref Rng & Units 9/28/2021 11/2/2021 12/14/2021 1/12/2022 1/24/2022 2/1/2022 2/2/2022    [facility] IMS TEMPLATE - - - - - - 7.5 mg -    INR 0.85 - 1.15 2.5(H) 2.1(H) 2.1(H) 2.4(H) 2.3(H) 1.02 1.08          Patient on warfarin pta - held for scheduled surgery.  Restarted post op.   Agree with plan to resume home dosing. Talked with RN - she will have patient follow up with INR check early next week    The patient should have an INR checked Mon or Tue next week.

## 2022-02-02 NOTE — PLAN OF CARE
Occupational Therapy Discharge Summary    Reason for therapy discharge:    All goals and outcomes met, no further needs identified.    Progress towards therapy goal(s). See goals on Care Plan in [facility] electronic health record for goal details.  Goals met    Therapy recommendation(s):    No further therapy is recommended.  Continue home exercise program.

## 2022-02-02 NOTE — PROGRESS NOTES
SPIRITUAL HEALTH SERVICES Progress Note   Ortho/Spine Unit    Attempted to see pt Edward per an admission request.  Edward discharged shortly before this author arrived to his room.    Kurtis Max M.Div., [facility]  Staff   Phone [phone]

## 2022-02-02 NOTE — PLAN OF CARE
"/57 (BP Location: Left arm)   Pulse 75   Temp 97.1  F (36.2  C) (Temporal)   Resp 23   Ht 1.753 m (5' 9\")   Wt 74.4 kg (164 lb)   SpO2 97%   BMI 24.22 kg/m    Orientation: A&O x4, [place]  Resp:LS clear, 2L O2/capno  Neuro: CMS intact, R thumb numbness, BLE neuropathy  Pain: managed with Tylenol  Dressing:  CDI  Activity: up to bathroom A x1  Diet: tolerated regular  Voiding: void @ 1630 for 200.  Second void @ 2115 unmeasured, PVR was 250  Discharge Plan: 2/2 with wife     "

## 2022-02-03 ENCOUNTER — DOCUMENTATION ONLY (OUTPATIENT)
Dept: ANTICOAGULATION | Facility: CLINIC | Age: 82
End: 2022-02-03
Payer: COMMERCIAL

## 2022-02-03 DIAGNOSIS — Z86.711 HISTORY OF PULMONARY EMBOLISM: Primary | ICD-10-CM

## 2022-02-03 NOTE — PROGRESS NOTES
ANTICOAGULATION  MANAGEMENT: Discharge Review    Edward Mendoza JR chart reviewed for anticoagulation continuity of care    Hospital Admission on right reverse total shoulder for 2/1.    Discharge disposition: Home    Results:    Recent labs: (last 7 days)     02/01/22  0624 02/02/22  0553   INR 1.02 1.08     Anticoagulation inpatient management:     home regimen continued    Anticoagulation discharge instructions:     Warfarin dosing: home regimen continued   Bridging: No   INR goal change: No      Medication changes affecting anticoagulation: Yes: Tramadol    Additional factors affecting anticoagulation: Yes: recent surgery and pain    Plan     Agree with discharge plan for follow up on 2/7    Recommended follow up is scheduled    Anticoagulation Calendar updated    Sussy Lane RN

## 2022-02-07 ENCOUNTER — ANTICOAGULATION THERAPY VISIT (OUTPATIENT)
Dept: ANTICOAGULATION | Facility: CLINIC | Age: 82
End: 2022-02-07

## 2022-02-07 ENCOUNTER — LAB (OUTPATIENT)
Dept: LAB | Facility: CLINIC | Age: 82
End: 2022-02-07
Payer: COMMERCIAL

## 2022-02-07 ENCOUNTER — TRANSFERRED RECORDS (OUTPATIENT)
Dept: HEALTH INFORMATION MANAGEMENT | Facility: CLINIC | Age: 82
End: 2022-02-07

## 2022-02-07 DIAGNOSIS — Z86.711 HISTORY OF PULMONARY EMBOLISM: Primary | ICD-10-CM

## 2022-02-07 PROCEDURE — 85610 PROTHROMBIN TIME: CPT | Performed by: INTERNAL MEDICINE

## 2022-02-07 PROCEDURE — 36416 COLLJ CAPILLARY BLOOD SPEC: CPT | Performed by: INTERNAL MEDICINE

## 2022-02-07 NOTE — PROGRESS NOTES
ANTICOAGULATION MANAGEMENT     Edward Mendoza JR 81 year old male is on warfarin with therapeutic INR result. (Goal INR 2.0-3.0)    Recent labs: (last 7 days)     02/07/22  0913   INR 2.10*       ASSESSMENT     Source(s): Chart Review and Template       Warfarin doses taken: Warfarin taken as instructed    Diet: No new diet changes identified    New illness, injury, or hospitalization: No    Medication/supplement changes: None noted    Signs or symptoms of bleeding or clotting: No    Previous INR: Subtherapeutic    Additional findings: Warfarin held last week for shoulder surgery     PLAN     Recommended plan for no diet, medication or health factor changes affecting INR     Dosing Instructions: Continue your current warfarin dose with next INR in 2 weeks       Summary  As of 2/7/2022    Full warfarin instructions:  7.5 mg every Mon; 5 mg all other days   Next INR check:  2/21/2022             Detailed voice message left for Edward with dosing instructions and follow up date.     Contact [phone] to schedule and with any changes, questions or concerns.     Education provided: Goal range and significance of current result and Contact [phone] with any changes, questions or concerns.     Plan made per [facility] anticoagulation protocol    Nicole Chow RN  Anticoagulation Clinic  2/7/2022    _______________________________________________________________________     Anticoagulation Episode Summary     Current INR goal:  2.0-3.0   TTR:  94.2 % (1 y)   Target end date:  Indefinite   Send INR reminders to:  CHRISTA PHELPS    Indications    History of pulmonary embolism [Z86.711]           Comments:           Anticoagulation Care Providers     Provider Role Specialty Phone number    Ascencion Calvillo MD Referring Internal Medicine [phone]

## 2022-02-11 ENCOUNTER — TRANSFERRED RECORDS (OUTPATIENT)
Dept: HEALTH INFORMATION MANAGEMENT | Facility: CLINIC | Age: 82
End: 2022-02-11
Payer: COMMERCIAL

## 2022-02-21 ENCOUNTER — ANTICOAGULATION THERAPY VISIT (OUTPATIENT)
Dept: ANTICOAGULATION | Facility: CLINIC | Age: 82
End: 2022-02-21

## 2022-02-21 ENCOUNTER — LAB (OUTPATIENT)
Dept: LAB | Facility: CLINIC | Age: 82
End: 2022-02-21
Payer: COMMERCIAL

## 2022-02-21 DIAGNOSIS — Z86.711 HISTORY OF PULMONARY EMBOLISM: Primary | ICD-10-CM

## 2022-02-21 DIAGNOSIS — Z86.711 HISTORY OF PULMONARY EMBOLISM: ICD-10-CM

## 2022-02-21 LAB — INR BLD: 2.6 (ref 0.9–1.1)

## 2022-02-21 PROCEDURE — 85610 PROTHROMBIN TIME: CPT

## 2022-02-21 PROCEDURE — 36416 COLLJ CAPILLARY BLOOD SPEC: CPT

## 2022-02-21 NOTE — PROGRESS NOTES
ANTICOAGULATION MANAGEMENT     Edward HENDRICKS Reji JR 81 year old male is on warfarin with therapeutic INR result. (Goal INR 2.0-3.0)    Recent labs: (last 7 days)     02/21/22  0913   INR 2.6*       ASSESSMENT     Source(s): Chart Review, Patient/Caregiver Call and Template       Warfarin doses taken: Warfarin taken as instructed    Diet: No new diet changes identified    New illness, injury, or hospitalization: No He is recovering very well after his total shoulder surgery.    Medication/supplement changes: None noted    Signs or symptoms of bleeding or clotting: No    Previous INR: Therapeutic last visit; previously outside of goal range    Additional findings: None     PLAN     Recommended plan for no diet, medication or health factor changes affecting INR     Dosing Instructions: Continue your current warfarin dose with next INR in 3 weeks       Summary  As of 2/21/2022    Full warfarin instructions:  7.5 mg every Mon; 5 mg all other days   Next INR check:  3/14/2022             Telephone call with Edward who verbalizes understanding and agrees to plan    Lab visit scheduled    Education provided: Importance of therapeutic range    Plan made per [facility] anticoagulation protocol    Sussy Lane RN  Anticoagulation Clinic  2/21/2022    _______________________________________________________________________     Anticoagulation Episode Summary     Current INR goal:  2.0-3.0   TTR:  94.2 % (1 y)   Target end date:  Indefinite   Send INR reminders to:  CHRISTA PHELPS    Indications    History of pulmonary embolism [Z86.711]           Comments:           Anticoagulation Care Providers     Provider Role Specialty Phone number    Ascencion Calvillo MD Referring Internal Medicine [phone]

## 2022-03-15 ENCOUNTER — ANTICOAGULATION THERAPY VISIT (OUTPATIENT)
Dept: ANTICOAGULATION | Facility: CLINIC | Age: 82
End: 2022-03-15

## 2022-03-15 ENCOUNTER — LAB (OUTPATIENT)
Dept: LAB | Facility: CLINIC | Age: 82
End: 2022-03-15

## 2022-03-15 DIAGNOSIS — Z86.711 HISTORY OF PULMONARY EMBOLISM: ICD-10-CM

## 2022-03-15 DIAGNOSIS — Z86.711 HISTORY OF PULMONARY EMBOLISM: Primary | ICD-10-CM

## 2022-03-15 LAB — INR BLD: 2.4 (ref 0.9–1.1)

## 2022-03-15 PROCEDURE — 85610 PROTHROMBIN TIME: CPT

## 2022-03-15 PROCEDURE — 36416 COLLJ CAPILLARY BLOOD SPEC: CPT

## 2022-03-15 NOTE — PROGRESS NOTES
ANTICOAGULATION MANAGEMENT     Edward Mendoza JR 81 year old male is on warfarin with therapeutic INR result. (Goal INR 2.0-3.0)    Recent labs: (last 7 days)     03/15/22  0938   INR 2.4*       ASSESSMENT       Source(s): Chart Review and Template       Warfarin doses taken: Warfarin taken as instructed    Diet: No new diet changes identified    New illness, injury, or hospitalization: No    Medication/supplement changes: None noted    Signs or symptoms of bleeding or clotting: No    Previous INR: Therapeutic last 2(+) visits    Additional findings: None       PLAN     Recommended plan for no diet, medication or health factor changes affecting INR     Dosing Instructions: Continue your current warfarin dose with next INR in 5 weeks       Summary  As of 3/15/2022    Full warfarin instructions:  7.5 mg every Mon; 5 mg all other days   Next INR check:  4/19/2022             Detailed voice message left for Edward with dosing instructions and follow up date.     Contact [phone] to schedule and with any changes, questions or concerns.     Education provided: Please call back if any changes to your diet, medications or how you've been taking warfarin    Plan made per [facility] anticoagulation protocol    Reagan Grewal RN  Anticoagulation Clinic  3/15/2022    _______________________________________________________________________     Anticoagulation Episode Summary     Current INR goal:  2.0-3.0   TTR:  94.2 % (1 y)   Target end date:  Indefinite   Send INR reminders to:  CHRISTA PHELPS    Indications    History of pulmonary embolism [Z86.711]           Comments:           Anticoagulation Care Providers     Provider Role Specialty Phone number    Ascencion Calvillo MD Referring Internal Medicine [phone]

## 2022-03-21 ENCOUNTER — TRANSFERRED RECORDS (OUTPATIENT)
Dept: HEALTH INFORMATION MANAGEMENT | Facility: CLINIC | Age: 82
End: 2022-03-21
Payer: COMMERCIAL

## 2022-04-11 DIAGNOSIS — Z86.711 HISTORY OF PULMONARY EMBOLISM: ICD-10-CM

## 2022-04-11 RX ORDER — WARFARIN SODIUM 2.5 MG/1
TABLET ORAL
Qty: 30 TABLET | Refills: 1 | Status: SHIPPED | OUTPATIENT
Start: 2022-04-11 | End: 2022-05-19

## 2022-04-13 ENCOUNTER — TELEPHONE (OUTPATIENT)
Dept: INTERNAL MEDICINE | Facility: CLINIC | Age: 82
End: 2022-04-13
Payer: COMMERCIAL

## 2022-04-13 DIAGNOSIS — Z86.711 HISTORY OF PULMONARY EMBOLISM: ICD-10-CM

## 2022-04-13 RX ORDER — WARFARIN SODIUM 5 MG/1
5 TABLET ORAL DAILY
Qty: 90 TABLET | Refills: 1 | Status: SHIPPED | OUTPATIENT
Start: 2022-04-13 | End: 2022-09-26

## 2022-04-13 ASSESSMENT — ACTIVITIES OF DAILY LIVING (ADL): CURRENT_FUNCTION: NO ASSISTANCE NEEDED

## 2022-04-13 NOTE — TELEPHONE ENCOUNTER
ANTICOAGULATION MANAGEMENT:  Medication Refill    Anticoagulation Summary  As of 3/15/2022    Warfarin maintenance plan:  7.5 mg (5 mg x 1.5) every Mon; 5 mg (5 mg x 1) all other days   Next INR check:  4/19/2022   Target end date:  Indefinite    Indications    History of pulmonary embolism [Z86.711]             Anticoagulation Care Providers     Provider Role Specialty Phone number    Ascencion Calvillo MD Referring Internal Medicine [phone]          Visit with referring provider/group within last year: Yes    [facility] referral signed within last year: Yes    Edward meets all criteria for refill (current [facility] referral, office visit with referring provider/group in last year, lab monitoring up to date or not exceeding 2 weeks overdue). Rx instructions and quantity supplied updated to match patient's current dosing plan. Warfarin 90 day supply with 1 refill granted per [facility] protocol     Sussy Lane RN  Anticoagulation Clinic

## 2022-04-13 NOTE — TELEPHONE ENCOUNTER
Reason for Call:  Other prescription    Detailed comments: FOR PT'S WARFARIN PRESCRIPTION REFILL, THE PHARMACY DID NOT DO THE RIGHT DOSE. PHARMACY SAID THEY WOULD CONTACT PROVIDER REGARDING THAT. PT HAS NOT HEARD ANYTHING BACK REGARDING THIS    Phone Number Patient can be reached at: Home number on file [phone] (home)    Best Time: ANY TIME    Can we leave a detailed message on this number? YES    Call taken on 4/13/2022 at 12:34 PM by Azul Sánchez

## 2022-04-14 ENCOUNTER — OFFICE VISIT (OUTPATIENT)
Dept: INTERNAL MEDICINE | Facility: CLINIC | Age: 82
End: 2022-04-14
Payer: COMMERCIAL

## 2022-04-14 ENCOUNTER — ANTICOAGULATION THERAPY VISIT (OUTPATIENT)
Dept: ANTICOAGULATION | Facility: CLINIC | Age: 82
End: 2022-04-14

## 2022-04-14 VITALS
SYSTOLIC BLOOD PRESSURE: 138 MMHG | DIASTOLIC BLOOD PRESSURE: 78 MMHG | HEART RATE: 64 BPM | HEIGHT: 69 IN | WEIGHT: 166.7 LBS | BODY MASS INDEX: 24.69 KG/M2

## 2022-04-14 DIAGNOSIS — Z85.828 HISTORY OF BASAL CELL CARCINOMA (BCC) OF SKIN: ICD-10-CM

## 2022-04-14 DIAGNOSIS — Z86.711 HISTORY OF PULMONARY EMBOLISM: ICD-10-CM

## 2022-04-14 DIAGNOSIS — Z86.711 HISTORY OF PULMONARY EMBOLISM: Primary | ICD-10-CM

## 2022-04-14 DIAGNOSIS — Z96.611 S/P REVERSE TOTAL SHOULDER ARTHROPLASTY, RIGHT: ICD-10-CM

## 2022-04-14 DIAGNOSIS — G47.33 OBSTRUCTIVE SLEEP APNEA ON CPAP: ICD-10-CM

## 2022-04-14 DIAGNOSIS — Z79.899 ENCOUNTER FOR LONG-TERM (CURRENT) USE OF MEDICATIONS: ICD-10-CM

## 2022-04-14 DIAGNOSIS — Z00.00 ENCOUNTER FOR WELLNESS EXAMINATION IN ADULT: Primary | ICD-10-CM

## 2022-04-14 DIAGNOSIS — Z85.46 HISTORY OF PROSTATE CANCER: ICD-10-CM

## 2022-04-14 DIAGNOSIS — I10 ESSENTIAL HYPERTENSION: ICD-10-CM

## 2022-04-14 DIAGNOSIS — H40.9 GLAUCOMA, UNSPECIFIED GLAUCOMA TYPE, UNSPECIFIED LATERALITY: ICD-10-CM

## 2022-04-14 LAB
ALBUMIN SERPL-MCNC: 4.1 G/DL (ref 3.5–5)
ALP SERPL-CCNC: 106 U/L (ref 45–120)
ALT SERPL W P-5'-P-CCNC: 20 U/L (ref 0–45)
ANION GAP SERPL CALCULATED.3IONS-SCNC: 12 MMOL/L (ref 5–18)
AST SERPL W P-5'-P-CCNC: 24 U/L (ref 0–40)
BILIRUB SERPL-MCNC: 0.8 MG/DL (ref 0–1)
BUN SERPL-MCNC: 14 MG/DL (ref 8–28)
CALCIUM SERPL-MCNC: 10.6 MG/DL (ref 8.5–10.5)
CHLORIDE BLD-SCNC: 102 MMOL/L (ref 98–107)
CHOLEST SERPL-MCNC: 214 MG/DL
CO2 SERPL-SCNC: 27 MMOL/L (ref 22–31)
CREAT SERPL-MCNC: 0.87 MG/DL (ref 0.7–1.3)
ERYTHROCYTE [DISTWIDTH] IN BLOOD BY AUTOMATED COUNT: 13.3 % (ref 10–15)
FASTING STATUS PATIENT QL REPORTED: ABNORMAL
GFR SERPL CREATININE-BSD FRML MDRD: 86 ML/MIN/1.73M2
GLUCOSE BLD-MCNC: 103 MG/DL (ref 70–125)
HCT VFR BLD AUTO: 49.1 % (ref 40–53)
HDLC SERPL-MCNC: 63 MG/DL
HGB BLD-MCNC: 16.3 G/DL (ref 13.3–17.7)
INR BLD: 3 (ref 0.9–1.1)
LDLC SERPL CALC-MCNC: 124 MG/DL
MCH RBC QN AUTO: 29.3 PG (ref 26.5–33)
MCHC RBC AUTO-ENTMCNC: 33.2 G/DL (ref 31.5–36.5)
MCV RBC AUTO: 88 FL (ref 78–100)
PLATELET # BLD AUTO: 268 10E3/UL (ref 150–450)
POTASSIUM BLD-SCNC: 4.3 MMOL/L (ref 3.5–5)
PROT SERPL-MCNC: 6.8 G/DL (ref 6–8)
PSA SERPL-MCNC: <0.1 UG/L (ref 0–6.5)
RBC # BLD AUTO: 5.57 10E6/UL (ref 4.4–5.9)
SODIUM SERPL-SCNC: 141 MMOL/L (ref 136–145)
TRIGL SERPL-MCNC: 134 MG/DL
WBC # BLD AUTO: 8.5 10E3/UL (ref 4–11)

## 2022-04-14 PROCEDURE — 85027 COMPLETE CBC AUTOMATED: CPT | Performed by: INTERNAL MEDICINE

## 2022-04-14 PROCEDURE — 36415 COLL VENOUS BLD VENIPUNCTURE: CPT | Performed by: INTERNAL MEDICINE

## 2022-04-14 PROCEDURE — 99397 PER PM REEVAL EST PAT 65+ YR: CPT | Performed by: INTERNAL MEDICINE

## 2022-04-14 PROCEDURE — 84153 ASSAY OF PSA TOTAL: CPT | Performed by: INTERNAL MEDICINE

## 2022-04-14 PROCEDURE — 80061 LIPID PANEL: CPT | Performed by: INTERNAL MEDICINE

## 2022-04-14 PROCEDURE — 80053 COMPREHEN METABOLIC PANEL: CPT | Performed by: INTERNAL MEDICINE

## 2022-04-14 PROCEDURE — 85610 PROTHROMBIN TIME: CPT | Performed by: INTERNAL MEDICINE

## 2022-04-14 ASSESSMENT — ACTIVITIES OF DAILY LIVING (ADL): CURRENT_FUNCTION: NO ASSISTANCE NEEDED

## 2022-04-14 NOTE — PATIENT INSTRUCTIONS
No change in medication treatment plan.    You are due for dermatology skin exam in June.    You are due for an ophthalmology glaucoma follow-up in August.    20 minutes of walking type exercise aerobic type exercise on a daily basis is recommended.  Daily stretching and moving can help reduce stiffness and achiness.    Goal blood pressure less than 140/85, but not less than 110/60.    Follow-up in 6 months for routine nonfasting blood pressure checkup appointment.

## 2022-04-14 NOTE — PROGRESS NOTES
SUBJECTIVE:   Edward Mendoza JR is a 81 year old male who presents for Preventive Visit.  Lives independently and cognitively intact.  He had been doing regular walking exercise, but has been getting less exercise since his right reverse total shoulder arthroplasty done in February.    The shoulder replacement was successful and he is not having significant pain now.  He is still doing some physical therapy and range of motion exercises for that.    Hypertension has been controlled on chlorthalidone 12.5 mg a day.  He does not have any lower extremity edema issues.  He is not checked his blood pressure recently.  Blood pressure has been controlled in the past.  No orthostatic type dizziness.    March 2021  with HDL 68 without medication.  He quit smoking back in 1960.  No strong family history of coronary disease.  2015 - stress echo.  He has not had chest pain or chest pressure.  No palpitations or history of arrhythmia.    He does have obstructive sleep apnea but highly compliant with CPAP and that is working well.  He does have peripheral neuropathy but no foot sores.    He did have pulmonary embolism in 2015 and is now on chronic warfarin.  He did have a subarachnoid hemorrhage with fall in 2019.  But no recent falls.    He has a history of chronic sinusitis with previous sinus surgery at age 16.  He does have some postnasal drip of mucus intermittently, but not severe.  No active sinusitis symptoms now.  He is on Nasacort.  Not using [place] pot or antihistamine currently.    Prostate cancer in 2011 with prostatectomy without recurrence.  PSA level was less than 0.1 last year.  No new urinary symptoms.    Past history basal cell cancer, I did review the October 2021 dermatology note, negative exam and follow-up in June.  No new skin lesions currently.    Bowels are regular no blood in stool.  No abdominal pain complaints.  2012 he had a negative colonoscopy.  No further plan with age.    He just had a  "COVID-19 vaccine booster 2 weeks ago.    Past history of glaucoma, I did review the eye exam from February, glaucoma controlled.  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  6-month follow-up given.    He had cataract surgery last August.    No new headache complaints.  No mouth sores.    Occasional choking on phlegm, but no escalating or new swallowing issues.    Patient has been advised of split billing requirements and indicates understanding: Yes  Are you in the first 12 months of your Medicare coverage?  No    Healthy Habits:     In general, how would you rate your overall health?  Excellent    Frequency of exercise:  6-7 days/week    Duration of exercise:  15-30 minutes    Do you usually eat at least 4 servings of fruit and vegetables a day, include whole grains    & fiber and avoid regularly eating high fat or \"junk\" foods?  Yes    Taking medications regularly:  Yes    Barriers to taking medications:  None    Medication side effects:  None    Ability to successfully perform activities of daily living:  No assistance needed    Home Safety:  Throw rugs in the hallway and lack of grab bars in the bathroom    Hearing Impairment:  Difficulty following a conversation in a noisy restaurant or crowded room and difficult to understand a speaker at a public meeting or [employer] service    In the past 6 months, have you been bothered by leaking of urine?  No    In general, how would you rate your overall mental or emotional health?  Good      PHQ-2 Total Score: 0    Additional concerns today:  No    Do you feel safe in your environment? Yes    Have you ever done Advance Care Planning? (For example, a Health Directive, POLST, or a discussion with a medical provider or your loved ones about your wishes): Yes, advance care planning is on file.      Fall risk  Fallen 2 or more times in the past year?: No  Any fall with injury in the past year?: No    Cognitive Screening   1) Repeat 3 items (Leader, Season, Table)    2) Clock " draw: NORMAL  3) 3 item recall: Recalls 3 objects  Results: 3 items recalled: COGNITIVE IMPAIRMENT LESS LIKELY    Mini-CogTM Copyright S Abdifatah. Licensed by the author for use in [facility]; reprinted with permission (ike@[facility]). All rights reserved.      Do you have sleep apnea, excessive snoring or daytime drowsiness?: yes    Reviewed and updated as needed this visit by clinical staff   Tobacco  Allergies  Meds                Reviewed and updated as needed this visit by Provider     Meds               Social History     Tobacco Use     Smoking status: Former Smoker     Packs/day: 0.50     Years: 5.00     Pack years: 2.50     Types: Cigarettes     Quit date: 1960     Years since quittin.3     Smokeless tobacco: Never Used   Substance Use Topics     Alcohol use: No         Alcohol Use 2022   Prescreen: >3 drinks/day or >7 drinks/week? Not Applicable         Current providers sharing in care for this patient include:   Patient Care Team:  Ascencion Calvillo MD as PCP - General  Ascencion Calvillo MD as Assigned PCP  Carmina Crowder MD as Assigned Pulmonology Provider    The following health maintenance items are reviewed in Epic and correct as of today:  Health Maintenance Due   Topic Date Due     URINE DRUG SCREEN  Never done     ZOSTER IMMUNIZATION (3 of 3) 2019     FALL RISK ASSESSMENT  2022     Lab work is in process  Patient Active Problem List   Diagnosis     Prostate Cancer     Hypertension     Osteopenia     Pulmonary embolism (H)     Obstructive sleep apnea     Subdural hematoma (H)     Chronic right shoulder pain     Lipoma of skin and subcutaneous tissue     History of pulmonary embolism     Arthritis of shoulder region, right     Past Surgical History:   Procedure Laterality Date     CATARACT EXTRACTION Bilateral 2015     CHOLECYSTECTOMY       HERNIA REPAIR Bilateral 1987    Inguinal     PROSTATECTOMY N/A     W/ Bilat Pelvic Lymphadenectomy     REVERSE  ARTHROPLASTY SHOULDER Right 2022    Procedure: Right reverse total shoulder arthroplasty;  Surgeon: Abel Tobar MD;  Location: [facility] OR     VITRECTOMY ANTERIOR Left 06/15/2015     ZZC THORACOTOMY,MAJOR,EXPLOR/BIOPSY  1983    VATs that found lipoma on chest wall, was concern for cancer.        Social History     Tobacco Use     Smoking status: Former Smoker     Packs/day: 0.50     Years: 5.00     Pack years: 2.50     Types: Cigarettes     Quit date: 1960     Years since quittin.3     Smokeless tobacco: Never Used   Substance Use Topics     Alcohol use: No     Family History   Problem Relation Age of Onset     Colon Cancer Father      Lung Cancer Father      Breast Cancer Sister 55     Sleep Apnea Sister      Aortic aneurysm Brother      Dementia Maternal Grandfather      Pneumonia Paternal Grandfather          Current Outpatient Medications   Medication Sig Dispense Refill     chlorthalidone (HYGROTON) 25 MG tablet [CHLORTHALIDONE (HYGROTEN) 25 MG TABLET] Take A 1/2 TABLETS (12.5 MG TOTAL) BY MOUTH DAILY. 45 tablet 3     cholecalciferol, vitamin D3, 1,000 unit tablet [CHOLECALCIFEROL, VITAMIN D3, 1,000 UNIT TABLET] Take 1,000 Units by mouth daily.       fexofenadine (ALLEGRA) 180 MG tablet [FEXOFENADINE (ALLEGRA) 180 MG TABLET] Take 180 mg by mouth daily.       multivitamin therapeutic tablet [MULTIVITAMIN THERAPEUTIC TABLET] Take 1 tablet by mouth daily.       triamcinolone (KENALOG) 0.1 % external cream Apply topically daily as needed for irritation       vit C/E/Zn/coppr/lutein/zeaxan (PRESERVISION AREDS-2 ORAL) Take 1 tablet by mouth 2 times daily        warfarin ANTICOAGULANT (COUMADIN) 2.5 MG tablet [WARFARIN ANTICOAGULANT (COUMADIN/JANTOVEN) 2.5 MG TABLET] Take 1 tablet by mouth on . Adjust dose based on INR results as directed. 30 tablet 1     warfarin ANTICOAGULANT (COUMADIN) 5 MG tablet Take 1 tablet (5 mg) by mouth daily [WARFARIN ANTICOAGULANT (COUMADIN/JANTOVEN) 5 MG TABLET]   "Adjust dose per INR results. 90 tablet 1     Allergies   Allergen Reactions     Cats Other (See Comments)     Respiratory congestion     Lisinopril Cough     Latex Itching     Latex adhesive- caused itching     Ace Inhibitors Cough     Chocolate Flavor Other (See Comments)     Pt gets sinus congestion from eating chocolate.     Corn [Corn Oil] Other (See Comments)     Pt gets sinus congestion from eating corn.     Fish Containing Products [Fish-Derived Products] Other (See Comments)     Pt gets sinus congestion from eating fish.     Levofloxacin Rash     Red line up the arm after IV administration     Nuts - Unspecified [Nuts] Other (See Comments)     Pt gets sinus congestion from eating nuts.     Penicillins Unknown     childhood     Ragweed Pollen [Ragweeds] Other (See Comments)     Sinus and chest congestion.         Review of Systems  Constitutional, HEENT, cardiovascular, pulmonary, GI, , musculoskeletal, neuro, skin, endocrine and psych systems are negative, except as otherwise noted.    OBJECTIVE:   /78 (BP Location: Right arm, Patient Position: Sitting, Cuff Size: Adult Regular)   Pulse 64   Ht 1.74 m (5' 8.5\")   Wt 75.6 kg (166 lb 11.2 oz)   BMI 24.98 kg/m   Estimated body mass index is 24.98 kg/m  as calculated from the following:    Height as of this encounter: 1.74 m (5' 8.5\").    Weight as of this encounter: 75.6 kg (166 lb 11.2 oz).  Physical Exam  Alert and oriented x3 with good mood and affect.  Clock face drawing normal and word recall normal.  Pupils NOSs equal and reactive.  Extraocular muscles intact, no jaundice or conjunctivitis.  External ears and nose exam is normal.  Wears hearing aids.  Minimal cerumen.  Normal tympanic membranes.  No cervical or supraclavicular or axillary adenopathy.  No JVD and no carotid bruits.  No thyromegaly or nodularity.  Lungs are clear to auscultation with good respiratory excursion.  Heart is regular with no murmur rub or gallop.  +1 pedal pulses.  " No ankle edema.  Mild hammertoes and dry skin of his big toes.  But no preulcerative calluses.  Mild decrease sensation on feet.  Abdomen is nonobese nontender no hepatosplenomegaly and no pulsatile abdominal mass.  He declined  exam.  Skin exam showed multiple benign-appearing moles, but no suspicious skin lesions noted.  Gait within normal limits and no parkinsonian signs.  Good range of motion on right shoulder.  ASSESSMENT / PLAN:   (Z00.00) Encounter for wellness examination in adult  (primary encounter diagnosis)  Comment: We discussed maintaining mobility and functionality with a regular exercise routine.  We discussed the least a 20-minute daily exercise, including regular walking.    Low cardiovascular risk, no evidence of vascular disease and excellent cholesterol levels with high HDL in the past.    He just got his COVID-19 vaccine booster 2 weeks ago otherwise up-to-date on immunizations  Plan: REVIEW OF HEALTH MAINTENANCE PROTOCOL ORDERS        Full code is already been documented    (I10) Essential hypertension  Comment: Controlled on chlorthalidone 12.5 mg a day  Plan: Lipid Profile        Not planning statin    (G47.33,  Z99.89) Obstructive sleep apnea on CPAP  Comment: Compliant with CPAP  Plan: Peripheral neuropathy associated with sleep apnea.  We discussed importance of foot care.  He does wear good orthotic shoes and does not have any preulcerative calluses.    (Z86.711) History of pulmonary embolism  Comment: Chronic warfarin.  Plan: INR            (Z85.46) History of prostate cancer  Comment: Prostatectomy 2011, not anticipated to recur.  No longer sees urology  Plan: PSA tumor marker, patient can choose no longer to check his PSA in the future            (Z85.828) History of basal cell carcinoma (BCC) of skin  Comment:   Plan: No evidence of recurrence, follow-up with dermatology in June    (H40.9) Glaucoma, unspecified glaucoma type, unspecified laterality  Comment: Controlled and managed  "by ophthalmology  Plan: Ophthalmology follow-up in August    (Z79.899) Encounter for long-term (current) use of medications  Comment:   Plan: CBC with platelets, Comprehensive metabolic         panel            (Z96.611) S/P reverse total shoulder arthroplasty, right  Comment: Doing well, continue physical therapy.  Plan:    History of chronic sinusitis, no major flare at this time.  Occasional postnasal drip.  Can continue on the Nasacort.  We did discuss saline irrigation or [place] pot, but he is not doing that at this time.  If he has increasing postnasal drip could consider increasing saline irrigation to once or twice a day.    Patient has been advised of split billing requirements and indicates understanding: No    COUNSELING:  Reviewed preventive health counseling, as reflected in patient instructions       Regular exercise       Healthy diet/nutrition       Vision screening    Estimated body mass index is 24.98 kg/m  as calculated from the following:    Height as of this encounter: 1.74 m (5' 8.5\").    Weight as of this encounter: 75.6 kg (166 lb 11.2 oz).        He reports that he quit smoking about 62 years ago. His smoking use included cigarettes. He has a 2.50 pack-year smoking history. He has never used smokeless tobacco.      Appropriate preventive services were discussed with this patient, including applicable screening as appropriate for cardiovascular disease, diabetes, osteopenia/osteoporosis, and glaucoma.  As appropriate for age/gender, discussed screening for colorectal cancer, prostate cancer, breast cancer, and cervical cancer. Checklist reviewing preventive services available has been given to the patient.    Reviewed patients plan of care and provided an AVS. The Basic Care Plan (routine screening as documented in Health Maintenance) for Edward meets the Care Plan requirement. This Care Plan has been established and reviewed with the Patient.    Counseling Resources:  ATP IV Guidelines  Pooled " Cohorts Equation Calculator  Breast Cancer Risk Calculator  Breast Cancer: Medication to Reduce Risk  FRAX Risk Assessment  ICSI Preventive Guidelines  Dietary Guidelines for Americans, 2010  USDA's MyPlate  ASA Prophylaxis  Lung CA Screening    Ascencion Calvillo MD  [facility]    Identified Health Risks:

## 2022-04-14 NOTE — PROGRESS NOTES
ANTICOAGULATION MANAGEMENT     Edward Mendoza JR 81 year old male is on warfarin with therapeutic INR result. (Goal INR 2.0-3.0)    Recent labs: (last 7 days)     04/14/22  1515   INR 3.0*       ASSESSMENT       Source(s): Chart Review, Patient/Caregiver Call and Template       Warfarin doses taken: Warfarin taken as instructed    Diet: Decreased greens/vitamin K in diet; plans to resume previous intake    New illness, injury, or hospitalization: No    Medication/supplement changes: None noted    Signs or symptoms of bleeding or clotting: No    Previous INR: Therapeutic last 2(+) visits    Additional findings: None       PLAN     Recommended plan for temporary change(s) affecting INR     Dosing Instructions: continue your current warfarin dose with next INR in 6 weeks       Summary  As of 4/14/2022    Full warfarin instructions:  7.5 mg every Mon; 5 mg all other days   Next INR check:  5/26/2022             Telephone call with Edward who verbalizes understanding and agrees to plan    Lab visit scheduled    Education provided: Please call back if any changes to your diet, medications or how you've been taking warfarin, Importance of consistent vitamin K intake and Importance of therapeutic range    Plan made per [facility] anticoagulation protocol    Sussy Lane RN  Anticoagulation Clinic  4/14/2022    _______________________________________________________________________     Anticoagulation Episode Summary     Current INR goal:  2.0-3.0   TTR:  94.4 % (1 y)   Target end date:  Indefinite   Send INR reminders to:  CHRISTA PHELPS    Indications    History of pulmonary embolism [Z86.711]           Comments:           Anticoagulation Care Providers     Provider Role Specialty Phone number    Ascencion Calvillo MD Referring Internal Medicine [phone]

## 2022-05-02 ENCOUNTER — TRANSFERRED RECORDS (OUTPATIENT)
Dept: HEALTH INFORMATION MANAGEMENT | Facility: CLINIC | Age: 82
End: 2022-05-02
Payer: COMMERCIAL

## 2022-05-19 ENCOUNTER — ANTICOAGULATION THERAPY VISIT (OUTPATIENT)
Dept: ANTICOAGULATION | Facility: CLINIC | Age: 82
End: 2022-05-19

## 2022-05-19 ENCOUNTER — LAB (OUTPATIENT)
Dept: LAB | Facility: CLINIC | Age: 82
End: 2022-05-19
Payer: COMMERCIAL

## 2022-05-19 DIAGNOSIS — Z86.711 HISTORY OF PULMONARY EMBOLISM: Primary | ICD-10-CM

## 2022-05-19 DIAGNOSIS — Z86.711 HISTORY OF PULMONARY EMBOLISM: ICD-10-CM

## 2022-05-19 LAB — INR BLD: 2.6 (ref 0.9–1.1)

## 2022-05-19 PROCEDURE — 36416 COLLJ CAPILLARY BLOOD SPEC: CPT

## 2022-05-19 PROCEDURE — 85610 PROTHROMBIN TIME: CPT

## 2022-05-19 RX ORDER — WARFARIN SODIUM 2.5 MG/1
TABLET ORAL
Qty: 60 TABLET | Refills: 0 | Status: SHIPPED | OUTPATIENT
Start: 2022-05-19 | End: 2023-04-17

## 2022-05-19 NOTE — PROGRESS NOTES
ANTICOAGULATION MANAGEMENT     Edward Mendoza JR 81 year old male is on warfarin with therapeutic INR result. (Goal INR 2.0-3.0)    Recent labs: (last 7 days)     05/19/22  0904   INR 2.6*       ASSESSMENT       Source(s): Chart Review, Patient/Caregiver Call and Template       Warfarin doses taken: Warfarin taken as instructed    Diet: No new diet changes identified    New illness, injury, or hospitalization: No    Medication/supplement changes: None noted    Signs or symptoms of bleeding or clotting: No    Previous INR: Therapeutic last 2(+) visits    Additional findings: None       PLAN     Recommended plan for no diet, medication or health factor changes affecting INR     Dosing Instructions: continue your current warfarin dose with next INR in 6 weeks       Summary  As of 5/19/2022    Full warfarin instructions:  7.5 mg every Mon; 5 mg all other days   Next INR check:  6/30/2022             Telephone call with Edward who verbalizes understanding and agrees to plan    Lab visit scheduled    Education provided: Please call back if any changes to your diet, medications or how you've been taking warfarin    Plan made per [facility] anticoagulation protocol    Elizabeth Barclay RN  Anticoagulation Clinic  5/19/2022    _______________________________________________________________________     Anticoagulation Episode Summary     Current INR goal:  2.0-3.0   TTR:  96.8 % (1 y)   Target end date:  Indefinite   Send INR reminders to:  CHRISTA PHELPS    Indications    History of pulmonary embolism [Z86.711]           Comments:           Anticoagulation Care Providers     Provider Role Specialty Phone number    Ascencion Calvillo MD Referring Internal Medicine [phone]

## 2022-05-19 NOTE — PROGRESS NOTES
ANTICOAGULATION MANAGEMENT:  Medication Refill  Patient asked for Warfarin 2.5 mg tabs to be filled.    Anticoagulation Summary  As of 5/19/2022    Warfarin maintenance plan:  7.5 mg (5 mg x 1.5) every Mon; 5 mg (5 mg x 1) all other days   Next INR check:  6/30/2022   Target end date:  Indefinite    Indications    History of pulmonary embolism [Z86.711]             Anticoagulation Care Providers     Provider Role Specialty Phone number    Ascencion Calvillo MD Referring Internal Medicine [phone]          Visit with referring provider/group within last year: Yes    [facility] referral signed within last year: Yes    Edward meets all criteria for refill (current [facility] referral, office visit with referring provider/group in last year, lab monitoring up to date or not exceeding 2 weeks overdue). Rx instructions and quantity supplied updated to match patient's current dosing plan. 60 day supply with 0 refills granted per patient request and per [facility] protocol     Elizabeth Barclay RN  Anticoagulation Clinic

## 2022-05-20 ENCOUNTER — NURSE TRIAGE (OUTPATIENT)
Dept: NURSING | Facility: CLINIC | Age: 82
End: 2022-05-20
Payer: COMMERCIAL

## 2022-05-20 NOTE — TELEPHONE ENCOUNTER
"Triage Call:    Pt and his wife calling to report the following:     Pt thought he was having allergy problems   Post nasal drip  Sore throat  Feeling \"warn-out\"  Slight headache  T: 97.5    Covid-19 test was positive yesterday    Pt is in the high risk category for developing complication from COVID-19 and would like to speak with a provider about paxlovid.     Disposition: Virtual Appt. Pt was given care advice and warm-transferred to scheduling to make this virtual appt.     Saida Baker RN  [facility] Nurse Advisor 11:45 AM 5/20/2022    How can I protect others?    These guidelines are for isolating before returning to work, school or .       If you DO have symptoms:  o Stay home and away from others  - For at least 5 days after your symptoms started, AND   - You are fever free for 24 hours (with no medicine that reduces fever), AND  - Your other symptoms are better.  o Wear a mask for 10 full days any time you are around others.    If you DON'T have symptoms:  o Stay at home and away from others for at least 5 days after your positive test.  o Wear a mask for 10 full days any time you are around others.    Reason for Disposition    HIGH RISK for severe COVID complications (e.g., weak immune system, age > 64 years, obesity with BMI > 25, pregnant, chronic lung disease or other chronic medical condition) (Exception: Already seen by PCP and no new or worsening symptoms.)    Additional Information    Negative: SEVERE difficulty breathing (e.g., struggling for each breath, speaks in single words)    Negative: Difficult to awaken or acting confused (e.g., disoriented, slurred speech)    Negative: Bluish (or gray) lips or face now    Negative: Shock suspected (e.g., cold/pale/clammy skin, too weak to stand, low BP, rapid pulse)    Negative: Sounds like a life-threatening emergency to the triager    Negative: [1] Diagnosed or suspected COVID-19 AND [2] symptoms lasting 3 or more weeks    Negative: [1] " COVID-19 exposure AND [2] no symptoms    Negative: COVID-19 vaccine reaction suspected (e.g., fever, headache, muscle aches) occurring 1 to 3 days after getting vaccine    Negative: COVID-19 vaccine, questions about    Negative: [1] Lives with someone known to have influenza (flu test positive) AND [2] flu-like symptoms (e.g., cough, runny nose, sore throat, SOB; with or without fever)    Negative: [1] Adult with possible COVID-19 symptoms AND [2] triager concerned about severity of symptoms or other causes    Negative: COVID-19 and breastfeeding, questions about    Negative: SEVERE or constant chest pain or pressure  (Exception: Mild central chest pain, present only when coughing.)    Negative: MODERATE difficulty breathing (e.g., speaks in phrases, SOB even at rest, pulse 100-120)    Negative: Headache and stiff neck (can't touch chin to chest)    Negative: Oxygen level (e.g., pulse oximetry) 90 percent or lower    Negative: Chest pain or pressure    Negative: Patient sounds very sick or weak to the triager    Negative: MILD difficulty breathing (e.g., minimal/no SOB at rest, SOB with walking, pulse <100)    Negative: Fever > 103 F (39.4 C)    Negative: [1] Fever > 101 F (38.3 C) AND [2] over 60 years of age    Negative: [1] Fever > 100.0 F (37.8 C) AND [2] bedridden (e.g., nursing home patient, CVA, chronic illness, recovering from surgery)    Protocols used: CORONAVIRUS (COVID-19) DIAGNOSED OR [id number] 1.18.2022

## 2022-05-22 ENCOUNTER — VIRTUAL VISIT (OUTPATIENT)
Dept: URGENT CARE | Facility: CLINIC | Age: 82
End: 2022-05-22
Payer: COMMERCIAL

## 2022-05-22 DIAGNOSIS — U07.1 CLINICAL DIAGNOSIS OF COVID-19: Primary | ICD-10-CM

## 2022-05-22 PROCEDURE — 99441 PR PHYSICIAN TELEPHONE EVALUATION 5-10 MIN: CPT | Mod: 95

## 2022-05-22 NOTE — PROGRESS NOTES
"Edward is a 81 year old who is being evaluated via a billable telephone visit.        Assessment & Plan     Clinical diagnosis of COVID-19    Treat with Paxlovid.    - nirmatrelvir and ritonavir (PAXLOVID) therapy pack; Take 3 tablets by mouth 2 times daily for 5 days Take 2 Nirmatrelvir tablets and 1 Ritonavir tablet twice daily for 5 days.             COVID-19 positive patient.  Encounter for consideration of medication intervention. Patient does qualify for a prescription. Full discussion with patient including medication options, risks and benefits. Potential drug interactions reviewed with patient.     Treatment Planned Paxlovid, Rx sent to [place] pharmacy- [facility]    Temporary change to home medications:  None - monitor INR    Estimated body mass index is 24.98 kg/m  as calculated from the following:    Height as of 4/14/22: 1.74 m (5' 8.5\").    Weight as of 4/14/22: 75.6 kg (166 lb 11.2 oz).  GFR Estimate   Date Value Ref Range Status   04/14/2022 86 >60 mL/min/1.73m2 Final     Comment:     Effective December 21, 2021 eGFRcr in adults is calculated using the 2021 CKD-EPI creatinine equation which includes age and gender (Cat et al., NEJM, DOI: 10.1056/CIULre4170767)   03/11/2021 >60 >60 mL/min/1.73m2 Final     Lab Results   Component Value Date    EBFSR07KPD Negative 01/28/2022       No follow-ups on file.    Virtual Urgent Care  [facility]    Subjective   Edward is a 81 year old who presents for the following health issues     HPI       COVID-19 Symptom Review  How many days ago did these symptoms start? 5/18    Are any of the following symptoms significant for you?    New or worsening difficulty breathing? No    Worsening cough? Yes, it's a dry cough.     Fever or chills? No    Headache: YES    Sore throat: YES    Chest pain: no    Diarrhea: no    Body aches? YES- and fatigue    Nasal congestion    What treatments has patient tried? Antihistamines    Does patient live in a " nursing home, group home, or shelter? no  Does patient have a way to get food/medications during quarantined? Yes, I have a friend or family member who can help me.            Review of Systems         Objective           Vitals:  No vitals were obtained today due to virtual visit.    Physical Exam   healthy, alert and no distress  PSYCH: Alert and oriented times 3; coherent speech, normal   rate and volume, able to articulate logical thoughts, able   to abstract reason, no tangential thoughts, no hallucinations   or delusions  His affect is normal and pleasant  RESP: No cough, no audible wheezing, able to talk in full sentences  Remainder of exam unable to be completed due to telephone visits                Phone call duration: 8 minutes

## 2022-05-22 NOTE — PATIENT INSTRUCTIONS
"  Discharge Instructions for COVID-19 Patients  You have--or may have--COVID-19. Please follow the instructions listed below.   If you have a weakened immune system, discuss with your doctor any other actions you need to take.  How can I protect others?  If you have symptoms (fever, cough, body aches or trouble breathing):    Stay home and away from others (self-isolate) until:  ? Your other symptoms have resolved (gotten better). And   ? You've had no fever--and no medicine that reduces fever--for 1 full day (24 hours). And   ? At least 10 days have passed since your symptoms started. (You may need to wait 20 days. Follow the advice of your care team.)  If you don't show symptoms, but testing showed that you have COVID-19:    Stay home and away from others (self-isolate) until at least 10 days have passed since the date of your first positive COVID-19 test.  During this time    Stay in your own room, even for meals. Use your own bathroom if you can.    Stay away from others in your home. No hugging, kissing or shaking hands. No visitors.    Don't go to work, school or anywhere else.    Clean \"high touch\" surfaces often (doorknobs, counters, handles). Use household cleaning spray or wipes.    You'll find a full list of  on the EPA website: www.epa.gov/pesticide-registration/list-n-disinfectants-use-against-sars-cov-2.    Cover your mouth and nose with a mask or other face covering to avoid spreading germs.    Wash your hands and face often. Use soap and water.    Caregivers in these groups are at risk for severe illness due to COVID-19:  ? People 65 years and older  ? People who live in a nursing home or long-term care facility  ? People with chronic disease (lung, heart, cancer, diabetes, kidney, liver, immunologic)  ? People who have a weakened immune system, including those who:    Are in cancer treatment    Take medicine that weakens the immune system, such as corticosteroids    Had a bone marrow or " organ transplant    Have an immune deficiency    Have poorly controlled HIV or AIDS    Are obese (body mass index of 40 or higher)    Smoke regularly    Caregivers should wear gloves while washing dishes, handling laundry and cleaning bedrooms and bathrooms.    Use caution when washing and drying laundry: Don't shake dirty laundry and use the warmest water setting that you can.    For more tips on managing your health at home, go to www.cdc.gov/coronavirus/2019-ncov/downloads/10Things.pdf.  How can I take care of myself at home?  1. Get lots of rest. Drink extra fluids (unless a doctor has told you not to).  2. Take Tylenol (acetaminophen) for fever or pain. If you have liver or kidney problems, ask your family doctor if it's okay to take Tylenol.   Adults can take either:   ? 650 mg (two 325 mg pills) every 4 to 6 hours, or   ? 1,000 mg (two 500 mg pills) every 8 hours as needed.  ? Note: Don't take more than 3,000 mg in one day. Acetaminophen is found in many medicines (both prescribed and over-the-counter medicines). Read all labels to be sure you don't take too much.   For children, check the Tylenol bottle for the right dose. The dose is based on the child's age or weight.  3. If you have other health problems (like cancer, heart failure, an organ transplant or severe kidney disease): Call your specialty clinic if you don't feel better in the next 2 days.  4. Know when to call 911. Emergency warning signs include:  ? Trouble breathing or shortness of breath  ? Pain or pressure in the chest that doesn't go away  ? Feeling confused like you haven't felt before, or not being able to wake up  ? Bluish-colored lips or face  5. Your doctor may have prescribed a blood thinner medicine. Follow their instructions.  Where can I get more information?     [facility] - About COVID-19:   [email or web address]    CDC - What to Do If You're Sick:  www.cdc.gov/coronavirus/2019-ncov/about/steps-when-sick.html    CDC - Ending Home Isolation: www.cdc.gov/coronavirus/2019-ncov/hcp/disposition-in-home-patients.html    CDC - Caring for Someone: www.cdc.gov/coronavirus/2019-ncov/if-you-are-sick/care-for-someone.html    [facility] - Interim Guidance for Hospital Discharge to Home: www.health.[facility].[place]./diseases/coronavirus/hcp/hospdischarge.pdf    Below are the COVID-19 hotlines at the [place] Department of Health ([facility]). Interpreters are available.  ? For health questions: Call 929-877-5787 or 1-959.639.3850 (7 a.m. to 7 p.m.)  ? For questions about schools and childcare: Call 143-853-4198 or 1-878.537.4525 (7 a.m. to 7 p.m.)    For informational purposes only. Not to replace the advice of your health care provider. Clinically reviewed by Dr. Mustapha Kiser.   Copyright   2020 [facility]. All rights reserved. [employer] [id number] - REV 01/05/21.

## 2022-05-23 ENCOUNTER — TELEPHONE (OUTPATIENT)
Dept: INTERNAL MEDICINE | Facility: CLINIC | Age: 82
End: 2022-05-23
Payer: COMMERCIAL

## 2022-05-23 DIAGNOSIS — Z86.711 HISTORY OF PULMONARY EMBOLISM: Primary | ICD-10-CM

## 2022-05-23 NOTE — TELEPHONE ENCOUNTER
Patient is on new medications and is requesting to speak with Sussy to see if he needs to dosing instructions. Please call him at [phone].  Tita Geronimo   [facility]  Central Scheduler

## 2022-05-23 NOTE — TELEPHONE ENCOUNTER
Spoke with Edward and he tested positive for covid 19 last week. He is starting to feel better but they started him on Paxlovid yesterday. Discussed potential for interaction with his warfarin making INR higher or lower. INR appointment scheduled for next week.

## 2022-06-02 ENCOUNTER — ANTICOAGULATION THERAPY VISIT (OUTPATIENT)
Dept: ANTICOAGULATION | Facility: CLINIC | Age: 82
End: 2022-06-02

## 2022-06-02 ENCOUNTER — LAB (OUTPATIENT)
Dept: LAB | Facility: CLINIC | Age: 82
End: 2022-06-02
Payer: COMMERCIAL

## 2022-06-02 DIAGNOSIS — Z86.711 HISTORY OF PULMONARY EMBOLISM: ICD-10-CM

## 2022-06-02 DIAGNOSIS — Z86.711 HISTORY OF PULMONARY EMBOLISM: Primary | ICD-10-CM

## 2022-06-02 LAB — INR BLD: 1.9 (ref 0.9–1.1)

## 2022-06-02 PROCEDURE — 36416 COLLJ CAPILLARY BLOOD SPEC: CPT

## 2022-06-02 PROCEDURE — 85610 PROTHROMBIN TIME: CPT

## 2022-06-02 NOTE — PROGRESS NOTES
ANTICOAGULATION MANAGEMENT     Edward Mendoza JR 81 year old male is on warfarin with subtherapeutic INR result. (Goal INR 2.0-3.0)    Recent labs: (last 7 days)     06/02/22  0855   INR 1.9*       ASSESSMENT       Source(s): Chart Review, Patient/Caregiver Call and Template       Warfarin doses taken: Warfarin taken as instructed    Diet: No new diet changes identified    New illness, injury, or hospitalization: Yes: positive for covid    Medication/supplement changes: Paxlovid 5 day course (dates: 5/23-5/27) which has potential for interaction; decreasing INR    Signs or symptoms of bleeding or clotting: No    Previous INR: Therapeutic last 2(+) visits    Additional findings: None       PLAN     Recommended plan for temporary change(s) affecting INR     Dosing Instructions: booster dose then continue your current warfarin dose with next INR in 2 weeks       Summary  As of 6/2/2022    Full warfarin instructions:  6/2: 7.5 mg; Otherwise 7.5 mg every Mon; 5 mg all other days   Next INR check:  6/16/2022             Telephone call with Edward who verbalizes understanding and agrees to plan    kept lab appt already scheduled for 6/22    Education provided: Please call back if any changes to your diet, medications or how you've been taking warfarin, Goal range and significance of current result and Importance of therapeutic range    Plan made per [facility] anticoagulation protocol    Sussy Lane RN  Anticoagulation Clinic  6/2/2022    _______________________________________________________________________     Anticoagulation Episode Summary     Current INR goal:  2.0-3.0   TTR:  96.3 % (1 y)   Target end date:  Indefinite   Send INR reminders to:  CHRISTA PHELPS    Indications    History of pulmonary embolism [Z86.711]           Comments:           Anticoagulation Care Providers     Provider Role Specialty Phone number    Ascecnion Calvillo MD Referring Internal Medicine [phone]

## 2022-06-22 ENCOUNTER — LAB (OUTPATIENT)
Dept: LAB | Facility: CLINIC | Age: 82
End: 2022-06-22
Payer: COMMERCIAL

## 2022-06-22 ENCOUNTER — ANTICOAGULATION THERAPY VISIT (OUTPATIENT)
Dept: ANTICOAGULATION | Facility: CLINIC | Age: 82
End: 2022-06-22

## 2022-06-22 DIAGNOSIS — Z86.711 HISTORY OF PULMONARY EMBOLISM: Primary | ICD-10-CM

## 2022-06-22 DIAGNOSIS — Z86.711 HISTORY OF PULMONARY EMBOLISM: ICD-10-CM

## 2022-06-22 LAB — INR BLD: 2.4 (ref 0.9–1.1)

## 2022-06-22 PROCEDURE — 36416 COLLJ CAPILLARY BLOOD SPEC: CPT

## 2022-06-22 PROCEDURE — 85610 PROTHROMBIN TIME: CPT

## 2022-06-22 NOTE — PROGRESS NOTES
ANTICOAGULATION MANAGEMENT     Edward Mendoza JR 82 year old male is on warfarin with therapeutic INR result. (Goal INR 2.0-3.0)    Recent labs: (last 7 days)     06/22/22  0940   INR 2.4*       ASSESSMENT       Source(s): Chart Review, Patient/Caregiver Call and Template       Warfarin doses taken: Warfarin taken as instructed    Diet: No new diet changes identified    New illness, injury, or hospitalization: No    Medication/supplement changes: None noted    Signs or symptoms of bleeding or clotting: No    Previous INR: Subtherapeutic    Additional findings: None       PLAN     Recommended plan for no diet, medication or health factor changes affecting INR     Dosing Instructions: continue your current warfarin dose with next INR in 4 weeks   It has been 3 weeks since last check.    Summary  As of 6/22/2022    Full warfarin instructions:  7.5 mg every Mon; 5 mg all other days   Next INR check:  7/20/2022             Telephone call with Edward who verbalizes understanding and agrees to plan    Lab visit scheduled    Education provided: Please call back if any changes to your diet, medications or how you've been taking warfarin and Importance of therapeutic range    Plan made per [facility] anticoagulation protocol    Sussy Lane RN  Anticoagulation Clinic  6/22/2022    _______________________________________________________________________     Anticoagulation Episode Summary     Current INR goal:  2.0-3.0   TTR:  95.2 % (1 y)   Target end date:  Indefinite   Send INR reminders to:  CHRISTA PHELPS    Indications    History of pulmonary embolism [Z86.711]           Comments:           Anticoagulation Care Providers     Provider Role Specialty Phone number    Ascencion Calvillo MD Referring Internal Medicine [phone]

## 2022-07-14 ENCOUNTER — TRANSFERRED RECORDS (OUTPATIENT)
Dept: HEALTH INFORMATION MANAGEMENT | Facility: CLINIC | Age: 82
End: 2022-07-14

## 2022-07-20 ENCOUNTER — ANTICOAGULATION THERAPY VISIT (OUTPATIENT)
Dept: ANTICOAGULATION | Facility: CLINIC | Age: 82
End: 2022-07-20

## 2022-07-20 ENCOUNTER — LAB (OUTPATIENT)
Dept: LAB | Facility: CLINIC | Age: 82
End: 2022-07-20
Payer: COMMERCIAL

## 2022-07-20 DIAGNOSIS — Z86.711 HISTORY OF PULMONARY EMBOLISM: Primary | ICD-10-CM

## 2022-07-20 DIAGNOSIS — Z86.711 HISTORY OF PULMONARY EMBOLISM: ICD-10-CM

## 2022-07-20 LAB — INR BLD: 3 (ref 0.9–1.1)

## 2022-07-20 PROCEDURE — 36416 COLLJ CAPILLARY BLOOD SPEC: CPT

## 2022-07-20 PROCEDURE — 85610 PROTHROMBIN TIME: CPT

## 2022-07-20 NOTE — PROGRESS NOTES
ANTICOAGULATION MANAGEMENT     Edward Mendoza JR 82 year old male is on warfarin with therapeutic INR result. (Goal INR 2.0-3.0)    Recent labs: (last 7 days)     07/20/22  0929   INR 3.0*       ASSESSMENT       Source(s): Chart Review and Patient/Caregiver Call       Warfarin doses taken: Warfarin taken as instructed    Diet: No new diet changes identified    New illness, injury, or hospitalization: No    Medication/supplement changes: None noted    Signs or symptoms of bleeding or clotting: No    Previous INR: Therapeutic last visit; previously outside of goal range    Additional findings: None       PLAN     Recommended plan for no diet, medication or health factor changes affecting INR     Dosing Instructions: continue your current warfarin dose with next INR in 4 weeks       Summary  As of 7/20/2022    Full warfarin instructions:  7.5 mg every Mon; 5 mg all other days   Next INR check:  8/17/2022             Telephone call with Edward who verbalizes understanding and agrees to plan    Lab visit scheduled    Education provided: None required    Plan made per [facility] anticoagulation protocol    Thaddeus Shaw RN  Anticoagulation Clinic  7/20/2022    _______________________________________________________________________     Anticoagulation Episode Summary     Current INR goal:  2.0-3.0   TTR:  95.2 % (1 y)   Target end date:  Indefinite   Send INR reminders to:  CHRISTA PHELPS    Indications    History of pulmonary embolism [Z86.711]           Comments:           Anticoagulation Care Providers     Provider Role Specialty Phone number    Ascencion Calvillo MD Referring Internal Medicine [phone]

## 2022-07-29 ENCOUNTER — NURSE TRIAGE (OUTPATIENT)
Dept: NURSING | Facility: CLINIC | Age: 82
End: 2022-07-29

## 2022-07-29 NOTE — TELEPHONE ENCOUNTER
"Sneezing runny nose chills  Tested positive from home test  Onset one day ago  High risk for ageCoronavirus (COVID-19) Notification    Caller Name (Patient, parent, daughter/son, grandparent, etc)  Edward Mendoza Jr/Self    Reason for call  Notify of Positive Coronavirus (COVID-19) lab results, assess symptoms,  review  [employer] recommendations    Lab Result    Lab test:  2019-nCoV rRt-PCR or SARS-CoV-2 PCR    Oropharyngeal AND/OR nasopharyngeal swabs is POSITIVE for 2019-nCoV RNA/SARS-COV-2 PCR (COVID-19 virus)    Brief introduction script  Introduce self then review script:  \"I am calling on behalf of [employer].  We were notified that your Coronavirus test (COVID-19) for was POSITIVE for the virus.\"    Gather patient reported symptoms   Assessment   Current Symptoms at time of phone call, reported by patient: (if no symptoms, document No symptoms] Sneezing, runny nose, chills   Date of Symptom(s) onset (if applicable) One day ago 07/28/22     If at time of call, Patients symptoms hare worsened, the Patient should contact 911 or have someone drive them to Emergency Dept promptly:      If Patient calling 911, inform 911 personal that you have tested positive for the Coronavirus (COVID-19).  Place mask on and await 911 to arrive.    If Emergency Dept, If possible, please have another adult drive you to the Emergency Dept but you need to wear mask when in contact with other people.      Monoclonal Antibody Administration    You may be eligible to receive a new treatment with a monoclonal antibody for preventing hospitalization in patients at high risk for complications from COVID-19. This medication is still experimental and available on a limited basis; it is given through an IV and must be given at an infusion center. Please note that not all people who are eligible will receive the medication since it is in limited supply.  Is the patient symptomatic and onset of symptoms within the last 7 days?  Yes  Is " the patient interested in a visit with a provider to discuss treatment options?: Yes  Is the patient seen at [facility]?  No: Warm transfer caller to [phone] to be scheduled with a virtual urgent provider.  During transfer, instruct  on appropriate time frame for visit     Review information with Patient    Your result was positive. This means you have COVID-19 (coronavirus).      How can I protect others?    These guidelines are for isolating before returning to work, school or .       If you DO have symptoms:  o Stay home and away from others  - For at least 5 days after your symptoms started, AND   - You are fever free for 24 hours (with no medicine that reduces fever), AND  - Your other symptoms are better.  o Wear a mask for 10 full days any time you are around others.    If you DON'T have symptoms:  o Stay at home and away from others for at least 5 days after your positive test.  o Wear a mask for 10 full days any time you are around others.    There may be different guidelines for healthcare facilities. Please check with the specific sites before arriving.     If you've been told by a doctor that you were severely ill with COVID-19 or are immunocompromised, you should isolate for at least 10 days.    You should not go back to work until you meet the guidelines above for ending your home isolation. You don't need to be retested for COVID-19 before going back to work--studies show that you won't spread the virus if it's been at least 10 days since your symptoms started (or 20 days, if you have a weak immune system).    Employers, schools, and daycares: This is an official notice for this person's medical guidelines for returning in-person. They must meet the above guidelines before going back to work, school, or  in person.    You will receive a positive COVID-19 letter via [employer] or the mail soon with additional self-care information.      Would you like me to review  some of that information with you now?  Yes    How can I take care of myself?      Get lots of rest. Drink extra fluids (unless a doctor has told you not to).      Take Tylenol (acetaminophen) for fever or pain. If you have liver or kidney problems, ask your family doctor if it's okay to take Tylenol.     Take either:     650 mg (two 325 mg pills) every 4 to 6 hours, or     1,000 mg (two 500 mg pills) every 8 hours as needed.     Note: Do not take more than 3,000 mg in one day. Acetaminophen is found in many medicines (both prescribed and over-the-counter medicines). Read all labels to be sure you don't take too much.    For children, check the Tylenol bottle for the right dose (based on their age or weight).      If you have other health problems (like cancer, heart failure, an organ transplant or severe kidney disease): Call your specialty clinic if you don't feel better in the next 2 days.      Know when to call 911: Emergency warning signs include:    Trouble breathing or shortness of breath    Pain or pressure in the chest that doesn't go away    Feeling confused like you haven't felt before, or not being able to wake up    Bluish-colored lips or face        If you were tested for an upcoming procedure, please contact your provider for next steps.     Anna Renteria RN    Reason for Disposition    HIGH RISK for severe COVID complications (e.g., weak immune system, age > 64 years, obesity with BMI > 25, pregnant, chronic lung disease or other chronic medical condition) (Exception: Already seen by PCP and no new or worsening symptoms.)    Additional Information    Negative: SEVERE difficulty breathing (e.g., struggling for each breath, speaks in single words)    Negative: Difficult to awaken or acting confused (e.g., disoriented, slurred speech)    Negative: Bluish (or gray) lips or face now    Negative: Shock suspected (e.g., cold/pale/clammy skin, too weak to stand, low BP, rapid pulse)    Negative:  Sounds like a life-threatening emergency to the triager    Negative: [1] Diagnosed or suspected COVID-19 AND [2] symptoms lasting 3 or more weeks    Negative: [1] COVID-19 exposure AND [2] no symptoms    Negative: COVID-19 vaccine reaction suspected (e.g., fever, headache, muscle aches) occurring 1 to 3 days after getting vaccine    Negative: COVID-19 vaccine, questions about    Negative: [1] Lives with someone known to have influenza (flu test positive) AND [2] flu-like symptoms (e.g., cough, runny nose, sore throat, SOB; with or without fever)    Negative: [1] Adult with possible COVID-19 symptoms AND [2] triager concerned about severity of symptoms or other causes    Negative: COVID-19 and breastfeeding, questions about    Negative: SEVERE or constant chest pain or pressure  (Exception: Mild central chest pain, present only when coughing.)    Negative: MODERATE difficulty breathing (e.g., speaks in phrases, SOB even at rest, pulse 100-120)    Negative: Headache and stiff neck (can't touch chin to chest)    Negative: Oxygen level (e.g., pulse oximetry) 90 percent or lower    Negative: Chest pain or pressure    Negative: Patient sounds very sick or weak to the triager    Negative: MILD difficulty breathing (e.g., minimal/no SOB at rest, SOB with walking, pulse <100)    Negative: Fever > 103 F (39.4 C)    Negative: [1] Fever > 101 F (38.3 C) AND [2] over 60 years of age    Negative: [1] Fever > 100.0 F (37.8 C) AND [2] bedridden (e.g., nursing home patient, CVA, chronic illness, recovering from surgery)    Protocols used: CORONAVIRUS (COVID-19) DIAGNOSED OR [id number]-M-ND 1.18.2022

## 2022-07-31 ENCOUNTER — VIRTUAL VISIT (OUTPATIENT)
Dept: URGENT CARE | Facility: CLINIC | Age: 82
End: 2022-07-31
Payer: COMMERCIAL

## 2022-07-31 DIAGNOSIS — R05.9 COUGH: ICD-10-CM

## 2022-07-31 DIAGNOSIS — U07.1 INFECTION DUE TO 2019 NOVEL CORONAVIRUS: Primary | ICD-10-CM

## 2022-07-31 PROCEDURE — 99442 PR PHYSICIAN TELEPHONE EVALUATION 11-20 MIN: CPT | Mod: CS | Performed by: INTERNAL MEDICINE

## 2022-07-31 NOTE — PATIENT INSTRUCTIONS
Paxlovid twice daily 5 day course    May affect INR levels  Recheck INR in the next days    You may continue other medications without dose adjustment    Consider oximeter monitor.  Monitor temperature    Plans to recheck with primary as concerned may need 10 day course.  Recommend virtual visit in 5 days.      Instructions for Patients  {Choose where to send COVID19 patient based on nurse triage or clinical judgement of symptom severity & add additional information if desired (Optional):[id number]}    What are the symptoms of COVID-19?  Symptoms can include fever, cough, shortness of breath, chills, headache, muscle pain sore throat, fatigue, runny or stuffy nose, and loss of taste and smell. Other less common symptoms include nausea, vomiting, or diarrhea (watery stools).    Know when to call 911. Emergency warning signs include:    Trouble breathing or shortness of breath    Pain or pressure in the chest that doesn't go away    Feeling confused like you haven't felt before, or not being able to wake up    Bluish-colored lips or face    How can I take care of myself?  1. Get lots of rest. Drink extra fluids (unless a doctor has told you not to).  2. Take Tylenol (acetaminophen) for fever or pain. If you have liver or kidney problems, ask your family doctor if it's okay to take Tylenol   Adults:   650 mg (two 325 mg pills or tablets) every 4 to 6 hours, or...   1,000 mg (two 500 mg pills or tablets) every 8 hours as needed.  Note: Don't take more than 3,000 mg in one day. Acetaminophen is found in many medicines (both prescribed and over the counter). Read all labels to be sure you don't take too much.  For children, check the Tylenol bottle for the right dose. The dose is based on the child's age or weight.  3. Take over the counter medicines for your symptoms as needed. Talk to your pharmacist.  4. If you have other health problems (like cancer, heart failure, an organ transplant, or severe kidney disease): Call your  specialty clinic if you don't feel better in the next 2 days.    These guidelines are for isolating and quarantining before returning to work, school or .     For employers, schools and day cares: This is an official notice for this person s medical guidelines for returning in-person.     For health care sites: The CDC gives different isolation and quarantine guidelines for healthcare sites, please check with these sites before arriving.     How do I self-isolate?  You isolate when you have symptoms of COVID or a test shows you have COVID, even if you don t have symptoms.     If you DO have symptoms:  o Stay home and away from others  - For at least 5 days after your symptoms started, AND   - You are fever free for 24 hours (with no medicine that reduces fever), AND  - Your other symptoms are better.  o Wear a mask for 10 full days any time you are around others.    If you DON T have symptoms:  o Stay at home and away from others for at least 5 days after your positive test.  o Wear a mask for 10 full days any time you are around others.    How and when do I quarantine?  You quarantine when you may have been exposed to the virus and DON T have symptoms.     Stay home and away from others.     You must quarantine for 5 days after your last contact with a person who has COVID.  o Note: If you are fully vaccinated, you don t need to quarantine. You should still follow the steps below.     Wear a mask for 10 full days any time you re around others.    Get tested at least 5 days after you were exposed, even if you don t have symptoms.     If you start to have symptoms, isolate right away and get tested.    Where can I get more information?    [facility] COVID-19 Resource Hub: [email or web address]     CDC Quarantine & Isolation: https://www.cdc.gov/coronavirus/2019-ncov/your-health/quarantine-isolation.html     CDC - What to Do If You're Sick:  https://www.cdc.gov/coronavirus/2019-ncov/if-you-are-sick/index.html    [facility] clinical trials (COVID-19 research studies): [email or web address]    [place] Department of Health COVID-19 Public Hotline: 1-673.711.9816

## 2022-07-31 NOTE — PROGRESS NOTES
"Edward is a 82 year old who is being evaluated via a billable telephone visit.      How would you like to obtain your AVS? MyChart        Assessment & Plan   Problem List Items Addressed This Visit    None     Visit Diagnoses     Infection due to 2019 novel coronavirus    -  Primary    Relevant Medications    nirmatrelvir and ritonavir (PAXLOVID) therapy pack    Cough                 56}     COVID-19 positive patient.  Encounter for consideration of medication intervention. Patient does qualify for a prescription. Full discussion with patient including medication options, risks and benefits. Potential drug interactions reviewed with patient.     Treatment Planned Paxlovid, Rx sent to [facility] pharmacy    Temporary change to home medications: None   None     Estimated body mass index is 24.98 kg/m  as calculated from the following:    Height as of 4/14/22: 1.74 m (5' 8.5\").    Weight as of 4/14/22: 75.6 kg (166 lb 11.2 oz).  GFR Estimate   Date Value Ref Range Status   04/14/2022 86 >60 mL/min/1.73m2 Final     Comment:     Effective December 21, 2021 eGFRcr in adults is calculated using the 2021 CKD-EPI creatinine equation which includes age and gender (Cat et al., NEJM, DOI: 10.1056/MXOXdx8662422)   03/11/2021 >60 >60 mL/min/1.73m2 Final     Lab Results   Component Value Date    HXBGW26PVB Negative 01/28/2022     Patient complains of extended fatigue with COVID illness May 2022.  Requesting 10-day course of medication.  Discussed recommend recheck at day 5 to discuss with primary regarding ongoing symptoms with the 5-day course   Return in about 4 days (around 8/4/2022).   CC PCP  All questions and concerns addressed  Patient Instructions   Paxlovid twice daily 5 day course    May affect INR levels  Recheck INR in the next days    You may continue other medications without dose adjustment    Consider oximeter monitor.  Monitor temperature    Plans to recheck with primary as concerned may need 10 day course.  Recommend " virtual visit in 5 days.         8:56 AM   9:15 AM   20 minutes    Virtual Urgent Care  [facility] VIRTUAL URGENT CARE    Subjective   Edward is a 82 year old accompanied by his wife, presenting for the following health issues:  No chief complaint on file.      HPI       COVID-19 Symptom Review  How many days ago did these symptoms start? 4  Had covid in May 2022, tested negative after 5 days but prolonged fatigue.  Last Thursday, symptoms  Tested positive twice in past 3 days  Are any of the following symptoms significant for you?    New or worsening difficulty breathing? No    Worsening cough? Yes, it's a dry cough.     Fever or chills? No has had chills.  No fever    Headache: No    Sore throat: YES with PND    Chest pain: No    Diarrhea: No    Body aches? No    What treatments has patient tried? none   Does patient live in a nursing home, group home, or shelter? No  Does patient have a way to get food/medications during quarantined? Yes, I have a friend or family member who can help me.        Patient Active Problem List    Diagnosis Date Noted     Arthritis of shoulder region, right 02/01/2022     Priority: Medium     History of pulmonary embolism 10/04/2021     Priority: Medium     Chronic right shoulder pain 02/13/2019     Priority: Medium     Lipoma of skin and subcutaneous tissue 02/13/2019     Priority: Medium     Subdural hematoma (H) 02/03/2019     Priority: Medium     Hypertension      Priority: Medium     Started chlorthalidone 4/17 and seems to work well          Osteopenia      Priority: Medium     Created by Conversion  Replacement Utility updated for latest IMO load         Obstructive sleep apnea 04/20/2016     Priority: Medium     Severe, AHI 48, CPAP 7cmH2O, PSG 3/2016         Pulmonary embolism (H) 12/19/2015     Priority: Medium     Bilateral extensive PE 12/19/15  Right DVT 12/19/15         Prostate Cancer      Priority: Medium     Created by Conversion         Current Outpatient  Medications   Medication Sig Dispense Refill     nirmatrelvir and ritonavir (PAXLOVID) therapy pack Take 3 tablets by mouth 2 times daily for 5 days 30 tablet 0     chlorthalidone (HYGROTON) 25 MG tablet [CHLORTHALIDONE (HYGROTEN) 25 MG TABLET] Take A 1/2 TABLETS (12.5 MG TOTAL) BY MOUTH DAILY. 45 tablet 3     cholecalciferol, vitamin D3, 1,000 unit tablet [CHOLECALCIFEROL, VITAMIN D3, 1,000 UNIT TABLET] Take 1,000 Units by mouth daily.       fexofenadine (ALLEGRA) 180 MG tablet [FEXOFENADINE (ALLEGRA) 180 MG TABLET] Take 180 mg by mouth daily.       multivitamin therapeutic tablet [MULTIVITAMIN THERAPEUTIC TABLET] Take 1 tablet by mouth daily.       triamcinolone (KENALOG) 0.1 % external cream Apply topically daily as needed for irritation       vit C/E/Zn/coppr/lutein/zeaxan (PRESERVISION AREDS-2 ORAL) Take 1 tablet by mouth 2 times daily        warfarin ANTICOAGULANT (COUMADIN) 2.5 MG tablet [WARFARIN ANTICOAGULANT (COUMADIN/JANTOVEN) 2.5 MG TABLET] Take 1 tablet by mouth on Monday's. Adjust dose based on INR results as directed. 60 tablet 0     warfarin ANTICOAGULANT (COUMADIN) 5 MG tablet Take 1 tablet (5 mg) by mouth daily [WARFARIN ANTICOAGULANT (COUMADIN/JANTOVEN) 5 MG TABLET]  Adjust dose per INR results. 90 tablet 1         Review of Systems         Objective           Vitals:  No vitals were obtained today due to virtual visit.    Physical Exam   GENERAL: Healthy, alert and no distress  RESP: cough  No audible wheeze,   No increased work of breathing.      PSYCH: Mentation appears normal, affect normal/bright, judgement and insight intact, normal speech and appearance well-groomed.                Phone Visit Details      .  ..

## 2022-07-31 NOTE — Clinical Note
Hi.  Would like follow up as asking if needs extended course.  Discussed if still has symptoms, then schedule visit at day 5 paxlovid.  Farhana

## 2022-08-01 ENCOUNTER — TELEPHONE (OUTPATIENT)
Dept: INTERNAL MEDICINE | Facility: CLINIC | Age: 82
End: 2022-08-01

## 2022-08-01 DIAGNOSIS — Z86.711 HISTORY OF PULMONARY EMBOLISM: Primary | ICD-10-CM

## 2022-08-01 NOTE — TELEPHONE ENCOUNTER
Spoke with Edward and he tested positive for covid on 7/29. Symptoms started 7/28. He is getting better. MD wanted him to check INR 8/3 due to starting Paxlovid and it's possible interactions with his warfarin. INR appointment made.

## 2022-08-01 NOTE — TELEPHONE ENCOUNTER
Reason for Call:  Other call back    Detailed comments: Pt is pos with covid. Pt started paxlovid on 7/31/2022. Doctor wants pt to have inr test on Wednesday or  Thursday. Pt is unsure if they can come for blood work due to pos covid.     Phone Number Patient can be reached at: Home number on file [phone] (home)    Best Time: anytime    Can we leave a detailed message on this number? YES    Call taken on 8/1/2022 at 9:41 AM by Edel Minaya

## 2022-08-03 ENCOUNTER — ANTICOAGULATION THERAPY VISIT (OUTPATIENT)
Dept: ANTICOAGULATION | Facility: CLINIC | Age: 82
End: 2022-08-03

## 2022-08-03 ENCOUNTER — LAB (OUTPATIENT)
Dept: LAB | Facility: CLINIC | Age: 82
End: 2022-08-03
Payer: COMMERCIAL

## 2022-08-03 DIAGNOSIS — Z86.711 HISTORY OF PULMONARY EMBOLISM: ICD-10-CM

## 2022-08-03 DIAGNOSIS — Z86.711 HISTORY OF PULMONARY EMBOLISM: Primary | ICD-10-CM

## 2022-08-03 LAB — INR BLD: 2.3 (ref 0.9–1.1)

## 2022-08-03 PROCEDURE — 85610 PROTHROMBIN TIME: CPT

## 2022-08-03 PROCEDURE — 36416 COLLJ CAPILLARY BLOOD SPEC: CPT

## 2022-08-03 NOTE — PROGRESS NOTES
ANTICOAGULATION MANAGEMENT     Edward Mendoza JR 82 year old male is on warfarin with therapeutic INR result. (Goal INR 2.0-3.0)    Recent labs: (last 7 days)     08/03/22  0745   INR 2.3*       ASSESSMENT       Source(s): Chart Review, Patient/Caregiver Call and Template       Warfarin doses taken: Missed dose(s) may be affecting INR    Diet: No new diet changes identified    New illness, injury, or hospitalization: Yes: Positive for covid 7/29. Feels better every day.    Medication/supplement changes: Paxlovid started on 7/31 which may be decreasing INR today    Signs or symptoms of bleeding or clotting: No    Previous INR: Therapeutic last 2(+) visits    Additional findings: None       PLAN     Recommended plan for temporary change(s) affecting INR     Dosing Instructions: Continue your current warfarin dose with next INR in 2 weeks       Summary  As of 8/3/2022    Full warfarin instructions:  7.5 mg every Mon; 5 mg all other days   Next INR check:  8/17/2022             Telephone call with Edward who verbalizes understanding and agrees to plan    Lab visit scheduled    Education provided: Please call back if any changes to your diet, medications or how you've been taking warfarin, Goal range and significance of current result and Importance of therapeutic range    Plan made per [facility] anticoagulation protocol    Sussy Lane, RN  Anticoagulation Clinic  8/3/2022    _______________________________________________________________________     Anticoagulation Episode Summary     Current INR goal:  2.0-3.0   TTR:  95.2 % (1 y)   Target end date:  Indefinite   Send INR reminders to:  CHRISTA PHELPS    Indications    History of pulmonary embolism [Z86.711]           Comments:           Anticoagulation Care Providers     Provider Role Specialty Phone number    Ascencion Calvillo MD Referring Internal Medicine [phone]

## 2022-08-08 ENCOUNTER — TRANSFERRED RECORDS (OUTPATIENT)
Dept: HEALTH INFORMATION MANAGEMENT | Facility: CLINIC | Age: 82
End: 2022-08-08

## 2022-08-15 ENCOUNTER — TRANSFERRED RECORDS (OUTPATIENT)
Dept: HEALTH INFORMATION MANAGEMENT | Facility: CLINIC | Age: 82
End: 2022-08-15

## 2022-08-17 ENCOUNTER — ANTICOAGULATION THERAPY VISIT (OUTPATIENT)
Dept: ANTICOAGULATION | Facility: CLINIC | Age: 82
End: 2022-08-17

## 2022-08-17 ENCOUNTER — LAB (OUTPATIENT)
Dept: LAB | Facility: CLINIC | Age: 82
End: 2022-08-17
Payer: COMMERCIAL

## 2022-08-17 DIAGNOSIS — Z86.711 HISTORY OF PULMONARY EMBOLISM: Primary | ICD-10-CM

## 2022-08-17 DIAGNOSIS — Z86.711 HISTORY OF PULMONARY EMBOLISM: ICD-10-CM

## 2022-08-17 LAB — INR BLD: 2 (ref 0.9–1.1)

## 2022-08-17 PROCEDURE — 36416 COLLJ CAPILLARY BLOOD SPEC: CPT

## 2022-08-17 PROCEDURE — 85610 PROTHROMBIN TIME: CPT

## 2022-08-17 NOTE — PROGRESS NOTES
ANTICOAGULATION MANAGEMENT     Edward Mendoza JR 82 year old male is on warfarin with therapeutic INR result. (Goal INR 2.0-3.0)    Recent labs: (last 7 days)     08/17/22  1040   INR 2.0*       ASSESSMENT       Source(s): Chart Review, Patient/Caregiver Call and Template       Warfarin doses taken: Warfarin taken as instructed    Diet: No new diet changes identified    New illness, injury, or hospitalization: No    Medication/supplement changes: None noted    Signs or symptoms of bleeding or clotting: No    Previous INR: Therapeutic last 2(+) visits    Additional findings: None       PLAN     Recommended plan for no diet, medication or health factor changes affecting INR     Dosing Instructions: Continue your current warfarin dose with next INR in 4 weeks       Summary  As of 8/17/2022    Full warfarin instructions:  7.5 mg every Mon; 5 mg all other days   Next INR check:  9/14/2022             Telephone call with Edward who verbalizes understanding and agrees to plan    Lab visit scheduled    Education provided: Please call back if any changes to your diet, medications or how you've been taking warfarin, Goal range and significance of current result and Importance of therapeutic range    Plan made per [facility] anticoagulation protocol    Sussy Lane RN  Anticoagulation Clinic  8/17/2022    _______________________________________________________________________     Anticoagulation Episode Summary     Current INR goal:  2.0-3.0   TTR:  95.2 % (1 y)   Target end date:  Indefinite   Send INR reminders to:  CHRISTA PHELPS    Indications    History of pulmonary embolism [Z86.711]           Comments:           Anticoagulation Care Providers     Provider Role Specialty Phone number    Ascencion Calvillo MD Referring Internal Medicine [phone]

## 2022-09-13 ENCOUNTER — LAB (OUTPATIENT)
Dept: LAB | Facility: CLINIC | Age: 82
End: 2022-09-13
Payer: COMMERCIAL

## 2022-09-13 ENCOUNTER — DOCUMENTATION ONLY (OUTPATIENT)
Dept: ANTICOAGULATION | Facility: CLINIC | Age: 82
End: 2022-09-13

## 2022-09-13 ENCOUNTER — ANTICOAGULATION THERAPY VISIT (OUTPATIENT)
Dept: ANTICOAGULATION | Facility: CLINIC | Age: 82
End: 2022-09-13

## 2022-09-13 DIAGNOSIS — Z86.711 HISTORY OF PULMONARY EMBOLISM: ICD-10-CM

## 2022-09-13 DIAGNOSIS — Z86.711 HISTORY OF PULMONARY EMBOLISM: Primary | ICD-10-CM

## 2022-09-13 LAB — INR BLD: 2.4 (ref 0.9–1.1)

## 2022-09-13 PROCEDURE — 36416 COLLJ CAPILLARY BLOOD SPEC: CPT

## 2022-09-13 PROCEDURE — 85610 PROTHROMBIN TIME: CPT

## 2022-09-13 NOTE — PROGRESS NOTES
ANTICOAGULATION CLINIC REFERRAL RENEWAL REQUEST       An annual renewal order is required for all patients referred to [facility]ticoagulation [facility].?  Please review and sign the pended referral order for Edward Mendoza JR.       ANTICOAGULATION SUMMARY      Warfarin indication(s)   PE    Mechanical heart valve present?  NO       Current goal range   INR: 2.0-3.0     Goal appropriate for indication? Goal INR 2-3, standard for indication(s) above     Time in Therapeutic Range (TTR)  (Goal > 60%) 95.2%       Office visit with referring provider's group within last year yes on 4/14/22       Sussy Lane RN  [facility] Anticoagulation [facility]

## 2022-09-13 NOTE — PROGRESS NOTES
ANTICOAGULATION MANAGEMENT     Edward Mendoza JR 82 year old male is on warfarin with therapeutic INR result. (Goal INR 2.0-3.0)    Recent labs: (last 7 days)     09/13/22  0945   INR 2.4*       ASSESSMENT       Source(s): Chart Review and Template       Warfarin doses taken: Warfarin taken as instructed    Diet: No new diet changes identified    New illness, injury, or hospitalization: No    Medication/supplement changes: None noted    Signs or symptoms of bleeding or clotting: No    Previous INR: Therapeutic last 2(+) visits    Additional findings: None       PLAN     Recommended plan for no diet, medication or health factor changes affecting INR     Dosing Instructions: Continue your current warfarin dose with next INR in 6 weeks       Summary  As of 9/13/2022    Full warfarin instructions:  7.5 mg every Mon; 5 mg all other days   Next INR check:  10/25/2022             Detailed voice message left for Edward with dosing instructions and follow up date.     Contact [phone] to schedule and with any changes, questions or concerns.     Education provided: Please call back if any changes to your diet, medications or how you've been taking warfarin    Plan made per [facility] anticoagulation protocol    Sussy Lane RN  Anticoagulation Clinic  9/13/2022    _______________________________________________________________________     Anticoagulation Episode Summary     Current INR goal:  2.0-3.0   TTR:  95.2 % (1 y)   Target end date:  Indefinite   Send INR reminders to:  CHRISTA PHELPS    Indications    History of pulmonary embolism [Z86.711]           Comments:           Anticoagulation Care Providers     Provider Role Specialty Phone number    Ascencion Calvillo MD Referring Internal Medicine [phone]

## 2022-09-18 ENCOUNTER — HEALTH MAINTENANCE LETTER (OUTPATIENT)
Age: 82
End: 2022-09-18

## 2022-09-26 ENCOUNTER — OFFICE VISIT (OUTPATIENT)
Dept: INTERNAL MEDICINE | Facility: CLINIC | Age: 82
End: 2022-09-26
Payer: COMMERCIAL

## 2022-09-26 VITALS
OXYGEN SATURATION: 97 % | BODY MASS INDEX: 24.72 KG/M2 | SYSTOLIC BLOOD PRESSURE: 130 MMHG | DIASTOLIC BLOOD PRESSURE: 70 MMHG | HEART RATE: 77 BPM | WEIGHT: 165 LBS

## 2022-09-26 DIAGNOSIS — I10 ESSENTIAL HYPERTENSION: Primary | ICD-10-CM

## 2022-09-26 DIAGNOSIS — Z86.711 HISTORY OF PULMONARY EMBOLISM: ICD-10-CM

## 2022-09-26 DIAGNOSIS — G47.33 OBSTRUCTIVE SLEEP APNEA ON CPAP: ICD-10-CM

## 2022-09-26 PROCEDURE — 99214 OFFICE O/P EST MOD 30 MIN: CPT | Performed by: INTERNAL MEDICINE

## 2022-09-26 RX ORDER — WARFARIN SODIUM 5 MG/1
5 TABLET ORAL DAILY
Qty: 90 TABLET | Refills: 1 | Status: SHIPPED | OUTPATIENT
Start: 2022-09-26 | End: 2023-02-01

## 2022-09-26 NOTE — PATIENT INSTRUCTIONS
Get back to your regular daily walking.  20 minutes of walking on a daily basis is recommended.    If you are left knee bothers you more, you can see the orthopedic clinic.  Do not take any ibuprofen or Aleve type medication.    If you continue to have growth on your right temple, see dermatology earlier than planned for further evaluation.    Follow-up for an INR recheck in approximately 4 weeks.    Follow-up for physical exam after April 14, fasting labs at that time.    Get a flu shot and COVID booster shot before Thanksgiving of this year.

## 2022-09-26 NOTE — PROGRESS NOTES
Edward Mendoza JR   82 year old male    Date of Visit: 9/26/2022    Chief Complaint   Patient presents with     Follow Up     Fasting, Lt knee cyst, fatigue from covid in august. Check skin on face.     Subjective  82-year-old male lives independently.    Patient did have a COVID illness in July.  He has not been getting back to regular exercise.  He used to walk every day.  He is walking some.  He does have peripheral neuropathy.  No new falls.    Some mild increased fatigue.  Minimal daytime sleepiness.  He feels his CPAP machine is working well for sleep apnea.    His blood pressures been well controlled with blood pressure systolic 120-130s without orthostasis or edema on chlorthalidone 12.5 mg a day.    In April his kidney labs were normal.    Cholesterol levels well controlled without medication.    Is not had chest pain or vascular disease diagnosis.  No palpitations.    Pulmonary embolism in 2015 and he is on chronic warfarin.  Subarachnoid hemorrhage with fall in 2019.  Right shoulder replacement in February of this year.    Prostatectomy in 2011 with PSA 0 in April.    No flare of sinusitis.    Glaucoma controlled and did see ophthalmology last month.    He saw dermatology earlier this summer.  He has had some slight recurrence of the sclerotic area on his right temple, more suggestive of a seborrheic keratosis.    He is also had some intermittent left knee bursal swelling, when doing stairs.  Its not inflamed or causing pain now.    PMHx:    Past Medical History:   Diagnosis Date     Arthritis      Broken ankle, left, sequela      Hypertension      Prostatitis      Sleep apnea      Thrombosis      PSHx:    Past Surgical History:   Procedure Laterality Date     CATARACT EXTRACTION Bilateral 12/07/2015     CHOLECYSTECTOMY       HERNIA REPAIR Bilateral 01/01/1987    Inguinal     PROSTATECTOMY N/A     W/ Bilat Pelvic Lymphadenectomy     REVERSE ARTHROPLASTY SHOULDER Right 2/1/2022    Procedure: Right reverse  total shoulder arthroplasty;  Surgeon: Abel Tobar MD;  Location: [facility] OR     VITRECTOMY ANTERIOR Left 06/15/2015     [facility] THORACOTOMY,MAJOR,EXPLOR/BIOPSY  01/01/1983    VATs that found lipoma on chest wall, was concern for cancer.      Immunizations:   Immunization History   Administered Date(s) Administered     COVID-19,PF,Pfizer (12+ Yrs) 02/04/2021, 02/25/2021, 10/12/2021, 04/01/2022     COVID-19,PF,Pfizer 12+ Yrs (2022 and After) 04/01/2022     DT (PEDS <7y) 12/04/2000     FLU 6-35 months 09/08/2011, 10/08/2012, 10/10/2013, 10/13/2014     FLUAD(HD)65+ QUAD 11/01/2021     Flu, Unspecified 10/01/2021     Influenza (H1N1) 01/15/2010     Influenza (High Dose) 3 valent vaccine 10/26/2015, 10/06/2016, 10/10/2017, 08/15/2018, 10/01/2019     Influenza, Quad, High Dose, Pf, 65yr+ (Fluzone HD) 10/05/2020, 10/06/2020     Pneumo Conj 13-V (2010&after) 10/13/2014     Pneumococcal 23 valent 10/02/2003     Td,adult,historic,unspecified 01/06/2011     Tdap (Adacel,Boostrix) 05/01/2019     Zoster vaccine recombinant adjuvanted (SHINGRIX) 02/19/2019     Zoster vaccine, live 09/08/2011       ROS A comprehensive review of systems was performed and was otherwise negative    Medications, allergies, and problem list were reviewed and updated    Exam  /70   Pulse 77   Wt 74.8 kg (165 lb)   SpO2 97%   BMI 24.72 kg/m    Lungs are clear.  Heart is regular without murmur.  Abdomen is nontender.  No edema.  Some mild bursal effusion on the left patellar bursa but not inflamed or tender.  Good range of motion of the knee.    Assessment/Plan  1. Essential hypertension  Controlled.  Continue chlorthalidone 12.5 mg a day.  Kidney labs are normal in April     2. Obstructive sleep apnea on CPAP  compliant with CPAP.  Peripheral neuropathy stable.  No recent falls.    3. History of pulmonary embolism  Chronic warfarin.  Recent INR therapeutic, plan was to recheck INR in approximately 1 month  - warfarin ANTICOAGULANT (COUMADIN) 5 MG  tablet; Take 1 tablet (5 mg) by mouth daily [WARFARIN ANTICOAGULANT (COUMADIN/JANTOVEN) 5 MG TABLET]  Adjust dose per INR results.  Dispense: 90 tablet; Refill: 1    Previous COVID, needs to get back to regular exercise which I did emphasize with patient.  We will plan to get a COVID booster and flu shot later this fall.    History of basal cell cancer.  Sclerotic area in his right temple was very small, suggestive of early regrowth of seborrheic keratosis but patient was told I could not rule out a skin cancer.  He will follow-up with dermatology if this begins to grow further.  Otherwise due for yearly follow-up with rheumatology next summer.    No evidence of recurrent prostate cancer.    Left knee bursal effusion, likely associate with doing stairs after inactivity with COVID.  Get back to more regular walking.  He can follow-up with orthopedic clinic if that causes increased inflammation.      Return in about 29 weeks (around 4/17/2023) for Routine preventive.   Patient Instructions   Get back to your regular daily walking.  20 minutes of walking on a daily basis is recommended.    If you are left knee bothers you more, you can see the orthopedic clinic.  Do not take any ibuprofen or Aleve type medication.    If you continue to have growth on your right temple, see dermatology earlier than planned for further evaluation.    Follow-up for an INR recheck in approximately 4 weeks.    Follow-up for physical exam after April 14, fasting labs at that time.    Get a flu shot and COVID booster shot before Thanksgiving of this year.    Ascencion Calvillo MD, MD        Current Outpatient Medications   Medication Sig Dispense Refill     chlorthalidone (HYGROTON) 25 MG tablet [CHLORTHALIDONE (HYGROTEN) 25 MG TABLET] Take A 1/2 TABLETS (12.5 MG TOTAL) BY MOUTH DAILY. 45 tablet 3     cholecalciferol, vitamin D3, 1,000 unit tablet [CHOLECALCIFEROL, VITAMIN D3, 1,000 UNIT TABLET] Take 1,000 Units by mouth daily.       fexofenadine  (ALLEGRA) 180 MG tablet [FEXOFENADINE (ALLEGRA) 180 MG TABLET] Take 180 mg by mouth daily.       multivitamin therapeutic tablet [MULTIVITAMIN THERAPEUTIC TABLET] Take 1 tablet by mouth daily.       vit C/E/Zn/coppr/lutein/zeaxan (PRESERVISION AREDS-2 ORAL) Take 1 tablet by mouth 2 times daily        warfarin ANTICOAGULANT (COUMADIN) 2.5 MG tablet [WARFARIN ANTICOAGULANT (COUMADIN/JANTOVEN) 2.5 MG TABLET] Take 1 tablet by mouth on . Adjust dose based on INR results as directed. 60 tablet 0     warfarin ANTICOAGULANT (COUMADIN) 5 MG tablet Take 1 tablet (5 mg) by mouth daily [WARFARIN ANTICOAGULANT (COUMADIN/JANTOVEN) 5 MG TABLET]  Adjust dose per INR results. 90 tablet 1     triamcinolone (KENALOG) 0.1 % external cream Apply topically daily as needed for irritation       Allergies   Allergen Reactions     Cats Other (See Comments)     Respiratory congestion     Lisinopril Cough     Latex Itching     Latex adhesive- caused itching     Ace Inhibitors Cough     Chocolate Flavor Other (See Comments)     Pt gets sinus congestion from eating chocolate.     Corn [Corn Oil] Other (See Comments)     Pt gets sinus congestion from eating corn.     Fish Containing Products [Fish-Derived Products] Other (See Comments)     Pt gets sinus congestion from eating fish.     Levofloxacin Rash     Red line up the arm after IV administration     Nuts - Unspecified [Nuts] Other (See Comments)     Pt gets sinus congestion from eating nuts.     Penicillins Unknown     childhood     Ragweed Pollen [Ragweeds] Other (See Comments)     Sinus and chest congestion.     Social History     Tobacco Use     Smoking status: Former Smoker     Packs/day: 0.50     Years: 5.00     Pack years: 2.50     Types: Cigarettes     Quit date: 1960     Years since quittin.7     Smokeless tobacco: Never Used   Substance Use Topics     Alcohol use: No     Drug use: No             Subjective   Edward is a 82 year old, presenting for the following health  issues:  Follow Up (Fasting, Lt knee cyst, fatigue from covid in august. Check skin on face.)      History of Present Illness       Reason for visit:  Follow up    He eats 2-3 servings of fruits and vegetables daily.He consumes 0 sweetened beverage(s) daily.He exercises with enough effort to increase his heart rate 20 to 29 minutes per day.               Review of Systems         Objective    /70   Pulse 77   Wt 74.8 kg (165 lb)   SpO2 97%   BMI 24.72 kg/m    Body mass index is 24.72 kg/m .  Physical Exam

## 2022-10-26 ENCOUNTER — ANTICOAGULATION THERAPY VISIT (OUTPATIENT)
Dept: ANTICOAGULATION | Facility: CLINIC | Age: 82
End: 2022-10-26

## 2022-10-26 ENCOUNTER — LAB (OUTPATIENT)
Dept: LAB | Facility: CLINIC | Age: 82
End: 2022-10-26
Payer: COMMERCIAL

## 2022-10-26 DIAGNOSIS — Z86.711 HISTORY OF PULMONARY EMBOLISM: Primary | ICD-10-CM

## 2022-10-26 DIAGNOSIS — Z86.711 HISTORY OF PULMONARY EMBOLISM: ICD-10-CM

## 2022-10-26 LAB — INR BLD: 2.4 (ref 0.9–1.1)

## 2022-10-26 PROCEDURE — 85610 PROTHROMBIN TIME: CPT

## 2022-10-26 PROCEDURE — 36416 COLLJ CAPILLARY BLOOD SPEC: CPT

## 2022-10-26 NOTE — PROGRESS NOTES
ANTICOAGULATION MANAGEMENT     Edward Mendoza JR 82 year old male is on warfarin with therapeutic INR result. (Goal INR 2.0-3.0)    Recent labs: (last 7 days)     10/26/22  0928   INR 2.4*       ASSESSMENT       Source(s): Chart Review, Patient/Caregiver Call and Template       Warfarin doses taken: Warfarin taken as instructed    Diet: No new diet changes identified    New illness, injury, or hospitalization: No    Medication/supplement changes: None noted    Signs or symptoms of bleeding or clotting: No    Previous INR: Therapeutic last 2(+) visits    Additional findings: None       PLAN     Recommended plan for no diet, medication or health factor changes affecting INR     Dosing Instructions: Continue your current warfarin dose with next INR in 6 weeks       Summary  As of 10/26/2022    Full warfarin instructions:  7.5 mg every Mon; 5 mg all other days; Starting 10/26/2022   Next INR check:  12/7/2022             Telephone call with Edward who verbalizes understanding and agrees to plan    Lab visit scheduled    Education provided:     Please call back if any changes to your diet, medications or how you've been taking warfarin    Goal range and lab monitoring: goal range and significance of current result and Importance of therapeutic range    Plan made per [facility] anticoagulation protocol    Sussy Lane RN  Anticoagulation Clinic  10/26/2022    _______________________________________________________________________     Anticoagulation Episode Summary     Current INR goal:  2.0-3.0   TTR:  95.2 % (1 y)   Target end date:  Indefinite   Send INR reminders to:  CHRISTA PHELPS    Indications    History of pulmonary embolism [Z86.711]           Comments:           Anticoagulation Care Providers     Provider Role Specialty Phone number    Ascencion Calvillo MD Referring Internal Medicine [phone]

## 2022-11-30 ENCOUNTER — LAB (OUTPATIENT)
Dept: LAB | Facility: CLINIC | Age: 82
End: 2022-11-30
Payer: COMMERCIAL

## 2022-11-30 ENCOUNTER — ANTICOAGULATION THERAPY VISIT (OUTPATIENT)
Dept: ANTICOAGULATION | Facility: CLINIC | Age: 82
End: 2022-11-30

## 2022-11-30 DIAGNOSIS — Z86.711 HISTORY OF PULMONARY EMBOLISM: ICD-10-CM

## 2022-11-30 DIAGNOSIS — Z86.711 HISTORY OF PULMONARY EMBOLISM: Primary | ICD-10-CM

## 2022-11-30 LAB — INR BLD: 3 (ref 0.9–1.1)

## 2022-11-30 PROCEDURE — 85610 PROTHROMBIN TIME: CPT

## 2022-11-30 PROCEDURE — 36416 COLLJ CAPILLARY BLOOD SPEC: CPT

## 2022-11-30 NOTE — PROGRESS NOTES
ANTICOAGULATION MANAGEMENT     Edward Mendoza JR 82 year old male is on warfarin with therapeutic INR result. (Goal INR 2.0-3.0)    Recent labs: (last 7 days)     11/30/22  0940   INR 3.0*       ASSESSMENT       Source(s): Chart Review, Patient/Caregiver Call and Template       Warfarin doses taken: Warfarin taken as instructed    Diet: hunting may be affecting diet and INR    New illness, injury, or hospitalization: No    Medication/supplement changes: None noted    Signs or symptoms of bleeding or clotting: No    Previous INR: Therapeutic last 2(+) visits    Additional findings: None       PLAN     Recommended plan for temporary change(s) affecting INR     Dosing Instructions: Continue your current warfarin dose with next INR in 6 weeks       Summary  As of 11/30/2022    Full warfarin instructions:  7.5 mg every Mon; 5 mg all other days; Starting 11/30/2022   Next INR check:  1/11/2023             Telephone call with Edward who verbalizes understanding and agrees to plan    Lab visit scheduled    Education provided:     Please call back if any changes to your diet, medications or how you've been taking warfarin    Goal range and lab monitoring: goal range and significance of current result and Importance of therapeutic range    Plan made per [facility] anticoagulation protocol    Sussy Lane, RN  Anticoagulation Clinic  11/30/2022    _______________________________________________________________________     Anticoagulation Episode Summary     Current INR goal:  2.0-3.0   TTR:  95.2 % (1 y)   Target end date:  Indefinite   Send INR reminders to:  CHRISTA PHELPS    Indications    History of pulmonary embolism [Z86.711]           Comments:           Anticoagulation Care Providers     Provider Role Specialty Phone number    Ascencion Calvillo MD Referring Internal Medicine [phone]

## 2023-01-13 ENCOUNTER — LAB (OUTPATIENT)
Dept: LAB | Facility: CLINIC | Age: 83
End: 2023-01-13
Payer: COMMERCIAL

## 2023-01-13 ENCOUNTER — ANTICOAGULATION THERAPY VISIT (OUTPATIENT)
Dept: ANTICOAGULATION | Facility: CLINIC | Age: 83
End: 2023-01-13

## 2023-01-13 DIAGNOSIS — Z86.711 HISTORY OF PULMONARY EMBOLISM: ICD-10-CM

## 2023-01-13 DIAGNOSIS — Z86.711 HISTORY OF PULMONARY EMBOLISM: Primary | ICD-10-CM

## 2023-01-13 LAB — INR BLD: 3 (ref 0.9–1.1)

## 2023-01-13 PROCEDURE — 36416 COLLJ CAPILLARY BLOOD SPEC: CPT

## 2023-01-13 PROCEDURE — 85610 PROTHROMBIN TIME: CPT

## 2023-01-13 NOTE — PROGRESS NOTES
ANTICOAGULATION MANAGEMENT     Edward Mendoza JR 82 year old male is on warfarin with therapeutic INR result. (Goal INR 2.0-3.0)    Recent labs: (last 7 days)     01/13/23  0811   INR 3.0*       ASSESSMENT       Source(s): Chart Review, Patient/Caregiver Call and Template       Warfarin doses taken: Warfarin taken as instructed    Diet: Decreased greens/vitamin K in diet; plans to resume previous intake    New illness, injury, or hospitalization: No    Medication/supplement changes: None noted    Signs or symptoms of bleeding or clotting: No    Previous INR: Therapeutic last 2(+) visits    Additional findings: He will be going to [place] for 2 weeks 2/11-2/24       PLAN     Recommended plan for temporary change(s) affecting INR     Dosing Instructions: Continue your current warfarin dose with next INR in 6 weeks       Summary  As of 1/13/2023    Full warfarin instructions:  7.5 mg every Mon; 5 mg all other days   Next INR check:  2/24/2023             Telephone call with Edward who verbalizes understanding and agrees to plan    Lab visit scheduled    Education provided:     Please call back if any changes to your diet, medications or how you've been taking warfarin    Goal range and lab monitoring: goal range and significance of current result and Importance of therapeutic range    Plan made per [facility] anticoagulation protocol    Sussy Lane RN  Anticoagulation Clinic  1/13/2023    _______________________________________________________________________     Anticoagulation Episode Summary     Current INR goal:  2.0-3.0   TTR:  95.2 % (1 y)   Target end date:  Indefinite   Send INR reminders to:  CHRISTA PHELPS    Indications    History of pulmonary embolism [Z86.711]           Comments:           Anticoagulation Care Providers     Provider Role Specialty Phone number    Ascencion Calvillo MD Referring Internal Medicine [phone]

## 2023-01-30 DIAGNOSIS — Z86.711 HISTORY OF PULMONARY EMBOLISM: ICD-10-CM

## 2023-01-30 DIAGNOSIS — I10 BENIGN ESSENTIAL HYPERTENSION: ICD-10-CM

## 2023-02-01 RX ORDER — WARFARIN SODIUM 5 MG/1
5-7.5 TABLET ORAL DAILY
Qty: 110 TABLET | Refills: 1 | Status: SHIPPED | OUTPATIENT
Start: 2023-02-01 | End: 2023-12-01

## 2023-02-01 RX ORDER — CHLORTHALIDONE 25 MG/1
TABLET ORAL
Qty: 45 TABLET | Refills: 3 | Status: SHIPPED | OUTPATIENT
Start: 2023-02-01 | End: 2023-04-17

## 2023-02-01 NOTE — TELEPHONE ENCOUNTER
ANTICOAGULATION MANAGEMENT:  Medication Refill    Anticoagulation Summary  As of 1/13/2023    Warfarin maintenance plan:  7.5 mg (5 mg x 1.5) every Mon; 5 mg (5 mg x 1) all other days   Next INR check:  2/24/2023   Target end date:  Indefinite    Indications    History of pulmonary embolism [Z86.711]             Anticoagulation Care Providers     Provider Role Specialty Phone number    Ascencion Calvillo MD Referring Internal Medicine [phone]          Visit with referring provider/group within last year: Yes    [facility] referral signed within last year: Yes    Edward meets all criteria for refill (current [facility] referral, office visit with referring provider/group in last year, lab monitoring up to date or not exceeding 2 weeks overdue). Rx instructions and quantity supplied updated to match patient's current dosing plan. Warfarin 90 day supply with 1 refill granted per [facility] protocol     Sussy Lane RN  Anticoagulation Clinic

## 2023-02-01 NOTE — TELEPHONE ENCOUNTER
"Routing refill request to provider for review/approval because:  Drug interaction warning    Last Written Prescription Date:  2/1/22  Last Fill Quantity: 45,  # refills: 3   Last office visit provider:  9/26/22     Requested Prescriptions   Pending Prescriptions Disp Refills     chlorthalidone (HYGROTON) 25 MG tablet 45 tablet 3     Sig: [CHLORTHALIDONE (HYGROTEN) 25 MG TABLET] Take A 1/2 TABLETS (12.5 MG TOTAL) BY MOUTH DAILY.       Diuretics (Including Combos) Protocol Passed - 1/30/2023  5:59 PM        Passed - Blood pressure under 140/90 in past 12 months     BP Readings from Last 3 Encounters:   09/26/22 130/70   04/14/22 138/78   02/02/22 132/63                 Passed - Recent (12 mo) or future (30 days) visit within the authorizing provider's specialty     Patient has had an office visit with the authorizing provider or a provider within the authorizing providers department within the previous 12 mos or has a future within next 30 days. See \"Patient Info\" tab in inbasket, or \"Choose Columns\" in Meds & Orders section of the refill encounter.              Passed - Medication is active on med list        Passed - Patient is age 18 or older        Passed - Normal serum creatinine on file in past 12 months     Recent Labs   Lab Test 04/14/22  1515   CR 0.87              Passed - Normal serum potassium on file in past 12 months     Recent Labs   Lab Test 04/14/22  1515   POTASSIUM 4.3                    Passed - Normal serum sodium on file in past 12 months     Recent Labs   Lab Test 04/14/22  1515                      Lidia Stone RN 01/31/23 8:01 PM  "

## 2023-02-08 ENCOUNTER — ANTICOAGULATION THERAPY VISIT (OUTPATIENT)
Dept: ANTICOAGULATION | Facility: CLINIC | Age: 83
End: 2023-02-08

## 2023-02-08 ENCOUNTER — LAB (OUTPATIENT)
Dept: LAB | Facility: CLINIC | Age: 83
End: 2023-02-08
Payer: COMMERCIAL

## 2023-02-08 DIAGNOSIS — Z86.711 HISTORY OF PULMONARY EMBOLISM: Primary | ICD-10-CM

## 2023-02-08 DIAGNOSIS — Z86.711 HISTORY OF PULMONARY EMBOLISM: ICD-10-CM

## 2023-02-08 LAB — INR BLD: 2.1 (ref 0.9–1.1)

## 2023-02-08 PROCEDURE — 85610 PROTHROMBIN TIME: CPT

## 2023-02-08 PROCEDURE — 36416 COLLJ CAPILLARY BLOOD SPEC: CPT

## 2023-02-08 NOTE — PROGRESS NOTES
ANTICOAGULATION MANAGEMENT     Edward Mendoza JR 82 year old male is on warfarin with therapeutic INR result. (Goal INR 2.0-3.0)    Recent labs: (last 7 days)     02/08/23  0932   INR 2.1*       ASSESSMENT       Source(s): Chart Review, Patient/Caregiver Call and Template       Warfarin doses taken: Warfarin taken as instructed    Diet: Increased greens/vitamin K in diet; plans to resume previous intake    New illness, injury, or hospitalization: No    Medication/supplement changes: None noted    Signs or symptoms of bleeding or clotting: No    Previous INR: Therapeutic last 2(+) visits    Additional findings: None       PLAN     Recommended plan for temporary change(s) affecting INR     Dosing Instructions: Continue your current warfarin dose with next INR in 6 weeks       Summary  As of 2/8/2023    Full warfarin instructions:  7.5 mg every Mon; 5 mg all other days   Next INR check:  3/22/2023             Telephone call with Edward who verbalizes understanding and agrees to plan and who agrees to plan and repeated back plan correctly    Lab visit scheduled    Education provided:     Please call back if any changes to your diet, medications or how you've been taking warfarin    Goal range and lab monitoring: goal range and significance of current result and Importance of therapeutic range    Plan made per [facility] anticoagulation protocol    Sussy Lane, RN  Anticoagulation Clinic  2/8/2023    _______________________________________________________________________     Anticoagulation Episode Summary     Current INR goal:  2.0-3.0   TTR:  98.4 % (1 y)   Target end date:  Indefinite   Send INR reminders to:  CHRISTA PHELPS    Indications    History of pulmonary embolism [Z86.711]           Comments:           Anticoagulation Care Providers     Provider Role Specialty Phone number    Ascencion Calvillo MD Referring Internal Medicine [phone]

## 2023-03-22 ENCOUNTER — LAB (OUTPATIENT)
Dept: LAB | Facility: CLINIC | Age: 83
End: 2023-03-22
Payer: COMMERCIAL

## 2023-03-22 ENCOUNTER — ANTICOAGULATION THERAPY VISIT (OUTPATIENT)
Dept: ANTICOAGULATION | Facility: CLINIC | Age: 83
End: 2023-03-22

## 2023-03-22 DIAGNOSIS — Z86.711 HISTORY OF PULMONARY EMBOLISM: ICD-10-CM

## 2023-03-22 DIAGNOSIS — Z86.711 HISTORY OF PULMONARY EMBOLISM: Primary | ICD-10-CM

## 2023-03-22 LAB — INR BLD: 2.3 (ref 0.9–1.1)

## 2023-03-22 PROCEDURE — 36416 COLLJ CAPILLARY BLOOD SPEC: CPT

## 2023-03-22 PROCEDURE — 85610 PROTHROMBIN TIME: CPT

## 2023-03-22 NOTE — PROGRESS NOTES
ANTICOAGULATION MANAGEMENT     Edward Mendoza JR 82 year old male is on warfarin with therapeutic INR result. (Goal INR 2.0-3.0)    Recent labs: (last 7 days)     03/22/23  0930   INR 2.3*       ASSESSMENT       Source(s): Chart Review, Patient/Caregiver Call and Template       Warfarin doses taken: Warfarin taken as instructed    Diet: No new diet changes identified    New illness, injury, or hospitalization: No    Medication/supplement changes: None noted    Signs or symptoms of bleeding or clotting: No    Previous INR: Therapeutic last 2(+) visits    Additional findings: He has a molar that is impacted and may need to come out. Dentist says he would not need to hold his warfarin. Discussed with one tooth we usually do not hold. Can discuss with Dr Calvillo and see his thoughts becasue tooth is impacted. Tooth is not bothering him and he does not plan on getting it taken out unless it causing an issue.         PLAN     Recommended plan for no diet, medication or health factor changes affecting INR     Dosing Instructions: Continue your current warfarin dose with next INR in 6 weeks       Summary  As of 3/22/2023    Full warfarin instructions:  7.5 mg every Mon; 5 mg all other days   Next INR check:  5/3/2023             Telephone call with Edward who verbalizes understanding and agrees to plan and who agrees to plan and repeated back plan correctly    Lab visit scheduled    Education provided:     Please call back if any changes to your diet, medications or how you've been taking warfarin    Goal range and lab monitoring: goal range and significance of current result and Importance of therapeutic range    Plan made per [facility] anticoagulation protocol    Sussy Lane, RN  Anticoagulation Clinic  3/22/2023    _______________________________________________________________________     Anticoagulation Episode Summary     Current INR goal:  2.0-3.0   TTR:  98.4 % (1 y)   Target end date:  Indefinite   Send INR reminders to:   CHRISTA PHELPS    Indications    History of pulmonary embolism [Z86.711]           Comments:           Anticoagulation Care Providers     Provider Role Specialty Phone number    Ascencion aClvillo MD Referring Internal Medicine [phone]

## 2023-04-17 ENCOUNTER — OFFICE VISIT (OUTPATIENT)
Dept: INTERNAL MEDICINE | Facility: CLINIC | Age: 83
End: 2023-04-17
Payer: COMMERCIAL

## 2023-04-17 ENCOUNTER — ANTICOAGULATION THERAPY VISIT (OUTPATIENT)
Dept: ANTICOAGULATION | Facility: CLINIC | Age: 83
End: 2023-04-17

## 2023-04-17 VITALS
SYSTOLIC BLOOD PRESSURE: 126 MMHG | DIASTOLIC BLOOD PRESSURE: 66 MMHG | WEIGHT: 167 LBS | OXYGEN SATURATION: 99 % | BODY MASS INDEX: 24.73 KG/M2 | TEMPERATURE: 98.1 F | HEART RATE: 74 BPM | HEIGHT: 69 IN | RESPIRATION RATE: 16 BRPM

## 2023-04-17 DIAGNOSIS — G47.33 OBSTRUCTIVE SLEEP APNEA ON CPAP: ICD-10-CM

## 2023-04-17 DIAGNOSIS — H35.3210 EXUDATIVE AGE-RELATED MACULAR DEGENERATION OF RIGHT EYE, UNSPECIFIED STAGE (H): ICD-10-CM

## 2023-04-17 DIAGNOSIS — Z51.81 ENCOUNTER FOR THERAPEUTIC DRUG MONITORING: ICD-10-CM

## 2023-04-17 DIAGNOSIS — G62.9 PERIPHERAL POLYNEUROPATHY: ICD-10-CM

## 2023-04-17 DIAGNOSIS — L56.3 SOLAR URTICARIA: ICD-10-CM

## 2023-04-17 DIAGNOSIS — Z00.00 ENCOUNTER FOR WELLNESS EXAMINATION IN ADULT: Primary | ICD-10-CM

## 2023-04-17 DIAGNOSIS — Z86.711 HISTORY OF PULMONARY EMBOLISM: ICD-10-CM

## 2023-04-17 DIAGNOSIS — R73.9 HYPERGLYCEMIA: ICD-10-CM

## 2023-04-17 DIAGNOSIS — I10 BENIGN ESSENTIAL HYPERTENSION: ICD-10-CM

## 2023-04-17 DIAGNOSIS — Z86.711 HISTORY OF PULMONARY EMBOLISM: Primary | ICD-10-CM

## 2023-04-17 DIAGNOSIS — Z85.46 HISTORY OF PROSTATE CANCER: ICD-10-CM

## 2023-04-17 LAB
ALBUMIN SERPL BCG-MCNC: 4.1 G/DL (ref 3.5–5.2)
ALP SERPL-CCNC: 86 U/L (ref 40–129)
ALT SERPL W P-5'-P-CCNC: 23 U/L (ref 10–50)
ANION GAP SERPL CALCULATED.3IONS-SCNC: 13 MMOL/L (ref 7–15)
AST SERPL W P-5'-P-CCNC: 29 U/L (ref 10–50)
BILIRUB SERPL-MCNC: 0.6 MG/DL
BUN SERPL-MCNC: 16.9 MG/DL (ref 8–23)
CALCIUM SERPL-MCNC: 10.1 MG/DL (ref 8.8–10.2)
CHLORIDE SERPL-SCNC: 102 MMOL/L (ref 98–107)
CHOLEST SERPL-MCNC: 209 MG/DL
CREAT SERPL-MCNC: 1.1 MG/DL (ref 0.67–1.17)
DEPRECATED HCO3 PLAS-SCNC: 26 MMOL/L (ref 22–29)
ERYTHROCYTE [DISTWIDTH] IN BLOOD BY AUTOMATED COUNT: 13.1 % (ref 10–15)
GFR SERPL CREATININE-BSD FRML MDRD: 66 ML/MIN/1.73M2
GLUCOSE SERPL-MCNC: 108 MG/DL (ref 70–99)
HBA1C MFR BLD: 5.8 % (ref 0–5.6)
HCT VFR BLD AUTO: 48.1 % (ref 40–53)
HDLC SERPL-MCNC: 71 MG/DL
HGB BLD-MCNC: 16.3 G/DL (ref 13.3–17.7)
INR BLD: 2.5 (ref 0.9–1.1)
LDLC SERPL CALC-MCNC: 121 MG/DL
MCH RBC QN AUTO: 29.2 PG (ref 26.5–33)
MCHC RBC AUTO-ENTMCNC: 33.9 G/DL (ref 31.5–36.5)
MCV RBC AUTO: 86 FL (ref 78–100)
NONHDLC SERPL-MCNC: 138 MG/DL
PLATELET # BLD AUTO: 254 10E3/UL (ref 150–450)
POTASSIUM SERPL-SCNC: 3.9 MMOL/L (ref 3.4–5.3)
PROT SERPL-MCNC: 7 G/DL (ref 6.4–8.3)
RBC # BLD AUTO: 5.59 10E6/UL (ref 4.4–5.9)
SODIUM SERPL-SCNC: 141 MMOL/L (ref 136–145)
TRIGL SERPL-MCNC: 85 MG/DL
WBC # BLD AUTO: 6.4 10E3/UL (ref 4–11)

## 2023-04-17 PROCEDURE — 36415 COLL VENOUS BLD VENIPUNCTURE: CPT | Performed by: INTERNAL MEDICINE

## 2023-04-17 PROCEDURE — G0439 PPPS, SUBSEQ VISIT: HCPCS | Performed by: INTERNAL MEDICINE

## 2023-04-17 PROCEDURE — 99214 OFFICE O/P EST MOD 30 MIN: CPT | Mod: 25 | Performed by: INTERNAL MEDICINE

## 2023-04-17 PROCEDURE — 85610 PROTHROMBIN TIME: CPT | Performed by: INTERNAL MEDICINE

## 2023-04-17 PROCEDURE — 80061 LIPID PANEL: CPT | Performed by: INTERNAL MEDICINE

## 2023-04-17 PROCEDURE — 80053 COMPREHEN METABOLIC PANEL: CPT | Performed by: INTERNAL MEDICINE

## 2023-04-17 PROCEDURE — 85027 COMPLETE CBC AUTOMATED: CPT | Performed by: INTERNAL MEDICINE

## 2023-04-17 PROCEDURE — 36416 COLLJ CAPILLARY BLOOD SPEC: CPT | Performed by: INTERNAL MEDICINE

## 2023-04-17 PROCEDURE — 83036 HEMOGLOBIN GLYCOSYLATED A1C: CPT | Performed by: INTERNAL MEDICINE

## 2023-04-17 RX ORDER — WARFARIN SODIUM 2.5 MG/1
TABLET ORAL
Qty: 60 TABLET | Refills: 1 | Status: SHIPPED | OUTPATIENT
Start: 2023-04-17 | End: 2023-11-27

## 2023-04-17 RX ORDER — CHLORTHALIDONE 25 MG/1
TABLET ORAL
Qty: 45 TABLET | Refills: 3 | Status: SHIPPED | OUTPATIENT
Start: 2023-04-17 | End: 2024-04-18

## 2023-04-17 ASSESSMENT — ENCOUNTER SYMPTOMS
JOINT SWELLING: 0
CHILLS: 0
DIARRHEA: 0
SHORTNESS OF BREATH: 0
SORE THROAT: 0
DYSURIA: 0
HEMATURIA: 0
NERVOUS/ANXIOUS: 0
DIZZINESS: 0
FEVER: 0
HEMATOCHEZIA: 0
ARTHRALGIAS: 0
ABDOMINAL PAIN: 0
HEARTBURN: 0
COUGH: 0
NAUSEA: 0
PALPITATIONS: 0
PARESTHESIAS: 1
MYALGIAS: 0
HEADACHES: 0
FREQUENCY: 0
EYE PAIN: 0
WEAKNESS: 0
CONSTIPATION: 0

## 2023-04-17 ASSESSMENT — ACTIVITIES OF DAILY LIVING (ADL): CURRENT_FUNCTION: NO ASSISTANCE NEEDED

## 2023-04-17 NOTE — PATIENT INSTRUCTIONS
No change in medication treatment plan.  Goal blood pressure less than 135/85, but not less than 110/60.    Make sure you are wearing your CPAP machine every night.    Your foot neuropathy will remain stable.  You will be at higher risk of falling and for having foot sores.  Wear good shoes at all times.  Walk on a regular basis to keep your strength and balance good.    I would not recommend further colonoscopies, because of the risk of colonoscopy likely outweighs the benefit.    You can use over-the-counter Claritin, Zyrtec or Allegra for your urticaria itchiness if needed.  Do not use any allergy medicine with decongestants in it.    You do need to increase your regular walking.  20 minutes of walking and activity on a daily basis is recommended.    Follow-up in 6 months for a blood pressure checkup appointment.    Discussed with your dentist about stopping warfarin, although the risk would be fairly low to stop warfarin for 3 days before dental work, if needed.

## 2023-04-17 NOTE — PROGRESS NOTES
SUBJECTIVE:   Edward is a 82 year old who presents for Preventive Visit.    Lives independently with wife, cognitively intact and still fairly active, though was not walking as much this winter.  He does go to the gym.    He does have peripheral neuropathy but has not had any foot sores or falls.    He has sleep apnea but highly compliant with CPAP and minimal daytime sleepiness.    Hypertension well-controlled with chlorthalidone 12.5 mg daily.  No orthostasis or edema.    No chest pain or chest pressure or palpitations.    No family history of strong coronary disease.  Quit smoking in 1960.  2015 negative stress echo.    Pulmonary embolism in 2015 now on chronic warfarin.  He did have a fall with subdural hemorrhage in 2019 but no recent falls or bleeding issues.  INR recently therapeutic.  No lower extremity edema or increasing shortness of breath.    Right shoulde knee replacement February 2022.  He has been doing his range of motion exercises.    Prostatectomy 2011, no new urinary symptoms.  His PSA level was 0 last year.  No further PSAs plan.    His father did have colon cancer.  But his colonoscopy was negative in 2012.  No bowel changes or blood in stool.  No abdominal pain complaints.    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  Get seen again next week.  Patient denies glaucoma, corrected with macular degeneration.    No knee swelling or pain complaints today.    History of solar urticaria.  No other new skin lesions.  Itchy skin after sun exposure couple months ago.  Using moisturizer cream.  Not severe.  Has used over-the-counter Benadryl.    Having a wisdom tooth pulled, but his dentist said that he could do the wisdom tooth surgery on warfarin.    Lipoma in his right pectoral area has not changed and not bother patient.    No headache complaints.    No swallowing issues.      4/17/2023     9:33 AM   Additional Questions   Roomed by Saida NORRIS   Accompanied by lesli  "        4/17/2023     9:33 AM   Patient Reported Additional Medications   Patient reports taking the following new medications no     Patient has been advised of split billing requirements and indicates understanding: No  Are you in the first 12 months of your Medicare coverage?  No    Healthy Habits:     In general, how would you rate your overall health?  Good    Frequency of exercise:  2-3 days/week    Duration of exercise:  15-30 minutes    Do you usually eat at least 4 servings of fruit and vegetables a day, include whole grains    & fiber and avoid regularly eating high fat or \"junk\" foods?  No    Taking medications regularly:  Yes    Medication side effects:  None    Ability to successfully perform activities of daily living:  No assistance needed    Home Safety:  Throw rugs in the hallway    Hearing Impairment:  Difficulty following a conversation in a noisy restaurant or crowded room and difficulty understanding soft or whispered speech    In the past 6 months, have you been bothered by leaking of urine?  No    In general, how would you rate your overall mental or emotional health?  Good      PHQ-2 Total Score: 0    Additional concerns today:  No      Have you ever done Advance Care Planning? (For example, a Health Directive, POLST, or a discussion with a medical provider or your loved ones about your wishes): Yes, advance care planning is on file.       Fall risk  Fallen 2 or more times in the past year?: No  Any fall with injury in the past year?: No  click delete button to remove this line now  Cognitive Screening   1) Repeat 3 items (Leader, Season, Table)    2) Clock draw: NORMAL  3) 3 item recall: Recalls 2 objects   Results: NORMAL clock, 1-2 items recalled: COGNITIVE IMPAIRMENT LESS LIKELY    Mini-CogTM Copyright HAI Gardiner. Licensed by the author for use in [employer] [employer]; reprinted with permission (ike@.[facility]). All rights reserved.      Do you have sleep apnea, excessive snoring or " daytime drowsiness?: yes    Reviewed and updated as needed this visit by clinical staff   Tobacco  Allergies  Meds              Reviewed and updated as needed this visit by Provider                 Social History     Tobacco Use     Smoking status: Former     Packs/day: 0.50     Years: 5.00     Pack years: 2.50     Types: Cigarettes     Quit date: 1960     Years since quittin.3     Smokeless tobacco: Never   Vaping Use     Vaping status: Never Used   Substance Use Topics     Alcohol use: No             2023     9:32 AM   Alcohol Use   Prescreen: >3 drinks/day or >7 drinks/week? Not Applicable          View : No data to display.              Do you have a current opioid prescription? No  Do you use any other controlled substances or medications that are not prescribed by a provider? None              Current providers sharing in care for this patient include:   Patient Care Team:  Ascencion Calvillo MD as PCP - General (Internal Medicine)  Ascencion Calvillo MD as Assigned PCP    The following health maintenance items are reviewed in Epic and correct as of today:  Health Maintenance   Topic Date Due     URINE DRUG SCREEN  Never done     ZOSTER IMMUNIZATION (3 of 3) 2019     COLORECTAL CANCER SCREENING  2022     COVID-19 Vaccine (5 - Booster for Pfizer series) 2023     ANNUAL REVIEW OF HM ORDERS  2023     MEDICARE ANNUAL WELLNESS VISIT  2023     FALL RISK ASSESSMENT  2024     ADVANCE CARE PLANNING  2028     DTAP/TDAP/TD IMMUNIZATION (2 - Td or Tdap) 2029     PHQ-2 (once per calendar year)  Completed     INFLUENZA VACCINE  Completed     Pneumococcal Vaccine: 65+ Years  Completed     IPV IMMUNIZATION  Aged Out     MENINGITIS IMMUNIZATION  Aged Out     Lab work is in process  Current Outpatient Medications   Medication Sig Dispense Refill     chlorthalidone (HYGROTON) 25 MG tablet [CHLORTHALIDONE (HYGROTEN) 25 MG TABLET] Take A 1/2 TABLETS (12.5 MG TOTAL) BY  MOUTH DAILY. 45 tablet 3     cholecalciferol, vitamin D3, 1,000 unit tablet [CHOLECALCIFEROL, VITAMIN D3, 1,000 UNIT TABLET] Take 1,000 Units by mouth daily.       fexofenadine (ALLEGRA) 180 MG tablet [FEXOFENADINE (ALLEGRA) 180 MG TABLET] Take 180 mg by mouth daily.       multivitamin therapeutic tablet [MULTIVITAMIN THERAPEUTIC TABLET] Take 1 tablet by mouth daily.       triamcinolone (KENALOG) 0.1 % external cream Apply topically daily as needed for irritation       vit C/E/Zn/coppr/lutein/zeaxan (PRESERVISION AREDS-2 ORAL) Take 1 tablet by mouth 2 times daily        warfarin ANTICOAGULANT (COUMADIN) 2.5 MG tablet [WARFARIN ANTICOAGULANT (COUMADIN/JANTOVEN) 2.5 MG TABLET] Take 1 tablet by mouth on Monday's. Adjust dose based on INR results as directed. 60 tablet 1     warfarin ANTICOAGULANT (COUMADIN) 5 MG tablet Take 1-1.5 tablets (5-7.5 mg) by mouth daily Adjust dose per INR results as directed. 110 tablet 1     Allergies   Allergen Reactions     Cats Other (See Comments)     Respiratory congestion     Lisinopril Cough     Latex Itching     Latex adhesive- caused itching     Ace Inhibitors Cough     Chocolate Flavor Other (See Comments)     Pt gets sinus congestion from eating chocolate.     Corn [Corn Oil] Other (See Comments)     Pt gets sinus congestion from eating corn.     Fish Containing Products [Fish-Derived Products] Other (See Comments)     Pt gets sinus congestion from eating fish.     Levofloxacin Rash     Red line up the arm after IV administration     Nuts - Unspecified [Nuts] Other (See Comments)     Pt gets sinus congestion from eating nuts.     Penicillins Unknown     childhood     Ragweed Pollen [Ragweeds] Other (See Comments)     Sinus and chest congestion.         Review of Systems   Constitutional: Negative for chills and fever.   HENT: Positive for congestion and hearing loss. Negative for ear pain and sore throat.    Eyes: Positive for visual disturbance. Negative for pain.   Respiratory:  "Negative for cough and shortness of breath.    Cardiovascular: Negative for chest pain, palpitations and peripheral edema.   Gastrointestinal: Negative for abdominal pain, constipation, diarrhea, heartburn, hematochezia and nausea.   Genitourinary: Negative for dysuria, frequency, genital sores, hematuria and urgency.   Musculoskeletal: Negative for arthralgias, joint swelling and myalgias.   Skin: Positive for rash.   Neurological: Positive for paresthesias. Negative for dizziness, weakness and headaches.   Psychiatric/Behavioral: Negative for mood changes. The patient is not nervous/anxious.    Remainder review of systems is negative    OBJECTIVE:   /66   Pulse 74   Temp 98.1  F (36.7  C)   Resp 16   Ht 1.753 m (5' 9\")   Wt 75.8 kg (167 lb)   SpO2 99%   BMI 24.66 kg/m   Estimated body mass index is 24.66 kg/m  as calculated from the following:    Height as of this encounter: 1.753 m (5' 9\").    Weight as of this encounter: 75.8 kg (167 lb).  Physical Exam  Alert and oriented x3.  Normal cognition.  Mobility exam within normal limits.  Clock face drawing and word recall normal.  Pupils and irises equal and reactive.  Extraocular muscles intact.  No jaundice or conjunctivitis.  External ears and nose exam is normal.  No significant cerumen, tympanic membranes are normal.  Wears hearing aids.  Pharynx appears normal.  Teeth and adequate condition.  He does have some scarring on his left buccal mucosa but does not appear to be consistent with a growth or leukoplakia.  No cervical or supraclavicular or axillary adenopathy.  No JVD and no carotid bruits.  No thyromegaly or nodularity.  Lungs are clear to auscultation with good respiratory excursion.  Heart is regular without murmur rub or gallop.  +1 pedal pulses and no ankle edema.  Feet in good condition, has numbness but no preulcerative calluses or evidence of rubbing.  Abdomen is nonobese nontender no hepatosplenomegaly and no pulsatile abdominal mass. "  He declined rectal exam.  Skin exam without suspicious skin lesions.  He does have a large lipoma in the right pectoral area that is mobile.    ASSESSMENT / PLAN:   (Z00.00) Encounter for wellness examination in adult  (primary encounter diagnosis)  Comment: Main health maintenance issue is his sleep apnea peripheral neuropathy and hypertension with stroke risk.  No history of stroke.  His risk factors are now well controlled and compliant with CPAP.    I encouraged him strongly to improve his regularity of exercise.    No further colonoscopies with age, and comorbidities risk with being on warfarin.  Likely risk outweighs benefit of screening colonoscopy.  I did discuss the risk of undiagnosed colon cancer with patient.  He accepts the risk, does not plan further colon cancer screening.  Plan: Full code    Up-to-date on immunizations    (Z85.46) History of prostate cancer  Comment: No further PSA screening with prostatectomy in 2011 and no recurrence.  Plan: No further PSA screening    (I10) Benign essential hypertension  Comment: Controlled.  Continue half tablet of chlorthalidon  Plan: chlorthalidone (HYGROTON) 25 MG tablet, Lipid         Profile            (Z86.711) History of pulmonary embolism  Comment: Long-term warfarin  Plan: warfarin ANTICOAGULANT (COUMADIN) 2.5 MG         tablet, INR point of care            (G47.33,  Z99.89) Obstructive sleep apnea on CPAP  Comment: Compliant with CPAP  Plan: No history of atrial fibrillation or heart failure    (G62.9) Peripheral polyneuropathy  Comment: Associated with sleep apnea.  Neuropathy stable without foot sores.  Unsteady gait but no falls.  Plan: I discussed the importance of good shoe wear.  He does not drink alcohol    (H35.4840) Exudative age-related macular degeneration of right eye, unspecified stage (H)  Comment: Follow-up with ophthalmology next week as planned for injections  Plan:     (Z51.81) Encounter for therapeutic drug monitoring  Comment:    Plan: CBC with platelets, Comprehensive metabolic         panel            (L56.3) Solar urticaria  Comment: Itchy skin of the forearms after sun exposure.  Use plenty of moisturizer cream and sunscreen.  Plan: Can use over-the-counter antihistamines    Patient has been advised of split billing requirements and indicates understanding: Yes      COUNSELING:  Reviewed preventive health counseling, as reflected in patient instructions       Regular exercise       Healthy diet/nutrition        He reports that he quit smoking about 63 years ago. His smoking use included cigarettes. He has a 2.50 pack-year smoking history. He has never used smokeless tobacco.      Appropriate preventive services were discussed with this patient, including applicable screening as appropriate for cardiovascular disease, diabetes, osteopenia/osteoporosis, and glaucoma.  As appropriate for age/gender, discussed screening for colorectal cancer, prostate cancer, breast cancer, and cervical cancer. Checklist reviewing preventive services available has been given to the patient.    Reviewed patients plan of care and provided an AVS. The Basic Care Plan (routine screening as documented in Health Maintenance) for Edward meets the Care Plan requirement. This Care Plan has been established and reviewed with the Patient.          Ascencion Calvillo MD  [facility]    Identified Health Risks:    I have reviewed Opioid Use Disorder and Substance Use Disorder risk factors and made any needed referrals.

## 2023-04-17 NOTE — PROGRESS NOTES
ANTICOAGULATION MANAGEMENT     Edward Mendoza JR 82 year old male is on warfarin with therapeutic INR result. (Goal INR 2.0-3.0)    Recent labs: (last 7 days)     04/17/23  1028   INR 2.5*       ASSESSMENT       Source(s): Chart Review and Patient/Caregiver Call       Warfarin doses taken: Warfarin taken as instructed    Diet: No new diet changes identified    Medication/supplement changes: None noted    New illness, injury, or hospitalization: No    Signs or symptoms of bleeding or clotting: No    Previous INR: Therapeutic last 2(+) visits    Additional findings: Upcoming surgery/procedure needs a impacted wisdom tooth out. This is not scheduled. Dentist said he does not need to hold warfarin. He may recheck INR sooner if he gets this scheduled.          PLAN     Recommended plan for no diet, medication or health factor changes affecting INR     Dosing Instructions: Continue your current warfarin dose with next INR in 6 weeks       Summary  As of 4/17/2023    Full warfarin instructions:  7.5 mg every Mon; 5 mg all other days   Next INR check:  5/30/2023             Telephone call with Edward who verbalizes understanding and agrees to plan and who agrees to plan and repeated back plan correctly    Lab visit scheduled    Education provided:     Please call back if any changes to your diet, medications or how you've been taking warfarin    Goal range and lab monitoring: goal range and significance of current result and Importance of therapeutic range    Plan made per [facility] anticoagulation protocol    Sussy Lane, RN  Anticoagulation Clinic  4/17/2023    _______________________________________________________________________     Anticoagulation Episode Summary     Current INR goal:  2.0-3.0   TTR:  98.4 % (1 y)   Target end date:  Indefinite   Send INR reminders to:  CHRISTA PHELPS    Indications    History of pulmonary embolism [Z86.711]           Comments:           Anticoagulation Care Providers     Provider Role  Specialty Phone number    Ascencion Calvillo MD Referring Internal Medicine [phone]

## 2023-05-30 ENCOUNTER — LAB (OUTPATIENT)
Dept: LAB | Facility: CLINIC | Age: 83
End: 2023-05-30
Payer: COMMERCIAL

## 2023-05-30 ENCOUNTER — ANTICOAGULATION THERAPY VISIT (OUTPATIENT)
Dept: ANTICOAGULATION | Facility: CLINIC | Age: 83
End: 2023-05-30

## 2023-05-30 DIAGNOSIS — Z86.711 HISTORY OF PULMONARY EMBOLISM: ICD-10-CM

## 2023-05-30 DIAGNOSIS — Z86.711 HISTORY OF PULMONARY EMBOLISM: Primary | ICD-10-CM

## 2023-05-30 LAB — INR BLD: 2.4 (ref 0.9–1.1)

## 2023-05-30 PROCEDURE — 85610 PROTHROMBIN TIME: CPT

## 2023-05-30 PROCEDURE — 36416 COLLJ CAPILLARY BLOOD SPEC: CPT

## 2023-05-30 NOTE — PROGRESS NOTES
ANTICOAGULATION MANAGEMENT     Edward Mendoza JR 82 year old male is on warfarin with therapeutic INR result. (Goal INR 2.0-3.0)    Recent labs: (last 7 days)     05/30/23  0936   INR 2.4*       ASSESSMENT       Source(s): Chart Review, Patient/Caregiver Call and Template       Warfarin doses taken: Warfarin taken as instructed    Diet: No new diet changes identified    Medication/supplement changes: None noted    New illness, injury, or hospitalization: No    Signs or symptoms of bleeding or clotting: No    Previous result: Therapeutic last 2(+) visits    Additional findings: says he has been getting some eye injections. Next week will be his fourth one.         PLAN     Recommended plan for no diet, medication or health factor changes affecting INR     Dosing Instructions: Continue your current warfarin dose with next INR in 6 weeks       Summary  As of 5/30/2023    Full warfarin instructions:  7.5 mg every Mon; 5 mg all other days   Next INR check:  7/11/2023             Telephone call with Edward who verbalizes understanding and agrees to plan and who agrees to plan and repeated back plan correctly    Lab visit scheduled    Education provided:     Please call back if any changes to your diet, medications or how you've been taking warfarin    Goal range and lab monitoring: goal range and significance of current result and Importance of therapeutic range    Plan made per [facility] anticoagulation protocol    Sussy Lane, RN  Anticoagulation Clinic  5/30/2023    _______________________________________________________________________     Anticoagulation Episode Summary     Current INR goal:  2.0-3.0   TTR:  98.4 % (1 y)   Target end date:  Indefinite   Send INR reminders to:  CHRISTA PHELPS    Indications    History of pulmonary embolism [Z86.711]           Comments:           Anticoagulation Care Providers     Provider Role Specialty Phone number    Ascencion Calvillo MD Referring Internal Medicine [phone]

## 2023-06-19 ENCOUNTER — TRANSFERRED RECORDS (OUTPATIENT)
Dept: HEALTH INFORMATION MANAGEMENT | Facility: CLINIC | Age: 83
End: 2023-06-19

## 2023-07-10 ENCOUNTER — TELEPHONE (OUTPATIENT)
Dept: PULMONOLOGY | Facility: CLINIC | Age: 83
End: 2023-07-10
Payer: COMMERCIAL

## 2023-07-10 NOTE — TELEPHONE ENCOUNTER
Phone call from patient.  States his CPAP is not working.  Has been 6 years since he had a new one.  Wife is bringing to [facility] today to see if they can fix.     Scheduled appt for July 14 9:30am to be seen so new CPAP can be ordered.  He will bring his machine so download can be obtained.

## 2023-07-11 ENCOUNTER — LAB (OUTPATIENT)
Dept: LAB | Facility: CLINIC | Age: 83
End: 2023-07-11
Payer: COMMERCIAL

## 2023-07-11 ENCOUNTER — ANTICOAGULATION THERAPY VISIT (OUTPATIENT)
Dept: ANTICOAGULATION | Facility: CLINIC | Age: 83
End: 2023-07-11

## 2023-07-11 DIAGNOSIS — Z86.711 HISTORY OF PULMONARY EMBOLISM: Primary | ICD-10-CM

## 2023-07-11 DIAGNOSIS — Z86.711 HISTORY OF PULMONARY EMBOLISM: ICD-10-CM

## 2023-07-11 LAB — INR BLD: 2.5 (ref 0.9–1.1)

## 2023-07-11 PROCEDURE — 85610 PROTHROMBIN TIME: CPT

## 2023-07-11 PROCEDURE — 36416 COLLJ CAPILLARY BLOOD SPEC: CPT

## 2023-07-11 NOTE — PROGRESS NOTES
ANTICOAGULATION MANAGEMENT     Edward Mendoza JR 83 year old male is on warfarin with therapeutic INR result. (Goal INR 2.0-3.0)    Recent labs: (last 7 days)     07/11/23  0929   INR 2.5*       ASSESSMENT       Source(s): Chart Review and Patient/Caregiver Call       Warfarin doses taken: Warfarin taken as instructed    Diet: No new diet changes identified    Medication/supplement changes: None noted    New illness, injury, or hospitalization: No    Signs or symptoms of bleeding or clotting: No    Previous result: Therapeutic last 2(+) visits    Additional findings: None         PLAN     Recommended plan for no diet, medication or health factor changes affecting INR     Dosing Instructions: Continue your current warfarin dose with next INR in 6 weeks       Summary  As of 7/11/2023    Full warfarin instructions:  7.5 mg every Mon; 5 mg all other days   Next INR check:  8/22/2023             Telephone call with Edward who verbalizes understanding and agrees to plan and who agrees to plan and repeated back plan correctly    Lab visit scheduled    Education provided:     Please call back if any changes to your diet, medications or how you've been taking warfarin    Goal range and lab monitoring: goal range and significance of current result and Importance of therapeutic range    Plan made per [facility] anticoagulation protocol    Sussy Lane, RN  Anticoagulation Clinic  7/11/2023    _______________________________________________________________________     Anticoagulation Episode Summary     Current INR goal:  2.0-3.0   TTR:  100.0 % (1 y)   Target end date:  Indefinite   Send INR reminders to:  CHRISTA PHELPS    Indications    History of pulmonary embolism [Z86.711]           Comments:           Anticoagulation Care Providers     Provider Role Specialty Phone number    Ascencion Calvillo MD Referring Internal Medicine [phone]

## 2023-07-13 ENCOUNTER — MEDICAL CORRESPONDENCE (OUTPATIENT)
Dept: HEALTH INFORMATION MANAGEMENT | Facility: CLINIC | Age: 83
End: 2023-07-13
Payer: COMMERCIAL

## 2023-07-14 ENCOUNTER — OFFICE VISIT (OUTPATIENT)
Dept: PULMONOLOGY | Facility: CLINIC | Age: 83
End: 2023-07-14
Payer: COMMERCIAL

## 2023-07-14 VITALS
SYSTOLIC BLOOD PRESSURE: 128 MMHG | HEART RATE: 66 BPM | OXYGEN SATURATION: 99 % | DIASTOLIC BLOOD PRESSURE: 64 MMHG | WEIGHT: 168 LBS | BODY MASS INDEX: 24.81 KG/M2

## 2023-07-14 DIAGNOSIS — G47.33 OBSTRUCTIVE SLEEP APNEA: Primary | ICD-10-CM

## 2023-07-14 DIAGNOSIS — Z86.711 HISTORY OF PULMONARY EMBOLISM: ICD-10-CM

## 2023-07-14 PROCEDURE — 99213 OFFICE O/P EST LOW 20 MIN: CPT | Performed by: NURSE PRACTITIONER

## 2023-07-14 NOTE — PROGRESS NOTES
Pulmonary Clinic Follow Up  July 14, 2023       ASSESSMENT/PLAN:  Edward is a 83 year old M with history of submassive PE (thought secondary to immobility while hunting) now on life-long anticoagulation here for follow up of JAS.    1) JAS, severe -- AHI 1.7 on current report  -RTC 1 year for annual follow up, call sooner if needed     2) Submassive PE, provoked but given how much clot burden, recommend life-long anticoagulation  -Continue Coumadin.      Rosmery Nassar, CNP  Pulmonary Medicine  [facility]   [phone]    Cc:   Chief Complaint   Patient presents with     Follow Up     COPD         HPI:   The patient underwent PSG in 3/2016 with AHI of 48, unable to achieve REM sleep until after CPAP was placed. He was started on CPAP at 7cmH2O. He received a new machine in spring 2016.    In early 2019 had fall with head trauma and subdural. Reversed and monitored, this has completely resolved and anticoagulation resumed.      Does feel benefit from wearing CPAP and uses it religiously. Recently, the cored on his machine broke but he was able to have this replaced.      27/30 days >4 hours     ROS: 10 point ROS neg other than the symptoms noted above in the HPI.    Current Outpatient Medications   Medication     chlorthalidone (HYGROTON) 25 MG tablet     cholecalciferol, vitamin D3, 1,000 unit tablet     fexofenadine (ALLEGRA) 180 MG tablet     multivitamin therapeutic tablet     triamcinolone (KENALOG) 0.1 % external cream     vit C/E/Zn/coppr/lutein/zeaxan (PRESERVISION AREDS-2 ORAL)     warfarin ANTICOAGULANT (COUMADIN) 2.5 MG tablet     warfarin ANTICOAGULANT (COUMADIN) 5 MG tablet     No current facility-administered medications for this visit.        Allergies   Allergen Reactions     Cats Other (See Comments)     Respiratory congestion     Lisinopril Cough     Latex Itching     Latex adhesive- caused itching     Ace Inhibitors Cough     Chocolate Flavor Other (See Comments)     Pt gets sinus congestion  from eating chocolate.     Corn [Corn Oil] Other (See Comments)     Pt gets sinus congestion from eating corn.     Fish Containing Products [Fish-Derived Products] Other (See Comments)     Pt gets sinus congestion from eating fish.     Levofloxacin Rash     Red line up the arm after IV administration     Nuts - Unspecified [Nuts] Other (See Comments)     Pt gets sinus congestion from eating nuts.     Penicillins Unknown     childhood     Ragweed Pollen [Ragweeds] Other (See Comments)     Sinus and chest congestion.     /64 (BP Location: Right arm, Patient Position: Chair, Cuff Size: Adult Regular)   Pulse 66   Wt 76.2 kg (168 lb)   SpO2 99%   BMI 24.81 kg/m      Physical Exam  Constitutional:       General: He is not in acute distress.     Appearance: He is not ill-appearing or diaphoretic.   Cardiovascular:      Rate and Rhythm: Normal rate and regular rhythm.   Pulmonary:      Effort: Pulmonary effort is normal. No respiratory distress.      Breath sounds: Normal breath sounds. No wheezing or rhonchi.   Musculoskeletal:      Right lower leg: No edema.      Left lower leg: No edema.   Neurological:      Mental Status: He is alert.   Psychiatric:         Behavior: Behavior normal.

## 2023-07-14 NOTE — PATIENT INSTRUCTIONS
- Your CPAP report today looks great! Your CPAP seems to be working well for you.   - If anything changes with your symptoms we will have you see Dr. Crowder sooner, otherwise, we can see you back in one year.     If you have worsening symptoms, questions, or need to speak with the nurse please call [phone].

## 2023-07-26 ENCOUNTER — TRANSFERRED RECORDS (OUTPATIENT)
Dept: HEALTH INFORMATION MANAGEMENT | Facility: CLINIC | Age: 83
End: 2023-07-26
Payer: COMMERCIAL

## 2023-08-15 ENCOUNTER — ANTICOAGULATION THERAPY VISIT (OUTPATIENT)
Dept: ANTICOAGULATION | Facility: CLINIC | Age: 83
End: 2023-08-15

## 2023-08-15 ENCOUNTER — OFFICE VISIT (OUTPATIENT)
Dept: INTERNAL MEDICINE | Facility: CLINIC | Age: 83
End: 2023-08-15
Payer: COMMERCIAL

## 2023-08-15 ENCOUNTER — DOCUMENTATION ONLY (OUTPATIENT)
Dept: ANTICOAGULATION | Facility: CLINIC | Age: 83
End: 2023-08-15

## 2023-08-15 VITALS
WEIGHT: 164 LBS | TEMPERATURE: 98 F | SYSTOLIC BLOOD PRESSURE: 132 MMHG | BODY MASS INDEX: 24.29 KG/M2 | HEART RATE: 70 BPM | HEIGHT: 69 IN | DIASTOLIC BLOOD PRESSURE: 66 MMHG | OXYGEN SATURATION: 98 % | RESPIRATION RATE: 16 BRPM

## 2023-08-15 DIAGNOSIS — I26.99 PULMONARY EMBOLISM (H): Primary | ICD-10-CM

## 2023-08-15 DIAGNOSIS — Z51.81 ENCOUNTER FOR THERAPEUTIC DRUG MONITORING: ICD-10-CM

## 2023-08-15 DIAGNOSIS — R53.83 FATIGUE, UNSPECIFIED TYPE: Primary | ICD-10-CM

## 2023-08-15 DIAGNOSIS — G47.33 OBSTRUCTIVE SLEEP APNEA ON CPAP: ICD-10-CM

## 2023-08-15 DIAGNOSIS — Z86.16 HISTORY OF 2019 NOVEL CORONAVIRUS DISEASE (COVID-19): ICD-10-CM

## 2023-08-15 DIAGNOSIS — Z86.711 HISTORY OF PULMONARY EMBOLISM: Primary | ICD-10-CM

## 2023-08-15 DIAGNOSIS — Z86.711 HISTORY OF PULMONARY EMBOLISM: ICD-10-CM

## 2023-08-15 DIAGNOSIS — I10 ESSENTIAL HYPERTENSION: ICD-10-CM

## 2023-08-15 DIAGNOSIS — G62.9 PERIPHERAL POLYNEUROPATHY: ICD-10-CM

## 2023-08-15 DIAGNOSIS — Z23 NEED FOR VACCINATION AGAINST STREPTOCOCCUS PNEUMONIAE: ICD-10-CM

## 2023-08-15 LAB
ALBUMIN SERPL BCG-MCNC: 4 G/DL (ref 3.5–5.2)
ALP SERPL-CCNC: 93 U/L (ref 40–129)
ALT SERPL W P-5'-P-CCNC: 22 U/L (ref 0–70)
ANION GAP SERPL CALCULATED.3IONS-SCNC: 10 MMOL/L (ref 7–15)
AST SERPL W P-5'-P-CCNC: 28 U/L (ref 0–45)
BASOPHILS # BLD AUTO: 0 10E3/UL (ref 0–0.2)
BASOPHILS NFR BLD AUTO: 1 %
BILIRUB SERPL-MCNC: 0.3 MG/DL
BUN SERPL-MCNC: 16.4 MG/DL (ref 8–23)
CALCIUM SERPL-MCNC: 10.4 MG/DL (ref 8.8–10.2)
CHLORIDE SERPL-SCNC: 104 MMOL/L (ref 98–107)
CREAT SERPL-MCNC: 0.91 MG/DL (ref 0.67–1.17)
DEPRECATED HCO3 PLAS-SCNC: 29 MMOL/L (ref 22–29)
EOSINOPHIL # BLD AUTO: 0.1 10E3/UL (ref 0–0.7)
EOSINOPHIL NFR BLD AUTO: 2 %
ERYTHROCYTE [DISTWIDTH] IN BLOOD BY AUTOMATED COUNT: 13.5 % (ref 10–15)
GFR SERPL CREATININE-BSD FRML MDRD: 83 ML/MIN/1.73M2
GLUCOSE SERPL-MCNC: 118 MG/DL (ref 70–99)
HCT VFR BLD AUTO: 49.5 % (ref 40–53)
HGB BLD-MCNC: 16.2 G/DL (ref 13.3–17.7)
IMM GRANULOCYTES # BLD: 0 10E3/UL
IMM GRANULOCYTES NFR BLD: 1 %
INR BLD: 2.6 (ref 0.9–1.1)
LYMPHOCYTES # BLD AUTO: 2.4 10E3/UL (ref 0.8–5.3)
LYMPHOCYTES NFR BLD AUTO: 32 %
MCH RBC QN AUTO: 29.1 PG (ref 26.5–33)
MCHC RBC AUTO-ENTMCNC: 32.7 G/DL (ref 31.5–36.5)
MCV RBC AUTO: 89 FL (ref 78–100)
MONOCYTES # BLD AUTO: 0.7 10E3/UL (ref 0–1.3)
MONOCYTES NFR BLD AUTO: 9 %
NEUTROPHILS # BLD AUTO: 4.2 10E3/UL (ref 1.6–8.3)
NEUTROPHILS NFR BLD AUTO: 56 %
PLATELET # BLD AUTO: 247 10E3/UL (ref 150–450)
POTASSIUM SERPL-SCNC: 4.2 MMOL/L (ref 3.4–5.3)
PROT SERPL-MCNC: 6.8 G/DL (ref 6.4–8.3)
RBC # BLD AUTO: 5.57 10E6/UL (ref 4.4–5.9)
SODIUM SERPL-SCNC: 143 MMOL/L (ref 136–145)
TSH SERPL DL<=0.005 MIU/L-ACNC: 1.44 UIU/ML (ref 0.3–4.2)
VIT B12 SERPL-MCNC: 780 PG/ML (ref 232–1245)
WBC # BLD AUTO: 7.5 10E3/UL (ref 4–11)

## 2023-08-15 PROCEDURE — 82607 VITAMIN B-12: CPT | Performed by: INTERNAL MEDICINE

## 2023-08-15 PROCEDURE — G0009 ADMIN PNEUMOCOCCAL VACCINE: HCPCS | Performed by: INTERNAL MEDICINE

## 2023-08-15 PROCEDURE — 90677 PCV20 VACCINE IM: CPT | Performed by: INTERNAL MEDICINE

## 2023-08-15 PROCEDURE — 85610 PROTHROMBIN TIME: CPT | Performed by: INTERNAL MEDICINE

## 2023-08-15 PROCEDURE — 80053 COMPREHEN METABOLIC PANEL: CPT | Performed by: INTERNAL MEDICINE

## 2023-08-15 PROCEDURE — 36415 COLL VENOUS BLD VENIPUNCTURE: CPT | Performed by: INTERNAL MEDICINE

## 2023-08-15 PROCEDURE — 85025 COMPLETE CBC W/AUTO DIFF WBC: CPT | Performed by: INTERNAL MEDICINE

## 2023-08-15 PROCEDURE — 84443 ASSAY THYROID STIM HORMONE: CPT | Performed by: INTERNAL MEDICINE

## 2023-08-15 PROCEDURE — 36416 COLLJ CAPILLARY BLOOD SPEC: CPT | Performed by: INTERNAL MEDICINE

## 2023-08-15 PROCEDURE — 99214 OFFICE O/P EST MOD 30 MIN: CPT | Mod: 25 | Performed by: INTERNAL MEDICINE

## 2023-08-15 ASSESSMENT — ENCOUNTER SYMPTOMS: FATIGUE: 1

## 2023-08-15 NOTE — PROGRESS NOTES
ANTICOAGULATION CLINIC REFERRAL RENEWAL REQUEST       An annual renewal order is required for all patients referred to [facility]ticoagulation [facility].?  Please review and sign the pended referral order for Edward Mendoza JR.       ANTICOAGULATION SUMMARY      Warfarin indication(s)   PE    Mechanical heart valve present?  NO       Current goal range   INR: 2.0-3.0     Goal appropriate for indication? Goal INR 2-3, standard for indication(s) above     Time in Therapeutic Range (TTR)  (Goal > 60%) 100%       Office visit with referring provider's group within last year yes on 8/15/23       Sussy Lane RN  [facility]

## 2023-08-15 NOTE — PROGRESS NOTES
Edward Mendoza JR   83 year old male    Date of Visit: 8/15/2023    Chief Complaint   Patient presents with    Fatigue     No energy, has dreams/thoughts at night. Wondering if he has long term covid.    Balance/ Vestibular     Balance is off, getting worse.       Subjective  83-year-old male who lives independently with wife, is complaining of worsening fatigue since he had COVID illness in November of last year.    He does feel he got somewhat better after the severe fatigue in November, but he never got back to his previous baseline.    He used to go to the gym and was quite active but he has not been going to the gym and not doing his regular exercise over the past year.    He does have severe obstructive sleep apnea but highly compliant with CPAP and was just in with his pulmonary clinic last month, and the report was good and using the CPAP.  He has had his machine replaced, apparently he was having some dysfunction earlier this summer.    He does have peripheral neuropathy and mild unsteady gait but no falls.    Weight is down 4 pounds.  He feels his diet is good.    He has prediabetes with a hemoglobin A1c level of 5.8% in April.    No new cough.  No worsening shortness of breath.  No chest pain or chest pressure.  No lower extremity edema.    No abdominal pain or change in bowels.  No blood in stool.  No bleeding issues.    Has been on warfarin for history of pulmonary embolism.    No new chest pain or sudden worsening shortness of breath.    Blood pressure has been well controlled on chlorthalidone 12.5 mg daily.  Normal kidney and liver tests and hemoglobin in April of this year.    Prostatectomy in 2011 but no recurrence.  PSA level was 0 last year.    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    No swallowing difficulty.  No mouth sores.  No abdominal pain.  No dyspepsia.    He feels he has low energy levels, some decreased motivation to exercise and be more active.  He denies overt  depression.  He does not have a history of depression.  He does not want to start SSRI    PMHx:    Past Medical History:   Diagnosis Date    Arthritis     Broken ankle, left, sequela     Hypertension     Prostatitis     Sleep apnea     Thrombosis      PSHx:    Past Surgical History:   Procedure Laterality Date    CATARACT EXTRACTION Bilateral 12/07/2015    CHOLECYSTECTOMY      HERNIA REPAIR Bilateral 01/01/1987    Inguinal    PROSTATECTOMY N/A     W/ Bilat Pelvic Lymphadenectomy    REVERSE ARTHROPLASTY SHOULDER Right 2/1/2022    Procedure: Right reverse total shoulder arthroplasty;  Surgeon: Abel Tobar MD;  Location: [facility] OR    VITRECTOMY ANTERIOR Left 06/15/2015    [facility] THORACOTOMY,MAJOR,EXPLOR/BIOPSY  01/01/1983    VATs that found lipoma on chest wall, was concern for cancer.      Immunizations:   Immunization History   Administered Date(s) Administered    COVID-19 Bivalent 12+ (Pfizer) 11/09/2022    COVID-19 Bivalent Peds 5-11Y (Pfizer) 11/09/2022    COVID-19 MONOVALENT 12+ (Pfizer) 02/04/2021, 02/25/2021, 10/12/2021, 04/01/2022    COVID-19 Monovalent 12+ (Pfizer 2022) 04/01/2022    DT (PEDS <7y) 12/04/2000    FLU 6-35 months 09/08/2011, 10/08/2012, 10/10/2013, 10/13/2014    FLUAD(HD)65+ QUAD 11/01/2021    Flu, Unspecified 10/01/2021, 10/18/2022    Influenza (H1N1) 01/15/2010    Influenza (High Dose) 3 valent vaccine 10/26/2015, 10/06/2016, 10/10/2017, 08/15/2018, 10/01/2019, 10/18/2022    Influenza Vaccine 65+ (Fluzone HD) 10/05/2020, 10/06/2020, 10/18/2022    Pneumo Conj 13-V (2010&after) 10/13/2014    Pneumococcal 20 valent Conjugate (Prevnar 20) 08/15/2023    Pneumococcal 23 valent 10/02/2003    TDAP (Adacel,Boostrix) 05/01/2019    Td,adult,historic,unspecified 01/06/2011    Zoster recombinant adjuvanted (SHINGRIX) 02/19/2019    Zoster vaccine, live 09/08/2011       ROS A comprehensive review of systems was performed and was otherwise negative    Medications, allergies, and problem list were reviewed and  "updated    Exam  /66   Pulse 70   Temp 98  F (36.7  C)   Resp 16   Ht 1.753 m (5' 9\")   Wt 74.4 kg (164 lb)   SpO2 98%   BMI 24.22 kg/m    Alert and oriented x3.  Cognition within normal limits.  Mildly flat affect but not profound.  Does not appear anxious.  Mobility exam within normal limits, not parkinsonian.  Pupils and irises equal and reactive and no jaundice.  Pharynx appears normal, slightly crowded but no oral lesions.  Teeth in good condition.  No cervical or supraclavicular or axillary adenopathy.  No JVD.  Lungs are clear to auscultation with good respiratory excursion and no dullness.  Heart is regular with no murmur rub or gallop.  No premature beats.  No ankle edema.  Abdomen is thin, nontender and no hepatosplenomegaly.  Skin exam appears normal.    Assessment/Plan  1. Fatigue, unspecified type  Global fatigue, worsening since COVID last year.    I suspect there may be some dysfunction in his CPAP machine, and there was report that there was some dysfunction earlier this summer, but it was replaced.    .  His new machine may not be at optimal settings given his severe sleep apnea.  If he continues to have worsening fatigue, I recommended he return to his sleep clinic to consider more formal evaluation of his CPAP adequacy.    He may be having chronic COVID fatigue after his COVID illness last November.  He has gotten away from his regular exercise since then and has had likely some deconditioning.    I recommend he get back to a daily exercise program.    We discussed the possibility for depression but he denied.  I did offer him referral to an adult provider and discussed option for SSRI trial.  Patient can follow-up earlier than planned to discuss that further if he wishes.    Otherwise follow-up in October for recheck after he gets back on a regular exercise routine.    He feels his diet is good.  He should be abstaining from all alcohol.  - CBC with Platelets & Differential  - " Comprehensive metabolic panel  - TSH    2. History of 2019 novel coronavirus disease (COVID-19)  Severe illness November of last year.    He will plan to get the COVID-vaccine and flu shot this fall    3. Obstructive sleep apnea on CPAP  Severe, but compliant with CPAP.  Worsening daytime sleepiness and fatigue, may be a sign of CPAP dysfunction.  If fatigue does not improve, return to sleep clinic for further evaluation    4. Peripheral polyneuropathy  Likely associated with sleep apnea.  Check B12 level.  Mildly unsteady gait.  - Vitamin B12    5. Essential hypertension  Controlled with chlorthalidone.    6. History of pulmonary embolism  Chronic warfarin  - INR point of care    7. Encounter for therapeutic drug monitoring    - Comprehensive metabolic panel    8. Need for vaccination against Streptococcus pneumoniae  Given today per patient request.  - PNEUMOCOCCAL 20 VALENT CONJUGATE (PREVNAR 20)    Prediabetes, follow-up in October for checkup    Wet macular degeneration, getting injections.    History of prostate cancer with prostatectomy in 2011 but no evidence of recurrence.    Had a colonoscopy in 2012 that was negative and no further colon cancer screening with age.      Return in 8 weeks (on 10/10/2023) for Follow-up appointment.   Patient Instructions   Your fatigue may be associated with the aftereffects of your COVID illness.    Your chronic fatigue can also be associated with sleep apnea.    Continue to wear your CPAP machine every evening.  If your fatigue gets worse, you may want to return to the sleep clinic to get your CPAP machine reevaluated.    To improve your conditioning after your COVID illness last year, you need to exercise on a daily basis.  Start a daily exercise plan with stretching and walking around your house.  Try to go to the gym 3 times a week.  Start regular exercise gradually to avoid any strain type injury.    If you feel you are getting more depressed, you can request a  referral to a mental health provider or follow-up with me to discuss antidepressant medication.    You should avoid all alcohol.    Follow-up on October 10 for blood pressure recheck and general follow-up appointment.    Ascencion Calvillo MD, MD        Current Outpatient Medications   Medication Sig Dispense Refill    chlorthalidone (HYGROTON) 25 MG tablet [CHLORTHALIDONE (HYGROTEN) 25 MG TABLET] Take A 1/2 TABLETS (12.5 MG TOTAL) BY MOUTH DAILY. 45 tablet 3    cholecalciferol, vitamin D3, 1,000 unit tablet [CHOLECALCIFEROL, VITAMIN D3, 1,000 UNIT TABLET] Take 1,000 Units by mouth daily.      fexofenadine (ALLEGRA) 180 MG tablet [FEXOFENADINE (ALLEGRA) 180 MG TABLET] Take 180 mg by mouth daily.      multivitamin therapeutic tablet [MULTIVITAMIN THERAPEUTIC TABLET] Take 1 tablet by mouth daily.      triamcinolone (KENALOG) 0.1 % external cream Apply topically daily as needed for irritation      vit C/E/Zn/coppr/lutein/zeaxan (PRESERVISION AREDS-2 ORAL) Take 1 tablet by mouth 2 times daily       warfarin ANTICOAGULANT (COUMADIN) 2.5 MG tablet [WARFARIN ANTICOAGULANT (COUMADIN/JANTOVEN) 2.5 MG TABLET] Take 1 tablet by mouth on Monday's. Adjust dose based on INR results as directed. 60 tablet 1    warfarin ANTICOAGULANT (COUMADIN) 5 MG tablet Take 1-1.5 tablets (5-7.5 mg) by mouth daily Adjust dose per INR results as directed. 110 tablet 1     Allergies   Allergen Reactions    Cats Other (See Comments)     Respiratory congestion    Lisinopril Cough    Latex Itching     Latex adhesive- caused itching    Ace Inhibitors Cough    Chocolate Flavor Other (See Comments)     Pt gets sinus congestion from eating chocolate.    Corn [Corn Oil] Other (See Comments)     Pt gets sinus congestion from eating corn.    Fish Containing Products [Fish-Derived Products] Other (See Comments)     Pt gets sinus congestion from eating fish.    Levofloxacin Rash     Red line up the arm after IV administration    Nuts - Unspecified [Nuts] Other  (See Comments)     Pt gets sinus congestion from eating nuts.    Penicillins Unknown     childhood    Ragweed Pollen [Ragweeds] Other (See Comments)     Sinus and chest congestion.     Social History     Tobacco Use    Smoking status: Former     Packs/day: 0.50     Years: 5.00     Pack years: 2.50     Types: Cigarettes     Quit date: 1960     Years since quittin.6    Smokeless tobacco: Never   Vaping Use    Vaping Use: Never used   Substance Use Topics    Alcohol use: No    Drug use: No             Subjective   Edward is a 83 year old, presenting for the following health issues:  Fatigue (No energy, has dreams/thoughts at night. Wondering if he has long term covid.) and Balance/ Vestibular (Balance is off, getting worse.  )      8/15/2023    11:22 AM   Additional Questions   Roomed by Saida NORRIS   Accompanied by lesli         8/15/2023    11:22 AM   Patient Reported Additional Medications   Patient reports taking the following new medications no       Fatigue  Associated symptoms include fatigue.   History of Present Illness       Reason for visit:  Long covid c/u    He eats 2-3 servings of fruits and vegetables daily.He consumes 0 sweetened beverage(s) daily.He exercises with enough effort to increase his heart rate 10 to 19 minutes per day.  He exercises with enough effort to increase his heart rate 4 days per week.   He is taking medications regularly.               Review of Systems   Constitutional:  Positive for fatigue.            Objective    There were no vitals taken for this visit.  There is no height or weight on file to calculate BMI.  Physical Exam

## 2023-08-15 NOTE — PATIENT INSTRUCTIONS
Your fatigue may be associated with the aftereffects of your COVID illness.    Your chronic fatigue can also be associated with sleep apnea.    Continue to wear your CPAP machine every evening.  If your fatigue gets worse, you may want to return to the sleep clinic to get your CPAP machine reevaluated.    To improve your conditioning after your COVID illness last year, you need to exercise on a daily basis.  Start a daily exercise plan with stretching and walking around your house.  Try to go to the gym 3 times a week.  Start regular exercise gradually to avoid any strain type injury.    If you feel you are getting more depressed, you can request a referral to a mental health provider or follow-up with me to discuss antidepressant medication.    You should avoid all alcohol.    Follow-up on October 10 for blood pressure recheck and general follow-up appointment.

## 2023-08-15 NOTE — PROGRESS NOTES
ANTICOAGULATION MANAGEMENT     Edward Mendoza JR 83 year old male is on warfarin with therapeutic INR result. (Goal INR 2.0-3.0)    Recent labs: (last 7 days)     08/15/23  1202   INR 2.6*       ASSESSMENT     Source(s): Chart Review, Patient/Caregiver Call, and Template     Warfarin doses taken: Warfarin taken as instructed  Diet: No new diet changes identified  Medication/supplement changes: None noted  New illness, injury, or hospitalization: yes-seen MD today for fatigue  Signs or symptoms of bleeding or clotting: No  Previous result: Therapeutic last 2(+) visits  Additional findings: None       PLAN     Recommended plan for no diet, medication or health factor changes affecting INR     Dosing Instructions: Continue your current warfarin dose with next INR in 6 weeks       Summary  As of 8/15/2023      Full warfarin instructions:  7.5 mg every Mon; 5 mg all other days   Next INR check:  9/26/2023               Telephone call with Edward who verbalizes understanding and agrees to plan and who agrees to plan and repeated back plan correctly    Lab visit scheduled    Education provided:   Please call back if any changes to your diet, medications or how you've been taking warfarin  Goal range and lab monitoring: goal range and significance of current result and Importance of therapeutic range    Plan made per [facility] anticoagulation protocol    Sussy Lane, RN  Anticoagulation Clinic  8/15/2023    _______________________________________________________________________     Anticoagulation Episode Summary       Current INR goal:  2.0-3.0   TTR:  100.0 % (1 y)   Target end date:  Indefinite   Send INR reminders to:  CHRISTA PHELPS    Indications    History of pulmonary embolism [Z86.711]             Comments:               Anticoagulation Care Providers       Provider Role Specialty Phone number    Ascencion Calvillo MD Referring Internal Medicine [phone]

## 2023-09-07 ENCOUNTER — TELEPHONE (OUTPATIENT)
Dept: ANTICOAGULATION | Facility: CLINIC | Age: 83
End: 2023-09-07

## 2023-09-07 ENCOUNTER — ANCILLARY PROCEDURE (OUTPATIENT)
Dept: GENERAL RADIOLOGY | Facility: CLINIC | Age: 83
End: 2023-09-07
Attending: NURSE PRACTITIONER
Payer: COMMERCIAL

## 2023-09-07 ENCOUNTER — OFFICE VISIT (OUTPATIENT)
Dept: FAMILY MEDICINE | Facility: CLINIC | Age: 83
End: 2023-09-07
Payer: COMMERCIAL

## 2023-09-07 VITALS
HEIGHT: 69 IN | TEMPERATURE: 97.8 F | WEIGHT: 166 LBS | SYSTOLIC BLOOD PRESSURE: 140 MMHG | DIASTOLIC BLOOD PRESSURE: 60 MMHG | OXYGEN SATURATION: 98 % | BODY MASS INDEX: 24.59 KG/M2 | HEART RATE: 66 BPM | RESPIRATION RATE: 16 BRPM
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
## 2023-09-07 PROCEDURE — 99213 OFFICE O/P EST LOW 20 MIN: CPT | Performed by: NURSE PRACTITIONER

## 2023-09-07 PROCEDURE — 71101 X-RAY EXAM UNILAT RIBS/CHEST: CPT | Mod: TC | Performed by: RADIOLOGY

## 2023-09-07 NOTE — TELEPHONE ENCOUNTER
Patient called in wanting to speak with Sussy. Notes that he fell this weekend and hurt his ribs. He is going to [facility] walk in urgent care this am for assessment and treatment. He has been taking Tylenol and is wondering about pain medication if he if offered some.    Informed that [facility] will work with pain medication and interaction if given.     He would like Sussy to call him at home first and then wife's cell phone incase they have already left.    Kamille Ardon RN    [facility]

## 2023-09-07 NOTE — PROGRESS NOTES
"Answers submitted by the patient for this visit:  General Questionnaire (Submitted on 9/7/2023)  Chief Complaint: Chronic problems general questions HPI Form  How many servings of fruits and vegetables do you eat daily?: 2-3  On average, how many sweetened beverages do you drink each day (Examples: soda, juice, sweet tea, etc.  Do NOT count diet or artificially sweetened beverages)?: 0  How many minutes a day do you exercise enough to make your heart beat faster?: 20 to 29  How many days a week do you exercise enough to make your heart beat faster?: 4  How many days per week do you miss taking your medication?: 0  General Concern (Submitted on 9/7/2023)  Chief Complaint: Chronic problems general questions HPI Form  What is the reason for your visit today?: fell on left side and injured ribs side  When did your symptoms begin?: 1-3 days ago    Assessment & Plan     Fall, initial encounter  Discussed rest, heat, compression as tolerated.  X-ray is negative for fracture.  If symptoms are worsening I would recommend following up with PT to help rehab the injury.  Can do 1000 mg of Tylenol at a time.  He can do this up to 3 times a day.  Liver function is normal.  - XR Ribs & Chest Left G/E 3 Views; Future                 [facility]    Subjective   Edward is a 83 year old, presenting for the following health issues:  Fall (X 3 days ago on left side upper ribs.  Painful to move around and laying down. 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 States when moving/turning in a certain way can hear a \"clicking sound\")        9/7/2023     1:47 PM   Additional Questions   Roomed by Karen NORRIS MA   Accompanied by Wife       History of Present Illness       Reason for visit:  Fell on left side and injured ribs side  Symptom onset:  1-3 days ago    He eats 2-3 servings of fruits and vegetables daily.He consumes 0 sweetened beverage(s) daily.He " "exercises with enough effort to increase his heart rate 20 to 29 minutes per day.  He exercises with enough effort to increase his heart rate 4 days per week.   He is taking medications regularly.     Elbow is not currently painful.  Pain on left lower side of ribs about the 10th and 11th rib.          Review of Systems         Objective    BP (!) 140/60   Pulse 66   Temp 97.8  F (36.6  C) (Oral)   Resp 16   Ht 1.753 m (5' 9\")   Wt 75.3 kg (166 lb)   SpO2 98%   BMI 24.51 kg/m    Body mass index is 24.51 kg/m .  Physical Exam  Constitutional:       Appearance: Normal appearance.   Cardiovascular:      Rate and Rhythm: Normal rate and regular rhythm.   Pulmonary:      Effort: Pulmonary effort is normal.      Breath sounds: Normal breath sounds.   Musculoskeletal:        Arms:    Neurological:      General: No focal deficit present.      Mental Status: He is alert and oriented to person, place, and time.   Psychiatric:         Mood and Affect: Mood normal.         Behavior: Behavior normal.            XR Ribs & Chest Left G/E 3 Views    Result Date: 9/7/2023  EXAM: XR RIBS AND CHEST LEFT 3 VIEWS LOCATION: [facility] DATE: 9/7/2023 INDICATION: Fall. Rib pain. COMPARISON: None.     IMPRESSION: Left basilar atelectasis. Lungs are otherwise clear. No pleural effusions. No pneumothorax. Nonenlarged cardiac silhouette. Atherosclerotic aortic calcifications. Demineralized bones, which limits evaluation for subtle, nondisplaced fractures. Within this limitation, no displaced left rib fractures are identified. Right shoulder arthroplasty hardware.                   "

## 2023-09-07 NOTE — TELEPHONE ENCOUNTER
He fell Sunday and and his elbow hits his ribs. He thought would get better but he is still having a lot of pain. He has been taking some Tylenol for pain. Discussed just to call after his visit at [facility] and we can discuss any medications they gave him and come up with plan. Will just check INR sooner and adjust as needed with his current situation. He also mentioned he has Tramadol at home. He understands that can increase INR.

## 2023-09-26 ENCOUNTER — LAB (OUTPATIENT)
Dept: LAB | Facility: CLINIC | Age: 83
End: 2023-09-26
Payer: COMMERCIAL

## 2023-09-26 ENCOUNTER — ANTICOAGULATION THERAPY VISIT (OUTPATIENT)
Dept: ANTICOAGULATION | Facility: CLINIC | Age: 83
End: 2023-09-26

## 2023-09-26 DIAGNOSIS — Z86.711 HISTORY OF PULMONARY EMBOLISM: ICD-10-CM

## 2023-09-26 DIAGNOSIS — Z86.711 HISTORY OF PULMONARY EMBOLISM: Primary | ICD-10-CM

## 2023-09-26 LAB — INR BLD: 2.6 (ref 0.9–1.1)

## 2023-09-26 PROCEDURE — 85610 PROTHROMBIN TIME: CPT

## 2023-09-26 PROCEDURE — 36416 COLLJ CAPILLARY BLOOD SPEC: CPT

## 2023-09-26 NOTE — PROGRESS NOTES
ANTICOAGULATION MANAGEMENT     Edward HENDRICKS Reji JR 83 year old male is on warfarin with therapeutic INR result. (Goal INR 2.0-3.0)    Recent labs: (last 7 days)     09/26/23  0939   INR 2.6*       ASSESSMENT     Source(s): Chart Review, Patient/Caregiver Call, and Template     Warfarin doses taken: Missed dose(s) may be affecting INR He missed last night dose.   Diet: No new diet changes identified  Medication/supplement changes: None noted  New illness, injury, or hospitalization: Yes-hurt ribs but they are better now.  Signs or symptoms of bleeding or clotting: No  Previous result: Therapeutic last 2(+) visits  Additional findings:  He will take his 7.5 mg today since he missed his dose yesterday.       PLAN     Recommended plan for temporary change(s) affecting INR     Dosing Instructions: booster dose then continue your current warfarin dose with next INR in 6 weeks       Summary  As of 9/26/2023      Full warfarin instructions:  9/26: 7.5 mg; Otherwise 7.5 mg every Mon; 5 mg all other days   Next INR check:  11/7/2023               Telephone call with Edward who verbalizes understanding and agrees to plan and who agrees to plan and repeated back plan correctly    Lab visit scheduled    Education provided:   Please call back if any changes to your diet, medications or how you've been taking warfarin  Goal range and lab monitoring: goal range and significance of current result and Importance of therapeutic range    Plan made per [facility] anticoagulation protocol    Sussy Lane RN  Anticoagulation Clinic  9/26/2023    _______________________________________________________________________     Anticoagulation Episode Summary       Current INR goal:  2.0-3.0   TTR:  100.0 % (1 y)   Target end date:  Indefinite   Send INR reminders to:  CHRISTA PHELPS    Indications    History of pulmonary embolism [Z86.711]             Comments:               Anticoagulation Care Providers       Provider Role Specialty Phone number     Ascencion Calvillo MD [facility] Internal Medicine [phone]

## 2023-10-10 ENCOUNTER — OFFICE VISIT (OUTPATIENT)
Dept: INTERNAL MEDICINE | Facility: CLINIC | Age: 83
End: 2023-10-10
Payer: COMMERCIAL

## 2023-10-10 VITALS
SYSTOLIC BLOOD PRESSURE: 134 MMHG | HEART RATE: 67 BPM | HEIGHT: 69 IN | TEMPERATURE: 97.8 F | RESPIRATION RATE: 16 BRPM | DIASTOLIC BLOOD PRESSURE: 60 MMHG | BODY MASS INDEX: 24.73 KG/M2 | WEIGHT: 167 LBS | OXYGEN SATURATION: 98 %

## 2023-10-10 DIAGNOSIS — I10 ESSENTIAL HYPERTENSION: Primary | ICD-10-CM

## 2023-10-10 DIAGNOSIS — R73.03 PREDIABETES: ICD-10-CM

## 2023-10-10 DIAGNOSIS — Z51.81 ENCOUNTER FOR THERAPEUTIC DRUG MONITORING: ICD-10-CM

## 2023-10-10 DIAGNOSIS — Z86.711 HISTORY OF PULMONARY EMBOLISM: ICD-10-CM

## 2023-10-10 DIAGNOSIS — G47.33 OBSTRUCTIVE SLEEP APNEA ON CPAP: ICD-10-CM

## 2023-10-10 LAB
ANION GAP SERPL CALCULATED.3IONS-SCNC: 10 MMOL/L (ref 7–15)
BUN SERPL-MCNC: 19.2 MG/DL (ref 8–23)
CALCIUM SERPL-MCNC: 10.2 MG/DL (ref 8.8–10.2)
CHLORIDE SERPL-SCNC: 101 MMOL/L (ref 98–107)
CREAT SERPL-MCNC: 0.92 MG/DL (ref 0.67–1.17)
DEPRECATED HCO3 PLAS-SCNC: 29 MMOL/L (ref 22–29)
EGFRCR SERPLBLD CKD-EPI 2021: 82 ML/MIN/1.73M2
GLUCOSE SERPL-MCNC: 136 MG/DL (ref 70–99)
HBA1C MFR BLD: 5.8 % (ref 0–5.6)
POTASSIUM SERPL-SCNC: 4 MMOL/L (ref 3.4–5.3)
SODIUM SERPL-SCNC: 140 MMOL/L (ref 135–145)

## 2023-10-10 PROCEDURE — 80048 BASIC METABOLIC PNL TOTAL CA: CPT | Performed by: INTERNAL MEDICINE

## 2023-10-10 PROCEDURE — 36415 COLL VENOUS BLD VENIPUNCTURE: CPT | Performed by: INTERNAL MEDICINE

## 2023-10-10 PROCEDURE — 99214 OFFICE O/P EST MOD 30 MIN: CPT | Performed by: INTERNAL MEDICINE

## 2023-10-10 PROCEDURE — 83036 HEMOGLOBIN GLYCOSYLATED A1C: CPT | Performed by: INTERNAL MEDICINE

## 2023-10-10 NOTE — PATIENT INSTRUCTIONS
Start going to the gym at least 3 times a week.  Daily 20 to 30 minutes of walking and aerobic type exercise and stretching at least 3 times a week normal.    Try to get activity time at home every morning.  Walk around and stretch for at least 20 minutes every day.    You will be more unsteady with your peripheral neuropathy.  Wear good shoes at all times.  You can consider getting a cane to help with balance.  Do not drink any alcohol, as that can worsen balance and peripheral neuropathy.    You have prediabetes.  Continue to avoid starchy foods such as pasta or bread.  Increase your daily activity.    Get your flu shot later this fall, as planned.    Goal blood pressure less than 140/85 but not less than 110/60.  If you are getting blood pressures outside that range more than occasionally, contact me.    Otherwise you are due for a adult wellness visit physical exam after April 17 of next year.

## 2023-10-10 NOTE — PROGRESS NOTES
Edward Mendoza JR   83 year old male    Date of Visit: 10/10/2023    Chief Complaint   Patient presents with    Follow Up     6 mo follow up.     Subjective  83-year-old male lives independently with wife.  Has been quite active in the past, but he did slow down after having significant COVID November 2022 and is complained of fatigue since then.  He does have severe sleep apnea but is highly compliant with CPAP, he does have a new machine and denies napping in chairs.    He does have peripheral neuropathy which did worsen after that COVID episode but is been getting better this summer.  Just some slight numbness of his toes now.  No foot sores.  No falls but does feel unsteady on feet at times, does not want to get a cane.  Normal B12 level and normal TSH in August of this year.    Prediabetes, does not check blood sugars.  Weight and diet is stable.  He does eat some starchy foods but denies any sweets.  Does not drink any alcohol.    Hemoglobin A1c level was 5.8% in April.  High HDL of 71 with an LDL of 121.  No history of vascular disease.  No chest pain or palpitations or increasing edema.    History of pulmonary embolism on warfarin.  No bleeding issues or falls.    Blood pressure has been controlled in the past, although has not checked his blood pressure recently.  He denies orthostasis.  He is on chlorthalidone 12.5 mg a day.  No edema.    Prostatectomy in 2011 without recurrence.    Wet macular degeneration and gets injections.    No chest pain palpitations or increasing shortness of breath or abdominal pain complaints.    PMHx:    Past Medical History:   Diagnosis Date    Arthritis     Broken ankle, left, sequela     Hypertension     Prostatitis     Sleep apnea     Thrombosis      PSHx:    Past Surgical History:   Procedure Laterality Date    CATARACT EXTRACTION Bilateral 12/07/2015    CHOLECYSTECTOMY      HERNIA REPAIR Bilateral 01/01/1987    Inguinal    PROSTATECTOMY N/A     W/ Bilat Pelvic  "Lymphadenectomy    REVERSE ARTHROPLASTY SHOULDER Right 2/1/2022    Procedure: Right reverse total shoulder arthroplasty;  Surgeon: Abel Tobar MD;  Location: [facility] OR    VITRECTOMY ANTERIOR Left 06/15/2015    [facility] THORACOTOMY,MAJOR,EXPLOR/BIOPSY  01/01/1983    VATs that found lipoma on chest wall, was concern for cancer.      Immunizations:   Immunization History   Administered Date(s) Administered    COVID-19 Bivalent 12+ (Pfizer) 11/09/2022    COVID-19 Bivalent Peds 5-11Y (Pfizer) 11/09/2022    COVID-19 MONOVALENT 12+ (Pfizer) 02/04/2021, 02/25/2021, 10/12/2021, 04/01/2022    COVID-19 Monovalent 12+ (Pfizer 2022) 04/01/2022    DT (PEDS <7y) 12/04/2000    FLU 6-35 months 09/08/2011, 10/08/2012, 10/10/2013, 10/13/2014    Flu, Unspecified 10/01/2021, 10/18/2022    Influenza (H1N1) 01/15/2010    Influenza (High Dose) 3 valent vaccine 10/26/2015, 10/06/2016, 10/10/2017, 08/15/2018, 10/01/2019, 10/18/2022    Influenza Vaccine 65+ (FLUAD) 11/01/2021    Influenza Vaccine 65+ (Fluzone HD) 10/05/2020, 10/06/2020, 10/18/2022    Pneumo Conj 13-V (2010&after) 10/13/2014    Pneumococcal 20 valent Conjugate (Prevnar 20) 08/15/2023    Pneumococcal 23 valent 10/02/2003    TDAP (Adacel,Boostrix) 05/01/2019    Td,adult,historic,unspecified 01/06/2011    Zoster recombinant adjuvanted (SHINGRIX) 02/19/2019    Zoster vaccine, live 09/08/2011       ROS A comprehensive review of systems was performed and was otherwise negative    Medications, allergies, and problem list were reviewed and updated    Exam  /60   Pulse 67   Temp 97.8  F (36.6  C)   Resp 16   Ht 1.753 m (5' 9\")   Wt 75.8 kg (167 lb)   SpO2 98%   BMI 24.66 kg/m    Alert and oriented x3.  Mobility normal.  Alert and oriented.  Lungs are clear.  Heart is regular without murmur.  No ankle edema.    Assessment/Plan  1. Essential hypertension  Blood pressure was rechecked by me and was 134/60.  He denies orthostasis.  See patient instructions for monitoring blood " pressure.  Continue chlorthalidone 12.5 mg a day    2. Prediabetes  I discussed diet and exercise  - Hemoglobin A1c    3. Obstructive sleep apnea on CPAP  I emphasized the importance of compliance with CPAP.  His peripheral neuropathy has improved since recovering from COVID last year.  I discussed unsteadiness with his peripheral neuropathy.  Avoid all alcohol.    4. Encounter for therapeutic drug monitoring    - Basic metabolic panel    5. History of pulmonary embolism  Warfarin has been therapeutic, INR recheck next month.    Left macular degeneration, gets injections.    Has already had a COVID shot and has his flu shot scheduled for later this fall.    Significant deconditioning and fatigue since COVID last year, not been going to the gym or getting regular exercise.  We discussed with him going back to the [facility] at least 3 times a week, see patient instructions.      Return in about 6 months (around 4/18/2024) for Adult wellness visit.   Patient Instructions   Start going to the gym at least 3 times a week.  Daily 20 to 30 minutes of walking and aerobic type exercise and stretching at least 3 times a week normal.    Try to get activity time at home every morning.  Walk around and stretch for at least 20 minutes every day.    You will be more unsteady with your peripheral neuropathy.  Wear good shoes at all times.  You can consider getting a cane to help with balance.  Do not drink any alcohol, as that can worsen balance and peripheral neuropathy.    You have prediabetes.  Continue to avoid starchy foods such as pasta or bread.  Increase your daily activity.    Get your flu shot later this fall, as planned.    Goal blood pressure less than 140/85 but not less than 110/60.  If you are getting blood pressures outside that range more than occasionally, contact me.    Otherwise you are due for a adult wellness visit physical exam after April 17 of next year.    Ascencion Calvillo MD, MD        Current Outpatient  Medications   Medication Sig Dispense Refill    chlorthalidone (HYGROTON) 25 MG tablet [CHLORTHALIDONE (HYGROTEN) 25 MG TABLET] Take A 1/2 TABLETS (12.5 MG TOTAL) BY MOUTH DAILY. 45 tablet 3    cholecalciferol, vitamin D3, 1,000 unit tablet [CHOLECALCIFEROL, VITAMIN D3, 1,000 UNIT TABLET] Take 1,000 Units by mouth daily.      fexofenadine (ALLEGRA) 180 MG tablet [FEXOFENADINE (ALLEGRA) 180 MG TABLET] Take 180 mg by mouth daily.      multivitamin therapeutic tablet [MULTIVITAMIN THERAPEUTIC TABLET] Take 1 tablet by mouth daily.      triamcinolone (KENALOG) 0.1 % external cream Apply topically daily as needed for irritation      vit C/E/Zn/coppr/lutein/zeaxan (PRESERVISION AREDS-2 ORAL) Take 1 tablet by mouth 2 times daily       warfarin ANTICOAGULANT (COUMADIN) 2.5 MG tablet [WARFARIN ANTICOAGULANT (COUMADIN/JANTOVEN) 2.5 MG TABLET] Take 1 tablet by mouth on Monday's. Adjust dose based on INR results as directed. 60 tablet 1    warfarin ANTICOAGULANT (COUMADIN) 5 MG tablet Take 1-1.5 tablets (5-7.5 mg) by mouth daily Adjust dose per INR results as directed. 110 tablet 1     Allergies   Allergen Reactions    Cats Other (See Comments)     Respiratory congestion    Lisinopril Cough    Latex Itching     Latex adhesive- caused itching    Ace Inhibitors Cough    Chocolate Flavor Other (See Comments)     Pt gets sinus congestion from eating chocolate.    Corn [Corn Oil] Other (See Comments)     Pt gets sinus congestion from eating corn.    Fish Containing Products [Fish-Derived Products] Other (See Comments)     Pt gets sinus congestion from eating fish.    Levofloxacin Rash     Red line up the arm after IV administration    Nuts - Unspecified [Nuts] Other (See Comments)     Pt gets sinus congestion from eating nuts.    Penicillins Unknown     childhood    Ragweed Pollen [Ragweeds] Other (See Comments)     Sinus and chest congestion.     Social History     Tobacco Use    Smoking status: Former     Packs/day: 0.50     Years:  5.00     Pack years: 2.50     Types: Cigarettes     Quit date: 1960     Years since quittin.8    Smokeless tobacco: Never   Vaping Use    Vaping Use: Never used   Substance Use Topics    Alcohol use: No    Drug use: No             Subjective   Edward is a 83 year old, presenting for the following health issues:  Follow Up (6 mo follow up.)      10/10/2023     8:42 AM   Additional Questions   Roomed by Saida NORRIS   Accompanied by lesli         10/10/2023     8:42 AM   Patient Reported Additional Medications   Patient reports taking the following new medications no       History of Present Illness       Reason for visit:  Weiiness  visit    He eats 2-3 servings of fruits and vegetables daily.He consumes 0 sweetened beverage(s) daily.He exercises with enough effort to increase his heart rate 20 to 29 minutes per day.  He exercises with enough effort to increase his heart rate 4 days per week.   He is taking medications regularly.                 Review of Systems         Objective    There were no vitals taken for this visit.  There is no height or weight on file to calculate BMI.  Physical Exam

## 2023-10-12 ENCOUNTER — TRANSFERRED RECORDS (OUTPATIENT)
Dept: HEALTH INFORMATION MANAGEMENT | Facility: CLINIC | Age: 83
End: 2023-10-12
Payer: COMMERCIAL

## 2023-10-31 ENCOUNTER — LAB (OUTPATIENT)
Dept: LAB | Facility: CLINIC | Age: 83
End: 2023-10-31
Payer: COMMERCIAL

## 2023-10-31 ENCOUNTER — ANTICOAGULATION THERAPY VISIT (OUTPATIENT)
Dept: ANTICOAGULATION | Facility: CLINIC | Age: 83
End: 2023-10-31

## 2023-10-31 DIAGNOSIS — Z86.711 HISTORY OF PULMONARY EMBOLISM: ICD-10-CM

## 2023-10-31 DIAGNOSIS — Z86.711 HISTORY OF PULMONARY EMBOLISM: Primary | ICD-10-CM

## 2023-10-31 LAB — INR BLD: 3.1 (ref 0.9–1.1)

## 2023-10-31 PROCEDURE — 85610 PROTHROMBIN TIME: CPT

## 2023-10-31 PROCEDURE — 36415 COLL VENOUS BLD VENIPUNCTURE: CPT

## 2023-10-31 NOTE — PROGRESS NOTES
ANTICOAGULATION MANAGEMENT     Edward Mendoza JR 83 year old male is on warfarin with supratherapeutic INR result. (Goal INR 2.0-3.0)    Recent labs: (last 7 days)     10/31/23  1557   INR 3.1*       ASSESSMENT     Source(s): Chart Review and Patient/Caregiver Call     Warfarin doses taken: Warfarin taken as instructed  Diet: Decreased greens/vitamin K in diet; plans to resume previous intake  Medication/supplement changes: None noted  New illness, injury, or hospitalization: No  Signs or symptoms of bleeding or clotting: No  Previous result: Therapeutic last 2(+) visits  Additional findings: None       PLAN     Recommended plan for temporary change(s) affecting INR     Dosing Instructions: Continue your current warfarin dose with next INR in 2 weeks       Summary  As of 10/31/2023      Full warfarin instructions:  7.5 mg every Mon; 5 mg all other days   Next INR check:  11/14/2023               Telephone call with Edward who verbalizes understanding and agrees to plan    Lab visit scheduled    Education provided:   Goal range and lab monitoring: goal range and significance of current result  Dietary considerations: importance of consistent vitamin K intake    Plan made per [facility] anticoagulation protocol    Bailey Rueda RN  Anticoagulation Clinic  10/31/2023    _______________________________________________________________________     Anticoagulation Episode Summary       Current INR goal:  2.0-3.0   TTR:  98.1% (1 y)   Target end date:  Indefinite   Send INR reminders to:  CHRISTA PHELPS    Indications    History of pulmonary embolism [Z86.711]             Comments:               Anticoagulation Care Providers       Provider Role Specialty Phone number    Ascencion Calvillo MD Referring Internal Medicine [phone]

## 2023-11-14 ENCOUNTER — ANTICOAGULATION THERAPY VISIT (OUTPATIENT)
Dept: ANTICOAGULATION | Facility: CLINIC | Age: 83
End: 2023-11-14

## 2023-11-14 ENCOUNTER — LAB (OUTPATIENT)
Dept: LAB | Facility: CLINIC | Age: 83
End: 2023-11-14
Payer: COMMERCIAL

## 2023-11-14 DIAGNOSIS — Z86.711 HISTORY OF PULMONARY EMBOLISM: ICD-10-CM

## 2023-11-14 DIAGNOSIS — Z86.711 HISTORY OF PULMONARY EMBOLISM: Primary | ICD-10-CM

## 2023-11-14 LAB — INR BLD: 2.3 (ref 0.9–1.1)

## 2023-11-14 PROCEDURE — 85610 PROTHROMBIN TIME: CPT

## 2023-11-14 PROCEDURE — 36416 COLLJ CAPILLARY BLOOD SPEC: CPT

## 2023-11-14 NOTE — PROGRESS NOTES
ANTICOAGULATION MANAGEMENT     Edward Mendoza JR 83 year old male is on warfarin with therapeutic INR result. (Goal INR 2.0-3.0)    Recent labs: (last 7 days)     11/14/23  0930   INR 2.3*       ASSESSMENT     Source(s): Chart Review and Patient/Caregiver Call     Warfarin doses taken: Warfarin taken as instructed  Diet: No new diet changes identified  Medication/supplement changes: None noted  New illness, injury, or hospitalization: No  Signs or symptoms of bleeding or clotting: No  Previous result: Supratherapeutic  Additional findings: None       PLAN     Recommended plan for no diet, medication or health factor changes affecting INR     Dosing Instructions: Continue your current warfarin dose with next INR in 3 weeks       Summary  As of 11/14/2023      Full warfarin instructions:  7.5 mg every Mon; 5 mg all other days   Next INR check:  12/6/2023               Telephone call with wife who verbalizes understanding and agrees to plan    Lab visit scheduled    Education provided:   Goal range and lab monitoring: goal range and significance of current result  Contact [phone] with any changes, questions or concerns.     Plan made per [facility] anticoagulation protocol    Jacquie Corona RN  Anticoagulation Clinic  11/14/2023    _______________________________________________________________________     Anticoagulation Episode Summary       Current INR goal:  2.0-3.0   TTR:  97.6% (1 y)   Target end date:  Indefinite   Send INR reminders to:  CHRISTA PHELPS    Indications    History of pulmonary embolism [Z86.711]             Comments:               Anticoagulation Care Providers       Provider Role Specialty Phone number    Ascencion Calvillo MD Referring Internal Medicine [phone]

## 2023-11-27 DIAGNOSIS — Z86.711 HISTORY OF PULMONARY EMBOLISM: ICD-10-CM

## 2023-11-28 RX ORDER — WARFARIN SODIUM 2.5 MG/1
TABLET ORAL
Qty: 60 TABLET | Refills: 1 | Status: SHIPPED | OUTPATIENT
Start: 2023-11-28 | End: 2024-09-05 | Stop reason: ALTCHOICE

## 2023-12-01 ENCOUNTER — TELEPHONE (OUTPATIENT)
Dept: INTERNAL MEDICINE | Facility: CLINIC | Age: 83
End: 2023-12-01
Payer: COMMERCIAL

## 2023-12-01 DIAGNOSIS — Z86.711 HISTORY OF PULMONARY EMBOLISM: Primary | ICD-10-CM

## 2023-12-01 RX ORDER — WARFARIN SODIUM 5 MG/1
5-7.5 TABLET ORAL DAILY
Qty: 110 TABLET | Refills: 1 | Status: SHIPPED | OUTPATIENT
Start: 2023-12-01 | End: 2024-04-18

## 2023-12-01 NOTE — TELEPHONE ENCOUNTER
Reason for Call:  INR follow up    Detailed comments: Wife Barb is calling regarding needing refill of Coumadin 5 mg    Phone Number Patient can be reached at: Home number on file [phone] (home)    Best Time: any    Can we leave a detailed message on this number? YES    Call taken on 12/1/2023 at 3:53 PM by Samantha Ivory

## 2023-12-01 NOTE — TELEPHONE ENCOUNTER
ANTICOAGULATION MANAGEMENT:  Medication Refill    Anticoagulation Summary  As of 11/14/2023      Warfarin maintenance plan:  7.5 mg (5 mg x 1.5) every Mon; 5 mg (5 mg x 1) all other days   Next INR check:  12/6/2023   Target end date:  Indefinite    Indications    History of pulmonary embolism [Z86.711]                 Anticoagulation Care Providers       Provider Role Specialty Phone number    Ascencion Calvillo MD Referring Internal Medicine [phone]            Refill Criteria    Visit with referring provider/group: Meets criteria: office visit within referring provider group in the last 1 year on 10/10/23    [facility] referral signed last signed: 08/15/2023; within last year: Yes    Lab monitoring not exceeding 2 weeks overdue: Yes    Edward meets all criteria for refill. Rx instructions and quantity supplied updated to match patient's current dosing plan. Warfarin 90 day supply with 1 refill granted per [facility] protocol   Wife states Edward was taken warfarin for years and it does not affect his corn oil allergy     Thaddeus Shaw RN  Anticoagulation Clinic

## 2023-12-06 ENCOUNTER — LAB (OUTPATIENT)
Dept: LAB | Facility: CLINIC | Age: 83
End: 2023-12-06
Payer: COMMERCIAL

## 2023-12-06 ENCOUNTER — ANTICOAGULATION THERAPY VISIT (OUTPATIENT)
Dept: ANTICOAGULATION | Facility: CLINIC | Age: 83
End: 2023-12-06

## 2023-12-06 DIAGNOSIS — Z86.711 HISTORY OF PULMONARY EMBOLISM: Primary | ICD-10-CM

## 2023-12-06 DIAGNOSIS — Z86.711 HISTORY OF PULMONARY EMBOLISM: ICD-10-CM

## 2023-12-06 LAB — INR BLD: 3.3 (ref 0.9–1.1)

## 2023-12-06 PROCEDURE — 36416 COLLJ CAPILLARY BLOOD SPEC: CPT

## 2023-12-06 PROCEDURE — 85610 PROTHROMBIN TIME: CPT

## 2023-12-06 NOTE — PROGRESS NOTES
ANTICOAGULATION MANAGEMENT     Edward Mendoza JR 83 year old male is on warfarin with supratherapeutic INR result. (Goal INR 2.0-3.0)    Recent labs: (last 7 days)     12/06/23  0901   INR 3.3*       ASSESSMENT     Source(s): Chart Review, Patient/Caregiver Call, and Template     Warfarin doses taken: Warfarin taken as instructed  Diet: Decreased greens/vitamin K in diet; plans to resume previous intake says for some reason he just has eaten less. Discussed decrease warfarin dose and he would prefer to eat more greens. He will have a salad today.  Medication/supplement changes: None noted  New illness, injury, or hospitalization: No  Signs or symptoms of bleeding or clotting: No  Previous result: Therapeutic last visit; previously outside of goal range  Additional findings: None       PLAN     Recommended plan for temporary change(s) affecting INR     Dosing Instructions: Continue your current warfarin dose with next INR in 2 weeks       Summary  As of 12/6/2023      Full warfarin instructions:  7.5 mg every Mon; 5 mg all other days   Next INR check:  12/20/2023               Telephone call with Edward who verbalizes understanding and agrees to plan and who agrees to plan and repeated back plan correctly    Lab visit scheduled    Education provided:   Please call back if any changes to your diet, medications or how you've been taking warfarin  Goal range and lab monitoring: goal range and significance of current result and Importance of therapeutic range    Plan made per [facility] anticoagulation protocol    Sussy Lane, RN  Anticoagulation Clinic  12/6/2023    _______________________________________________________________________     Anticoagulation Episode Summary       Current INR goal:  2.0-3.0   TTR:  95.8% (1 y)   Target end date:  Indefinite   Send INR reminders to:  CHRISTA PHELPS    Indications    History of pulmonary embolism [Z86.711]             Comments:               Anticoagulation Care Providers        Provider Role Specialty Phone number    Ascencion Calvillo MD Referring Internal Medicine [phone]

## 2023-12-20 ENCOUNTER — ANTICOAGULATION THERAPY VISIT (OUTPATIENT)
Dept: ANTICOAGULATION | Facility: CLINIC | Age: 83
End: 2023-12-20

## 2023-12-20 ENCOUNTER — LAB (OUTPATIENT)
Dept: LAB | Facility: CLINIC | Age: 83
End: 2023-12-20
Payer: COMMERCIAL

## 2023-12-20 ENCOUNTER — TELEPHONE (OUTPATIENT)
Dept: INTERNAL MEDICINE | Facility: CLINIC | Age: 83
End: 2023-12-20

## 2023-12-20 DIAGNOSIS — Z86.711 HISTORY OF PULMONARY EMBOLISM: ICD-10-CM

## 2023-12-20 DIAGNOSIS — Z86.711 HISTORY OF PULMONARY EMBOLISM: Primary | ICD-10-CM

## 2023-12-20 LAB — INR BLD: 2.8 (ref 0.9–1.1)

## 2023-12-20 PROCEDURE — 36416 COLLJ CAPILLARY BLOOD SPEC: CPT

## 2023-12-20 PROCEDURE — 85610 PROTHROMBIN TIME: CPT

## 2023-12-20 NOTE — TELEPHONE ENCOUNTER
Reason for Call:  Other other    Detailed comments: patient called [facility] anti coag dept back.    Patient scheduled Tremaine 10 for next INR.    Doesn't wish to speak with anti coag nurse so I did not call Sussy.    Thank you.    Phone Number Patient can be reached at: Home number on file [phone] (home)    Best Time: any    Can we leave a detailed message on this number? YES    Call taken on 12/20/2023 at 10:59 AM by Olivia Baker

## 2023-12-20 NOTE — PROGRESS NOTES
ANTICOAGULATION MANAGEMENT     Edward Mendoza JR 83 year old male is on warfarin with therapeutic INR result. (Goal INR 2.0-3.0)    Recent labs: (last 7 days)     12/20/23  0831   INR 2.8*       ASSESSMENT     Source(s): Chart Review and Template     Warfarin doses taken: Warfarin taken as instructed  Diet: No new diet changes identified  Medication/supplement changes: None noted  New illness, injury, or hospitalization: No  Signs or symptoms of bleeding or clotting: No  Previous result: Supratherapeutic  Additional findings: None       PLAN     Recommended plan for no diet, medication or health factor changes affecting INR     Dosing Instructions: Continue your current warfarin dose with next INR in 3 weeks       Summary  As of 12/20/2023      Full warfarin instructions:  7.5 mg every Mon; 5 mg all other days   Next INR check:  1/10/2024               Detailed voice message left for Edward with dosing instructions and follow up date.     Contact [phone] to schedule and with any changes, questions or concerns.     Education provided:   Please call back if any changes to your diet, medications or how you've been taking warfarin    Plan made per [facility] anticoagulation protocol    Sussy Lane RN  Anticoagulation Clinic  12/20/2023    _______________________________________________________________________     Anticoagulation Episode Summary       Current INR goal:  2.0-3.0   TTR:  93.5% (1 y)   Target end date:  Indefinite   Send INR reminders to:  CHRISTA PHELPS    Indications    History of pulmonary embolism [Z86.711]             Comments:               Anticoagulation Care Providers       Provider Role Specialty Phone number    Ascencion Calvillo MD Referring Internal Medicine [phone]

## 2024-01-10 ENCOUNTER — ANTICOAGULATION THERAPY VISIT (OUTPATIENT)
Dept: ANTICOAGULATION | Facility: CLINIC | Age: 84
End: 2024-01-10

## 2024-01-10 ENCOUNTER — LAB (OUTPATIENT)
Dept: LAB | Facility: CLINIC | Age: 84
End: 2024-01-10
Payer: COMMERCIAL

## 2024-01-10 DIAGNOSIS — Z86.711 HISTORY OF PULMONARY EMBOLISM: ICD-10-CM

## 2024-01-10 DIAGNOSIS — Z86.711 HISTORY OF PULMONARY EMBOLISM: Primary | ICD-10-CM

## 2024-01-10 LAB — INR BLD: 3.2 (ref 0.9–1.1)

## 2024-01-10 PROCEDURE — 85610 PROTHROMBIN TIME: CPT

## 2024-01-10 PROCEDURE — 36416 COLLJ CAPILLARY BLOOD SPEC: CPT

## 2024-01-10 NOTE — PROGRESS NOTES
ANTICOAGULATION MANAGEMENT     Edward Mendoza JR 83 year old male is on warfarin with supratherapeutic INR result. (Goal INR 2.0-3.0)    Recent labs: (last 7 days)     01/10/24  0857   INR 3.2*       ASSESSMENT     Source(s): Chart Review, Patient/Caregiver Call, and Template     Warfarin doses taken: Warfarin taken as instructed  Diet: Decreased greens/vitamin K in diet; plans to resume previous intake  Medication/supplement changes: None noted  New illness, injury, or hospitalization: No  Signs or symptoms of bleeding or clotting: No  Previous result: Therapeutic last visit; previously outside of goal range  Additional findings:  He wants to get back on diet and not change dose.       PLAN     Recommended plan for temporary change(s) affecting INR     Dosing Instructions: Continue your current warfarin dose with next INR in 2 weeks       Summary  As of 1/10/2024      Full warfarin instructions:  7.5 mg every Mon; 5 mg all other days   Next INR check:  1/24/2024               Telephone call with Edward who verbalizes understanding and agrees to plan and who agrees to plan and repeated back plan correctly    Lab visit scheduled    Education provided:   Please call back if any changes to your diet, medications or how you've been taking warfarin  Goal range and lab monitoring: goal range and significance of current result and Importance of therapeutic range    Plan made per [facility] anticoagulation protocol    Sussy Lane RN  Anticoagulation Clinic  1/10/2024    _______________________________________________________________________     Anticoagulation Episode Summary       Current INR goal:  2.0-3.0   TTR:  90.6% (1 y)   Target end date:  Indefinite   Send INR reminders to:  CHRISTA PHELPS    Indications    History of pulmonary embolism [Z86.711]             Comments:               Anticoagulation Care Providers       Provider Role Specialty Phone number    Ascencion Calvillo MD Referring Internal Medicine [phone]

## 2024-01-24 ENCOUNTER — TELEPHONE (OUTPATIENT)
Dept: INTERNAL MEDICINE | Facility: CLINIC | Age: 84
End: 2024-01-24

## 2024-01-24 ENCOUNTER — ANTICOAGULATION THERAPY VISIT (OUTPATIENT)
Dept: ANTICOAGULATION | Facility: CLINIC | Age: 84
End: 2024-01-24

## 2024-01-24 ENCOUNTER — LAB (OUTPATIENT)
Dept: LAB | Facility: CLINIC | Age: 84
End: 2024-01-24
Payer: COMMERCIAL

## 2024-01-24 DIAGNOSIS — Z86.711 HISTORY OF PULMONARY EMBOLISM: ICD-10-CM

## 2024-01-24 DIAGNOSIS — Z86.711 HISTORY OF PULMONARY EMBOLISM: Primary | ICD-10-CM

## 2024-01-24 LAB — INR BLD: 3 (ref 0.9–1.1)

## 2024-01-24 PROCEDURE — 36416 COLLJ CAPILLARY BLOOD SPEC: CPT

## 2024-01-24 PROCEDURE — 85610 PROTHROMBIN TIME: CPT

## 2024-01-24 NOTE — TELEPHONE ENCOUNTER
Patient Returning Call    Reason for call:  Returning INR call    Information relayed to patient:  Will leave TE for call back    Patient has additional questions:  No      Could we send this information to you in [facility] or would you prefer to receive a phone call?:   Patient would prefer a phone call   Okay to leave a detailed message?: No at Home number on file [phone] (home)

## 2024-01-24 NOTE — PROGRESS NOTES
ANTICOAGULATION MANAGEMENT     Edward Mendoza JR 83 year old male is on warfarin with therapeutic INR result. (Goal INR 2.0-3.0)    Recent labs: (last 7 days)     01/24/24  0901   INR 3.0*       ASSESSMENT     Source(s): Chart Review and Template     Warfarin doses taken: Warfarin taken as instructed  Diet: No new diet changes identified  Medication/supplement changes: None noted  New illness, injury, or hospitalization: No  Signs or symptoms of bleeding or clotting: No  Previous result: Supratherapeutic  Additional findings: None       PLAN     Recommended plan for no diet, medication or health factor changes affecting INR     Dosing Instructions: Continue your current warfarin dose with next INR in 3 weeks       Summary  As of 1/24/2024      Full warfarin instructions:  7.5 mg every Mon; 5 mg all other days   Next INR check:  2/14/2024               Detailed voice message left for Edward with dosing instructions and follow up date.     Contact [phone] to schedule and with any changes, questions or concerns.     Education provided:   Please call back if any changes to your diet, medications or how you've been taking warfarin    Plan made per [facility] anticoagulation protocol    Sussy Lane RN  Anticoagulation Clinic  1/24/2024    _______________________________________________________________________     Anticoagulation Episode Summary       Current INR goal:  2.0-3.0   TTR:  86.8% (1 y)   Target end date:  Indefinite   Send INR reminders to:  CHRISTA PHELPS    Indications    History of pulmonary embolism [Z86.711]             Comments:               Anticoagulation Care Providers       Provider Role Specialty Phone number    Ascencion Calvillo MD Referring Internal Medicine [phone]

## 2024-01-24 NOTE — TELEPHONE ENCOUNTER
Spoke with Edward and discussed INR results. He made INR for 2/21 due to going to be out of town the week before. He does plan on eating a little more greens then he has been.

## 2024-02-16 ENCOUNTER — TRANSFERRED RECORDS (OUTPATIENT)
Dept: HEALTH INFORMATION MANAGEMENT | Facility: CLINIC | Age: 84
End: 2024-02-16

## 2024-02-21 ENCOUNTER — LAB (OUTPATIENT)
Dept: LAB | Facility: CLINIC | Age: 84
End: 2024-02-21
Payer: COMMERCIAL

## 2024-02-21 ENCOUNTER — ANTICOAGULATION THERAPY VISIT (OUTPATIENT)
Dept: ANTICOAGULATION | Facility: CLINIC | Age: 84
End: 2024-02-21

## 2024-02-21 DIAGNOSIS — Z86.711 HISTORY OF PULMONARY EMBOLISM: Primary | ICD-10-CM

## 2024-02-21 DIAGNOSIS — Z86.711 HISTORY OF PULMONARY EMBOLISM: ICD-10-CM

## 2024-02-21 LAB — INR BLD: 3.4 (ref 0.9–1.1)

## 2024-02-21 PROCEDURE — 85610 PROTHROMBIN TIME: CPT

## 2024-02-21 PROCEDURE — 36415 COLL VENOUS BLD VENIPUNCTURE: CPT

## 2024-02-21 NOTE — PROGRESS NOTES
ANTICOAGULATION MANAGEMENT     Edward Mendoza JR 83 year old male is on warfarin with supratherapeutic INR result. (Goal INR 2.0-3.0)    Recent labs: (last 7 days)     02/21/24  0915   INR 3.4*       ASSESSMENT     Source(s): Chart Review and Patient/Caregiver Call     Warfarin doses taken: Warfarin taken as instructed  Diet: Decreased greens/vitamin K in diet; plans to resume previous intake  Medication/supplement changes: None noted  New illness, injury, or hospitalization: No  Signs or symptoms of bleeding or clotting: No  Previous result: Therapeutic last visit; previously outside of goal range  Additional findings:  He was out of town and did not have any salads. Discussed if high at next check will need to change his dose.       PLAN     Recommended plan for temporary change(s) affecting INR     Dosing Instructions: partial hold then continue your current warfarin dose with next INR in 2 weeks       Summary  As of 2/21/2024      Full warfarin instructions:  2/21: 2.5 mg; Otherwise 7.5 mg every Mon; 5 mg all other days   Next INR check:  3/6/2024               Telephone call with Edward who agrees to plan and repeated back plan correctly    Lab visit scheduled    Education provided:   Please call back if any changes to your diet, medications or how you've been taking warfarin  Goal range and lab monitoring: goal range and significance of current result and Importance of therapeutic range    Plan made per [facility] anticoagulation protocol    Sussy Lane, RN  Anticoagulation Clinic  2/21/2024    _______________________________________________________________________     Anticoagulation Episode Summary       Current INR goal:  2.0-3.0   TTR:  79.1% (1 y)   Target end date:  Indefinite   Send INR reminders to:  CHRISTA PHELPS    Indications    History of pulmonary embolism [Z86.711]             Comments:               Anticoagulation Care Providers       Provider Role Specialty Phone number    Ascencion Calvillo MD  [facility] [phone]

## 2024-03-06 ENCOUNTER — LAB (OUTPATIENT)
Dept: LAB | Facility: CLINIC | Age: 84
End: 2024-03-06
Payer: COMMERCIAL

## 2024-03-06 ENCOUNTER — ANTICOAGULATION THERAPY VISIT (OUTPATIENT)
Dept: ANTICOAGULATION | Facility: CLINIC | Age: 84
End: 2024-03-06

## 2024-03-06 DIAGNOSIS — Z86.711 HISTORY OF PULMONARY EMBOLISM: Primary | ICD-10-CM

## 2024-03-06 DIAGNOSIS — Z86.711 HISTORY OF PULMONARY EMBOLISM: ICD-10-CM

## 2024-03-06 LAB — INR BLD: 2.8 (ref 0.9–1.1)

## 2024-03-06 PROCEDURE — 85610 PROTHROMBIN TIME: CPT

## 2024-03-06 PROCEDURE — 36416 COLLJ CAPILLARY BLOOD SPEC: CPT

## 2024-03-06 NOTE — PROGRESS NOTES
ANTICOAGULATION MANAGEMENT     Edward Mendoza JR 83 year old male is on warfarin with therapeutic INR result. (Goal INR 2.0-3.0)    Recent labs: (last 7 days)     03/06/24  0855   INR 2.8*       ASSESSMENT     Source(s): Chart Review, Patient/Caregiver Call, and Template     Warfarin doses taken: Warfarin taken as instructed  Diet: No new diet changes identified  Medication/supplement changes: None noted  New illness, injury, or hospitalization: No  Signs or symptoms of bleeding or clotting: No  Previous result: Supratherapeutic  Additional findings: None       PLAN     Recommended plan for no diet, medication or health factor changes affecting INR     Dosing Instructions: Continue your current warfarin dose with next INR in 3 weeks       Summary  As of 3/6/2024      Full warfarin instructions:  7.5 mg every Mon; 5 mg all other days   Next INR check:  3/27/2024               Telephone call with Edward who verbalizes understanding and agrees to plan    Lab visit scheduled    Education provided:   Goal range and lab monitoring: goal range and significance of current result    Plan made per [facility] anticoagulation protocol    Bailey Rueda RN  Anticoagulation Clinic  3/6/2024    _______________________________________________________________________     Anticoagulation Episode Summary       Current INR goal:  2.0-3.0   TTR:  76.6% (1 y)   Target end date:  Indefinite   Send INR reminders to:  CHRISTA PHELPS    Indications    History of pulmonary embolism [Z86.711]             Comments:               Anticoagulation Care Providers       Provider Role Specialty Phone number    Ascencion Calvillo MD Referring Internal Medicine [phone]

## 2024-03-15 ENCOUNTER — TELEPHONE (OUTPATIENT)
Dept: PULMONOLOGY | Facility: CLINIC | Age: 84
End: 2024-03-15
Payer: COMMERCIAL

## 2024-03-15 DIAGNOSIS — G47.33 OBSTRUCTIVE SLEEP APNEA: Primary | ICD-10-CM

## 2024-03-15 NOTE — TELEPHONE ENCOUNTER
[facility] Call Center    Phone Message    May a detailed message be left on voicemail: yes     Reason for Call: Other: .     Patients wife, Barb is wanting to get a call back. Barb states patients prescription for the CPAP supplies  and is needing a new prescription placed to [facility] Phone: [phone]. Barb states patient was recently seen with a NP 2023. Please advise.       Action Taken: Message routed to:  [facility] Lung    Travel Screening: Not Applicable                                                                    [facility] Call Center    Phone Message    May a detailed message be left on voicemail: yes    Reason for Call: Other: Pt is currently experiencing painful swelling in his foot. He saw his PCP today and he thinks it is the torsemide (DEMADEX) that is causing these symptoms. His PCP prescribed him a new medication and pt asked the pharmacist the same question and he said that, yes, torsemide (DEMADEX) can cause symptoms that appear like gout. Pt would like a call back to discuss what he should be doing. He is only drinking water as needed now.     Action Taken: Message routed to:  [facility] ([facility]  CARDIOVASCULAR CTR

## 2024-03-15 NOTE — TELEPHONE ENCOUNTER
DATE: 03/15/2024  DME PROVIDER: [facility]   SUPPLY ORDERED: c pap supply   PROVIDER: Mad[facility]    Faxed last note, order and facesheet.    Linda Hall LPN

## 2024-03-15 NOTE — TELEPHONE ENCOUNTER
Called Edward and relayed message below from Rosmery-    Can you please reach out and let them know I will refill this for 6 months but this is something they need to see sleep medicine for future refills. I have placed a referral for sleep medicine so they can establish with them for future supplies.  Thanks!     Linda Hall LPN

## 2024-03-18 ENCOUNTER — MEDICAL CORRESPONDENCE (OUTPATIENT)
Dept: HEALTH INFORMATION MANAGEMENT | Facility: CLINIC | Age: 84
End: 2024-03-18
Payer: COMMERCIAL

## 2024-03-27 ENCOUNTER — ANTICOAGULATION THERAPY VISIT (OUTPATIENT)
Dept: ANTICOAGULATION | Facility: CLINIC | Age: 84
End: 2024-03-27

## 2024-03-27 ENCOUNTER — LAB (OUTPATIENT)
Dept: LAB | Facility: CLINIC | Age: 84
End: 2024-03-27
Payer: COMMERCIAL

## 2024-03-27 DIAGNOSIS — Z86.711 HISTORY OF PULMONARY EMBOLISM: Primary | ICD-10-CM

## 2024-03-27 DIAGNOSIS — Z86.711 HISTORY OF PULMONARY EMBOLISM: ICD-10-CM

## 2024-03-27 LAB — INR BLD: 3.1 (ref 0.9–1.1)

## 2024-03-27 PROCEDURE — 85610 PROTHROMBIN TIME: CPT

## 2024-03-27 PROCEDURE — 36416 COLLJ CAPILLARY BLOOD SPEC: CPT

## 2024-03-27 NOTE — PROGRESS NOTES
ANTICOAGULATION MANAGEMENT     Edward Mendoza JR 83 year old male is on warfarin with supratherapeutic INR result. (Goal INR 2.0-3.0)    Recent labs: (last 7 days)     03/27/24  0906   INR 3.1*       ASSESSMENT     Source(s): Chart Review, Patient/Caregiver Call, and Template     Warfarin doses taken: Warfarin taken as instructed  Diet: Decreased greens/vitamin K in diet; plans to resume previous intake  Medication/supplement changes: None noted  New illness, injury, or hospitalization: No  Signs or symptoms of bleeding or clotting: No  Previous result: Therapeutic last visit; previously outside of goal range  Additional findings: None       PLAN     Recommended plan for temporary change(s) affecting INR     Dosing Instructions: Continue your current warfarin dose with next INR in 2 weeks       Summary  As of 3/27/2024      Full warfarin instructions:  7.5 mg every Mon; 5 mg all other days   Next INR check:  4/10/2024               Telephone call with Edward who agrees to plan and repeated back plan correctly    Check at provider office visit    Education provided:   Please call back if any changes to your diet, medications or how you've been taking warfarin  Goal range and lab monitoring: goal range and significance of current result and Importance of therapeutic range    Plan made per [facility] anticoagulation protocol    Sussy Lane, RN  Anticoagulation Clinic  3/27/2024    _______________________________________________________________________     Anticoagulation Episode Summary       Current INR goal:  2.0-3.0   TTR:  74.7% (1 y)   Target end date:  Indefinite   Send INR reminders to:  CHRISTA PHELPS    Indications    History of pulmonary embolism [Z86.711]             Comments:               Anticoagulation Care Providers       Provider Role Specialty Phone number    Ascencion Calvillo MD Referring Internal Medicine [phone]

## 2024-04-18 ENCOUNTER — ANTICOAGULATION THERAPY VISIT (OUTPATIENT)
Dept: ANTICOAGULATION | Facility: CLINIC | Age: 84
End: 2024-04-18

## 2024-04-18 ENCOUNTER — OFFICE VISIT (OUTPATIENT)
Dept: INTERNAL MEDICINE | Facility: CLINIC | Age: 84
End: 2024-04-18
Payer: COMMERCIAL

## 2024-04-18 VITALS
TEMPERATURE: 98.2 F | SYSTOLIC BLOOD PRESSURE: 118 MMHG | WEIGHT: 166 LBS | DIASTOLIC BLOOD PRESSURE: 60 MMHG | OXYGEN SATURATION: 93 % | BODY MASS INDEX: 24.59 KG/M2 | HEIGHT: 69 IN | RESPIRATION RATE: 16 BRPM | HEART RATE: 60 BPM

## 2024-04-18 DIAGNOSIS — Z51.81 ENCOUNTER FOR THERAPEUTIC DRUG MONITORING: ICD-10-CM

## 2024-04-18 DIAGNOSIS — Z00.00 ENCOUNTER FOR WELLNESS EXAMINATION IN ADULT: Primary | ICD-10-CM

## 2024-04-18 DIAGNOSIS — G47.33 OBSTRUCTIVE SLEEP APNEA ON CPAP: ICD-10-CM

## 2024-04-18 DIAGNOSIS — R73.03 PREDIABETES: ICD-10-CM

## 2024-04-18 DIAGNOSIS — H35.30 MACULAR DEGENERATION (SENILE) OF RETINA: ICD-10-CM

## 2024-04-18 DIAGNOSIS — I10 BENIGN ESSENTIAL HYPERTENSION: ICD-10-CM

## 2024-04-18 DIAGNOSIS — Z86.711 HISTORY OF PULMONARY EMBOLISM: ICD-10-CM

## 2024-04-18 DIAGNOSIS — Z86.711 HISTORY OF PULMONARY EMBOLISM: Primary | ICD-10-CM

## 2024-04-18 LAB
ERYTHROCYTE [DISTWIDTH] IN BLOOD BY AUTOMATED COUNT: 12.9 % (ref 10–15)
HBA1C MFR BLD: 6 % (ref 0–5.6)
HCT VFR BLD AUTO: 49.3 % (ref 40–53)
HGB BLD-MCNC: 16.7 G/DL (ref 13.3–17.7)
INR BLD: 2.7 (ref 0.9–1.1)
MCH RBC QN AUTO: 29.3 PG (ref 26.5–33)
MCHC RBC AUTO-ENTMCNC: 33.9 G/DL (ref 31.5–36.5)
MCV RBC AUTO: 87 FL (ref 78–100)
PLATELET # BLD AUTO: 235 10E3/UL (ref 150–450)
RBC # BLD AUTO: 5.69 10E6/UL (ref 4.4–5.9)
WBC # BLD AUTO: 7.3 10E3/UL (ref 4–11)

## 2024-04-18 PROCEDURE — 83036 HEMOGLOBIN GLYCOSYLATED A1C: CPT | Performed by: INTERNAL MEDICINE

## 2024-04-18 PROCEDURE — 85610 PROTHROMBIN TIME: CPT | Performed by: INTERNAL MEDICINE

## 2024-04-18 PROCEDURE — 36415 COLL VENOUS BLD VENIPUNCTURE: CPT | Performed by: INTERNAL MEDICINE

## 2024-04-18 PROCEDURE — 80061 LIPID PANEL: CPT | Performed by: INTERNAL MEDICINE

## 2024-04-18 PROCEDURE — 85027 COMPLETE CBC AUTOMATED: CPT | Performed by: INTERNAL MEDICINE

## 2024-04-18 PROCEDURE — 99214 OFFICE O/P EST MOD 30 MIN: CPT | Mod: 25 | Performed by: INTERNAL MEDICINE

## 2024-04-18 PROCEDURE — G0439 PPPS, SUBSEQ VISIT: HCPCS | Performed by: INTERNAL MEDICINE

## 2024-04-18 PROCEDURE — 80053 COMPREHEN METABOLIC PANEL: CPT | Performed by: INTERNAL MEDICINE

## 2024-04-18 RX ORDER — WARFARIN SODIUM 5 MG/1
5-7.5 TABLET ORAL DAILY
Qty: 110 TABLET | Refills: 3 | Status: SHIPPED | OUTPATIENT
Start: 2024-04-18 | End: 2024-09-05 | Stop reason: ALTCHOICE

## 2024-04-18 RX ORDER — CHLORTHALIDONE 25 MG/1
TABLET ORAL
Qty: 45 TABLET | Refills: 3 | Status: SHIPPED | OUTPATIENT
Start: 2024-04-18

## 2024-04-18 SDOH — HEALTH STABILITY: PHYSICAL HEALTH: ON AVERAGE, HOW MANY DAYS PER WEEK DO YOU ENGAGE IN MODERATE TO STRENUOUS EXERCISE (LIKE A BRISK WALK)?: 3 DAYS

## 2024-04-18 SDOH — HEALTH STABILITY: PHYSICAL HEALTH: ON AVERAGE, HOW MANY MINUTES DO YOU ENGAGE IN EXERCISE AT THIS LEVEL?: 30 MIN

## 2024-04-18 ASSESSMENT — SOCIAL DETERMINANTS OF HEALTH (SDOH)
HOW OFTEN DO YOU GET TOGETHER WITH FRIENDS OR RELATIVES?: MORE THAN THREE TIMES A WEEK
HOW OFTEN DO YOU GET TOGETHER WITH FRIENDS OR RELATIVES?: MORE THAN THREE TIMES A WEEK

## 2024-04-18 ASSESSMENT — ENCOUNTER SYMPTOMS: FATIGUE: 1

## 2024-04-18 NOTE — PROGRESS NOTES
Preventive Care Visit  [facility]  Ascencion Calvillo MD, Internal Medicine  Apr 18, 2024          Edward RUTHIE Mendoza JR   83 year old male    Date of Visit: 4/18/2024    Chief Complaint   Patient presents with    Medicare Visit     Fasting.    Fatigue     More than usual.     Balance/ Vestibular     Balance is off.     Toenail     Rt great toe pain, possible ingrown toe nail.     Subjective  83-year-old male living independently with wife and has been better about exercise over the winter, using a treadmill 3 times a week and doing some walking outside, but admits he could be more active.    He still has chronic fatigue with his severe sleep apnea and worse since having COVID November 2022.  Also with peripheral neuropathy.  He states the peripheral apathy is somewhat better since last year.  He had a normal B12 and TSH August 2023.    No foot sores but he is having some discomfort from mild ingrown toenail with callus on his left great toe.    Highly compliant with CPAP and he states it is working well, new machine.  Still has some mild daytime sleepiness.    No increasing shortness of breath or edema.  No palpitations or history of arrhythmia.  No falls.    He had a stress echo that was normal back in 2015.    He has prediabetes.  Watching his carbs fairly well.  No significant alcohol.  Hemoglobin A1c level was 5.8% last October.    Excellent cholesterol levels with high HDL, not on medication.    His blood pressure has been in the 130s/70s on his checks.  He denies orthostasis.  No edema.  On chlorthalidone 12.5 mg daily.    History of pulmonary embolism in 2015.  He remains on long-term warfarin.  No recent bleeding issues.    Prostatectomy in 2011 without recurrence.  No urinary complaints.    Bowels are normal.  He has a history of cholecystectomy.  Previous colonoscopy 2012 was negative.    Right knee replacement 2022.    Wet macular degeneration and gets injections, but stable and he has not  missed any appointments.    No headache complaints.  No swallowing problems.  No new cough.    He has a mole on his right temple but he states it has been stable for the past year but does have an appointment next month with dermatology.    He gets some seasonal allergies and using Allegra and Flonase, but no sinusitis symptoms.  Just clear drainage from the nose.        PMHx:    Past Medical History:   Diagnosis Date    Arthritis     Broken ankle, left, sequela     Hypertension     Prostatitis     Sleep apnea     Thrombosis      PSHx:    Past Surgical History:   Procedure Laterality Date    CATARACT EXTRACTION Bilateral 12/07/2015    CHOLECYSTECTOMY      HERNIA REPAIR Bilateral 01/01/1987    Inguinal    PROSTATECTOMY N/A     W/ Bilat Pelvic Lymphadenectomy    REVERSE ARTHROPLASTY SHOULDER Right 2/1/2022    Procedure: Right reverse total shoulder arthroplasty;  Surgeon: Abel Tobar MD;  Location: [facility] OR    VITRECTOMY ANTERIOR Left 06/15/2015    ZC THORACOTOMY,MAJOR,EXPLOR/BIOPSY  01/01/1983    VATs that found lipoma on chest wall, was concern for cancer.      Immunizations:   Immunization History   Administered Date(s) Administered    COVID-19 Bivalent 12+ (Pfizer) 11/09/2022    COVID-19 Bivalent Peds 5-11Y (Pfizer) 11/09/2022    COVID-19 MONOVALENT 12+ (Pfizer) 02/04/2021, 02/25/2021, 10/12/2021, 04/01/2022    COVID-19 Monovalent 12+ (Pfizer 2022) 04/01/2022    DT (PEDS <7y) 12/04/2000    Flu, Unspecified 10/01/2021, 10/18/2022    Influenza (H1N1) 01/15/2010    Influenza (High Dose) 3 valent vaccine 10/26/2015, 10/06/2016, 10/10/2017, 08/15/2018, 10/01/2019, 10/18/2022    Influenza Vaccine 65+ (FLUAD) 11/01/2021, 10/17/2023    Influenza Vaccine 65+ (Fluzone HD) 10/05/2020, 10/06/2020, 10/18/2022    Influenza, seasonal, injectable, PF 09/08/2011, 10/08/2012, 10/10/2013, 10/13/2014    Pneumo Conj 13-V (2010&after) 10/13/2014    Pneumococcal 20 valent Conjugate (Prevnar 20) 08/15/2023    Pneumococcal 23 valent  "10/02/2003    RSV Vaccine (Arexvy) 10/11/2023    TDAP (Adacel,Boostrix) 05/01/2019    Td,adult,historic,unspecified 01/06/2011    Zoster recombinant adjuvanted (SHINGRIX) 02/19/2019    Zoster vaccine, live 09/08/2011       ROS A comprehensive review of systems was performed and was otherwise negative    Medications, allergies, and problem list were reviewed and updated    Exam  /60   Pulse 60   Temp 98.2  F (36.8  C)   Resp 16   Ht 1.74 m (5' 8.5\")   Wt 75.3 kg (166 lb)   SpO2 93%   BMI 24.87 kg/m    Alert and oriented x 3 with good mood and affect.  Clock face drawing and word recall normal.  Mobility exam is normal.  Good mood and affect.  Pupils and irises equal and reactive.  Extraocular muscles intact.  No jaundice or conjunctivitis.  External ears and nose exam is normal.  No significant cerumen.  Pharynx is normal.  Teeth in good condition.  No cervical or supraclavicular or axillary adenopathy.  No JVD and no carotid bruits.  No thyromegaly or nodularity to inspection and palpation.  Lungs clear to auscultation with good respiratory excursion.  Heart is regular with no murmur or gallop.  +1 pedal pulses and no ankle edema.  Feet in adequate condition although there is some callus formation around the great toe, slight ingrown toenail but no infection.  Abdomen is thin, nontender and no hepatosplenomegaly.  No pulsatile abdominal mass.  Skin exam with a lipoma in his right chest unchanged.  He does have a well-demarcated 1 cm flat plaque on his right temple suggestive of a seborrheic keratosis that has been excoriated.  He declined  exam    Assessment/Plan  1. Encounter for wellness examination in adult  Main focus for patient is maintaining mobility and regular activity.  I emphasized the importance of daily exercise.    His other major risk is neuropathy and cognitive effects of his severe sleep apnea, but he is highly compliant with CPAP, does not drink alcohol and avoiding further weight " gain.    Not high risk for cardiovascular disease.    No further cancer screening, previous prostatectomy without recurrence    He has declined COVID vaccines but otherwise up-to-date with vaccines    2. Benign essential hypertension  Controlled.  Follow-up in 6 months for checkup  - chlorthalidone (HYGROTON) 25 MG tablet; [CHLORTHALIDONE (HYGROTEN) 25 MG TABLET] Take A 1/2 TABLETS (12.5 MG TOTAL) BY MOUTH DAILY.  Dispense: 45 tablet; Refill: 3  - Lipid Profile    3. History of pulmonary embolism  Long-term anticoagulation  - warfarin ANTICOAGULANT (COUMADIN) 5 MG tablet; Take 1-1.5 tablets (5-7.5 mg) by mouth daily Adjust dose per INR results as directed.  Dispense: 110 tablet; Refill: 3  - INR point of care (finger stick)    4. Obstructive sleep apnea on CPAP  Mildly compliant with CPAP    5. Macular degeneration (senile) of retina  Compliant with ophthalmology follow-up, getting injections.  Patient reports stability    6. Prediabetes  Increase regular exercise.  Avoid starchy foods and baked goods  - Hemoglobin A1c    7. Encounter for therapeutic drug monitoring    - CBC with platelets  - Comprehensive metabolic panel    Skin lesion, likely benign seborrheic keratosis but patient was told to get that evaluated with dermatology within the next month.    Seasonal allergies, continue Allegra and Flonase.  Patient was told not to use decongestant      Return in about 6 months (around 10/18/2024) for Blood pressure follow-up appointment.   Patient Instructions   Regular exercise helps keep your body healthy and can reduce the atrophy of the aging process.  Get 20 minutes of stretching and aerobic type exercise such as walking every day.    Wear your CPAP machine every night, all night.  If you feel there is any dysfunction of your CPAP machine, get it evaluated.    Your peripheral neuropathy may cause unsteadiness on your feet.    I would recommend you soak your toes every day and use moisturizer cream on feet to  help reduce the calluses.  You can also see a podiatrist.  Wear good shoes at all times that do not rub.    Goal blood pressure less than 140/85 but not less than 110/60.  Continue on current chlorthalidone dose.  Check in with me in approximately 6 months for blood pressure checkup appointment.        Ascencion Calvillo MD, MD        Current Outpatient Medications   Medication Sig Dispense Refill    chlorthalidone (HYGROTON) 25 MG tablet [CHLORTHALIDONE (HYGROTEN) 25 MG TABLET] Take A 1/2 TABLETS (12.5 MG TOTAL) BY MOUTH DAILY. 45 tablet 3    cholecalciferol, vitamin D3, 1,000 unit tablet [CHOLECALCIFEROL, VITAMIN D3, 1,000 UNIT TABLET] Take 1,000 Units by mouth daily.      fexofenadine (ALLEGRA) 180 MG tablet [FEXOFENADINE (ALLEGRA) 180 MG TABLET] Take 180 mg by mouth daily.      multivitamin therapeutic tablet [MULTIVITAMIN THERAPEUTIC TABLET] Take 1 tablet by mouth daily.      triamcinolone (KENALOG) 0.1 % external cream Apply topically daily as needed for irritation      vit C/E/Zn/coppr/lutein/zeaxan (PRESERVISION AREDS-2 ORAL) Take 1 tablet by mouth 2 times daily       warfarin ANTICOAGULANT (COUMADIN) 2.5 MG tablet [WARFARIN ANTICOAGULANT (COUMADIN/JANTOVEN) 2.5 MG TABLET] Take 1 tablet by mouth on Monday's. Adjust dose based on INR results as directed. 60 tablet 1    warfarin ANTICOAGULANT (COUMADIN) 5 MG tablet Take 1-1.5 tablets (5-7.5 mg) by mouth daily Adjust dose per INR results as directed. 110 tablet 3     Allergies   Allergen Reactions    Cats Other (See Comments)     Respiratory congestion    Lisinopril Cough    Latex Itching     Latex adhesive- caused itching    Ace Inhibitors Cough    Chocolate Flavor Other (See Comments)     Pt gets sinus congestion from eating chocolate.    Corn [Corn Oil] Other (See Comments)     Pt gets sinus congestion from eating corn.    Fish Containing Products [Fish-Derived Products] Other (See Comments)     Pt gets sinus congestion from eating fish.    Levofloxacin Rash      Red line up the arm after IV administration    Nuts - Unspecified [Nuts] Other (See Comments)     Pt gets sinus congestion from eating nuts.    Penicillins Unknown     childhood    Ragweed Pollen [Ragweeds] Other (See Comments)     Sinus and chest congestion.     Social History     Tobacco Use    Smoking status: Former     Current packs/day: 0.00     Average packs/day: 0.5 packs/day for 5.0 years (2.5 ttl pk-yrs)     Types: Cigarettes     Start date: 1955     Quit date: 1960     Years since quittin.3     Passive exposure: Past    Smokeless tobacco: Never   Vaping Use    Vaping status: Never Used   Substance Use Topics    Alcohol use: No    Drug use: No             Subjective   Edward is a 83 year old, presenting for the following:  Medicare Visit (Fasting.), Fatigue (More than usual. ), Balance/ Vestibular (Balance is off. ), and Toenail (Rt great toe pain, possible ingrown toe nail.)        2024     8:50 AM   Additional Questions   Roomed by Saida NORRIS   Accompanied by lesli        Health Care Directive  Patient does not have a Health Care Directive or Living Will: Advance Directive received and scanned. Click on Code in the patient header to view.    Fatigue  Associated symptoms include fatigue.   Toenail  Associated symptoms include fatigue.                 2024   General Health   How would you rate your overall physical health? Good   Feel stress (tense, anxious, or unable to sleep) Only a little   (!) STRESS CONCERN      2024   Nutrition   Diet: Low salt         2024   Exercise   Days per week of moderate/strenous exercise 3 days   Average minutes spent exercising at this level 30 min         2024   Social Factors   Frequency of gathering with friends or relatives More than three times a week   Worry food won't last until get money to buy more No   Food not last or not have enough money for food? No   Do you have housing?  Yes   Are you worried about losing your housing? No   Lack  of transportation? No   Unable to get utilities (heat,electricity)? No         2024   Fall Risk   Fallen 2 or more times in the past year? No    No   Trouble with walking or balance? Yes    No   Reason Gait Speed Test Not Completed Patient declines          2024   Activities of Daily Living- Home Safety   Needs help with the following daily activites None of the above   Safety concerns in the home Throw rugs in the hallway         2024   Dental   Dentist two times every year? Yes         2024   Hearing Screening   Hearing concerns? (!) IT'S HARD TO FOLLOW A CONVERSATION IN A NOISY RESTAURANT OR CROWDED ROOM.         2024   Driving Risk Screening   Patient/family members have concerns about driving No         2024   General Alertness/Fatigue Screening   Have you been more tired than usual lately? (!) YES         2024   Urinary Incontinence Screening   Bothered by leaking urine in past 6 months No            Today's PHQ-2 Score:       2024     8:43 AM   PHQ-2 (  Pfizer)   Q1: Little interest or pleasure in doing things 0   Q2: Feeling down, depressed or hopeless 1   PHQ-2 Score 1   Q1: Little interest or pleasure in doing things Not at all   Q2: Feeling down, depressed or hopeless Several days   PHQ-2 Score 1           2024   Substance Use   Alcohol more than 3/day or more than 7/wk No   Do you have a current opioid prescription? No   How severe/bad is pain from 1 to 10? 1/10   Do you use any other substances recreationally? No     Social History     Tobacco Use    Smoking status: Former     Current packs/day: 0.00     Average packs/day: 0.5 packs/day for 5.0 years (2.5 ttl pk-yrs)     Types: Cigarettes     Start date: 1955     Quit date: 1960     Years since quittin.3     Passive exposure: Past    Smokeless tobacco: Never   Vaping Use    Vaping status: Never Used   Substance Use Topics    Alcohol use: No    Drug use: No                 Reviewed and updated  "as needed this visit by Provider                      Current providers sharing in care for this patient include:  Patient Care Team:  Ascencion Calvillo MD as PCP - General (Internal Medicine)  Ascencion Calvillo MD as Assigned PCP  Rosmery Nassar APRN CNP as Assigned Pulmonology Provider    The following health maintenance items are reviewed in Epic and correct as of today:  Health Maintenance   Topic Date Due    URINE DRUG SCREEN  Never done    ZOSTER IMMUNIZATION (3 of 3) 04/16/2019    COLORECTAL CANCER SCREENING  12/21/2022    ANNUAL REVIEW OF HM ORDERS  04/14/2023    COVID-19 Vaccine (7 - 2023-24 season) 09/01/2023    MEDICARE ANNUAL WELLNESS VISIT  04/17/2024    FALL RISK ASSESSMENT  04/18/2025    ADVANCE CARE PLANNING  04/17/2028    DTAP/TDAP/TD IMMUNIZATION (2 - Td or Tdap) 05/01/2029    PHQ-2 (once per calendar year)  Completed    INFLUENZA VACCINE  Completed    Pneumococcal Vaccine: 65+ Years  Completed    RSV VACCINE (Pregnancy & 60+)  Completed    IPV IMMUNIZATION  Aged Out    HPV IMMUNIZATION  Aged Out    MENINGITIS IMMUNIZATION  Aged Out    RSV MONOCLONAL ANTIBODY  Aged Out            Objective    Exam  /60   Pulse 60   Temp 98.2  F (36.8  C)   Resp 16   Ht 1.74 m (5' 8.5\")   Wt 75.3 kg (166 lb)   SpO2 93%   BMI 24.87 kg/m     Estimated body mass index is 24.87 kg/m  as calculated from the following:    Height as of this encounter: 1.74 m (5' 8.5\").    Weight as of this encounter: 75.3 kg (166 lb).    Physical Exam           4/18/2024   Mini Cog   Clock Draw Score 2 Normal   3 Item Recall 3 objects recalled   Mini Cog Total Score 5         Vision Screen  Reason Vision Screen Not Completed: Other (pt has eye appt scheduled for next week.)      Signed Electronically by: Ascencion Calvillo MD    "

## 2024-04-18 NOTE — PROGRESS NOTES
ANTICOAGULATION MANAGEMENT     Edward Mendoza JR 83 year old male is on warfarin with therapeutic INR result. (Goal INR 2.0-3.0)    Recent labs: (last 7 days)     04/18/24  0926   INR 2.7*       ASSESSMENT     Source(s): Chart Review and Patient/Caregiver Call     Warfarin doses taken: Warfarin taken as instructed  Diet: Increased greens/vitamin K in diet; ongoing change  Medication/supplement changes: None noted  New illness, injury, or hospitalization: No  Signs or symptoms of bleeding or clotting: No  Previous result: Supratherapeutic  Additional findings: None       PLAN     Recommended plan for ongoing change(s) affecting INR     Dosing Instructions: Continue your current warfarin dose with next INR in 4 weeks   It has been 3 weeks since last check.    Summary  As of 4/18/2024      Full warfarin instructions:  7.5 mg every Mon; 5 mg all other days   Next INR check:  5/16/2024               Telephone call with Edward who verbalizes understanding and agrees to plan    Lab visit scheduled    Education provided:   Please call back if any changes to your diet, medications or how you've been taking warfarin  Goal range and lab monitoring: goal range and significance of current result and Importance of therapeutic range    Plan made per [facility] anticoagulation protocol    Sussy Lane, RN  Anticoagulation Clinic  4/18/2024    _______________________________________________________________________     Anticoagulation Episode Summary       Current INR goal:  2.0-3.0   TTR:  73.2% (1 y)   Target end date:  Indefinite   Send INR reminders to:  CHRISTA PHELPS    Indications    History of pulmonary embolism [Z86.711]             Comments:               Anticoagulation Care Providers       Provider Role Specialty Phone number    Ascencion Calvillo MD Referring Internal Medicine [phone]

## 2024-04-19 LAB
ALBUMIN SERPL BCG-MCNC: 4.1 G/DL (ref 3.5–5.2)
ALP SERPL-CCNC: 87 U/L (ref 40–150)
ALT SERPL W P-5'-P-CCNC: 19 U/L (ref 0–70)
ANION GAP SERPL CALCULATED.3IONS-SCNC: 10 MMOL/L (ref 7–15)
AST SERPL W P-5'-P-CCNC: 25 U/L (ref 0–45)
BILIRUB SERPL-MCNC: 0.8 MG/DL
BUN SERPL-MCNC: 16.6 MG/DL (ref 8–23)
CALCIUM SERPL-MCNC: 10.1 MG/DL (ref 8.8–10.2)
CHLORIDE SERPL-SCNC: 101 MMOL/L (ref 98–107)
CHOLEST SERPL-MCNC: 211 MG/DL
CREAT SERPL-MCNC: 1.02 MG/DL (ref 0.67–1.17)
DEPRECATED HCO3 PLAS-SCNC: 28 MMOL/L (ref 22–29)
EGFRCR SERPLBLD CKD-EPI 2021: 72 ML/MIN/1.73M2
FASTING STATUS PATIENT QL REPORTED: ABNORMAL
GLUCOSE SERPL-MCNC: 108 MG/DL (ref 70–99)
HDLC SERPL-MCNC: 66 MG/DL
LDLC SERPL CALC-MCNC: 123 MG/DL
NONHDLC SERPL-MCNC: 145 MG/DL
POTASSIUM SERPL-SCNC: 4 MMOL/L (ref 3.4–5.3)
PROT SERPL-MCNC: 7.1 G/DL (ref 6.4–8.3)
SODIUM SERPL-SCNC: 139 MMOL/L (ref 135–145)
TRIGL SERPL-MCNC: 111 MG/DL

## 2024-04-24 ENCOUNTER — TRANSFERRED RECORDS (OUTPATIENT)
Dept: HEALTH INFORMATION MANAGEMENT | Facility: CLINIC | Age: 84
End: 2024-04-24
Payer: COMMERCIAL

## 2024-05-16 ENCOUNTER — ANTICOAGULATION THERAPY VISIT (OUTPATIENT)
Dept: ANTICOAGULATION | Facility: CLINIC | Age: 84
End: 2024-05-16

## 2024-05-16 ENCOUNTER — LAB (OUTPATIENT)
Dept: LAB | Facility: CLINIC | Age: 84
End: 2024-05-16
Payer: COMMERCIAL

## 2024-05-16 DIAGNOSIS — Z86.711 HISTORY OF PULMONARY EMBOLISM: ICD-10-CM

## 2024-05-16 DIAGNOSIS — Z86.711 HISTORY OF PULMONARY EMBOLISM: Primary | ICD-10-CM

## 2024-05-16 LAB — INR BLD: 1.8 (ref 0.9–1.1)

## 2024-05-16 PROCEDURE — 36416 COLLJ CAPILLARY BLOOD SPEC: CPT

## 2024-05-16 PROCEDURE — 85610 PROTHROMBIN TIME: CPT

## 2024-05-16 NOTE — PROGRESS NOTES
ANTICOAGULATION MANAGEMENT     Edward Mendoza JR 83 year old male is on warfarin with subtherapeutic INR result. (Goal INR 2.0-3.0)    Recent labs: (last 7 days)     05/16/24  0952   INR 1.8*       ASSESSMENT     Source(s): Chart Review, Patient/Caregiver Call, and Template     Warfarin doses taken: Missed dose(s) may be affecting INR  Diet: No new diet changes identified  Medication/supplement changes: None noted  New illness, injury, or hospitalization: No  Signs or symptoms of bleeding or clotting: No  Previous result: Therapeutic last visit; previously outside of goal range  Additional findings: None       PLAN     Recommended plan for temporary change(s) affecting INR     Dosing Instructions: booster dose then continue your current warfarin dose with next INR in 2 weeks       Summary  As of 5/16/2024      Full warfarin instructions:  5/16: 7.5 mg; Otherwise 7.5 mg every Mon; 5 mg all other days   Next INR check:  5/30/2024               Telephone call with Edward who agrees to plan and repeated back plan correctly    Lab visit scheduled    Education provided:   Please call back if any changes to your diet, medications or how you've been taking warfarin  Goal range and lab monitoring: goal range and significance of current result and Importance of therapeutic range    Plan made per [facility] anticoagulation protocol    Sussy Lane, RN  Anticoagulation Clinic  5/16/2024    _______________________________________________________________________     Anticoagulation Episode Summary       Current INR goal:  2.0-3.0   TTR:  71.4% (1 y)   Target end date:  Indefinite   Send INR reminders to:  CHRISTA PHELPS    Indications    History of pulmonary embolism [Z86.711]             Comments:               Anticoagulation Care Providers       Provider Role Specialty Phone number    Ascencion Calvillo MD Referring Internal Medicine [phone]

## 2024-05-23 ENCOUNTER — TELEPHONE (OUTPATIENT)
Dept: INTERNAL MEDICINE | Facility: CLINIC | Age: 84
End: 2024-05-23
Payer: COMMERCIAL

## 2024-05-23 DIAGNOSIS — Z86.711 HISTORY OF PULMONARY EMBOLISM: Primary | ICD-10-CM

## 2024-05-23 NOTE — TELEPHONE ENCOUNTER
Spoke with Edward and he will take 7.5 mg tonight and then resume his regular dose. He has an INR scheduled for 5/30.

## 2024-05-23 NOTE — TELEPHONE ENCOUNTER
Patient Returning Call    Reason for call:    Missed a pill last night and is requesting a call back from Chris    Information relayed to patient:    Sending message to clinic    Patient has additional questions:  No      Could we send this information to you in [facility] or would you prefer to receive a phone call?:   Patient would prefer a phone call   Okay to leave a detailed message?: Yes at Home number on file [phone] (home)

## 2024-05-30 ENCOUNTER — LAB (OUTPATIENT)
Dept: LAB | Facility: CLINIC | Age: 84
End: 2024-05-30
Payer: COMMERCIAL

## 2024-05-30 ENCOUNTER — ANTICOAGULATION THERAPY VISIT (OUTPATIENT)
Dept: ANTICOAGULATION | Facility: CLINIC | Age: 84
End: 2024-05-30

## 2024-05-30 DIAGNOSIS — Z86.711 HISTORY OF PULMONARY EMBOLISM: ICD-10-CM

## 2024-05-30 DIAGNOSIS — Z86.711 HISTORY OF PULMONARY EMBOLISM: Primary | ICD-10-CM

## 2024-05-30 LAB — INR BLD: 3.2 (ref 0.9–1.1)

## 2024-05-30 PROCEDURE — 36416 COLLJ CAPILLARY BLOOD SPEC: CPT

## 2024-05-30 PROCEDURE — 85610 PROTHROMBIN TIME: CPT

## 2024-05-30 NOTE — PROGRESS NOTES
ANTICOAGULATION MANAGEMENT     Edward Mendoza JR 83 year old male is on warfarin with supratherapeutic INR result. (Goal INR 2.0-3.0)    Recent labs: (last 7 days)     05/30/24  0907   INR 3.2*       ASSESSMENT     Source(s): Chart Review, Patient/Caregiver Call, and Template     Warfarin doses taken: Missed dose(s) may be affecting INR and took 7.5 mg the next day  Diet: Decreased greens/vitamin K in diet; plans to resume previous intake  Medication/supplement changes: None noted  New illness, injury, or hospitalization: No  Signs or symptoms of bleeding or clotting: No  Previous result: Subtherapeutic  Additional findings: None       PLAN     Recommended plan for temporary change(s) affecting INR     Dosing Instructions: Continue your current warfarin dose with next INR in 2 weeks       Summary  As of 5/30/2024      Full warfarin instructions:  7.5 mg every Mon; 5 mg all other days   Next INR check:  6/13/2024               Telephone call with Edward who agrees to plan and repeated back plan correctly    Lab visit scheduled    Education provided:   Please call back if any changes to your diet, medications or how you've been taking warfarin  Goal range and lab monitoring: goal range and significance of current result and Importance of therapeutic range    Plan made per [facility] anticoagulation protocol    Sussy Lane, RN  Anticoagulation Clinic  5/30/2024    _______________________________________________________________________     Anticoagulation Episode Summary       Current INR goal:  2.0-3.0   TTR:  70.4% (1 y)   Target end date:  Indefinite   Send INR reminders to:  CHRISTA PHELPS    Indications    History of pulmonary embolism [Z86.711]             Comments:               Anticoagulation Care Providers       Provider Role Specialty Phone number    Ascencion Calvillo MD Referring Internal Medicine [phone]

## 2024-06-19 ENCOUNTER — ANTICOAGULATION THERAPY VISIT (OUTPATIENT)
Dept: ANTICOAGULATION | Facility: CLINIC | Age: 84
End: 2024-06-19

## 2024-06-19 ENCOUNTER — LAB (OUTPATIENT)
Dept: LAB | Facility: CLINIC | Age: 84
End: 2024-06-19
Payer: COMMERCIAL

## 2024-06-19 DIAGNOSIS — Z86.711 HISTORY OF PULMONARY EMBOLISM: Primary | ICD-10-CM

## 2024-06-19 DIAGNOSIS — Z86.711 HISTORY OF PULMONARY EMBOLISM: ICD-10-CM

## 2024-06-19 LAB — INR BLD: 2.8 (ref 0.9–1.1)

## 2024-06-19 PROCEDURE — 85610 PROTHROMBIN TIME: CPT

## 2024-06-19 PROCEDURE — 36416 COLLJ CAPILLARY BLOOD SPEC: CPT

## 2024-06-19 NOTE — PROGRESS NOTES
ANTICOAGULATION MANAGEMENT     Edward HENDRICKS Reji JR 84 year old male is on warfarin with therapeutic INR result. (Goal INR 2.0-3.0)    Recent labs: (last 7 days)     06/19/24  0911   INR 2.8*       ASSESSMENT     Source(s): Chart Review, Patient/Caregiver Call, and Template     Warfarin doses taken: Warfarin taken as instructed  Diet: Increased greens/vitamin K in diet; ongoing change  Medication/supplement changes: None noted  New illness, injury, or hospitalization: No  Signs or symptoms of bleeding or clotting: No  Previous result: Supratherapeutic  Additional findings: None       PLAN     Recommended plan for ongoing change(s) affecting INR     Dosing Instructions: Continue your current warfarin dose with next INR in 4 weeks   It has been 3 weeks since last check.    Summary  As of 6/19/2024      Full warfarin instructions:  7.5 mg every Mon; 5 mg all other days   Next INR check:  7/17/2024               Telephone call with Edward who agrees to plan and repeated back plan correctly    Lab visit scheduled    Education provided:   Please call back if any changes to your diet, medications or how you've been taking warfarin  Goal range and lab monitoring: goal range and significance of current result and Importance of therapeutic range    Plan made per [facility] anticoagulation protocol    Sussy Lane, RN  Anticoagulation Clinic  6/19/2024    _______________________________________________________________________     Anticoagulation Episode Summary       Current INR goal:  2.0-3.0   TTR:  67.6% (1 y)   Target end date:  Indefinite   Send INR reminders to:  CHRISTA PHELPS    Indications    History of pulmonary embolism [Z86.711]             Comments:               Anticoagulation Care Providers       Provider Role Specialty Phone number    Ascencion Calvillo MD Referring Internal Medicine [phone]

## 2024-07-18 ENCOUNTER — DOCUMENTATION ONLY (OUTPATIENT)
Dept: ANTICOAGULATION | Facility: CLINIC | Age: 84
End: 2024-07-18

## 2024-07-18 ENCOUNTER — ANTICOAGULATION THERAPY VISIT (OUTPATIENT)
Dept: ANTICOAGULATION | Facility: CLINIC | Age: 84
End: 2024-07-18

## 2024-07-18 ENCOUNTER — LAB (OUTPATIENT)
Dept: LAB | Facility: CLINIC | Age: 84
End: 2024-07-18
Payer: COMMERCIAL

## 2024-07-18 DIAGNOSIS — Z86.711 HISTORY OF PULMONARY EMBOLISM: ICD-10-CM

## 2024-07-18 DIAGNOSIS — Z86.711 HISTORY OF PULMONARY EMBOLISM: Primary | ICD-10-CM

## 2024-07-18 LAB — INR BLD: 2.8 (ref 0.9–1.1)

## 2024-07-18 PROCEDURE — 85610 PROTHROMBIN TIME: CPT

## 2024-07-18 PROCEDURE — 36416 COLLJ CAPILLARY BLOOD SPEC: CPT

## 2024-07-18 NOTE — PROGRESS NOTES
ANTICOAGULATION MANAGEMENT     Edward Mendoza JR 84 year old male is on warfarin with therapeutic INR result. (Goal INR 2.0-3.0)    Recent labs: (last 7 days)     07/18/24  0938   INR 2.8*       ASSESSMENT     Source(s): Chart Review, Patient/Caregiver Call, and Template     Warfarin doses taken: Warfarin taken as instructed  Diet: No new diet changes identified  Medication/supplement changes: None noted  New illness, injury, or hospitalization: No  Signs or symptoms of bleeding or clotting: No  Previous result: Therapeutic last visit; previously outside of goal range  Additional findings: None       PLAN     Recommended plan for no diet, medication or health factor changes affecting INR     Dosing Instructions: Continue your current warfarin dose with next INR in 5 weeks       Summary  As of 7/18/2024      Full warfarin instructions:  7.5 mg every Mon; 5 mg all other days   Next INR check:  8/22/2024               Telephone call with Edward who verbalizes understanding and agrees to plan    Lab visit scheduled    Education provided: Please call back if any changes to your diet, medications or how you've been taking warfarin  Goal range and lab monitoring: goal range and significance of current result and Importance of therapeutic range  Contact [phone] with any changes, questions or concerns.     Plan made per [facility] anticoagulation protocol    Sussy Lane RN  Anticoagulation Clinic  7/18/2024    _______________________________________________________________________     Anticoagulation Episode Summary       Current INR goal:  2.0-3.0   TTR:  67.6% (1 y)   Target end date:  Indefinite   Send INR reminders to:  CHRISTA PHELPS    Indications    History of pulmonary embolism [Z86.711]             Comments:               Anticoagulation Care Providers       Provider Role Specialty Phone number    Ascencion Calvillo MD Referring Internal Medicine [phone]

## 2024-07-18 NOTE — PROGRESS NOTES
ANTICOAGULATION CLINIC REFERRAL RENEWAL REQUEST       An annual renewal order is required for all patients referred to [facility].?  Please review and sign the pended referral order for Edward Mendoza JR.       ANTICOAGULATION SUMMARY      Warfarin indication(s)   PE    Mechanical heart valve present?  NO       Current goal range   INR: 2.0-3.0     Goal appropriate for indication? Goal INR 2-3, standard for indication(s) above     Time in Therapeutic Range (TTR)  (Goal > 60%) 67.6%       Office visit with referring provider's group within last year yes on 4/18/24       Sussy Lane RN  [facility]

## 2024-07-23 ENCOUNTER — OFFICE VISIT (OUTPATIENT)
Dept: PULMONOLOGY | Facility: CLINIC | Age: 84
End: 2024-07-23
Payer: COMMERCIAL

## 2024-07-23 VITALS
HEART RATE: 68 BPM | WEIGHT: 167 LBS | DIASTOLIC BLOOD PRESSURE: 80 MMHG | SYSTOLIC BLOOD PRESSURE: 138 MMHG | OXYGEN SATURATION: 98 % | BODY MASS INDEX: 25.02 KG/M2

## 2024-07-23 DIAGNOSIS — G47.33 OBSTRUCTIVE SLEEP APNEA: Primary | ICD-10-CM

## 2024-07-23 DIAGNOSIS — Z86.711 HISTORY OF PULMONARY EMBOLISM: ICD-10-CM

## 2024-07-23 PROCEDURE — 99214 OFFICE O/P EST MOD 30 MIN: CPT | Performed by: INTERNAL MEDICINE

## 2024-07-23 NOTE — PROGRESS NOTES
Pulmonary Clinic Follow Up    Cc: Severe JAS, submassive PE    HPI: 79yoM with history of submassive PE (thought secondary to immobility while hunting) now on life-long anticoagulation here for follow up of JAS.    IN early 2019 had fall with head trauma and subdural. Reversed and monitored, this has completely resolved and anticoagulation resumed.     The patient underwent PSG in 3/2016 with AHI of 48, unable to achieve REM sleep until after CPAP was placed. He was started on CPAP at 7cmH2O. He received a new machine in spring 2016.    Compliance:  AHI 2.4, Leak  95% max 19. CPAP 7cmH2O, 90/90 days, 100%.   He feels refreshed wearing it but has been more tired recently.   He notes he has not replaced the mask for over 1 year as his wife just keeps washing it.       Current Outpatient Medications   Medication Sig Dispense Refill    chlorthalidone (HYGROTON) 25 MG tablet [CHLORTHALIDONE (HYGROTEN) 25 MG TABLET] Take A 1/2 TABLETS (12.5 MG TOTAL) BY MOUTH DAILY. 45 tablet 3    cholecalciferol, vitamin D3, 1,000 unit tablet [CHOLECALCIFEROL, VITAMIN D3, 1,000 UNIT TABLET] Take 1,000 Units by mouth daily.      fexofenadine (ALLEGRA) 180 MG tablet [FEXOFENADINE (ALLEGRA) 180 MG TABLET] Take 180 mg by mouth daily.      multivitamin therapeutic tablet [MULTIVITAMIN THERAPEUTIC TABLET] Take 1 tablet by mouth daily.      triamcinolone (KENALOG) 0.1 % external cream Apply topically daily as needed for irritation      vit C/E/Zn/coppr/lutein/zeaxan (PRESERVISION AREDS-2 ORAL) Take 1 tablet by mouth 2 times daily       warfarin ANTICOAGULANT (COUMADIN) 2.5 MG tablet [WARFARIN ANTICOAGULANT (COUMADIN/JANTOVEN) 2.5 MG TABLET] Take 1 tablet by mouth on Monday's. Adjust dose based on INR results as directed. 60 tablet 1    warfarin ANTICOAGULANT (COUMADIN) 5 MG tablet Take 1-1.5 tablets (5-7.5 mg) by mouth daily Adjust dose per INR results as directed. 110 tablet 3     No current facility-administered medications for this visit.          /80   Pulse 68   Wt 75.8 kg (167 lb)   SpO2 98%   BMI 25.02 kg/m     RA  Gen: NAD, Mallampati IV  C/V: RRR, S1 and S2  Resp: Clear bilaterally  Ext: No edema  Neuro: grossly normal.     ASSESSMENT/PLAN:  1) JAS, severe  -RTC 1 year for annual follow up, call sooner if needed  -Continues to use his CPAP and feels refreshed on nights he uses it.  -encouraged him to consider replacing the mask every 6 months to improve leak.     2) Submassive PE, provoked but given how much clot burden, recommend life-long anticoagulation  -He was agreeable today to discuss DOAC. He has had high and low INRs in May. I shared that data has found DOACs to decrease both thrombus risk and bleeding risk. We now have good agents for reversal and have a lot of experience with it. The affordability of the medications has also improved. He was willing to entertain the idea. I reached out to Orlando Calvillo and Drew (neurosurgery) to be sure they are ok with transition. Subdural was so remote I am hopeful this is not a barrier.    Carmina Crowder MD

## 2024-07-29 ENCOUNTER — TRANSFERRED RECORDS (OUTPATIENT)
Dept: HEALTH INFORMATION MANAGEMENT | Facility: CLINIC | Age: 84
End: 2024-07-29
Payer: COMMERCIAL

## 2024-08-01 ENCOUNTER — TELEPHONE (OUTPATIENT)
Dept: INTERNAL MEDICINE | Facility: CLINIC | Age: 84
End: 2024-08-01
Payer: COMMERCIAL

## 2024-08-01 NOTE — TELEPHONE ENCOUNTER
General Call      Reason for Call:  pt wanting to speak to inr nurse regardin meds    What are your questions or concerns:  see above    Date of last appointment with provider: n/a    Could we send this information to you in [facility] or would you prefer to receive a phone call?:   Patient would prefer a phone call   Okay to leave a detailed message?: No at Home number on file [phone] (home)

## 2024-08-01 NOTE — TELEPHONE ENCOUNTER
Spoke with Edward and he wanted to discuss Eliquis. He thinks he is going to start this. Says should only cost him about 35$ per month. He will call when he gets the medications so we can discuss transitioning from the warfarin.

## 2024-08-13 DIAGNOSIS — Z86.711 HISTORY OF PULMONARY EMBOLISM: Primary | ICD-10-CM

## 2024-08-22 ENCOUNTER — LAB (OUTPATIENT)
Dept: LAB | Facility: CLINIC | Age: 84
End: 2024-08-22
Payer: COMMERCIAL

## 2024-08-22 ENCOUNTER — ANTICOAGULATION THERAPY VISIT (OUTPATIENT)
Dept: ANTICOAGULATION | Facility: CLINIC | Age: 84
End: 2024-08-22

## 2024-08-22 DIAGNOSIS — Z86.711 HISTORY OF PULMONARY EMBOLISM: ICD-10-CM

## 2024-08-22 DIAGNOSIS — Z86.711 HISTORY OF PULMONARY EMBOLISM: Primary | ICD-10-CM

## 2024-08-22 LAB — INR BLD: 3 (ref 0.9–1.1)

## 2024-08-22 PROCEDURE — 36416 COLLJ CAPILLARY BLOOD SPEC: CPT

## 2024-08-22 PROCEDURE — 85610 PROTHROMBIN TIME: CPT

## 2024-08-22 NOTE — PROGRESS NOTES
ANTICOAGULATION MANAGEMENT     Edward Mendoza JR 84 year old male is on warfarin with therapeutic INR result. (Goal INR 2.0-3.0)    Recent labs: (last 7 days)     08/22/24  0935   INR 3.0*       ASSESSMENT     Source(s): Chart Review, Patient/Caregiver Call, and Template     Warfarin doses taken: Warfarin taken as instructed  Diet: No new diet changes identified  Medication/supplement changes:  He has picked up the Eliquis and ready to transition.   New illness, injury, or hospitalization: No  Signs or symptoms of bleeding or clotting: No  Previous result: Therapeutic last 2(+) visits  Additional findings:  He is going out of town this weekend until next Tuesday. He does not want to start the new medication until he is home. Says he has a lot of allergies and feels safer to do it this way. He prefers a call next week to discuss.        PLAN     Recommended plan for no diet, medication or health factor changes affecting INR     Dosing Instructions:  To stop warfarin on 8/27 and start Eliquis Thursday 8/29 in AM   ACN will call Friday per request.     Summary  As of 8/22/2024      Full warfarin instructions:  8/27: Hold; 8/28: Hold; 8/29: Hold; 8/30: Hold; Otherwise 7.5 mg every Mon; 5 mg all other days   Next INR check:  8/30/2024               Telephone call with Edward who agrees to plan and repeated back plan correctly    Will no longer need INR checks.    Education provided: Please call back if any changes to your diet, medications or how you've been taking warfarin  Goal range and lab monitoring: goal range and significance of current result  Contact [phone] with any changes, questions or concerns.     Plan made per [facility] anticoagulation protocol    Sussy Lane, RN  Anticoagulation Clinic  8/22/2024    _______________________________________________________________________     Anticoagulation Episode Summary       Current INR goal:  2.0-3.0   TTR:  67.6% (1 y)   Target end date:  Indefinite   Send INR  reminders to:  CHRISTA PHELPS    Indications    History of pulmonary embolism [Z86.711]             Comments:               Anticoagulation Care Providers       Provider Role Specialty Phone number    Ascencion Calvillo MD Referring Internal Medicine [phone]

## 2024-08-30 ENCOUNTER — TELEPHONE (OUTPATIENT)
Dept: ANTICOAGULATION | Facility: CLINIC | Age: 84
End: 2024-08-30
Payer: COMMERCIAL

## 2024-08-30 DIAGNOSIS — Z86.711 HISTORY OF PULMONARY EMBOLISM: Primary | ICD-10-CM

## 2024-08-30 NOTE — TELEPHONE ENCOUNTER
ANTICOAGULATION  MANAGEMENT    Edward Mendoza JR is being discharged from the [facility] ([facility]).    Reason for discharge: warfarin replaced by alternate therapy, Eliquis    Anticoagulation episode resolved, [facility] referral closed, and Standing order discontinued    If patient needs warfarin management in the future, please send a new referral    Sussy Lane RN

## 2024-08-30 NOTE — TELEPHONE ENCOUNTER
Spoke with Edward and he has stopped the warfarin and started the Eliquis 3 days ago. He has not had any issues.

## 2024-09-05 ENCOUNTER — DOCUMENTATION ONLY (OUTPATIENT)
Dept: ANTICOAGULATION | Facility: CLINIC | Age: 84
End: 2024-09-05
Payer: COMMERCIAL

## 2024-09-05 NOTE — PROGRESS NOTES
Anticoagulant Therapeutic Duplication    Duplicate orders identified: different medication of the same therapeutic class    The duplicate anticoagulant order(s) has been discontinued    Active anticoagulant: apixaban (Eliquis)    Plan made per [facility] anticoagulation protocol.    Lauro Tuttle RN  9/5/2024

## 2024-10-03 ENCOUNTER — TRANSFERRED RECORDS (OUTPATIENT)
Dept: HEALTH INFORMATION MANAGEMENT | Facility: CLINIC | Age: 84
End: 2024-10-03
Payer: COMMERCIAL

## 2024-10-28 ENCOUNTER — OFFICE VISIT (OUTPATIENT)
Dept: INTERNAL MEDICINE | Facility: CLINIC | Age: 84
End: 2024-10-28
Payer: COMMERCIAL

## 2024-10-28 VITALS
BODY MASS INDEX: 24.59 KG/M2 | OXYGEN SATURATION: 98 % | DIASTOLIC BLOOD PRESSURE: 68 MMHG | HEIGHT: 69 IN | TEMPERATURE: 98.1 F | SYSTOLIC BLOOD PRESSURE: 121 MMHG | WEIGHT: 166 LBS | HEART RATE: 65 BPM | RESPIRATION RATE: 16 BRPM

## 2024-10-28 DIAGNOSIS — G47.33 OBSTRUCTIVE SLEEP APNEA ON CPAP: ICD-10-CM

## 2024-10-28 DIAGNOSIS — Z51.81 ENCOUNTER FOR THERAPEUTIC DRUG MONITORING: ICD-10-CM

## 2024-10-28 DIAGNOSIS — R73.03 PREDIABETES: Primary | ICD-10-CM

## 2024-10-28 DIAGNOSIS — Z86.711 HISTORY OF PULMONARY EMBOLISM: ICD-10-CM

## 2024-10-28 DIAGNOSIS — I10 BENIGN ESSENTIAL HYPERTENSION: ICD-10-CM

## 2024-10-28 LAB
EST. AVERAGE GLUCOSE BLD GHB EST-MCNC: 126 MG/DL
HBA1C MFR BLD: 6 % (ref 0–5.6)

## 2024-10-28 PROCEDURE — 83036 HEMOGLOBIN GLYCOSYLATED A1C: CPT | Performed by: INTERNAL MEDICINE

## 2024-10-28 PROCEDURE — 99214 OFFICE O/P EST MOD 30 MIN: CPT | Performed by: INTERNAL MEDICINE

## 2024-10-28 PROCEDURE — 36415 COLL VENOUS BLD VENIPUNCTURE: CPT | Performed by: INTERNAL MEDICINE

## 2024-10-28 PROCEDURE — 80048 BASIC METABOLIC PNL TOTAL CA: CPT | Performed by: INTERNAL MEDICINE

## 2024-10-28 PROCEDURE — G2211 COMPLEX E/M VISIT ADD ON: HCPCS | Performed by: INTERNAL MEDICINE

## 2024-10-28 RX ORDER — KETOCONAZOLE 20 MG/G
CREAM TOPICAL
COMMUNITY
Start: 2024-07-29

## 2024-10-28 ASSESSMENT — ENCOUNTER SYMPTOMS: WHEEZING: 1

## 2024-10-28 NOTE — PROGRESS NOTES
Edward Mendoza JR   84 year old male    Date of Visit: 10/28/2024    Chief Complaint   Patient presents with    Follow Up     6 mo follow up.    Allergies     Getting worse, meds not helping. Clearing throat     Subjective  84-year-old male lives independently with wife.  He has not been as active recently, not been doing his treadmill or getting regular walking.  His weight is stable.    He does have peripheral neuropathy but no foot sores or falls.  Feels somewhat unsteady on feet.  No longer drinks alcohol.  Normal B12 level August 2023.    He had a severe COVID illness in 2022 which worsened his neuropathy.    He is highly compliant with his CPAP machine and it is a new machine now.  Minimal daytime sleepiness.    No palpitations or increasing shortness of breath or edema.    2015 stress echo was negative/normal.    History of pulmonary embolism in 2015 and he is on chronic Eliquis blood thinner but no bleeding issues or blood in stool.    No chest pain or increasing shortness of breath.    Prediabetes, does like eating ice cream.  Hemoglobin A1c level was 6.0% April 2024.    Blood pressure has been in the 130-135/70s.  Denies orthostasis.  On chlorthalidone 12.5 mg daily.    Prostatectomy in 2011 without recurrence.  PSA level was 0 in 2022.    His father had colon cancer late in life in the 80s.  He had a colonoscopy 2012 which was negative.  No change in stool or blood in stool.    Patient did see dermatology in July, exam was negative for skin cancers.  History of basal cell cancer.  Given a 1 year follow-up.    Just saw ophthalmology for his macular degeneration issue earlier this month.    Patient has chronic nasal allergies with postnasal drip and chronic cough and clearing of the throat.  He has not been using his Flonase daily but is using intermittently.  He is using Allegra.  No sinusitis symptoms.  No problems swallowing.  He is using Listerine for gargling.    Patient has had a lipoma on his right  chest for many years but recently has noticed that it is growing slightly.  Is not causing him any pain.    PMHx:    Past Medical History:   Diagnosis Date    Arthritis     Broken ankle, left, sequela     Hypertension     Prostatitis     Sleep apnea     Thrombosis      PSHx:    Past Surgical History:   Procedure Laterality Date    CATARACT EXTRACTION Bilateral 12/07/2015    CHOLECYSTECTOMY      HERNIA REPAIR Bilateral 01/01/1987    Inguinal    PROSTATECTOMY N/A     W/ Bilat Pelvic Lymphadenectomy    REVERSE ARTHROPLASTY SHOULDER Right 2/1/2022    Procedure: Right reverse total shoulder arthroplasty;  Surgeon: Abel Tobar MD;  Location: [facility] OR    VITRECTOMY ANTERIOR Left 06/15/2015    [facility] THORACOTOMY,MAJOR,EXPLOR/BIOPSY  01/01/1983    VATs that found lipoma on chest wall, was concern for cancer.      Immunizations:   Immunization History   Administered Date(s) Administered    COVID-19 12+ (Pfizer) 01/01/2024, 09/23/2024    COVID-19 Bivalent 12+ (Pfizer) 11/09/2022    COVID-19 Bivalent Peds 5-11Y (Pfizer) 11/09/2022    COVID-19 MONOVALENT 12+ (Pfizer) 02/04/2021, 02/25/2021, 10/12/2021, 04/01/2022    COVID-19 Monovalent 12+ (Pfizer 2022) 04/01/2022    DT (PEDS <7y) 12/04/2000    Flu 65+ (Fluad) 10/06/2024    Flu, Unspecified 10/01/2021, 10/18/2022, 01/01/2024    Influenza (H1N1) 01/15/2010    Influenza (High Dose) Trivalent,PF (Fluzone) 10/26/2015, 10/06/2016, 10/10/2017, 08/15/2018, 10/01/2019, 10/18/2022    Influenza Vaccine 65+ (FLUAD) 11/01/2021, 10/17/2023    Influenza Vaccine 65+ (Fluzone HD) 10/05/2020, 10/06/2020, 10/18/2022    Influenza, seasonal, injectable, PF 09/08/2011, 10/08/2012, 10/10/2013, 10/13/2014    Pneumo Conj 13-V (2010&after) 10/13/2014    Pneumococcal 20 valent Conjugate (Prevnar 20) 08/15/2023    Pneumococcal 23 valent 10/02/2003    RSV Vaccine (Arexvy) 10/11/2023    TDAP (Adacel,Boostrix) 05/01/2019    Td,adult,historic,unspecified 01/06/2011    Zoster recombinant adjuvanted (SHINGRIX)  "02/19/2019    Zoster vaccine, live 09/08/2011       ROS A comprehensive review of systems was performed and was otherwise negative    Medications, allergies, and problem list were reviewed and updated    Exam  /68   Pulse 65   Temp 98.1  F (36.7  C)   Resp 16   Ht 1.74 m (5' 8.5\")   Wt 75.3 kg (166 lb)   SpO2 98%   BMI 24.87 kg/m    On exam he does have a large lipoma just underneath his right breast, perhaps 4 x 2 cm oval, but in the dermal layer and mobile.  Smooth open lipoma.  Overlying skin is normal.  Lungs are clear.  Heart is regular without murmur.  Abdomen is nontender and no ankle edema.    Assessment/Plan  1. Prediabetes (Primary)  I counseled patient to improve regularity of activity.  I stressed the importance of regular exercise to maintain the body's health.  He has a treadmill but he has not been using it.  I strongly recommended patient get back on a daily exercise routine.  - Hemoglobin A1c    2. History of pulmonary embolism  Chronic Eliquis long-term.  No bleeding issues.  No falls.    3. Benign essential hypertension  Well-controlled.  Continue chlorthalidone 12.5 g daily.  Follow-up in the spring for physical    4. Encounter for therapeutic drug monitoring    - Basic metabolic panel    5. Obstructive sleep apnea on CPAP  Severe, but highly compliant with CPAP.    Peripheral neuropathy associated with above.  Mildly unsteady gait but no foot sores.    Seasonal allergies with postnasal drip.  I recommended Flonase daily.  Could consider changing back to Zyrtec.  Avoid any decongestants.  I did discuss option for ipratropium nasal spray if needed.    History of basal cell cancer, follow-up with dermatology for yearly skin exam next July.    Late onset family history of colon cancer, but with age 84 and on blood thinners I feel that the risk likely outweighs the benefit with colonoscopy and I would not recommend continuing colon cancer screening at this age.    Right knee replacement " 2022, no complaints.    Lipoma, consistent with benign lipoma.  No intervention needed.    History of prostatectomy without evidence of recurrence.    The longitudinal plan of care for the diagnosis(es)/condition(s) as documented were addressed during this visit. Due to the added complexity in care, I will continue to support Edward in the subsequent management and with ongoing continuity of care.        Return in about 6 months (around 4/28/2025) for Adult wellness visit physical exam.   Patient Instructions   Stay active on a regular basis.  Regular activity helps keep the body healthy, and helps regenerate your muscle tissue.  Try to get 20 minutes of activity every day.  Start using your treadmill regularly.    Make sure you are wearing your CPAP machine every night.    Goal blood pressure less than 135/85 but not less than 110/60.  If blood pressure is outside that range more than occasionally, see me in clinic to discuss her blood pressure medication.    Otherwise I will plan to see you after April 18 for your yearly adult wellness visit physical exam.    With your prediabetes, avoid baked goods and sugary foods.    Do not drink any alcohol, as that could worsen your neuropathy.    I would not recommend further colonoscopy screening at age 84.    For your allergy postnasal drip symptoms, try gargling with salt water on a daily basis.  Use your Flonase/fluticasone nasal steroid spray every day.  Continue to use Allegra or you can change to Zyrtec.  But do not take any decongestant type medication.    You could consider ipratropium nasal spray if your nasal drainage becomes more of a problem.        Ascencion Calvillo MD, MD        Current Outpatient Medications   Medication Sig Dispense Refill    apixaban ANTICOAGULANT (ELIQUIS) 5 MG tablet Take 1 tablet (5 mg) by mouth 2 times daily 60 tablet 11    chlorthalidone (HYGROTON) 25 MG tablet [CHLORTHALIDONE (HYGROTEN) 25 MG TABLET] Take A 1/2 TABLETS (12.5 MG TOTAL) BY  MOUTH DAILY. 45 tablet 3    cholecalciferol, vitamin D3, 1,000 unit tablet [CHOLECALCIFEROL, VITAMIN D3, 1,000 UNIT TABLET] Take 1,000 Units by mouth daily.      fexofenadine (ALLEGRA) 180 MG tablet [FEXOFENADINE (ALLEGRA) 180 MG TABLET] Take 180 mg by mouth daily.      ketoconazole (NIZORAL) 2 % external cream APPLY 1 APPLICATION DAILY TOPICALLY TO AFFECTED AREAS OF THE LEFT HIP FOR UP TO 2 WEEKS      multivitamin therapeutic tablet [MULTIVITAMIN THERAPEUTIC TABLET] Take 1 tablet by mouth daily.      triamcinolone (KENALOG) 0.1 % external cream Apply topically daily as needed for irritation      vit C/E/Zn/coppr/lutein/zeaxan (PRESERVISION AREDS-2 ORAL) Take 1 tablet by mouth 2 times daily        Allergies   Allergen Reactions    Cats Other (See Comments)     Respiratory congestion    Lisinopril Cough    Latex Itching     Latex adhesive- caused itching    Ace Inhibitors Cough    Chocolate Flavor Other (See Comments)     Pt gets sinus congestion from eating chocolate.    Corn [Corn Oil] Other (See Comments)     Pt gets sinus congestion from eating corn.    Fish Containing Products [Fish-Derived Products] Other (See Comments)     Pt gets sinus congestion from eating fish.    Levofloxacin Rash     Red line up the arm after IV administration    Nuts - Unspecified [Nuts] Other (See Comments)     Pt gets sinus congestion from eating nuts.    Penicillins Unknown     childhood    Ragweed Pollen [Ragweeds] Other (See Comments)     Sinus and chest congestion.     Social History     Tobacco Use    Smoking status: Former     Current packs/day: 0.00     Average packs/day: 0.5 packs/day for 5.0 years (2.5 ttl pk-yrs)     Types: Cigarettes     Start date: 1955     Quit date: 1960     Years since quittin.8     Passive exposure: Past    Smokeless tobacco: Never   Vaping Use    Vaping status: Never Used   Substance Use Topics    Alcohol use: No    Drug use: No             Subjective   Edward is a 84 year old, presenting  "for the following health issues:  Follow Up (6 mo follow up.) and Allergies (Getting worse, meds not helping. Clearing throat)        10/28/2024    11:42 AM   Additional Questions   Roomed by Saida NORRIS   Accompanied by lesli     Allergies    History of Present Illness       Hypertension: He presents for follow up of hypertension.  He does not check blood pressure  regularly outside of the clinic. Outside blood pressures have been over 140/90. He follows a low salt diet.     He eats 2-3 servings of fruits and vegetables daily.He exercises with enough effort to increase his heart rate 20 to 29 minutes per day.  He exercises with enough effort to increase his heart rate 4 days per week. He is missing 7 dose(s) of medications per week.                     Objective    /68   Pulse 65   Temp 98.1  F (36.7  C)   Resp 16   Ht 1.74 m (5' 8.5\")   Wt 75.3 kg (166 lb)   SpO2 98%   BMI 24.87 kg/m    Body mass index is 24.87 kg/m .  Physical Exam               Signed Electronically by: Ascencion Calvillo MD    "

## 2024-10-28 NOTE — PATIENT INSTRUCTIONS
Stay active on a regular basis.  Regular activity helps keep the body healthy, and helps regenerate your muscle tissue.  Try to get 20 minutes of activity every day.  Start using your treadmill regularly.    Make sure you are wearing your CPAP machine every night.    Goal blood pressure less than 135/85 but not less than 110/60.  If blood pressure is outside that range more than occasionally, see me in clinic to discuss her blood pressure medication.    Otherwise I will plan to see you after April 18 for your yearly adult wellness visit physical exam.    With your prediabetes, avoid baked goods and sugary foods.    Do not drink any alcohol, as that could worsen your neuropathy.    I would not recommend further colonoscopy screening at age 84.    For your allergy postnasal drip symptoms, try gargling with salt water on a daily basis.  Use your Flonase/fluticasone nasal steroid spray every day.  Continue to use Allegra or you can change to Zyrtec.  But do not take any decongestant type medication.    You could consider ipratropium nasal spray if your nasal drainage becomes more of a problem.

## 2024-10-29 LAB
ANION GAP SERPL CALCULATED.3IONS-SCNC: 11 MMOL/L (ref 7–15)
BUN SERPL-MCNC: 16.9 MG/DL (ref 8–23)
CALCIUM SERPL-MCNC: 10.7 MG/DL (ref 8.8–10.4)
CHLORIDE SERPL-SCNC: 102 MMOL/L (ref 98–107)
CREAT SERPL-MCNC: 1.01 MG/DL (ref 0.67–1.17)
EGFRCR SERPLBLD CKD-EPI 2021: 72 ML/MIN/1.73M2
GLUCOSE SERPL-MCNC: 68 MG/DL (ref 70–99)
HCO3 SERPL-SCNC: 29 MMOL/L (ref 22–29)
POTASSIUM SERPL-SCNC: 4.5 MMOL/L (ref 3.4–5.3)
SODIUM SERPL-SCNC: 142 MMOL/L (ref 135–145)

## 2025-03-17 ENCOUNTER — OFFICE VISIT (OUTPATIENT)
Dept: INTERNAL MEDICINE | Facility: CLINIC | Age: 85
End: 2025-03-17
Payer: COMMERCIAL

## 2025-03-17 VITALS
HEART RATE: 72 BPM | WEIGHT: 167 LBS | SYSTOLIC BLOOD PRESSURE: 112 MMHG | HEIGHT: 69 IN | BODY MASS INDEX: 24.73 KG/M2 | OXYGEN SATURATION: 99 % | TEMPERATURE: 98 F | RESPIRATION RATE: 16 BRPM | DIASTOLIC BLOOD PRESSURE: 58 MMHG

## 2025-03-17 DIAGNOSIS — Z91.018 FOOD ALLERGY: ICD-10-CM

## 2025-03-17 DIAGNOSIS — J30.9 ALLERGIC RHINITIS, UNSPECIFIED SEASONALITY, UNSPECIFIED TRIGGER: ICD-10-CM

## 2025-03-17 DIAGNOSIS — Z86.711 HISTORY OF PULMONARY EMBOLISM: ICD-10-CM

## 2025-03-17 DIAGNOSIS — F41.9 ANXIETY: Primary | ICD-10-CM

## 2025-03-17 DIAGNOSIS — G62.9 PERIPHERAL POLYNEUROPATHY: ICD-10-CM

## 2025-03-17 DIAGNOSIS — G47.33 OBSTRUCTIVE SLEEP APNEA ON CPAP: ICD-10-CM

## 2025-03-17 DIAGNOSIS — I10 ESSENTIAL HYPERTENSION: ICD-10-CM

## 2025-03-17 PROCEDURE — 3078F DIAST BP <80 MM HG: CPT | Performed by: INTERNAL MEDICINE

## 2025-03-17 PROCEDURE — 3074F SYST BP LT 130 MM HG: CPT | Performed by: INTERNAL MEDICINE

## 2025-03-17 PROCEDURE — G2211 COMPLEX E/M VISIT ADD ON: HCPCS | Performed by: INTERNAL MEDICINE

## 2025-03-17 PROCEDURE — 99214 OFFICE O/P EST MOD 30 MIN: CPT | Performed by: INTERNAL MEDICINE

## 2025-03-17 RX ORDER — CITALOPRAM HYDROBROMIDE 10 MG/1
10 TABLET ORAL DAILY
Qty: 90 TABLET | Refills: 3 | Status: SHIPPED | OUTPATIENT
Start: 2025-03-17

## 2025-03-17 RX ORDER — FLUTICASONE PROPIONATE 50 MCG
2 SPRAY, SUSPENSION (ML) NASAL DAILY
Qty: 16 G | Refills: 4 | Status: SHIPPED | OUTPATIENT
Start: 2025-03-17

## 2025-03-17 ASSESSMENT — ANXIETY QUESTIONNAIRES
1. FEELING NERVOUS, ANXIOUS, OR ON EDGE: SEVERAL DAYS
5. BEING SO RESTLESS THAT IT IS HARD TO SIT STILL: NOT AT ALL
3. WORRYING TOO MUCH ABOUT DIFFERENT THINGS: MORE THAN HALF THE DAYS
IF YOU CHECKED OFF ANY PROBLEMS ON THIS QUESTIONNAIRE, HOW DIFFICULT HAVE THESE PROBLEMS MADE IT FOR YOU TO DO YOUR WORK, TAKE CARE OF THINGS AT HOME, OR GET ALONG WITH OTHER PEOPLE: NOT DIFFICULT AT ALL
2. NOT BEING ABLE TO STOP OR CONTROL WORRYING: MORE THAN HALF THE DAYS
GAD7 TOTAL SCORE: 7
GAD7 TOTAL SCORE: 7
7. FEELING AFRAID AS IF SOMETHING AWFUL MIGHT HAPPEN: NOT AT ALL
6. BECOMING EASILY ANNOYED OR IRRITABLE: SEVERAL DAYS

## 2025-03-17 ASSESSMENT — PATIENT HEALTH QUESTIONNAIRE - PHQ9: 5. POOR APPETITE OR OVEREATING: SEVERAL DAYS

## 2025-03-17 ASSESSMENT — ENCOUNTER SYMPTOMS: NERVOUS/ANXIOUS: 1

## 2025-03-17 NOTE — PATIENT INSTRUCTIONS
Start citalopram 10 mg a day to help reduce anxiety.  This is a low-dose.  If this low-dose is not effective after 4 weeks, you can contact me to increase the dose to 20 mg a day.  Will discuss this medication again in April at your physical exam.    Get a good night sleep and wear your CPAP machine every night.    Reduce your nose congestion symptoms by using the Flonase nasal steroid every day.  You can continue on the Allegra if you feel it is benefiting.  You could try Zyrtec antihistamine instead.  Do not use any decongestant    Regular exercise is important for managing anxiety and stress.  Get 20 minutes of walking on her treadmill every day.  You do not need to walk fast, but but exercise on a regular basis.

## 2025-03-17 NOTE — PROGRESS NOTES
Edward Mendoza JR   84 year old male    Date of Visit: 3/17/2025    Chief Complaint   Patient presents with    Anxiety     Worries about wife and son.     Constipation    Follow Up     Wants allergy referral, fell 1 mo ago hurt ribs.     Subjective  84-year-old male is here with wife, Karyn, to discuss worsening anxiety over the past 2 months.  He is worried about his son and grandson not making good choices and some medical issues with his wife.    He tends to perseverate and worry about things in the future that may not come to past.  He denies depression, but has admitted to being less interested in regular exercise and less interested in participating in activities.    He has a history of peripheral neuropathy but no longer drinks alcohol.  Normal B12 level back in 2023.  He had an unsteadiness fall about 1 month ago, but is feeling better with that.    He is highly compliant with CPAP, wearing every night, all night.  Denies significant daytime sleepiness.    He does have chronic nasal allergies and congestion.  History of corn allergy it makes his nose congestion worse.  Has been using Allegra but not using Flonase regularly.    Blood pressure has been well-controlled on chlorthalidone 12.5 mg a day.  No increasing shortness of breath or edema.    No bleeding issues on Eliquis, history of pulmonary embolism back in 2015.    No history of cardiac arrhythmia or syncope.  2021 EKG showed a normal QTc of 404.    No history of major depression.  He denies any thoughts of harm to self or others.  Weight is stable, still eating well.  No other somatic complaints.    PMHx:    Past Medical History:   Diagnosis Date    Arthritis     Broken ankle, left, sequela     Hypertension     Prostatitis     Sleep apnea     Thrombosis      PSHx:    Past Surgical History:   Procedure Laterality Date    CATARACT EXTRACTION Bilateral 12/07/2015    CHOLECYSTECTOMY      HERNIA REPAIR Bilateral 01/01/1987    Inguinal    PROSTATECTOMY N/A   "   W/ Bilat Pelvic Lymphadenectomy    REVERSE ARTHROPLASTY SHOULDER Right 2/1/2022    Procedure: Right reverse total shoulder arthroplasty;  Surgeon: Abel Tobar MD;  Location: [facility] OR    VITRECTOMY ANTERIOR Left 06/15/2015    [facility] THORACOTOMY,MAJOR,EXPLOR/BIOPSY  01/01/1983    VATs that found lipoma on chest wall, was concern for cancer.      Immunizations:   Immunization History   Administered Date(s) Administered    COVID-19 12+ (Pfizer) 01/01/2024, 09/23/2024    COVID-19 Bivalent 12+ (Pfizer) 11/09/2022    COVID-19 Bivalent Peds 5-11Y (Pfizer) 11/09/2022    COVID-19 MONOVALENT 12+ (Pfizer) 02/04/2021, 02/25/2021, 10/12/2021, 04/01/2022    COVID-19 Monovalent 12+ (Pfizer 2022) 04/01/2022    DT (PEDS <7y) 12/04/2000    Flu 65+ (Fluad) 10/06/2024    Flu, Unspecified 10/01/2021, 10/18/2022, 01/01/2024    Influenza (H1N1) 01/15/2010    Influenza (High Dose) Trivalent,PF (Fluzone) 10/26/2015, 10/06/2016, 10/10/2017, 08/15/2018, 10/01/2019, 10/18/2022    Influenza (prior to 2024) 09/08/2011, 10/08/2012, 10/10/2013, 10/13/2014    Influenza Vaccine 65+ (FLUAD) 11/01/2021, 10/17/2023    Influenza Vaccine 65+ (Fluzone HD) 10/05/2020, 10/06/2020, 10/18/2022    Pneumo Conj 13-V (2010&after) 10/13/2014    Pneumococcal 20 valent Conjugate (Prevnar 20) 08/15/2023    Pneumococcal 23 valent 10/02/2003    RSV Vaccine (Arexvy) 10/11/2023    TDAP (Adacel,Boostrix) 05/01/2019    Td,adult,historic,unspecified 01/06/2011    Zoster recombinant adjuvanted (SHINGRIX) 02/19/2019    Zoster vaccine, live 09/08/2011       ROS A comprehensive review of systems was performed and was otherwise negative    Medications, allergies, and problem list were reviewed and updated    Exam  /58   Pulse 72   Temp 98  F (36.7  C)   Resp 16   Ht 1.74 m (5' 8.5\")   Wt 75.8 kg (167 lb)   SpO2 99%   BMI 25.02 kg/m    He is alert and oriented x 3.  Still has good mood and affect.  Perhaps somewhat more withdrawn.  Does not appear significantly " anxious today.  Mobility is normal.  Heart is regular without murmur.  No edema.  Abdomen nontender.     Assessment/Plan  1. Anxiety (Primary)  Mild chronic anxiety with flare that is situational recently with family issues.  He feels he is becoming dysfunctional, perseverating too much about anxiety and having anhedonia or poor motivation with this.  He needs to get back to regular exercise.    He denies significant sleep disturbance, although still some daytime sleepiness despite his CPAP use.  He confirmed that he is not drinking any alcohol.    Encouraged him to get back to regular exercise, and start citalopram, see patient instructions.  - citalopram (CELEXA) 10 MG tablet; Take 1 tablet (10 mg) by mouth daily.  Dispense: 90 tablet; Refill: 3    2. Obstructive sleep apnea on CPAP  Compliant with CPAP, I stressed the importance of compliance with CPAP.    3. History of pulmonary embolism  Long-term use of Eliquis    4. Essential hypertension  Controlled on chlorthalidone    5. Peripheral polyneuropathy  No longer drinks alcohol.  Compliant with CPAP.  Normal B12 level previously.  Unsteady gait.  Encouraged him to get back to regular exercise and could consider a cane or walker if needed.    6. Allergic rhinitis, unspecified seasonality, unspecified trigger  Restart Flonase.  He requested a referral to allergy clinic to discuss his corn allergy worsening his nose congestion and phlegm production.  He Is Currently on Allegra, I Discussed Alternative of Zyrtec  - fluticasone (FLONASE) 50 MCG/ACT nasal spray; Spray 2 sprays into both nostrils daily.  Dispense: 16 g; Refill: 4  - Adult Allergy/Asthma  Referral; Future    7. Food allergy  As above,: Allergy  - Adult Allergy/Asthma  Referral; Future    History of prostatectomy in 2011 without recurrence.    The longitudinal plan of care for the diagnosis(es)/condition(s) as documented were addressed during this visit. Due to the added complexity in  care, I will continue to support Edward in the subsequent management and with ongoing continuity of care.        Return in 5 weeks (on 4/21/2025) for Annual wellness visit.   Patient Instructions   Start citalopram 10 mg a day to help reduce anxiety.  This is a low-dose.  If this low-dose is not effective after 4 weeks, you can contact me to increase the dose to 20 mg a day.  Will discuss this medication again in April at your physical exam.    Get a good night sleep and wear your CPAP machine every night.    Reduce your nose congestion symptoms by using the Flonase nasal steroid every day.  You can continue on the Allegra if you feel it is benefiting.  You could try Zyrtec antihistamine instead.  Do not use any decongestant    Regular exercise is important for managing anxiety and stress.  Get 20 minutes of walking on her treadmill every day.  You do not need to walk fast, but but exercise on a regular basis.        Ascencion Calvillo MD, MD        Current Outpatient Medications   Medication Sig Dispense Refill    apixaban ANTICOAGULANT (ELIQUIS) 5 MG tablet Take 1 tablet (5 mg) by mouth 2 times daily 60 tablet 11    chlorthalidone (HYGROTON) 25 MG tablet [CHLORTHALIDONE (HYGROTEN) 25 MG TABLET] Take A 1/2 TABLETS (12.5 MG TOTAL) BY MOUTH DAILY. 45 tablet 3    cholecalciferol, vitamin D3, 1,000 unit tablet [CHOLECALCIFEROL, VITAMIN D3, 1,000 UNIT TABLET] Take 1,000 Units by mouth daily.      citalopram (CELEXA) 10 MG tablet Take 1 tablet (10 mg) by mouth daily. 90 tablet 3    fexofenadine (ALLEGRA) 180 MG tablet [FEXOFENADINE (ALLEGRA) 180 MG TABLET] Take 180 mg by mouth daily.      fluticasone (FLONASE) 50 MCG/ACT nasal spray Spray 2 sprays into both nostrils daily. 16 g 4    ketoconazole (NIZORAL) 2 % external cream APPLY 1 APPLICATION DAILY TOPICALLY TO AFFECTED AREAS OF THE LEFT HIP FOR UP TO 2 WEEKS      multivitamin therapeutic tablet [MULTIVITAMIN THERAPEUTIC TABLET] Take 1 tablet by mouth daily.       triamcinolone (KENALOG) 0.1 % external cream Apply topically daily as needed for irritation      vit C/E/Zn/coppr/lutein/zeaxan (PRESERVISION AREDS-2 ORAL) Take 1 tablet by mouth 2 times daily        Allergies   Allergen Reactions    Cats Other (See Comments)     Respiratory congestion    Lisinopril Cough    Latex Itching     Latex adhesive- caused itching    Ace Inhibitors Cough    Chocolate Flavoring Agent (Non-Screening) Other (See Comments)     Pt gets sinus congestion from eating chocolate.    Corn [Corn Oil] Other (See Comments)     Pt gets sinus congestion from eating corn.    Fish Containing Products [Fish-Derived Products] Other (See Comments)     Pt gets sinus congestion from eating fish.    Levofloxacin Rash     Red line up the arm after IV administration    Nuts - Unspecified [Nuts] Other (See Comments)     Pt gets sinus congestion from eating nuts.    Penicillins Unknown     childhood    Ragweed Pollen [Ragweeds] Other (See Comments)     Sinus and chest congestion.     Social History     Tobacco Use    Smoking status: Former     Current packs/day: 0.00     Average packs/day: 0.5 packs/day for 5.0 years (2.5 ttl pk-yrs)     Types: Cigarettes     Start date: 1955     Quit date: 1960     Years since quittin.2     Passive exposure: Past    Smokeless tobacco: Never   Vaping Use    Vaping status: Never Used   Substance Use Topics    Alcohol use: No    Drug use: No             Subjective   Edward is a 84 year old, presenting for the following health issues:  Anxiety (Worries about wife and son. ), Constipation, and Follow Up (Wants allergy referral, fell 1 mo ago hurt ribs.)        3/17/2025    11:08 AM   Additional Questions   Roomed by Saida NORRIS   Accompanied by wife     Anxiety    History of Present Illness       Reason for visit:  Depression    He eats 0-1 servings of fruits and vegetables daily.He consumes 0 sweetened beverage(s) daily.He exercises with enough effort to increase his heart rate 9  "or less minutes per day.  He exercises with enough effort to increase his heart rate 3 or less days per week.   He is taking medications regularly.                      Objective    /58   Pulse 72   Temp 98  F (36.7  C)   Resp 16   Ht 1.74 m (5' 8.5\")   Wt 75.8 kg (167 lb)   SpO2 99%   BMI 25.02 kg/m    Body mass index is 25.02 kg/m .  Physical Exam               Signed Electronically by: Ascencion Calvillo MD    "

## 2025-04-09 DIAGNOSIS — I10 BENIGN ESSENTIAL HYPERTENSION: ICD-10-CM

## 2025-04-09 RX ORDER — CHLORTHALIDONE 25 MG/1
TABLET ORAL
Qty: 45 TABLET | Refills: 3 | Status: SHIPPED | OUTPATIENT
Start: 2025-04-09

## 2025-04-10 ENCOUNTER — TRANSFERRED RECORDS (OUTPATIENT)
Dept: HEALTH INFORMATION MANAGEMENT | Facility: CLINIC | Age: 85
End: 2025-04-10
Payer: COMMERCIAL

## 2025-04-21 ENCOUNTER — OFFICE VISIT (OUTPATIENT)
Dept: INTERNAL MEDICINE | Facility: CLINIC | Age: 85
End: 2025-04-21
Payer: COMMERCIAL

## 2025-04-21 VITALS
SYSTOLIC BLOOD PRESSURE: 139 MMHG | TEMPERATURE: 98.1 F | DIASTOLIC BLOOD PRESSURE: 64 MMHG | BODY MASS INDEX: 24.29 KG/M2 | HEART RATE: 98 BPM | OXYGEN SATURATION: 96 % | RESPIRATION RATE: 16 BRPM | HEIGHT: 69 IN | WEIGHT: 164 LBS

## 2025-04-21 DIAGNOSIS — Z51.81 ENCOUNTER FOR THERAPEUTIC DRUG MONITORING: ICD-10-CM

## 2025-04-21 DIAGNOSIS — H35.30 MACULAR DEGENERATION (SENILE) OF RETINA: ICD-10-CM

## 2025-04-21 DIAGNOSIS — F41.9 ANXIETY: ICD-10-CM

## 2025-04-21 DIAGNOSIS — I27.82 OTHER CHRONIC PULMONARY EMBOLISM, UNSPECIFIED WHETHER ACUTE COR PULMONALE PRESENT (H): ICD-10-CM

## 2025-04-21 DIAGNOSIS — G47.33 OBSTRUCTIVE SLEEP APNEA ON CPAP: ICD-10-CM

## 2025-04-21 DIAGNOSIS — R53.83 FATIGUE, UNSPECIFIED TYPE: ICD-10-CM

## 2025-04-21 DIAGNOSIS — Z85.46 HISTORY OF PROSTATE CANCER: ICD-10-CM

## 2025-04-21 DIAGNOSIS — Z90.79 HISTORY OF PROSTATECTOMY: ICD-10-CM

## 2025-04-21 DIAGNOSIS — Z00.00 ANNUAL WELLNESS VISIT: Primary | ICD-10-CM

## 2025-04-21 DIAGNOSIS — R73.03 PREDIABETES: ICD-10-CM

## 2025-04-21 DIAGNOSIS — I10 ESSENTIAL HYPERTENSION: ICD-10-CM

## 2025-04-21 DIAGNOSIS — H35.3210 EXUDATIVE AGE-RELATED MACULAR DEGENERATION OF RIGHT EYE, UNSPECIFIED STAGE (H): ICD-10-CM

## 2025-04-21 DIAGNOSIS — Z23 NEED FOR COVID-19 VACCINE: ICD-10-CM

## 2025-04-21 LAB
ALBUMIN SERPL BCG-MCNC: 3.8 G/DL (ref 3.5–5.2)
ALP SERPL-CCNC: 317 U/L (ref 40–150)
ALT SERPL W P-5'-P-CCNC: 21 U/L (ref 0–70)
ANION GAP SERPL CALCULATED.3IONS-SCNC: 11 MMOL/L (ref 7–15)
AST SERPL W P-5'-P-CCNC: 21 U/L (ref 0–45)
BILIRUB SERPL-MCNC: 0.6 MG/DL
BUN SERPL-MCNC: 15.3 MG/DL (ref 8–23)
CALCIUM SERPL-MCNC: 10.5 MG/DL (ref 8.8–10.4)
CHLORIDE SERPL-SCNC: 102 MMOL/L (ref 98–107)
CHOLEST SERPL-MCNC: 195 MG/DL
CREAT SERPL-MCNC: 0.98 MG/DL (ref 0.67–1.17)
EGFRCR SERPLBLD CKD-EPI 2021: 75 ML/MIN/1.73M2
ERYTHROCYTE [DISTWIDTH] IN BLOOD BY AUTOMATED COUNT: 14.3 % (ref 10–15)
EST. AVERAGE GLUCOSE BLD GHB EST-MCNC: 131 MG/DL
FASTING STATUS PATIENT QL REPORTED: YES
FASTING STATUS PATIENT QL REPORTED: YES
GLUCOSE SERPL-MCNC: 112 MG/DL (ref 70–99)
HBA1C MFR BLD: 6.2 % (ref 0–5.6)
HCO3 SERPL-SCNC: 28 MMOL/L (ref 22–29)
HCT VFR BLD AUTO: 48.3 % (ref 40–53)
HDLC SERPL-MCNC: 60 MG/DL
HGB BLD-MCNC: 15.9 G/DL (ref 13.3–17.7)
LDLC SERPL CALC-MCNC: 117 MG/DL
MCH RBC QN AUTO: 29.8 PG (ref 26.5–33)
MCHC RBC AUTO-ENTMCNC: 32.9 G/DL (ref 31.5–36.5)
MCV RBC AUTO: 91 FL (ref 78–100)
NONHDLC SERPL-MCNC: 135 MG/DL
PLATELET # BLD AUTO: 272 10E3/UL (ref 150–450)
POTASSIUM SERPL-SCNC: 4 MMOL/L (ref 3.4–5.3)
PROT SERPL-MCNC: 7.5 G/DL (ref 6.4–8.3)
PSA SERPL DL<=0.01 NG/ML-MCNC: 0.05 NG/ML
RBC # BLD AUTO: 5.33 10E6/UL (ref 4.4–5.9)
SODIUM SERPL-SCNC: 141 MMOL/L (ref 135–145)
TRIGL SERPL-MCNC: 91 MG/DL
TSH SERPL DL<=0.005 MIU/L-ACNC: 1.22 UIU/ML (ref 0.3–4.2)
WBC # BLD AUTO: 8.9 10E3/UL (ref 4–11)

## 2025-04-21 PROCEDURE — 84443 ASSAY THYROID STIM HORMONE: CPT | Performed by: INTERNAL MEDICINE

## 2025-04-21 PROCEDURE — 83036 HEMOGLOBIN GLYCOSYLATED A1C: CPT | Performed by: INTERNAL MEDICINE

## 2025-04-21 PROCEDURE — 80053 COMPREHEN METABOLIC PANEL: CPT | Performed by: INTERNAL MEDICINE

## 2025-04-21 PROCEDURE — 36415 COLL VENOUS BLD VENIPUNCTURE: CPT | Performed by: INTERNAL MEDICINE

## 2025-04-21 PROCEDURE — 99214 OFFICE O/P EST MOD 30 MIN: CPT | Mod: 25 | Performed by: INTERNAL MEDICINE

## 2025-04-21 PROCEDURE — 3075F SYST BP GE 130 - 139MM HG: CPT | Performed by: INTERNAL MEDICINE

## 2025-04-21 PROCEDURE — G0439 PPPS, SUBSEQ VISIT: HCPCS | Performed by: INTERNAL MEDICINE

## 2025-04-21 PROCEDURE — 3078F DIAST BP <80 MM HG: CPT | Performed by: INTERNAL MEDICINE

## 2025-04-21 PROCEDURE — 91320 SARSCV2 VAC 30MCG TRS-SUC IM: CPT | Performed by: INTERNAL MEDICINE

## 2025-04-21 PROCEDURE — 90480 ADMN SARSCOV2 VAC 1/ONLY CMP: CPT | Performed by: INTERNAL MEDICINE

## 2025-04-21 PROCEDURE — 85027 COMPLETE CBC AUTOMATED: CPT | Performed by: INTERNAL MEDICINE

## 2025-04-21 PROCEDURE — 84153 ASSAY OF PSA TOTAL: CPT | Performed by: INTERNAL MEDICINE

## 2025-04-21 PROCEDURE — 80061 LIPID PANEL: CPT | Performed by: INTERNAL MEDICINE

## 2025-04-21 RX ORDER — CITALOPRAM HYDROBROMIDE 20 MG/1
20 TABLET ORAL DAILY
Qty: 90 TABLET | Refills: 3 | Status: SHIPPED | OUTPATIENT
Start: 2025-04-21

## 2025-04-21 SDOH — HEALTH STABILITY: PHYSICAL HEALTH: ON AVERAGE, HOW MANY DAYS PER WEEK DO YOU ENGAGE IN MODERATE TO STRENUOUS EXERCISE (LIKE A BRISK WALK)?: 2 DAYS

## 2025-04-21 ASSESSMENT — SOCIAL DETERMINANTS OF HEALTH (SDOH): HOW OFTEN DO YOU GET TOGETHER WITH FRIENDS OR RELATIVES?: MORE THAN THREE TIMES A WEEK

## 2025-04-21 NOTE — PROGRESS NOTES
Preventive Care Visit  [facility]  Ascencion Calvillo MD, Internal Medicine  Apr 21, 2025          Edward HENDRICKS Reji WING   84 year old male    Date of Visit: 4/21/2025    Chief Complaint   Patient presents with    Medicare Visit     fasting     Subjective  84-year-old male lives independently with wife, Karyn.  He admits to not being very active this winter and has still not increased his activity.  He has not used his treadmill at all.  He likes being outside but has not been walking outside much.    He has been complaining of increased fatigue.  He has severe sleep apnea but his CPAP machine is working well and he is using it every night.  No palpitations or history of arrhythmia.    No longer using alcohol.  Does have peripheral neuropathy but no foot sores.  No falls but does feel unsteady on feet.  Normal B12 level in 2023.    He has had significant fatigue ever since COVID illness in 2022.    He continues to complain of fatigue at this time.  His anxiety has improved significantly on the citalopram 20 mg a day, but his wife states that he should go up to the higher dose.  No palpitations or history of arrhythmia.  He had been stressed over his son and grandson, but he states that is going better.    His weight is down 3 pounds.  Just mildly overweight.  Denies change in diet.  Does have prediabetes with hemoglobin A1c level of 6.0% last October.    No chest pain or chest pressure.  Negative stress echo in 2015.    He has a high HDL, not on statin.    His blood pressure has been running higher over the past month in the 140-150/60-70s.  He denies orthostasis.  On half tablet of chlorthalidone, 12.5 mg a day.  No edema.    Prostatectomy 2011 without leakage or bladder issues.    Colonoscopy 2012 was negative, no plan for repeat.  History of cholecystectomy and a right knee replacement in 2022.    No headache complaints.  No vision changes.  He gets injections regularly with the ophthalmologist for his  wet macular degeneration.    No cough or respiratory symptoms.    PMHx:    Past Medical History:   Diagnosis Date    Arthritis     Broken ankle, left, sequela     Hypertension     Prostatitis     Sleep apnea     Thrombosis      PSHx:    Past Surgical History:   Procedure Laterality Date    CATARACT EXTRACTION Bilateral 12/07/2015    CHOLECYSTECTOMY      HERNIA REPAIR Bilateral 01/01/1987    Inguinal    PROSTATECTOMY N/A     W/ Bilat Pelvic Lymphadenectomy    REVERSE ARTHROPLASTY SHOULDER Right 2/1/2022    Procedure: Right reverse total shoulder arthroplasty;  Surgeon: Abel Tobar MD;  Location: [facility] OR    VITRECTOMY ANTERIOR Left 06/15/2015    ZZC THORACOTOMY,MAJOR,EXPLOR/BIOPSY  01/01/1983    VATs that found lipoma on chest wall, was concern for cancer.      Immunizations:   Immunization History   Administered Date(s) Administered    COVID-19 12+ (Pfizer) 01/01/2024, 09/23/2024, 04/21/2025    COVID-19 Bivalent 12+ (Pfizer) 11/09/2022    COVID-19 Bivalent Peds 5-11Y (Pfizer) 11/09/2022    COVID-19 MONOVALENT 12+ (Pfizer) 02/04/2021, 02/25/2021, 10/12/2021, 04/01/2022    COVID-19 Monovalent 12+ (Pfizer 2022) 04/01/2022    DT (PEDS <7y) 12/04/2000    Flu 65+ (Fluad) 10/06/2024    Flu, Unspecified 10/01/2021, 10/18/2022, 01/01/2024    Influenza (H1N1) 01/15/2010    Influenza (High Dose) Trivalent,PF (Fluzone) 10/26/2015, 10/06/2016, 10/10/2017, 08/15/2018, 10/01/2019, 10/18/2022    Influenza (prior to 2024) 09/08/2011, 10/08/2012, 10/10/2013, 10/13/2014    Influenza Vaccine 65+ (FLUAD) 11/01/2021, 10/17/2023    Influenza Vaccine 65+ (Fluzone HD) 10/05/2020, 10/06/2020, 10/18/2022    Pneumo Conj 13-V (2010&after) 10/13/2014    Pneumococcal 20 valent Conjugate (Prevnar 20) 08/15/2023    Pneumococcal 23 valent 10/02/2003    RSV Vaccine (Arexvy) 10/11/2023    TDAP (Adacel,Boostrix) 05/01/2019    Td,adult,historic,unspecified 01/06/2011    Zoster recombinant adjuvanted (Shingrix) 02/19/2019    Zoster vaccine, live  "09/08/2011       ROS A comprehensive review of systems was performed and was otherwise negative    Medications, allergies, and problem list were reviewed and updated    Exam  /64   Pulse 98   Temp 98.1  F (36.7  C)   Resp 16   Ht 1.75 m (5' 8.9\")   Wt 74.4 kg (164 lb)   SpO2 96%   BMI 24.29 kg/m    Alert and oriented x 3.  Clock face drawing and word recall normal.  Mobility within normal limits.  Not significantly anxious.  Mood and affect within normal limits.  Gait normal.  Pupils and irises equal and reactive.  Extraocular muscles intact.  No jaundice or conjunctivitis.  He declined to take out his hearing aids.  Pharynx is just mildly crowded.  No pharyngitis or oral lesions.  Teeth in good condition.  No cervical or supraclavicular adenopathy.  No JVD and no carotid bruits.  Lungs are clear to auscultation with good respiratory excursion.  Heart is regular with no murmur rub or gallop.  There is +1 posterior tibialis pulses bilaterally and no ankle edema and feet in good condition without preulcerative calluses.  Abdomen is nonobese nontender no hepatosplenomegaly and no pulsatile abdominal mass.  Skin exam without suspicious skin lesions.    Assessment/Plan  1. Annual wellness visit (Primary)  Main focus for patient is improving his daily exercise and conditioning.  He is having increasing fatigue and less activity on a regular basis.    His other main issue is stroke prevention.  With history of hypertension and sleep apnea.  When he does have a high HDL.  Does not have the diagnosis of vascular disease.  Is on Eliquis for history of pulmonary embolism.    He is at fall risk with his peripheral neuropathy.  He is compliant with CPAP and no longer drinking alcohol.  Normal B12 level in 2023.  I encouraged him to exercise to practice his balance on a daily basis.    He is full code.    Up-to-date with immunizations.    No further colonoscopy with age.    Counseling given, as appropriate, for " nutrition, fall prevention, tobacco, physical activity, social engagement, weight loss and cognition.    2. Essential hypertension  Borderline controlled although somewhat higher systolic than previous.  He is getting higher numbers at home.  Lack of exercise may be contributing to his elevated blood pressures.  Could consider low-dose amlodipine in addition to this chlorthalidone.    He will check blood pressure on his own and follow-up with his blood pressure cuff in a few weeks.    Not planning statin  - Lipid Profile    3. Obstructive sleep apnea on CPAP  Compliant with CPAP, controlled    4. Anxiety  Improved but wife is recommending increased dose for patient.  Patient still admits to some anxiety and anhedonia.  Increase citalopram and follow-up in clinic in 2 to 3 weeks.  - citalopram (CELEXA) 20 MG tablet; Take 1 tablet (20 mg) by mouth daily.  Dispense: 90 tablet; Refill: 3    5. History of prostatectomy  2011 prostatectomy, no anticipation of recurrence.  Patient wanted to recheck PSA    6. Macular degeneration (senile) of retina  Up-to-date with his ophthalmology visits and gets injections, stable    7. Encounter for therapeutic drug monitoring    - CBC with platelets  - Comprehensive metabolic panel    8. Fatigue, unspecified type  Multifactorial.  Improve regular exercise.  CPAP appears to be functioning well and using.  Reevaluate on higher dose citalopram 20 mg  - TSH with free T4 reflex    9. Prediabetes  Improve exercise.  Diabetic diet.  - Hemoglobin A1c    10. Need for COVID-19 vaccine  Given today  - COVID-19 12+ (PFIZER)    11. History of prostate cancer  As above  - PSA tumor marker    History of pulmonary embolism, currently being treated long-term Eliquis.  Other chronic pulmonary embolism, unspecified whether acute cor pulmonale present., long-term Eliquis.  I did discuss bleeding risk with patient.    Peripheral neuropathy, he was told to wear good shoes at all times.  Practices balance  "with exercise daily.  Avoid any further alcohol.      Return in about 2 weeks (around 5/5/2025) for Blood pressure checkup appointment, nonfasting.   Patient Instructions   Goal blood pressure less than 140/85, but not less than 110/60.    Regular exercise affects blood pressure.    Get 20 minutes of \"activity time \"every day.  This can simply be walking around your house and stretching, or doing the treadmill, or even going to the gym.    Continue to make sure your CPAP is working well and that you are using it all night.    Continue on your current dose of chlorthalidone at this time.  Check your blood pressure at different times of the day and write the numbers down.  Bring your blood pressure cuff with you to your appointment in 2 to 3 weeks, to determine your blood pressure treatment plan.    Increase citalopram to 20 mg a day.        Ascencion Calvillo MD, MD        Current Outpatient Medications   Medication Sig Dispense Refill    apixaban ANTICOAGULANT (ELIQUIS) 5 MG tablet Take 1 tablet (5 mg) by mouth 2 times daily 60 tablet 11    chlorthalidone (HYGROTON) 25 MG tablet [CHLORTHALIDONE (HYGROTEN) 25 MG TABLET] TAKE A 1/2 TABLETS (12.5 MG TOTAL) BY MOUTH DAILY. 45 tablet 3    cholecalciferol, vitamin D3, 1,000 unit tablet [CHOLECALCIFEROL, VITAMIN D3, 1,000 UNIT TABLET] Take 1,000 Units by mouth daily.      citalopram (CELEXA) 20 MG tablet Take 1 tablet (20 mg) by mouth daily. 90 tablet 3    fexofenadine (ALLEGRA) 180 MG tablet [FEXOFENADINE (ALLEGRA) 180 MG TABLET] Take 180 mg by mouth daily.      fluticasone (FLONASE) 50 MCG/ACT nasal spray Spray 2 sprays into both nostrils daily. 16 g 4    ketoconazole (NIZORAL) 2 % external cream APPLY 1 APPLICATION DAILY TOPICALLY TO AFFECTED AREAS OF THE LEFT HIP FOR UP TO 2 WEEKS      multivitamin therapeutic tablet [MULTIVITAMIN THERAPEUTIC TABLET] Take 1 tablet by mouth daily.      triamcinolone (KENALOG) 0.1 % external cream Apply topically daily as needed for " irritation      vit C/E/Zn/coppr/lutein/zeaxan (PRESERVISION AREDS-2 ORAL) Take 1 tablet by mouth 2 times daily        Allergies   Allergen Reactions    Cats Other (See Comments)     Respiratory congestion    Lisinopril Cough    Latex Itching     Latex adhesive- caused itching    Ace Inhibitors Cough    Chocolate Flavoring Agent (Non-Screening) Other (See Comments)     Pt gets sinus congestion from eating chocolate.    Corn [Corn Oil] Other (See Comments)     Pt gets sinus congestion from eating corn.    Fish Containing Products [Fish-Derived Products] Other (See Comments)     Pt gets sinus congestion from eating fish.    Levofloxacin Rash     Red line up the arm after IV administration    Nuts - Unspecified [Nuts] Other (See Comments)     Pt gets sinus congestion from eating nuts.    Penicillins Unknown     childhood    Ragweed Pollen [Ragweeds] Other (See Comments)     Sinus and chest congestion.     Social History     Tobacco Use    Smoking status: Former     Current packs/day: 0.00     Average packs/day: 0.5 packs/day for 5.0 years (2.5 ttl pk-yrs)     Types: Cigarettes     Start date: 1955     Quit date: 1960     Years since quittin.3     Passive exposure: Past    Smokeless tobacco: Never   Vaping Use    Vaping status: Never Used   Substance Use Topics    Alcohol use: No    Drug use: No             Subjective   Edward is a 84 year old, presenting for the following:  Medicare Visit (fasting)        2025     9:17 AM   Additional Questions   Roomed by Saida NORRIS   Accompanied by lesli MORENO           Advance Care Planning    Discussed advance care planning with patient; informed AVS has link to [facility].        2025   General Health   How would you rate your overall physical health? Good   Feel stress (tense, anxious, or unable to sleep) To some extent   (!) STRESS CONCERN      2025   Nutrition   Diet: Other   If other, please elaborate: allergies         2025   Exercise    Days per week of moderate/strenous exercise 2 days   (!) EXERCISE CONCERN      4/21/2025   Social Factors   Frequency of gathering with friends or relatives More than three times a week   Worry food won't last until get money to buy more Patient declined   Food not last or not have enough money for food? Patient declined   Do you have housing? (Housing is defined as stable permanent housing and does not include staying ouside in a car, in a tent, in an abandoned building, in an overnight shelter, or couch-surfing.) Patient declined   Are you worried about losing your housing? Patient declined   Lack of transportation? Patient declined   Unable to get utilities (heat,electricity)? Patient declined         4/21/2025   Fall Risk   Fallen 2 or more times in the past year? No    No   Trouble with walking or balance? Yes    Yes       Multiple values from one day are sorted in reverse-chronological order           4/21/2025   Activities of Daily Living- Home Safety   Needs help with the following daily activites None of the above   Safety concerns in the home No grab bars in the bathroom         4/21/2025   Dental   Dentist two times every year? Yes         4/21/2025   Hearing Screening   Hearing concerns? None of the above         4/21/2025   Driving Risk Screening   Patient/family members have concerns about driving No         4/21/2025   General Alertness/Fatigue Screening   Have you been more tired than usual lately? (!) YES         4/21/2025   Urinary Incontinence Screening   Bothered by leaking urine in past 6 months No           Today's PHQ-2 Score:       3/17/2025    11:03 AM   PHQ-2 ( 1999 Pfizer)   Q1: Little interest or pleasure in doing things 1    Q2: Feeling down, depressed or hopeless 1    PHQ-2 Score 2    Q1: Little interest or pleasure in doing things Several days   Q2: Feeling down, depressed or hopeless Several days   PHQ-2 Score 2       Proxy-reported         4/21/2025   Substance Use   Alcohol more  "than 3/day or more than 7/wk Not Applicable   Do you have a current opioid prescription? No   How severe/bad is pain from 1 to 10? /10   Do you use any other substances recreationally? No     Social History     Tobacco Use    Smoking status: Former     Current packs/day: 0.00     Average packs/day: 0.5 packs/day for 5.0 years (2.5 ttl pk-yrs)     Types: Cigarettes     Start date: 1955     Quit date: 1960     Years since quittin.3     Passive exposure: Past    Smokeless tobacco: Never   Vaping Use    Vaping status: Never Used   Substance Use Topics    Alcohol use: No    Drug use: No             Reviewed and updated as needed this visit by Provider                      Current providers sharing in care for this patient include:  Patient Care Team:  Ascencion Calvillo MD as PCP - General (Internal Medicine)  Ascencion Calvillo MD as Assigned PCP  Carmina Crowder MD as Assigned Pulmonology Provider  Shanice Dye MD as MD (Allergy & Immunology)    The following health maintenance items are reviewed in Epic and correct as of today:  Health Maintenance   Topic Date Due    URINE DRUG SCREEN  Never done    ZOSTER IMMUNIZATION (3 of 3) 2019    COLORECTAL CANCER SCREENING  2022    ANNUAL REVIEW OF HM ORDERS  2023    COVID-19 Vaccine ( season) 2025    MEDICARE ANNUAL WELLNESS VISIT  2025    BMP  10/28/2025    FALL RISK ASSESSMENT  2026    ADVANCE CARE PLANNING  2029    DTAP/TDAP/TD IMMUNIZATION (2 - Td or Tdap) 2029    PHQ-2 (once per calendar year)  Completed    INFLUENZA VACCINE  Completed    Pneumococcal Vaccine: 50+ Years  Completed    RSV VACCINE  Completed    HPV IMMUNIZATION  Aged Out    MENINGITIS IMMUNIZATION  Aged Out            Objective    Exam  /64   Pulse 98   Temp 98.1  F (36.7  C)   Resp 16   Ht 1.75 m (5' 8.9\")   Wt 74.4 kg (164 lb)   SpO2 96%   BMI 24.29 kg/m     Estimated body mass index is 24.29 kg/m  as calculated from the " "following:    Height as of this encounter: 1.75 m (5' 8.9\").    Weight as of this encounter: 74.4 kg (164 lb).    Physical Exam           4/21/2025   Mini Cog   Clock Draw Score 2 Normal   3 Item Recall 3 objects recalled   Mini Cog Total Score 5             4/18/2024   Vision Screen   Reason Vision Screen Not Completed Other       Signed Electronically by: Ascencion Calvillo MD    "

## 2025-04-21 NOTE — PATIENT INSTRUCTIONS
"Goal blood pressure less than 140/85, but not less than 110/60.    Regular exercise affects blood pressure.    Get 20 minutes of \"activity time \"every day.  This can simply be walking around your house and stretching, or doing the treadmill, or even going to the gym.    Continue to make sure your CPAP is working well and that you are using it all night.    Continue on your current dose of chlorthalidone at this time.  Check your blood pressure at different times of the day and write the numbers down.  Bring your blood pressure cuff with you to your appointment in 2 to 3 weeks, to determine your blood pressure treatment plan.    Increase citalopram to 20 mg a day.      "

## 2025-05-07 ENCOUNTER — OFFICE VISIT (OUTPATIENT)
Dept: INTERNAL MEDICINE | Facility: CLINIC | Age: 85
End: 2025-05-07
Payer: COMMERCIAL

## 2025-05-07 VITALS
TEMPERATURE: 97.7 F | DIASTOLIC BLOOD PRESSURE: 72 MMHG | HEIGHT: 69 IN | WEIGHT: 162 LBS | HEART RATE: 70 BPM | SYSTOLIC BLOOD PRESSURE: 138 MMHG | BODY MASS INDEX: 23.99 KG/M2 | RESPIRATION RATE: 16 BRPM | OXYGEN SATURATION: 98 %

## 2025-05-07 DIAGNOSIS — I10 ESSENTIAL HYPERTENSION: Primary | ICD-10-CM

## 2025-05-07 DIAGNOSIS — Z85.46 HISTORY OF PROSTATE CANCER: ICD-10-CM

## 2025-05-07 DIAGNOSIS — Z86.711 HISTORY OF PULMONARY EMBOLISM: ICD-10-CM

## 2025-05-07 DIAGNOSIS — F41.9 ANXIETY: ICD-10-CM

## 2025-05-07 DIAGNOSIS — R73.03 PREDIABETES: ICD-10-CM

## 2025-05-07 DIAGNOSIS — G47.33 OBSTRUCTIVE SLEEP APNEA ON CPAP: ICD-10-CM

## 2025-05-07 PROCEDURE — 3078F DIAST BP <80 MM HG: CPT | Performed by: INTERNAL MEDICINE

## 2025-05-07 PROCEDURE — 99214 OFFICE O/P EST MOD 30 MIN: CPT | Performed by: INTERNAL MEDICINE

## 2025-05-07 PROCEDURE — 3075F SYST BP GE 130 - 139MM HG: CPT | Performed by: INTERNAL MEDICINE

## 2025-05-07 PROCEDURE — G2211 COMPLEX E/M VISIT ADD ON: HCPCS | Performed by: INTERNAL MEDICINE

## 2025-05-07 NOTE — PATIENT INSTRUCTIONS
Continue with current medications.  Take the citalopram at night.    Continue with your daily exercise.  If your blood pressure is running above 140/80 more often, or starts running above 150 systolic, contact me to consider starting amlodipine medication, in addition to your chlorthalidone.    You do not need to fast for your follow-up visit this fall.  But I will be rechecking your diabetes test.  If your blood sugar test is increasing, you may consider metformin medication in the future.    Continue to avoid starchy foods and baked goods and sweets.

## 2025-05-07 NOTE — PROGRESS NOTES
Edward Mendoza JR   84 year old male    Date of Visit: 5/7/2025    Chief Complaint   Patient presents with    Follow Up     2 wk BP check.      Subjective  84-year-old male is here with his wife, Karyn for follow-up appointment after increasing citalopram to 20 mg a day in the morning.  He got significantly more sleepy with that, but skipped it yesterday morning and felt fine, then took it last night and slept okay.  Feels okay this morning.  He is unsure if its helped with his dysthymia much, but he does feel better mentally.    He started to walk on a daily basis and uses treadmill.  A few days after he started exercising regularly he noticed his blood pressure was going down.  Was in the 150s systolic earlier but now down in the 120-140s/60s.  Denies orthostasis.    Still on chlorthalidone 12.5 mg a day.  No edema.    He does have severe sleep apnea but highly compliant with CPAP.  He does have peripheral neuropathy.  He no longer drinks any alcohol.  Normal B12 level in 2023.  No palpitations or history of atrial fibrillation.  No increasing shortness of breath.    History of pulmonary embolism on Eliquis.  No bleeding issues.    Prediabetes with hemoglobin A1c level last month of 6.2%.  He has cut down on some baked goods.  Still eating some ice cream.    Prostatectomy in 2011.  His PSA level was 0.05 last month, consistent with a less than 0.1 measurement previously.    His alk phos was up at 317, however.  No bone pain complaints.    History of macular degeneration, getting injections.  Vision stable.    PMHx:    Past Medical History:   Diagnosis Date    Arthritis     Broken ankle, left, sequela     Hypertension     Prostatitis     Sleep apnea     Thrombosis      PSHx:    Past Surgical History:   Procedure Laterality Date    CATARACT EXTRACTION Bilateral 12/07/2015    CHOLECYSTECTOMY      HERNIA REPAIR Bilateral 01/01/1987    Inguinal    PROSTATECTOMY N/A     W/ Bilat Pelvic Lymphadenectomy    REVERSE  "ARTHROPLASTY SHOULDER Right 2/1/2022    Procedure: Right reverse total shoulder arthroplasty;  Surgeon: Abel Tobar MD;  Location: [facility] OR    VITRECTOMY ANTERIOR Left 06/15/2015    ZZC THORACOTOMY,MAJOR,EXPLOR/BIOPSY  01/01/1983    VATs that found lipoma on chest wall, was concern for cancer.      Immunizations:   Immunization History   Administered Date(s) Administered    COVID-19 12+ (Pfizer) 01/01/2024, 09/23/2024, 04/21/2025    COVID-19 Bivalent 12+ (Pfizer) 11/09/2022    COVID-19 Bivalent Peds 5-11Y (Pfizer) 11/09/2022    COVID-19 MONOVALENT 12+ (Pfizer) 02/04/2021, 02/25/2021, 10/12/2021, 04/01/2022    COVID-19 Monovalent 12+ (Pfizer 2022) 04/01/2022    DT (PEDS <7y) 12/04/2000    Flu 65+ (Fluad) 10/06/2024    Flu, Unspecified 10/01/2021, 10/18/2022, 01/01/2024    Influenza (H1N1) 01/15/2010    Influenza (High Dose) Trivalent,PF (Fluzone) 10/26/2015, 10/06/2016, 10/10/2017, 08/15/2018, 10/01/2019, 10/18/2022    Influenza (prior to 2024) 09/08/2011, 10/08/2012, 10/10/2013, 10/13/2014    Influenza Vaccine 65+ (FLUAD) 11/01/2021, 10/17/2023    Influenza Vaccine 65+ (Fluzone HD) 10/05/2020, 10/06/2020, 10/18/2022    Pneumo Conj 13-V (2010&after) 10/13/2014    Pneumococcal 20 valent Conjugate (Prevnar 20) 08/15/2023    Pneumococcal 23 valent 10/02/2003    RSV Vaccine (Arexvy) 10/11/2023    TDAP (Adacel,Boostrix) 05/01/2019    Td,adult,historic,unspecified 01/06/2011    Zoster recombinant adjuvanted (Shingrix) 02/19/2019    Zoster vaccine, live 09/08/2011       ROS A comprehensive review of systems was performed and was otherwise negative    Medications, allergies, and problem list were reviewed and updated    Exam  /72   Pulse 70   Temp 97.7  F (36.5  C)   Resp 16   Ht 1.75 m (5' 8.9\")   Wt 73.5 kg (162 lb)   SpO2 98%   BMI 23.99 kg/m    Appears well.  Lungs clear.  Heart regular.  No edema    Assessment/Plan  1. Essential hypertension (Primary)  Improved control with regular exercise.  If blood " pressure is running more than 140/80, consider adding amlodipine 2.5 mg a day.  Continue chlorthalidone at this time.    2. Obstructive sleep apnea on CPAP  Compliant with CPAP.  Significant daytime sleepiness with worsening citalopram, but that improved with changing to the evening.    3. History of pulmonary embolism  Chronic Eliquis    4. Prediabetes  I discussed need to continue regular walking exercise and avoiding baked goods and starchy foods.  Limit ice cream.  Recheck hemoglobin A1c level this fall.  I did discuss metformin as an option with patient if sugars continue high.    Sees ophthalmology regularly for his macular degeneration and injections    5. Anxiety  Unclear if significantly improved with citalopram 20 mg increase, he had worsening daytime fatigue when taking in the morning but it appears to be adequately tolerated at night now.    Increasing exercise and improved weather with the sprain, likely improved his dysthymia and anxiety, but he does feel it is improved.    Continue on escitalopram 20 mg dose.  Could consider reduction back to 10 mg a day if needed in the future    6. History of prostate cancer  PSA level low, I am not suspicious for recurrence.  Can recheck alkaline phosphatase level at next visit.    The longitudinal plan of care for the diagnosis(es)/condition(s) as documented were addressed during this visit. Due to the added complexity in care, I will continue to support Edward in the subsequent management and with ongoing continuity of care.        Return in 5 months (on 10/14/2025) for Nonfasting follow-up visit.   Patient Instructions   Continue with current medications.  Take the citalopram at night.    Continue with your daily exercise.  If your blood pressure is running above 140/80 more often, or starts running above 150 systolic, contact me to consider starting amlodipine medication, in addition to your chlorthalidone.    You do not need to fast for your follow-up visit this  fall.  But I will be rechecking your diabetes test.  If your blood sugar test is increasing, you may consider metformin medication in the future.    Continue to avoid starchy foods and baked goods and sweets.      Ascencion Calvillo MD, MD        Current Outpatient Medications   Medication Sig Dispense Refill    apixaban ANTICOAGULANT (ELIQUIS) 5 MG tablet Take 1 tablet (5 mg) by mouth 2 times daily 60 tablet 11    chlorthalidone (HYGROTON) 25 MG tablet [CHLORTHALIDONE (HYGROTEN) 25 MG TABLET] TAKE A 1/2 TABLETS (12.5 MG TOTAL) BY MOUTH DAILY. 45 tablet 3    cholecalciferol, vitamin D3, 1,000 unit tablet [CHOLECALCIFEROL, VITAMIN D3, 1,000 UNIT TABLET] Take 1,000 Units by mouth daily.      citalopram (CELEXA) 20 MG tablet Take 1 tablet (20 mg) by mouth daily. 90 tablet 3    fexofenadine (ALLEGRA) 180 MG tablet [FEXOFENADINE (ALLEGRA) 180 MG TABLET] Take 180 mg by mouth daily.      fluticasone (FLONASE) 50 MCG/ACT nasal spray Spray 2 sprays into both nostrils daily. 16 g 4    ketoconazole (NIZORAL) 2 % external cream APPLY 1 APPLICATION DAILY TOPICALLY TO AFFECTED AREAS OF THE LEFT HIP FOR UP TO 2 WEEKS      multivitamin therapeutic tablet [MULTIVITAMIN THERAPEUTIC TABLET] Take 1 tablet by mouth daily.      triamcinolone (KENALOG) 0.1 % external cream Apply topically daily as needed for irritation      vit C/E/Zn/coppr/lutein/zeaxan (PRESERVISION AREDS-2 ORAL) Take 1 tablet by mouth 2 times daily        Allergies   Allergen Reactions    Cats Other (See Comments)     Respiratory congestion    Lisinopril Cough    Latex Itching     Latex adhesive- caused itching    Ace Inhibitors Cough    Chocolate Flavoring Agent (Non-Screening) Other (See Comments)     Pt gets sinus congestion from eating chocolate.    Corn [Corn Oil] Other (See Comments)     Pt gets sinus congestion from eating corn.    Fish Containing Products [Fish-Derived Products] Other (See Comments)     Pt gets sinus congestion from eating fish.    Levofloxacin  "Rash     Red line up the arm after IV administration    Nuts - Unspecified [Nuts] Other (See Comments)     Pt gets sinus congestion from eating nuts.    Penicillins Unknown     childhood    Ragweed Pollen [Ragweeds] Other (See Comments)     Sinus and chest congestion.     Social History     Tobacco Use    Smoking status: Former     Current packs/day: 0.00     Average packs/day: 0.5 packs/day for 5.0 years (2.5 ttl pk-yrs)     Types: Cigarettes     Start date: 1955     Quit date: 1960     Years since quittin.3     Passive exposure: Past    Smokeless tobacco: Never   Vaping Use    Vaping status: Never Used   Substance Use Topics    Alcohol use: No    Drug use: No             Subjective   Edward is a 84 year old, presenting for the following health issues:  Follow Up (2 wk BP check. )        2025     9:21 AM   Additional Questions   Roomed by Saida NORRIS   Accompanied by lesli     History of Present Illness       Hypertension: He presents for follow up of hypertension.  He does check blood pressure  regularly outside of the clinic. Outside blood pressures have been over 140/90. He follows a low salt diet.     He eats 2-3 servings of fruits and vegetables daily.He consumes 0 sweetened beverage(s) daily.He exercises with enough effort to increase his heart rate 10 to 19 minutes per day.  He exercises with enough effort to increase his heart rate 5 days per week.   He is taking medications regularly.                      Objective    Pulse 70   Temp 97.7  F (36.5  C)   Resp 16   Ht 1.75 m (5' 8.9\")   Wt 73.5 kg (162 lb)   SpO2 98%   BMI 23.99 kg/m    Body mass index is 23.99 kg/m .  Physical Exam               Signed Electronically by: Ascencion Calvillo MD    "

## 2025-05-30 ENCOUNTER — TRANSFERRED RECORDS (OUTPATIENT)
Dept: HEALTH INFORMATION MANAGEMENT | Facility: CLINIC | Age: 85
End: 2025-05-30
Payer: COMMERCIAL

## 2025-07-08 DIAGNOSIS — Z86.711 HISTORY OF PULMONARY EMBOLISM: ICD-10-CM

## 2025-07-08 DIAGNOSIS — G47.33 OBSTRUCTIVE SLEEP APNEA: Primary | ICD-10-CM

## 2025-07-09 RX ORDER — APIXABAN 5 MG/1
5 TABLET, FILM COATED ORAL 2 TIMES DAILY
Qty: 180 TABLET | Refills: 0 | Status: SHIPPED | OUTPATIENT
Start: 2025-07-09

## 2025-07-10 ENCOUNTER — TELEPHONE (OUTPATIENT)
Dept: PULMONOLOGY | Facility: CLINIC | Age: 85
End: 2025-07-10

## 2025-07-10 ENCOUNTER — OFFICE VISIT (OUTPATIENT)
Dept: ALLERGY | Facility: CLINIC | Age: 85
End: 2025-07-10
Payer: COMMERCIAL

## 2025-07-10 VITALS — WEIGHT: 152 LBS | BODY MASS INDEX: 22.51 KG/M2 | HEIGHT: 69 IN | HEART RATE: 78 BPM | OXYGEN SATURATION: 97 %

## 2025-07-10 DIAGNOSIS — R13.19 OTHER DYSPHAGIA: Primary | ICD-10-CM

## 2025-07-10 DIAGNOSIS — T78.1XXD ADVERSE FOOD REACTION, SUBSEQUENT ENCOUNTER: ICD-10-CM

## 2025-07-10 DIAGNOSIS — K21.9 GASTROESOPHAGEAL REFLUX DISEASE WITHOUT ESOPHAGITIS: ICD-10-CM

## 2025-07-10 DIAGNOSIS — J30.1 SEASONAL ALLERGIC RHINITIS DUE TO POLLEN: Primary | ICD-10-CM

## 2025-07-10 RX ORDER — FAMOTIDINE 40 MG/1
40 TABLET, FILM COATED ORAL DAILY
Qty: 30 TABLET | Refills: 1 | Status: SHIPPED | OUTPATIENT
Start: 2025-07-10

## 2025-07-10 RX ORDER — AZELASTINE 1 MG/ML
2 SPRAY, METERED NASAL 2 TIMES DAILY
Qty: 30 ML | Refills: 3 | Status: SHIPPED | OUTPATIENT
Start: 2025-07-10

## 2025-07-10 NOTE — LETTER
7/10/2025      Edward Mendoza JR  [address] [facility] [address]      Dear Colleague,    Thank you for referring your patient, Edward Mendoza JR, to the [facility]. Please see a copy of my visit note below.          Subjective  Edward is a 85 year old, presenting for the following health issues:  Allergy Consult (rhinitis)    HPI      Chief complaint: Allergies    History of present illness: This is a pleasant 85-year-old gentleman that I was asked to see for evaluation of allergies by Dr. Calvillo.  Patient states for many years she has had allergies.  In fact he was on allergy shots in the 1970s.  Last tested at that time.  He states that he has a lot of drainage on the back of his throat and it comes down into his throat and then he chokes.  Wife states it typically happens after he eats either corn or wheat.  He has also listed nuts and fish that causes sinus congestion.  No hives, swelling or shortness of breath after eating these foods, however.  He states he will choke so hard his difficult to breathe.  He is never been treated for reflux and I do not see a history of an endoscopy and he denies any endoscopy previously.  He states he does not feel that food gets stuck when he eats, however.  He believes that the choking is secondary to all the drainage.  He tries to avoid these foods in his diet.  Most recently he had a skin rash after eating oats and he thinks he is now allergic to oats.  He does note that pollen seems to aggravate the symptoms and the symptoms are not seasonal.  He takes Allegra and Flonase which he states he thinks helps.  He needed medications for today's visit did not seem to worsen the symptoms.  No history of asthma.    Past medical history: Hypertension, history of a pulmonary embolism, obstructive sleep apnea wears a CPAP, history of subdural hematoma    Social history: He is , no pets at home          Objective   There were no vitals taken for this  visit.  There is no height or weight on file to calculate BMI.  Physical Exam   Gen: Pleasant male not in acute distress  HEENT: Eyes no erythema of the bulbar or palpebral conjunctiva, no edema. Nose: No congestion, mucosa normal. Mouth: Throat clear, no lip or tongue edema.     Neck: Small nodule on the right side of the neck likely consistent with a small lymph node, freely mobile nontender  Psych: Alert and oriented times 3         At today's visit the patient/parent and I engaged in an informed consent discussion about allergy testing.  We discussed skin testing, blood testing,  and the alternative of not undergoing any testing. The patient/ parent has a preference for skin testing. We then discussed the risks and benefits of skin testing.  The patient/ parent understands skin testing risks can include, but are not limited to, urticaria, angioedema, shortness of breath, and severe anaphylaxis.  The benefits include, but are not limited, to evaluation for allergens causing symptoms.  After answering the patients/parents questions they have agreed to proceed with skin testing.    33 percutaneous test were placed to the environmental skin test panel corn oat and wheat.  Negative food test.  Positive histamine control.  Positive test to tree grass weed pollen, Alternaria and 1 species of dust mite.  Please see scanned photograph.    Impression report and plan:    Allergic rhinitis  Dysphagia  Patient does have allergic rhinitis but his symptoms are not consistent with food allergy.  Recommend for this reason, GI evaluation with possible scope.  Differential includes reflux versus eosinophilic esophagitis.  I will start him on Pepcid for the next month.  Recommended Astelin nasal spray over Flonase.  Given his history of pulmonary embolism and subsequent anticoagulation, Flonase could potentiate epistaxis.  Okay to continue Allegra but I am not sure this will help with his drainage.  Recommend daily sinus rinses.  I  will touch base with his primary care and follow-up with the patient regarding GI referral.      Signed Electronically by: Shanice Dye MD      Again, thank you for allowing me to participate in the care of your patient.        Sincerely,        Shanice Dye MD    Electronically signed

## 2025-07-10 NOTE — TELEPHONE ENCOUNTER
DATE:  07/10/2025  DME PROVIDER:  [facility]   SUPPLY ORDERED:  Cpap/ supplies   PROVIDER:  Carmina Crowder MD    COMMENT:  Faxed :  Cover sheet   Face sheet   Cpap/ supplies order  Office note     All above faxed to [facility] at : [phone].         Cem Perez MA

## 2025-07-10 NOTE — PROGRESS NOTES
Subjective   Edward is a 85 year old, presenting for the following health issues:  Allergy Consult (rhinitis)    HPI      Chief complaint: Allergies    History of present illness: This is a pleasant 85-year-old gentleman that I was asked to see for evaluation of allergies by Dr. Calvillo.  Patient states for many years she has had allergies.  In fact he was on allergy shots in the 1970s.  Last tested at that time.  He states that he has a lot of drainage on the back of his throat and it comes down into his throat and then he chokes.  Wife states it typically happens after he eats either corn or wheat.  He has also listed nuts and fish that causes sinus congestion.  No hives, swelling or shortness of breath after eating these foods, however.  He states he will choke so hard his difficult to breathe.  He is never been treated for reflux and I do not see a history of an endoscopy and he denies any endoscopy previously.  He states he does not feel that food gets stuck when he eats, however.  He believes that the choking is secondary to all the drainage.  He tries to avoid these foods in his diet.  Most recently he had a skin rash after eating oats and he thinks he is now allergic to oats.  He does note that pollen seems to aggravate the symptoms and the symptoms are not seasonal.  He takes Allegra and Flonase which he states he thinks helps.  He needed medications for today's visit did not seem to worsen the symptoms.  No history of asthma.    Past medical history: Hypertension, history of a pulmonary embolism, obstructive sleep apnea wears a CPAP, history of subdural hematoma    Social history: He is , no pets at home          Objective    There were no vitals taken for this visit.  There is no height or weight on file to calculate BMI.  Physical Exam   Gen: Pleasant male not in acute distress  HEENT: Eyes no erythema of the bulbar or palpebral conjunctiva, no edema. Nose: No congestion, mucosa normal. Mouth:  Throat clear, no lip or tongue edema.     Neck: Small nodule on the right side of the neck likely consistent with a small lymph node, freely mobile nontender  Psych: Alert and oriented times 3         At today's visit the patient/parent and I engaged in an informed consent discussion about allergy testing.  We discussed skin testing, blood testing,  and the alternative of not undergoing any testing. The patient/ parent has a preference for skin testing. We then discussed the risks and benefits of skin testing.  The patient/ parent understands skin testing risks can include, but are not limited to, urticaria, angioedema, shortness of breath, and severe anaphylaxis.  The benefits include, but are not limited, to evaluation for allergens causing symptoms.  After answering the patients/parents questions they have agreed to proceed with skin testing.    33 percutaneous test were placed to the environmental skin test panel corn oat and wheat.  Negative food test.  Positive histamine control.  Positive test to tree grass weed pollen, Alternaria and 1 species of dust mite.  Please see scanned photograph.    Impression report and plan:    Allergic rhinitis  Dysphagia  Patient does have allergic rhinitis but his symptoms are not consistent with food allergy.  Recommend for this reason, GI evaluation with possible scope.  Differential includes reflux versus eosinophilic esophagitis.  I will start him on Pepcid for the next month.  Recommended Astelin nasal spray over Flonase.  Given his history of pulmonary embolism and subsequent anticoagulation, Flonase could potentiate epistaxis.  Okay to continue Allegra but I am not sure this will help with his drainage.  Recommend daily sinus rinses.  I will touch base with his primary care and follow-up with the patient regarding GI referral.      Signed Electronically by: Shanice Dye MD

## 2025-07-10 NOTE — Clinical Note
I saw Mr Reji today.  He has a lot of concern of food allergy, but symptoms do not correlate with IgE-mediated food allergy.  He does have environmental allergies so he does have a risk for EoE.  I would like him to see GI for evaluation of this.  Okay if I place referral?  Shanice

## 2025-07-10 NOTE — PATIENT INSTRUCTIONS
Food allergy testing negative    Food allergy = hives, swelling, anaphylaxis    Would recommend GI--I will talk with primary    Allegra daily    Astelin 2 sprays each nostril twice daily     Stop Flonase    Daily Kg Med Sinus Rinse    Pepcid daily for now

## 2025-07-16 ENCOUNTER — OFFICE VISIT (OUTPATIENT)
Dept: INTERNAL MEDICINE | Facility: CLINIC | Age: 85
End: 2025-07-16
Payer: COMMERCIAL

## 2025-07-16 VITALS
WEIGHT: 158 LBS | OXYGEN SATURATION: 95 % | HEIGHT: 69 IN | HEART RATE: 66 BPM | RESPIRATION RATE: 16 BRPM | SYSTOLIC BLOOD PRESSURE: 132 MMHG | DIASTOLIC BLOOD PRESSURE: 69 MMHG | TEMPERATURE: 97.7 F | BODY MASS INDEX: 23.4 KG/M2

## 2025-07-16 DIAGNOSIS — R53.83 FATIGUE, UNSPECIFIED TYPE: ICD-10-CM

## 2025-07-16 DIAGNOSIS — M25.552 HIP PAIN, LEFT: ICD-10-CM

## 2025-07-16 DIAGNOSIS — G62.9 PERIPHERAL POLYNEUROPATHY: ICD-10-CM

## 2025-07-16 DIAGNOSIS — Z86.711 HISTORY OF PULMONARY EMBOLISM: ICD-10-CM

## 2025-07-16 DIAGNOSIS — Z51.81 ENCOUNTER FOR THERAPEUTIC DRUG MONITORING: ICD-10-CM

## 2025-07-16 DIAGNOSIS — K59.01 SLOW TRANSIT CONSTIPATION: ICD-10-CM

## 2025-07-16 DIAGNOSIS — F41.9 ANXIETY: ICD-10-CM

## 2025-07-16 DIAGNOSIS — I10 ESSENTIAL HYPERTENSION: ICD-10-CM

## 2025-07-16 DIAGNOSIS — G47.33 OBSTRUCTIVE SLEEP APNEA ON CPAP: Primary | ICD-10-CM

## 2025-07-16 LAB
ALBUMIN SERPL BCG-MCNC: 3.4 G/DL (ref 3.5–5.2)
ALP SERPL-CCNC: 274 U/L (ref 40–150)
ALT SERPL W P-5'-P-CCNC: 15 U/L (ref 0–70)
ANION GAP SERPL CALCULATED.3IONS-SCNC: 10 MMOL/L (ref 7–15)
AST SERPL W P-5'-P-CCNC: 22 U/L (ref 0–45)
B BURGDOR IGG+IGM SER QL: 0.05
BILIRUB SERPL-MCNC: 0.6 MG/DL
BUN SERPL-MCNC: 15.2 MG/DL (ref 8–23)
CALCIUM SERPL-MCNC: 10.2 MG/DL (ref 8.8–10.4)
CHLORIDE SERPL-SCNC: 99 MMOL/L (ref 98–107)
CREAT SERPL-MCNC: 0.91 MG/DL (ref 0.67–1.17)
EGFRCR SERPLBLD CKD-EPI 2021: 82 ML/MIN/1.73M2
ERYTHROCYTE [DISTWIDTH] IN BLOOD BY AUTOMATED COUNT: 15.5 % (ref 10–15)
GLUCOSE SERPL-MCNC: 86 MG/DL (ref 70–99)
HCO3 SERPL-SCNC: 29 MMOL/L (ref 22–29)
HCT VFR BLD AUTO: 47.3 % (ref 40–53)
HGB BLD-MCNC: 15.6 G/DL (ref 13.3–17.7)
MCH RBC QN AUTO: 28.8 PG (ref 26.5–33)
MCHC RBC AUTO-ENTMCNC: 33 G/DL (ref 31.5–36.5)
MCV RBC AUTO: 87 FL (ref 78–100)
PLATELET # BLD AUTO: 304 10E3/UL (ref 150–450)
POTASSIUM SERPL-SCNC: 3.8 MMOL/L (ref 3.4–5.3)
PROT SERPL-MCNC: 7.2 G/DL (ref 6.4–8.3)
RBC # BLD AUTO: 5.41 10E6/UL (ref 4.4–5.9)
SODIUM SERPL-SCNC: 138 MMOL/L (ref 135–145)
TSH SERPL DL<=0.005 MIU/L-ACNC: 1.2 UIU/ML (ref 0.3–4.2)
VIT B12 SERPL-MCNC: 824 PG/ML (ref 232–1245)
WBC # BLD AUTO: 11.6 10E3/UL (ref 4–11)

## 2025-07-16 PROCEDURE — G2211 COMPLEX E/M VISIT ADD ON: HCPCS | Performed by: INTERNAL MEDICINE

## 2025-07-16 PROCEDURE — 80053 COMPREHEN METABOLIC PANEL: CPT | Performed by: INTERNAL MEDICINE

## 2025-07-16 PROCEDURE — 85027 COMPLETE CBC AUTOMATED: CPT | Performed by: INTERNAL MEDICINE

## 2025-07-16 PROCEDURE — 82607 VITAMIN B-12: CPT | Performed by: INTERNAL MEDICINE

## 2025-07-16 PROCEDURE — 3075F SYST BP GE 130 - 139MM HG: CPT | Performed by: INTERNAL MEDICINE

## 2025-07-16 PROCEDURE — 99214 OFFICE O/P EST MOD 30 MIN: CPT | Performed by: INTERNAL MEDICINE

## 2025-07-16 PROCEDURE — 36415 COLL VENOUS BLD VENIPUNCTURE: CPT | Performed by: INTERNAL MEDICINE

## 2025-07-16 PROCEDURE — 3078F DIAST BP <80 MM HG: CPT | Performed by: INTERNAL MEDICINE

## 2025-07-16 PROCEDURE — 84443 ASSAY THYROID STIM HORMONE: CPT | Performed by: INTERNAL MEDICINE

## 2025-07-16 PROCEDURE — 86618 LYME DISEASE ANTIBODY: CPT | Performed by: INTERNAL MEDICINE

## 2025-07-16 ASSESSMENT — ENCOUNTER SYMPTOMS: FATIGUE: 1

## 2025-07-16 NOTE — PATIENT INSTRUCTIONS
I suspect your sleepiness and fatigue over the past couple of months because your CPAP machine was not working well.    Continue to use the CPAP machine you have now, but see your pulmonary doctor later this month in order to get adequate documentation for your insurance to get the new CPAP machine.  Make sure the new CPAP machine is working well and that you are using it every night.    If you are not improving on the new CPAP machine, follow-up in clinic with me for further evaluation.    Continue on famotidine for possible gastroesophageal reflux.    For your abdominal bloating I would recommend 2 scoops of Metamucil fiber/or psyllium or MiraLAX on a daily basis to help keep you regular.  Avoid FODMAP foods, which cause gas bloating.    Continue to do your exercises for your left hip and legs.  Make sure you are getting at least 20 minutes of exercise and stretching every day.  Get back to daily walking.

## 2025-07-16 NOTE — PROGRESS NOTES
Edward Mendoza JR   85 year old male    Date of Visit: 7/16/2025    Chief Complaint   Patient presents with    Fatigue     Extreme fatigue, feels tired all the time.    Constipation     Subjective  85-year-old male is here with wife, Alexandre.  Patient has a history of severe sleep apnea but has been compliant with CPAP.    Shortly after he saw me in May he began having increasing fatigue and daytime sleepiness.  He became less active.    He stopped his citalopram shortly after he saw me because of worsening fatigue.    He denies that his anxiety and dysthymia is significantly worse and does not want to go back on citalopram, since trial in May.    Just 1 week ago his CPAP machine finally quit on him.  It was a 7-year-old machine.  He started using his wife's old machine over the past week and feels like he is less sleepy since using the new machine.    History of peripheral neuropathy.  No longer drinks alcohol.  Normal B12 level back in 2023.    In April his hemoglobin was normal and normal TSH.  Incidental finding of elevated alkaline phosphatase at that time but normal other liver test.    He does have a history of prostatectomy in 2011.  His PSA level was 0.05 in April.    No cough or increasing shortness of breath.  No chest pain.  No lower extremity edema.  History of pulmonary embolism, on Eliquis.  He denies any bleeding or blood in stool.    Prediabetes but hemoglobin A1c level 6.2% in April of this year.    He was recently at the dermatologist and did have some basal cell cancers treated.    Macular degeneration, gets injections.    History of cholecystectomy.    Has been having some left hip pain with his activity being reduced, but he started to do the lower extremity leg exercises that he has been taught in the past and he is starting to improve on his hip pain.  Knees ambulating without significant pain now.    Complaining of some increased constipation and abdominal bloating.  He was having some reflux  symptoms and was started on famotidine by an allergist who did some food allergy testing which turned out to be negative.  But he has had significantly less heartburn and throat symptoms since being on the famotidine.    PMHx:    Past Medical History:   Diagnosis Date    Arthritis     Broken ankle, left, sequela     Hypertension     Prostatitis     Sleep apnea     Thrombosis      PSHx:    Past Surgical History:   Procedure Laterality Date    CATARACT EXTRACTION Bilateral 12/07/2015    CHOLECYSTECTOMY      HERNIA REPAIR Bilateral 01/01/1987    Inguinal    PROSTATECTOMY N/A     W/ Bilat Pelvic Lymphadenectomy    REVERSE ARTHROPLASTY SHOULDER Right 2/1/2022    Procedure: Right reverse total shoulder arthroplasty;  Surgeon: Abel Tobar MD;  Location: [facility] OR    VITRECTOMY ANTERIOR Left 06/15/2015    ZC THORACOTOMY,MAJOR,EXPLOR/BIOPSY  01/01/1983    VATs that found lipoma on chest wall, was concern for cancer.      Immunizations:   Immunization History   Administered Date(s) Administered    COVID-19 12+ (Pfizer) 01/01/2024, 09/23/2024, 04/21/2025    COVID-19 Bivalent 12+ (Pfizer) 11/09/2022    COVID-19 Bivalent Peds 5-11Y (Pfizer) 11/09/2022    COVID-19 MONOVALENT 12+ (Pfizer) 02/04/2021, 02/25/2021, 10/12/2021, 04/01/2022    COVID-19 Monovalent 12+ (Pfizer 2022) 04/01/2022    DT (PEDS <7y) 12/04/2000    Flu 65+ (Fluad) 10/06/2024    Flu, Unspecified 10/01/2021, 10/18/2022, 01/01/2024    Influenza (H1N1) 01/15/2010    Influenza (High Dose) Trivalent,PF (Fluzone) 10/26/2015, 10/06/2016, 10/10/2017, 08/15/2018, 10/01/2019, 10/18/2022    Influenza (prior to 2024) 09/08/2011, 10/08/2012, 10/10/2013, 10/13/2014    Influenza Vaccine 65+ (FLUAD) 11/01/2021, 10/17/2023    Influenza Vaccine 65+ (Fluzone HD) 10/05/2020, 10/06/2020, 10/18/2022    Pneumo Conj 13-V (2010&after) 10/13/2014    Pneumococcal 20 valent Conjugate (Prevnar 20) 08/15/2023    Pneumococcal 23 valent 10/02/2003    RSV Vaccine (Arexvy) 10/11/2023    TDAP  "(Adacel,Boostrix) 05/01/2019    Td (Adult), Adsorbed 01/06/2011    Td,adult,historic,unspecified 01/06/2011    Zoster recombinant adjuvanted (Shingrix) 02/19/2019, 05/01/2019    Zoster vaccine, live 09/08/2011       ROS A comprehensive review of systems was performed and was otherwise negative    Medications, allergies, and problem list were reviewed and updated    Exam  /69   Pulse 66   Temp 97.7  F (36.5  C)   Resp 16   Ht 1.753 m (5' 9\")   Wt 71.7 kg (158 lb)   SpO2 95%   BMI 23.33 kg/m    Alert and oriented.  Nonfocal neurologic exam.  Able to climb up on the exam table.  Mildly flat affect but not severe.  Does not appear parkinsonian.  Lungs are clear to auscultation with good respiratory excursion.  Heart is regular without murmur.  Abdomen is nontender and no hepatosplenomegaly.  Pharynx is normal.  Teeth in good condition.  No cervical or supraclavicular adenopathy.  No thyromegaly or nodularity to inspection and palpation.  No ankle edema    Assessment/Plan  1. Obstructive sleep apnea on CPAP (Primary)  I suspect increasing daytime fatigue and sleepiness is associated with dysfunctional CPAP machine.  It finally quit on him 1 week ago.  He is using an old CPAP machine of his wife's, that does appear to be working better now with less daytime sleepiness.  But he is in the process of getting a new CPAP machine, but needs to get documentation for insurance through the pulmonary clinic within managing his CPAP machine.  He has an appointment July 31 for that.    Continue to use his wife's CPAP machine at this time.    Once he gets a new CPAP machine and is highly compliant with that, reevaluate his fatigue and daytime sleepiness.  If he continues to have worsening symptoms despite the new CPAP machine and follow-up at that time for further evaluation.    2. Anxiety  Chronic dysthymia and anxiety.  He had worsening fatigue with citalopram, but did not have any improvement in fatigue with stopping " citalopram.  I suspect it was his dysfunctional CPAP at the time.    Reevaluate anxiety and dysthymia once he is on the new CPAP machine.  Could consider an SSRI in the future, or Wellbutrin    3. History of pulmonary embolism  Chronic Eliquis.  Denies bleeding issues.  No increasing shortness of breath    4. Essential hypertension  Well-controlled.  Continue chlorthalidone 12.5 mg daily    5. Hip pain, left  Consistent with bursitis or tendinitis from inactivity.  Improved with regular exercise.  As long as it is improving with his leg exercises he can continue the current treatment plan, consider physical therapy and/or orthopedic referral if he is not improving with time.    No NSAIDs with Eliquis treatment.    The wife wanted to check for Lyme disease but he denies any fever or erythema migrans  - LYME DISEASE TOTAL ANTIBODIES WITH REFLEX TO CONFIRMATION    6. Fatigue, unspecified type  Consistent with dysfunctional CPAP machine.  Get a new CPAP  - CBC with platelets  - TSH with free T4 reflex    7. Peripheral polyneuropathy  Consistent with obstructive sleep apnea and distant history of alcohol.  No longer drinking alcohol.  Recheck B12  - Vitamin B12    8. Encounter for therapeutic drug monitoring    - Comprehensive metabolic panel    9. Slow transit constipation  Metamucil recommended.  Avoid FODMAP foods.    Macular degeneration, gets injections.    Recent basal cell cancer recurrence, just saw dermatology.    Prediabetes, diet and exercise.    Esophagitis symptoms, improved with Pepcid/famotidine and can continue that dose.    The longitudinal plan of care for the diagnosis(es)/condition(s) as documented were addressed during this visit. Due to the added complexity in care, I will continue to support Edward in the subsequent management and with ongoing continuity of care.        Return in 3 months (on 10/14/2025) for Follow-up appointment.   Patient Instructions   I suspect your sleepiness and fatigue over  the past couple of months because your CPAP machine was not working well.    Continue to use the CPAP machine you have now, but see your pulmonary doctor later this month in order to get adequate documentation for your insurance to get the new CPAP machine.  Make sure the new CPAP machine is working well and that you are using it every night.    If you are not improving on the new CPAP machine, follow-up in clinic with me for further evaluation.    Continue on famotidine for possible gastroesophageal reflux.    For your abdominal bloating I would recommend 2 scoops of Metamucil fiber/or psyllium or MiraLAX on a daily basis to help keep you regular.  Avoid FODMAP foods, which cause gas bloating.    Continue to do your exercises for your left hip and legs.  Make sure you are getting at least 20 minutes of exercise and stretching every day.  Get back to daily walking.        Ascencion Calvillo MD, MD        Current Outpatient Medications   Medication Sig Dispense Refill    apixaban ANTICOAGULANT (ELIQUIS ANTICOAGULANT) 5 MG tablet TAKE 1 TABLET BY MOUTH TWICE A  tablet 0    azelastine (ASTELIN) 0.1 % nasal spray Spray 2 sprays into both nostrils 2 times daily. 30 mL 3    chlorthalidone (HYGROTON) 25 MG tablet [CHLORTHALIDONE (HYGROTEN) 25 MG TABLET] TAKE A 1/2 TABLETS (12.5 MG TOTAL) BY MOUTH DAILY. 45 tablet 3    cholecalciferol, vitamin D3, 1,000 unit tablet [CHOLECALCIFEROL, VITAMIN D3, 1,000 UNIT TABLET] Take 1,000 Units by mouth daily.      famotidine (PEPCID) 40 MG tablet Take 1 tablet (40 mg) by mouth daily. 30 tablet 1    fexofenadine (ALLEGRA) 180 MG tablet [FEXOFENADINE (ALLEGRA) 180 MG TABLET] Take 180 mg by mouth daily.      ketoconazole (NIZORAL) 2 % external cream APPLY 1 APPLICATION DAILY TOPICALLY TO AFFECTED AREAS OF THE LEFT HIP FOR UP TO 2 WEEKS      multivitamin therapeutic tablet [MULTIVITAMIN THERAPEUTIC TABLET] Take 1 tablet by mouth daily.      triamcinolone (KENALOG) 0.1 % external cream  "Apply topically daily as needed for irritation      vit C/E/Zn/coppr/lutein/zeaxan (PRESERVISION AREDS-2 ORAL) Take 1 tablet by mouth 2 times daily        Allergies   Allergen Reactions    Lisinopril Cough    Latex Itching     Latex adhesive- caused itching    Ace Inhibitors Cough    Chocolate Flavoring Agent (Non-Screening) Other (See Comments)     Pt gets sinus congestion from eating chocolate.    Corn [Corn Oil] Other (See Comments)     Pt gets sinus congestion from eating corn.    Fish Containing Products [Fish-Derived Products] Other (See Comments)     Pt gets sinus congestion from eating fish.    Levofloxacin Rash     Red line up the arm after IV administration    Nuts - Unspecified [Nuts] Other (See Comments)     Pt gets sinus congestion from eating nuts.    Penicillins Unknown     childhood    Ragweed Pollen [Ragweeds] Other (See Comments)     Sinus and chest congestion.     Social History     Tobacco Use    Smoking status: Former     Current packs/day: 0.00     Average packs/day: 0.5 packs/day for 5.0 years (2.5 ttl pk-yrs)     Types: Cigarettes     Start date: 1955     Quit date: 1960     Years since quittin.5     Passive exposure: Past    Smokeless tobacco: Never   Vaping Use    Vaping status: Never Used   Substance Use Topics    Alcohol use: No    Drug use: No             Subjective   Edward is a 85 year old, presenting for the following health issues:  Fatigue (Extreme fatigue, feels tired all the time.) and Constipation        2025     3:04 PM   Additional Questions   Roomed by Saida NORRIS   Accompanied by wife     Fatigue  Associated symptoms include fatigue.   History of Present Illness       Reason for visit:  Extreme fatique  Symptoms include:  Extreme fatique  Symptom progression:  Staying the same  Had these symptoms before:  No   He is taking medications regularly.                      Objective    /69   Pulse 66   Temp 97.7  F (36.5  C)   Resp 16   Ht 1.753 m (5' 9\")   Wt " 71.7 kg (158 lb)   SpO2 95%   BMI 23.33 kg/m    Body mass index is 23.33 kg/m .  Physical Exam               Signed Electronically by: Ascencion Calvillo MD

## 2025-07-17 ENCOUNTER — MEDICAL CORRESPONDENCE (OUTPATIENT)
Dept: HEALTH INFORMATION MANAGEMENT | Facility: CLINIC | Age: 85
End: 2025-07-17
Payer: COMMERCIAL

## 2025-07-23 NOTE — TELEPHONE ENCOUNTER
REFERRAL INFORMATION:  Referring Provider:  Shanice Dye MD   Referring Clinic:  [facility]   Reason for Visit/Diagnosis: R13.19 (ICD-10-CM) - Other dysphagia      FUTURE VISIT INFORMATION:  Appointment Date: 8/22/25     NOTES STATUS DETAILS   OFFICE NOTE from Referring Provider Internal 7/10/25   MEDICATION LIST Internal    PROCEDURES     STOOL TESTING     LABS     PERTINENT LABS Internal    IMAGES

## 2025-07-30 ENCOUNTER — OFFICE VISIT (OUTPATIENT)
Dept: GASTROENTEROLOGY | Facility: CLINIC | Age: 85
End: 2025-07-30
Attending: ALLERGY & IMMUNOLOGY
Payer: COMMERCIAL

## 2025-07-30 ENCOUNTER — LAB (OUTPATIENT)
Dept: LAB | Facility: CLINIC | Age: 85
End: 2025-07-30
Payer: COMMERCIAL

## 2025-07-30 VITALS
OXYGEN SATURATION: 95 % | SYSTOLIC BLOOD PRESSURE: 135 MMHG | HEART RATE: 71 BPM | DIASTOLIC BLOOD PRESSURE: 70 MMHG | WEIGHT: 157.2 LBS | BODY MASS INDEX: 23.28 KG/M2 | HEIGHT: 69 IN

## 2025-07-30 DIAGNOSIS — R68.89 THROAT SYMPTOM: ICD-10-CM

## 2025-07-30 DIAGNOSIS — R74.8 ELEVATED ALKALINE PHOSPHATASE LEVEL: ICD-10-CM

## 2025-07-30 DIAGNOSIS — R10.13 EPIGASTRIC PAIN: ICD-10-CM

## 2025-07-30 DIAGNOSIS — R13.19 OTHER DYSPHAGIA: Primary | ICD-10-CM

## 2025-07-30 DIAGNOSIS — R63.4 WEIGHT LOSS: ICD-10-CM

## 2025-07-30 PROCEDURE — 3075F SYST BP GE 130 - 139MM HG: CPT | Performed by: PHYSICIAN ASSISTANT

## 2025-07-30 PROCEDURE — 3078F DIAST BP <80 MM HG: CPT | Performed by: PHYSICIAN ASSISTANT

## 2025-07-30 PROCEDURE — 1126F AMNT PAIN NOTED NONE PRSNT: CPT | Performed by: PHYSICIAN ASSISTANT

## 2025-07-30 PROCEDURE — 99205 OFFICE O/P NEW HI 60 MIN: CPT | Performed by: PHYSICIAN ASSISTANT

## 2025-07-30 PROCEDURE — 99417 PROLNG OP E/M EACH 15 MIN: CPT | Performed by: PHYSICIAN ASSISTANT

## 2025-07-30 ASSESSMENT — PAIN SCALES - GENERAL: PAINLEVEL_OUTOF10: NO PAIN (0)

## 2025-07-30 NOTE — LETTER
"7/30/2025      Edward Mendoza JR  [address] [id number]      Dear Colleague,    Thank you for referring your patient, Edward Mendoza JR, to the [facility] [place]. Please see a copy of my visit note below.      GI CLINIC VISIT    CC/REFERRING MD:  Shanice Dye  REASON FOR CONSULTATION: R13.19 (ICD-10-CM) - Other dysphagia     ASSESSMENT/PLAN:  Edward Mendoza JR is a 85 year old year old male with PMHx significant for seasonal allergies, severe JAS (on CPAP), preDM( A1c 6.2%), who [facility] seeing [facility] for dysphagia. Wife Karyn is present and supplies some collateral historical details.     Referral from allergy team Dr Dye for dysphagia and evaluation of potential EoE vs reflux. Food allergy testing came back negative and started on Pepcid and sent to [facility] for eval of potential dysphagia. He also brought up weight loss, anorexia and fatigue too.     #nasal drainage  #throat clearing  #voice hoarseness  #coughing with swallows  #feeling like food gets stuck  Ongoing sx constellation for decades. He feels most of this sx are due to increased sinus drainage down the back of the throat that causes him to choke.  Though he also reports feeling like his swallow can \" go down the wrong way\" and the sensation of food and liquids getting stuck in is upper throat. With this, he typically makes himself cough it up then denies any issues. In general, endorses ongoing EoE  compensatory EoE mechanisms (slow eater, over-mastication, lots of fluids w/ intake) and atopic hx of seasonal allergies, at one time requiring allergy shots in the 1970s. Pepcid has helped significantly throat symptoms.     Hx is  suggestive of oropharyngeal and potentially esophageal dysphagia.  This could be EoE vs erosive GERD  with ddx of structural change of esophagus/enlarged cricopharyngeal bar. Chronicity makes this less likely malignant though he is having some newer weight loss (that may " not actually be related to his swallowing issues given their improvement with pepcid but his continued poor appetite)     Plan  -VFSS  -TBE w/ tablet  -EGD w/ distal/eso bx and consideration of empiric esophageal dilation. Ordered BRAVO. Denies nickel allergy. Explained need for sx diary for 96h after procedure.    --advised to hold Pepcid 14d before EGD and during testing      Future consideration  1.High Resolution Manometry with pH impedence testing off therapy   2. PPI trial +/- H2RA  3. ENT consult        #poor appetite  #weight loss of 10# over 6 mo  #newer constipation x 6 mo  #fatigue   #mood troubles  Unclear etiology. Not as hungry and having smaller portions could contribute to ongoing loss though also suspect multifactorial as well to include mood disorder. From last PCP note, 7/16/25, anxiety and dysthymia reported as significantly worsening though he did not want to go back on citalopram. CBC without anemia but did have mild leukocytosis to 11.6. TSH was normal CMP with album to 3.4 and elevation to alk phos to 274-317 of unclear etiology. He is s/p cholecystectomy and other AST/ALT, Tbili looked normal. His last colonoscopy was in 2012 under good prep to cecum. 2 mm polyp taken (bx -hyperplastic). Recall was 10 yr - that was not followed up on and there is no FHX of CRC. No bloody, black stools or abd pain. He is having some constipation issues that he thinks may be due to limited oral intake     Plan  -alk phos fractionation + RUQ US to follow elevation recent elevation in Alk phos levels.   -h.pylori stool Ag given mild epigastric pain on exam but in general denies abd pain  -daily Miralax   -EGD per above   -I asked he follow this unspecified weight loss with his primary for continued evaluation  -encouraged he try for more oral intake as tolerated     Future consideration  -cross sectional imaging  -consider colonoscopy   -GI RD consult     Orders Placed This Encounter   Procedures     XR  Esophagram     XR Video Swallow with SLP or OT - Order with Speech Therapy Referral     US Abdomen Limited     Alkaline phosphatase isoenzymes     Helicobacter pylori Antigen Stool     Adult GI  Referral - Procedure Only     Speech Therapy  Referral     RTC 1 mo after EGD, or sooner PRN     Thank you for this consultation.  It was a pleasure to participate in the care of this patient; please contact me with any further questions.     Almaz Carroll PA-C    Follow up: As planned above. Today, I personally spent 75  minutes spent on the date of the encounter doing chart review, history and exam, documentation and further activities per the note.    HPI  Edward Mendoza JR is a 85 year old year old male with PMHx of allergies following with the [facility] GI group for throat issues, dysphagia.     Long-standing throat issues since his childhood years. Dx as a child as having signficant food allergies.   In grade school had terrible sinus headaches, had some surgery at 16 which resolved the heaaches but then the sx of post nasal drip strated, voice hoaresness/raspiness, increased throat clearing. Corn and corn byproducts were the worst things that caused these sx. As it had been a while since his testing, he sought care again and recently saw Allergy who   Tested him with an updated food allergy panel that came back NEG. Started with Pepcid once daily with marked improvement of sx and sent to GI for eval of GI cause of his sx.     Prior to pepcid use, he had following sx   -constant nasal drainage, throat clearning  -states would choke after eating.  - Would seem like swallow could go down into wind pipe which then causes him to cough.  Coughing fits, take his breath away. This occurs with food and liquids .  - Would feel like things are getting stuck in upper throat, he either cough it up or throws it up. Usually this occurs with food and liquids. Worse and progressive in last 10 years but has not happened in last  several weeks with start of Pepcid. -in both of these instances, he feels increased nasal drainage contributes to their development   No esophageal dyaphgia or odynpahgia.     No sore throat, neck pain/swelling  No heartburn, retrosternal chest pain.   No nausea/vomiting, regurg.     In general he states he has always been a slow eating and will over masticate. Drinks a lot of fluids.   No surgery/radiation treatment to head/neck/chest.   Thoracotomy for lipomas several decades ago    Son takes reflux meds but no known BE or esophageal cancer.   Former smoker  No NSAIDs    2. Bowel Movements  Used to be regular daily but in last 6 mo ago, something changed  -2 days without a BM but can  also have firm/hard stools too that are difficult to evacuate at times   No abdominal pain   Miralax and stool softener helps  Slightly a ligher brown, no bloody or black stools. No pale / white colored stools.     Lost 10# over past 6 mo  He has depressed appetite   Finishing his meals, but servings are smaller now  States he is tired all the time, ongoing in past 3 mo but wife states this is ongoing for even longer than that too.   This was discussed with primary during recent appt     Colonoscopy 12/2012 - good prep, cecum. 2 mm polyp taken (bx -hyperplastic). Recall was 10 yr - that was not followed up on    Family Hx  No other known family history or GI related malignancy (esophageal, gastric, pancreatic, liver or colon) or family history of IBD/celiac disease.     Social Hx   Denies use of NSAIDs    Stopped ETOH 20 yr   Former tobacco products, 60y ago; 1 pack a week x  5-7 yr   No recreational drug use     PERTINENT PAST MEDICAL HISTORY:  Past Medical History:   Diagnosis Date     Arthritis      Broken ankle, left, sequela      Hypertension      Prostatitis      Sleep apnea      Thrombosis      PREVIOUS SURGERIES:  Past Surgical History:   Procedure Laterality Date     CATARACT EXTRACTION Bilateral 12/07/2015      CHOLECYSTECTOMY       HERNIA REPAIR Bilateral 01/01/1987    Inguinal     PROSTATECTOMY N/A     W/ Bilat Pelvic Lymphadenectomy     REVERSE ARTHROPLASTY SHOULDER Right 2/1/2022    Procedure: Right reverse total shoulder arthroplasty;  Surgeon: Abel Tobar MD;  Location: [facility] OR     VITRECTOMY ANTERIOR Left 06/15/2015     [facility] THORACOTOMY,MAJOR,EXPLOR/BIOPSY  01/01/1983    VATs that found lipoma on chest wall, was concern for cancer.        ALLERGIES:     Allergies   Allergen Reactions     Lisinopril Cough     Latex Itching     Latex adhesive- caused itching     Ace Inhibitors Cough     Chocolate Flavoring Agent (Non-Screening) Other (See Comments)     Pt gets sinus congestion from eating chocolate.     Corn [Corn Oil] Other (See Comments)     Pt gets sinus congestion from eating corn.     Fish Containing Products [Fish-Derived Products] Other (See Comments)     Pt gets sinus congestion from eating fish.     Levofloxacin Rash     Red line up the arm after IV administration     Nuts - Unspecified [Nuts] Other (See Comments)     Pt gets sinus congestion from eating nuts.     Penicillins Unknown     childhood     Ragweed Pollen [Ragweeds] Other (See Comments)     Sinus and chest congestion.       PERTINENT MEDICATIONS:    Current Outpatient Medications:      apixaban ANTICOAGULANT (ELIQUIS ANTICOAGULANT) 5 MG tablet, TAKE 1 TABLET BY MOUTH TWICE A DAY, Disp: 180 tablet, Rfl: 0     azelastine (ASTELIN) 0.1 % nasal spray, Spray 2 sprays into both nostrils 2 times daily., Disp: 30 mL, Rfl: 3     chlorthalidone (HYGROTON) 25 MG tablet, [CHLORTHALIDONE (HYGROTEN) 25 MG TABLET] TAKE A 1/2 TABLETS (12.5 MG TOTAL) BY MOUTH DAILY., Disp: 45 tablet, Rfl: 3     cholecalciferol, vitamin D3, 1,000 unit tablet, [CHOLECALCIFEROL, VITAMIN D3, 1,000 UNIT TABLET] Take 1,000 Units by mouth daily., Disp: , Rfl:      famotidine (PEPCID) 40 MG tablet, Take 1 tablet (40 mg) by mouth daily., Disp: 30 tablet, Rfl: 1     fexofenadine (ALLEGRA)  180 MG tablet, [FEXOFENADINE (ALLEGRA) 180 MG TABLET] Take 180 mg by mouth daily., Disp: , Rfl:      ketoconazole (NIZORAL) 2 % external cream, APPLY 1 APPLICATION DAILY TOPICALLY TO AFFECTED AREAS OF THE LEFT HIP FOR UP TO 2 WEEKS, Disp: , Rfl:      multivitamin therapeutic tablet, [MULTIVITAMIN THERAPEUTIC TABLET] Take 1 tablet by mouth daily., Disp: , Rfl:      triamcinolone (KENALOG) 0.1 % external cream, Apply topically daily as needed for irritation, Disp: , Rfl:      vit C/E/Zn/coppr/lutein/zeaxan (PRESERVISION AREDS-2 ORAL), Take 1 tablet by mouth 2 times daily , Disp: , Rfl:     SOCIAL HISTORY:  Social History     Socioeconomic History     Marital status:      Spouse name: Not on file     Number of children: 3     Years of education: Not on file     Highest education level: Not on file   Occupational History     Occupation: Marketing for [employer]     Comment: Retired   Tobacco Use     Smoking status: Former     Current packs/day: 0.00     Average packs/day: 0.5 packs/day for 5.0 years (2.5 ttl pk-yrs)     Types: Cigarettes     Start date: 1955     Quit date: 1960     Years since quittin.6     Passive exposure: Past     Smokeless tobacco: Never   Vaping Use     Vaping status: Never Used   Substance and Sexual Activity     Alcohol use: No     Drug use: No     Sexual activity: Yes     Partners: Female   Other Topics Concern     Parent/sibling w/ CABG, MI or angioplasty before 65F 55M? Not Asked   Social History Narrative     Not on file     Social Drivers of Health     Financial Resource Strain: Unknown (2025)    Financial Resource Strain      Within the past 12 months, have you or your family members you live with been unable to get utilities (heat, electricity) when it was really needed?: Patient declined   Food Insecurity: Unknown (2025)    Food Insecurity      Within the past 12 months, did you worry that your food would run out before you got money to buy more?: Patient  declined      Within the past 12 months, did the food you bought just not last and you didn t have money to get more?: Patient declined   Transportation Needs: Unknown (4/21/2025)    Transportation Needs      Within the past 12 months, has lack of transportation kept you from medical appointments, getting your medicines, non-medical meetings or appointments, work, or from getting things that you need?: Patient declined   Physical Activity: Unknown (4/21/2025)    Exercise Vital Sign      Days of Exercise per Week: 2 days      Minutes of Exercise per Session: Not on file   Stress: Stress Concern Present (4/21/2025)    [employer] - Occupational Stress Questionnaire      Feeling of Stress : To some extent   Social Connections: Unknown (4/21/2025)    Social Connection and Isolation Panel [NHANES]      Frequency of Communication with Friends and Family: Not on file      Frequency of Social Gatherings with Friends and Family: More than three times a week      Attends Synagogue Services: Not on file      Active Member of Clubs or Organizations: Not on file      Attends Club or Organization Meetings: Not on file      Marital Status: Not on file   Interpersonal Safety: Low Risk  (4/21/2025)    Interpersonal Safety      Do you feel physically and emotionally safe where you currently live?: Yes      Within the past 12 months, have you been hit, slapped, kicked or otherwise physically hurt by someone?: No      Within the past 12 months, have you been humiliated or emotionally abused in other ways by your partner or ex-partner?: No   Housing Stability: Unknown (4/21/2025)    Housing Stability      Do you have housing? : Patient declined      Are you worried about losing your housing?: Patient declined       FAMILY HISTORY:  Family History   Problem Relation Age of Onset     Colon Cancer Father      Lung Cancer Father      Breast Cancer Sister 55     Sleep Apnea Sister      Aortic aneurysm Brother   "    Dementia Maternal Grandfather      Pneumonia Paternal Grandfather        Past/family/social history reviewed and no changes    PHYSICAL EXAMINATION:  Vitals reviewed: /70   Pulse 71   Ht 1.753 m (5' 9\")   Wt 71.3 kg (157 lb 3.2 oz)   SpO2 95%   BMI 23.21 kg/m    Constitutional: aaox3, cooperative, pleasant, not dyspneic/diaphoretic, no acute distress  Eyes: Sclera anicteric/injected  Ears/nose/mouth/throat: hearing intact  Neck: supple, active ROM w/o limitation or pain   CV: No edema  Respiratory: Unlabored breathing  Abd:  Nondistended, mild tenderness to epigastric region, , no peritoneal signs  Skin: warm, perfused, no jaundice  Psych: Variable eye contact though affect slightly flat.   MSK: Normal gait     PERTINENT STUDIES:    Office Visit on 07/16/2025   Component Date Value Ref Range Status     Lyme Disease Antibodies Total 07/16/2025 0.05  <0.90 Final     Sodium 07/16/2025 138  135 - 145 mmol/L Final     Potassium 07/16/2025 3.8  3.4 - 5.3 mmol/L Final     Carbon Dioxide (CO2) 07/16/2025 29  22 - 29 mmol/L Final     Anion Gap 07/16/2025 10  7 - 15 mmol/L Final     Urea Nitrogen 07/16/2025 15.2  8.0 - 23.0 mg/dL Final     Creatinine 07/16/2025 0.91  0.67 - 1.17 mg/dL Final     GFR Estimate 07/16/2025 82  >60 mL/min/1.73m2 Final     Calcium 07/16/2025 10.2  8.8 - 10.4 mg/dL Final     Chloride 07/16/2025 99  98 - 107 mmol/L Final     Glucose 07/16/2025 86  70 - 99 mg/dL Final     Alkaline Phosphatase 07/16/2025 274 (H)  40 - 150 U/L Final     AST 07/16/2025 22  0 - 45 U/L Final     ALT 07/16/2025 15  0 - 70 U/L Final     Protein Total 07/16/2025 7.2  6.4 - 8.3 g/dL Final     Albumin 07/16/2025 3.4 (L)  3.5 - 5.2 g/dL Final     Bilirubin Total 07/16/2025 0.6  <=1.2 mg/dL Final     WBC Count 07/16/2025 11.6 (H)  4.0 - 11.0 10e3/uL Final     RBC Count 07/16/2025 5.41  4.40 - 5.90 10e6/uL Final     Hemoglobin 07/16/2025 15.6  13.3 - 17.7 g/dL Final     Hematocrit 07/16/2025 47.3  40.0 - 53.0 % Final "     MCV 07/16/2025 87  78 - 100 fL Final     MCH 07/16/2025 28.8  26.5 - 33.0 pg Final     MCHC 07/16/2025 33.0  31.5 - 36.5 g/dL Final     RDW 07/16/2025 15.5 (H)  10.0 - 15.0 % Final     Platelet Count 07/16/2025 304  150 - 450 10e3/uL Final     TSH 07/16/2025 1.20  0.30 - 4.20 uIU/mL Final     Vitamin B12 07/16/2025 824  232 - 1,245 pg/mL Final       Last Comprehensive Metabolic Panel:  Lab Results   Component Value Date     07/16/2025    POTASSIUM 3.8 07/16/2025    CHLORIDE 99 07/16/2025    CO2 29 07/16/2025    ANIONGAP 10 07/16/2025    GLC 86 07/16/2025    BUN 15.2 07/16/2025    CR 0.91 07/16/2025    GFRESTIMATED 82 07/16/2025    OLVIN 10.2 07/16/2025           TSH   Date Value Ref Range Status   07/16/2025 1.20 0.30 - 4.20 uIU/mL Final   03/11/2021 1.95 0.30 - 5.00 uIU/mL Final        PREVIOUS ENDOSCOPY    No results found for this or any previous visit.    Again, thank you for allowing me to participate in the care of your patient.        Sincerely,        Almaz Carroll PA-C    Electronically signed

## 2025-07-30 NOTE — PROGRESS NOTES
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 Wife Karyn is present and supplies some collateral historical details.     Referral from allergy team Dr Dye for dysphagia and evaluation of potential EoE vs reflux. Food allergy testing came back negative and started on Pepcid and sent to [facility] for eval of potential dysphagia. He also brought up weight loss, anorexia and fatigue too.     #nasal drainage  #throat clearing  #voice hoarseness  #coughing with swallows  #feeling like food gets stuck  Ongoing sx constellation for decades. He feels most of this sx are due to increased sinus drainage down the back of the throat that causes him to choke.  Though he also reports feeling like his swallow can \" go down the wrong way\" and the sensation of food and liquids getting stuck in is upper throat. With this, he typically makes himself cough it up then denies any issues. In general, endorses ongoing EoE  compensatory EoE mechanisms (slow eater, over-mastication, lots of fluids w/ intake) and atopic hx of seasonal allergies, at one time requiring allergy shots in the 1970s. Pepcid has helped significantly throat symptoms.     Hx is  suggestive of oropharyngeal and potentially esophageal dysphagia.  This could be EoE vs erosive GERD  with ddx of structural change of esophagus/enlarged cricopharyngeal bar. Chronicity makes this less likely malignant though he is having some newer weight loss (that may not actually be related to his swallowing issues given their improvement with pepcid but his continued poor appetite)     Plan  -VFSS  -TBE w/ tablet  -EGD w/ distal/eso bx and consideration of empiric esophageal dilation. Ordered BRAVO. Denies nickel " allergy. Explained need for sx diary for 96h after procedure.    --advised to hold Pepcid 14d before EGD and during testing      Future consideration  1.High Resolution Manometry with pH impedence testing off therapy   2. PPI trial +/- H2RA  3. ENT consult        #poor appetite  #weight loss of 10# over 6 mo  #newer constipation x 6 mo  #fatigue   #mood troubles  Unclear etiology. Not as hungry and having smaller portions could contribute to ongoing loss though also suspect multifactorial as well to include mood disorder. From last PCP note, 7/16/25, anxiety and dysthymia reported as significantly worsening though he did not want to go back on citalopram. CBC without anemia but did have mild leukocytosis to 11.6. TSH was normal CMP with album to 3.4 and elevation to alk phos to 274-317 of unclear etiology. He is s/p cholecystectomy and other AST/ALT, Tbili looked normal. His last colonoscopy was in 2012 under good prep to cecum. 2 mm polyp taken (bx -hyperplastic). Recall was 10 yr - that was not followed up on and there is no FHX of CRC. No bloody, black stools or abd pain. He is having some constipation issues that he thinks may be due to limited oral intake     Plan  -alk phos fractionation + RUQ US to follow elevation recent elevation in Alk phos levels.   -h.pylori stool Ag given mild epigastric pain on exam but in general denies abd pain  -daily Miralax   -EGD per above   -I asked he follow this unspecified weight loss with his primary for continued evaluation  -encouraged he try for more oral intake as tolerated     Future consideration  -cross sectional imaging  -consider colonoscopy   -GI RD consult     Orders Placed This Encounter   Procedures    XR Esophagram    XR Video Swallow with SLP or OT - Order with Speech Therapy Referral    US Abdomen Limited    Alkaline phosphatase isoenzymes    Helicobacter pylori Antigen Stool    Adult GI  Referral - Procedure Only    Speech Therapy  Referral      RTC 1 mo after EGD, or sooner PRN     Thank you for this consultation.  It was a pleasure to participate in the care of this patient; please contact me with any further questions.     Almaz Carroll PA-C    Follow up: As planned above. Today, I personally spent 75  minutes spent on the date of the encounter doing chart review, history and exam, documentation and further activities per the note.    HPI  Edward Mendoza JR is a 85 year old year old male with PMHx of allergies following with the [facility] GI group for throat issues, dysphagia.     Long-standing throat issues since his childhood years. Dx as a child as having signficant food allergies.   In grade school had terrible sinus headaches, had some surgery at 16 which resolved the heaaches but then the sx of post nasal drip strated, voice hoaresness/raspiness, increased throat clearing. Corn and corn byproducts were the worst things that caused these sx. As it had been a while since his testing, he sought care again and recently saw Allergy who   Tested him with an updated food allergy panel that came back NEG. Started with Pepcid once daily with marked improvement of sx and sent to GI for eval of GI cause of his sx.     Prior to pepcid use, he had following sx   -constant nasal drainage, throat clearning  -states would choke after eating.  - Would seem like swallow could go down into wind pipe which then causes him to cough.  Coughing fits, take his breath away. This occurs with food and liquids .  - Would feel like things are getting stuck in upper throat, he either cough it up or throws it up. Usually this occurs with food and liquids. Worse and progressive in last 10 years but has not happened in last several weeks with start of Pepcid. -in both of these instances, he feels increased nasal drainage contributes to their development   No esophageal dyaphgia or odynpahgia.     No sore throat, neck pain/swelling  No heartburn, retrosternal chest pain.   No  nausea/vomiting, regurg.     In general he states he has always been a slow eating and will over masticate. Drinks a lot of fluids.   No surgery/radiation treatment to head/neck/chest.   Thoracotomy for lipomas several decades ago    Son takes reflux meds but no known BE or esophageal cancer.   Former smoker  No NSAIDs    2. Bowel Movements  Used to be regular daily but in last 6 mo ago, something changed  -2 days without a BM but can  also have firm/hard stools too that are difficult to evacuate at times   No abdominal pain   Miralax and stool softener helps  Slightly a ligher brown, no bloody or black stools. No pale / white colored stools.     Lost 10# over past 6 mo  He has depressed appetite   Finishing his meals, but servings are smaller now  States he is tired all the time, ongoing in past 3 mo but wife states this is ongoing for even longer than that too.   This was discussed with primary during recent appt     Colonoscopy 12/2012 - good prep, cecum. 2 mm polyp taken (bx -hyperplastic). Recall was 10 yr - that was not followed up on    Family Hx  No other known family history or GI related malignancy (esophageal, gastric, pancreatic, liver or colon) or family history of IBD/celiac disease.     Social Hx   Denies use of NSAIDs    Stopped ETOH 20 yr   Former tobacco products, 60y ago; 1 pack a week x  5-7 yr   No recreational drug use     PERTINENT PAST MEDICAL HISTORY:  Past Medical History:   Diagnosis Date    Arthritis     Broken ankle, left, sequela     Hypertension     Prostatitis     Sleep apnea     Thrombosis      PREVIOUS SURGERIES:  Past Surgical History:   Procedure Laterality Date    CATARACT EXTRACTION Bilateral 12/07/2015    CHOLECYSTECTOMY      HERNIA REPAIR Bilateral 01/01/1987    Inguinal    PROSTATECTOMY N/A     W/ Bilat Pelvic Lymphadenectomy    REVERSE ARTHROPLASTY SHOULDER Right 2/1/2022    Procedure: Right reverse total shoulder arthroplasty;  Surgeon: Abel Tobar MD;  Location:  OR     VITRECTOMY ANTERIOR Left 06/15/2015    [facility] THORACOTOMY,MAJOR,EXPLOR/BIOPSY  01/01/1983    VATs that found lipoma on chest wall, was concern for cancer.        ALLERGIES:     Allergies   Allergen Reactions    Lisinopril Cough    Latex Itching     Latex adhesive- caused itching    Ace Inhibitors Cough    Chocolate Flavoring Agent (Non-Screening) Other (See Comments)     Pt gets sinus congestion from eating chocolate.    Corn [Corn Oil] Other (See Comments)     Pt gets sinus congestion from eating corn.    Fish Containing Products [Fish-Derived Products] Other (See Comments)     Pt gets sinus congestion from eating fish.    Levofloxacin Rash     Red line up the arm after IV administration    Nuts - Unspecified [Nuts] Other (See Comments)     Pt gets sinus congestion from eating nuts.    Penicillins Unknown     childhood    Ragweed Pollen [Ragweeds] Other (See Comments)     Sinus and chest congestion.       PERTINENT MEDICATIONS:    Current Outpatient Medications:     apixaban ANTICOAGULANT (ELIQUIS ANTICOAGULANT) 5 MG tablet, TAKE 1 TABLET BY MOUTH TWICE A DAY, Disp: 180 tablet, Rfl: 0    azelastine (ASTELIN) 0.1 % nasal spray, Spray 2 sprays into both nostrils 2 times daily., Disp: 30 mL, Rfl: 3    chlorthalidone (HYGROTON) 25 MG tablet, [CHLORTHALIDONE (HYGROTEN) 25 MG TABLET] TAKE A 1/2 TABLETS (12.5 MG TOTAL) BY MOUTH DAILY., Disp: 45 tablet, Rfl: 3    cholecalciferol, vitamin D3, 1,000 unit tablet, [CHOLECALCIFEROL, VITAMIN D3, 1,000 UNIT TABLET] Take 1,000 Units by mouth daily., Disp: , Rfl:     famotidine (PEPCID) 40 MG tablet, Take 1 tablet (40 mg) by mouth daily., Disp: 30 tablet, Rfl: 1    fexofenadine (ALLEGRA) 180 MG tablet, [FEXOFENADINE (ALLEGRA) 180 MG TABLET] Take 180 mg by mouth daily., Disp: , Rfl:     ketoconazole (NIZORAL) 2 % external cream, APPLY 1 APPLICATION DAILY TOPICALLY TO AFFECTED AREAS OF THE LEFT HIP FOR UP TO 2 WEEKS, Disp: , Rfl:     multivitamin therapeutic tablet, [MULTIVITAMIN  THERAPEUTIC TABLET] Take 1 tablet by mouth daily., Disp: , Rfl:     triamcinolone (KENALOG) 0.1 % external cream, Apply topically daily as needed for irritation, Disp: , Rfl:     vit C/E/Zn/coppr/lutein/zeaxan (PRESERVISION AREDS-2 ORAL), Take 1 tablet by mouth 2 times daily , Disp: , Rfl:     SOCIAL HISTORY:  Social History     Socioeconomic History    Marital status:      Spouse name: Not on file    Number of children: 3    Years of education: Not on file    Highest education level: Not on file   Occupational History    Occupation: Marketing for [employer]     Comment: Retired   Tobacco Use    Smoking status: Former     Current packs/day: 0.00     Average packs/day: 0.5 packs/day for 5.0 years (2.5 ttl pk-yrs)     Types: Cigarettes     Start date: 1955     Quit date: 1960     Years since quittin.6     Passive exposure: Past    Smokeless tobacco: Never   Vaping Use    Vaping status: Never Used   Substance and Sexual Activity    Alcohol use: No    Drug use: No    Sexual activity: Yes     Partners: Female   Other Topics Concern    Parent/sibling w/ CABG, MI or angioplasty before 65F 55M? Not Asked   Social History Narrative    Not on file     Social Drivers of Health     Financial Resource Strain: Unknown (2025)    Financial Resource Strain     Within the past 12 months, have you or your family members you live with been unable to get utilities (heat, electricity) when it was really needed?: Patient declined   Food Insecurity: Unknown (2025)    Food Insecurity     Within the past 12 months, did you worry that your food would run out before you got money to buy more?: Patient declined     Within the past 12 months, did the food you bought just not last and you didn t have money to get more?: Patient declined   Transportation Needs: Unknown (2025)    Transportation Needs     Within the past 12 months, has lack of transportation kept you from medical appointments, getting your  "medicines, non-medical meetings or appointments, work, or from getting things that you need?: Patient declined   Physical Activity: Unknown (4/21/2025)    Exercise Vital Sign     Days of Exercise per Week: 2 days     Minutes of Exercise per Session: Not on file   Stress: Stress Concern Present (4/21/2025)    Citizen of [place] of Occupational Health - Occupational Stress Questionnaire     Feeling of Stress : To some extent   Social Connections: Unknown (4/21/2025)    Social Connection and Isolation Panel [NHANES]     Frequency of Communication with Friends and Family: Not on file     Frequency of Social Gatherings with Friends and Family: More than three times a week     Attends Yazdanism Services: Not on file     Active Member of Clubs or Organizations: Not on file     Attends Club or Organization Meetings: Not on file     Marital Status: Not on file   Interpersonal Safety: Low Risk  (4/21/2025)    Interpersonal Safety     Do you feel physically and emotionally safe where you currently live?: Yes     Within the past 12 months, have you been hit, slapped, kicked or otherwise physically hurt by someone?: No     Within the past 12 months, have you been humiliated or emotionally abused in other ways by your partner or ex-partner?: No   Housing Stability: Unknown (4/21/2025)    Housing Stability     Do you have housing? : Patient declined     Are you worried about losing your housing?: Patient declined       FAMILY HISTORY:  Family History   Problem Relation Age of Onset    Colon Cancer Father     Lung Cancer Father     Breast Cancer Sister 55    Sleep Apnea Sister     Aortic aneurysm Brother     Dementia Maternal Grandfather     Pneumonia Paternal Grandfather        Past/family/social history reviewed and no changes    PHYSICAL EXAMINATION:  Vitals reviewed: /70   Pulse 71   Ht 1.753 m (5' 9\")   Wt 71.3 kg (157 lb 3.2 oz)   SpO2 95%   BMI 23.21 kg/m    Constitutional: aaox3, cooperative, pleasant, not " dyspneic/diaphoretic, no acute distress  Eyes: Sclera anicteric/injected  Ears/nose/mouth/throat: hearing intact  Neck: supple, active ROM w/o limitation or pain   CV: No edema  Respiratory: Unlabored breathing  Abd:  Nondistended, mild tenderness to epigastric region, , no peritoneal signs  Skin: warm, perfused, no jaundice  Psych: Variable eye contact though affect slightly flat.   MSK: Normal gait     PERTINENT STUDIES:    Office Visit on 07/16/2025   Component Date Value Ref Range Status    Lyme Disease Antibodies Total 07/16/2025 0.05  <0.90 Final    Sodium 07/16/2025 138  135 - 145 mmol/L Final    Potassium 07/16/2025 3.8  3.4 - 5.3 mmol/L Final    Carbon Dioxide (CO2) 07/16/2025 29  22 - 29 mmol/L Final    Anion Gap 07/16/2025 10  7 - 15 mmol/L Final    Urea Nitrogen 07/16/2025 15.2  8.0 - 23.0 mg/dL Final    Creatinine 07/16/2025 0.91  0.67 - 1.17 mg/dL Final    GFR Estimate 07/16/2025 82  >60 mL/min/1.73m2 Final    Calcium 07/16/2025 10.2  8.8 - 10.4 mg/dL Final    Chloride 07/16/2025 99  98 - 107 mmol/L Final    Glucose 07/16/2025 86  70 - 99 mg/dL Final    Alkaline Phosphatase 07/16/2025 274 (H)  40 - 150 U/L Final    AST 07/16/2025 22  0 - 45 U/L Final    ALT 07/16/2025 15  0 - 70 U/L Final    Protein Total 07/16/2025 7.2  6.4 - 8.3 g/dL Final    Albumin 07/16/2025 3.4 (L)  3.5 - 5.2 g/dL Final    Bilirubin Total 07/16/2025 0.6  <=1.2 mg/dL Final    WBC Count 07/16/2025 11.6 (H)  4.0 - 11.0 10e3/uL Final    RBC Count 07/16/2025 5.41  4.40 - 5.90 10e6/uL Final    Hemoglobin 07/16/2025 15.6  13.3 - 17.7 g/dL Final    Hematocrit 07/16/2025 47.3  40.0 - 53.0 % Final    MCV 07/16/2025 87  78 - 100 fL Final    MCH 07/16/2025 28.8  26.5 - 33.0 pg Final    MCHC 07/16/2025 33.0  31.5 - 36.5 g/dL Final    RDW 07/16/2025 15.5 (H)  10.0 - 15.0 % Final    Platelet Count 07/16/2025 304  150 - 450 10e3/uL Final    TSH 07/16/2025 1.20  0.30 - 4.20 uIU/mL Final    Vitamin B12 07/16/2025 824  232 - 1,245 pg/mL Final        Last Comprehensive Metabolic Panel:  Lab Results   Component Value Date     07/16/2025    POTASSIUM 3.8 07/16/2025    CHLORIDE 99 07/16/2025    CO2 29 07/16/2025    ANIONGAP 10 07/16/2025    GLC 86 07/16/2025    BUN 15.2 07/16/2025    CR 0.91 07/16/2025    GFRESTIMATED 82 07/16/2025    OLVIN 10.2 07/16/2025           TSH   Date Value Ref Range Status   07/16/2025 1.20 0.30 - 4.20 uIU/mL Final   03/11/2021 1.95 0.30 - 5.00 uIU/mL Final        PREVIOUS ENDOSCOPY    No results found for this or any previous visit.

## 2025-07-30 NOTE — NURSING NOTE
"Do you have a history of colon cancer in your immediate family? Yes    If yes who: father 75    And what age  were they diagnosed:       Chief Complaint   Patient presents with    New Patient       Vitals:    07/30/25 0848   BP: 135/70   Pulse: 71   SpO2: 95%   Weight: 71.3 kg (157 lb 3.2 oz)   Height: 1.753 m (5' 9\")       Body mass index is 23.21 kg/m .    Diamante Neumann MA    "

## 2025-07-31 ENCOUNTER — TRANSFERRED RECORDS (OUTPATIENT)
Dept: HEALTH INFORMATION MANAGEMENT | Facility: CLINIC | Age: 85
End: 2025-07-31

## 2025-07-31 ENCOUNTER — TELEPHONE (OUTPATIENT)
Dept: PULMONOLOGY | Facility: CLINIC | Age: 85
End: 2025-07-31

## 2025-07-31 ENCOUNTER — OFFICE VISIT (OUTPATIENT)
Dept: PULMONOLOGY | Facility: CLINIC | Age: 85
End: 2025-07-31
Attending: INTERNAL MEDICINE
Payer: COMMERCIAL

## 2025-07-31 VITALS
OXYGEN SATURATION: 96 % | DIASTOLIC BLOOD PRESSURE: 66 MMHG | BODY MASS INDEX: 23.3 KG/M2 | SYSTOLIC BLOOD PRESSURE: 122 MMHG | HEART RATE: 70 BPM | WEIGHT: 157.8 LBS

## 2025-07-31 DIAGNOSIS — G47.33 OBSTRUCTIVE SLEEP APNEA: Primary | ICD-10-CM

## 2025-07-31 DIAGNOSIS — Z86.711 HISTORY OF PULMONARY EMBOLISM: ICD-10-CM

## 2025-07-31 RX ORDER — CLINDAMYCIN HYDROCHLORIDE 300 MG/1
CAPSULE ORAL PRN
COMMUNITY

## 2025-07-31 NOTE — TELEPHONE ENCOUNTER
DATE:  7/31/25  DME PROVIDER:  [facility] MEDICAL  SUPPLY ORDERED:  CPAP/ SUPPLIES  PROVIDER:  VIRIDIANA DESHPANDE MD    COMMENT:  10:52 AM    *Cover sheet  *Face sheet  *CPAP/Supplies order  *Updated office notes    All the above was faxed to [facility] at [phone].    Kendall HOLLAND CMA, M [facility]

## 2025-07-31 NOTE — PROGRESS NOTES
Pulmonary Clinic Follow Up    Cc: Severe JAS, submassive PE    HPI: 79yoM with history of submassive PE (thought secondary to immobility while hunting) now on life-long anticoagulation here for follow up of JAS.    IN early 2019 had fall with head trauma and subdural. Reversed and monitored, this has completely resolved and anticoagulation resumed. He switched to DOAC last year and has done well.     The patient underwent PSG in 3/2016 with AHI of 48, unable to achieve REM sleep until after CPAP was placed. He was started on CPAP at 7cmH2O. He received a new machine in spring 2016.    3 weeks ago his machine completely stopped working. In all previous years compliance has been 100% with a CPAP at 7cmH2O and leak of 19. I am unable to obtain a compliance today because the machine no longer works. He has been using an old machine of his wife's but unsure what the settings are.     Fatigue significantly increased. Saw PCP who did TSH CBC, B12 without obvious source of fatigue. Also increased hip pain causing fatigue.       Current Outpatient Medications   Medication Sig Dispense Refill    apixaban ANTICOAGULANT (ELIQUIS ANTICOAGULANT) 5 MG tablet TAKE 1 TABLET BY MOUTH TWICE A  tablet 0    azelastine (ASTELIN) 0.1 % nasal spray Spray 2 sprays into both nostrils 2 times daily. 30 mL 3    chlorthalidone (HYGROTON) 25 MG tablet [CHLORTHALIDONE (HYGROTEN) 25 MG TABLET] TAKE A 1/2 TABLETS (12.5 MG TOTAL) BY MOUTH DAILY. 45 tablet 3    cholecalciferol, vitamin D3, 1,000 unit tablet [CHOLECALCIFEROL, VITAMIN D3, 1,000 UNIT TABLET] Take 1,000 Units by mouth daily.      famotidine (PEPCID) 40 MG tablet Take 1 tablet (40 mg) by mouth daily. 30 tablet 1    fexofenadine (ALLEGRA) 180 MG tablet [FEXOFENADINE (ALLEGRA) 180 MG TABLET] Take 180 mg by mouth daily.      ketoconazole (NIZORAL) 2 % external cream APPLY 1 APPLICATION DAILY TOPICALLY TO AFFECTED AREAS OF THE LEFT HIP FOR UP TO 2 WEEKS      multivitamin therapeutic  tablet [MULTIVITAMIN THERAPEUTIC TABLET] Take 1 tablet by mouth daily.      triamcinolone (KENALOG) 0.1 % external cream Apply topically daily as needed for irritation      vit C/E/Zn/coppr/lutein/zeaxan (PRESERVISION AREDS-2 ORAL) Take 1 tablet by mouth 2 times daily        No current facility-administered medications for this visit.         /66 (BP Location: Left arm, Patient Position: Sitting, Cuff Size: Adult Regular)   Pulse 70   Wt 71.6 kg (157 lb 12.8 oz)   SpO2 96%   BMI 23.30 kg/m     RA  Gen: NAD, Mallampati IV  C/V: RRR, S1 and S2  Resp: Clear bilaterally  Ext: No edema  Neuro: grossly normal.     ASSESSMENT/PLAN:  1) JAS, severe  -RTC 1 year for annual follow up, call sooner if needed  -[facility] Was feeling refreshed after waking when machine was working.     2) Submassive PE, provoked but given how much clot burden, recommend life-long anticoagulation  -Continue Eliquis.    Carmina Crowder MD

## 2025-08-01 ENCOUNTER — APPOINTMENT (OUTPATIENT)
Dept: LAB | Facility: CLINIC | Age: 85
End: 2025-08-01
Payer: COMMERCIAL

## 2025-08-01 PROCEDURE — 87338 HPYLORI STOOL AG IA: CPT | Performed by: PHYSICIAN ASSISTANT

## 2025-08-01 PROCEDURE — 99000 SPECIMEN HANDLING OFFICE-LAB: CPT | Performed by: PATHOLOGY

## 2025-08-03 ENCOUNTER — LAB (OUTPATIENT)
Dept: LAB | Facility: CLINIC | Age: 85
End: 2025-08-03
Payer: COMMERCIAL

## 2025-08-03 ENCOUNTER — HOSPITAL ENCOUNTER (OUTPATIENT)
Dept: ULTRASOUND IMAGING | Facility: CLINIC | Age: 85
Discharge: HOME | End: 2025-08-03
Attending: PHYSICIAN ASSISTANT | Admitting: PHYSICIAN ASSISTANT
Payer: COMMERCIAL

## 2025-08-03 DIAGNOSIS — R74.8 ELEVATED ALKALINE PHOSPHATASE LEVEL: ICD-10-CM

## 2025-08-03 PROCEDURE — 36415 COLL VENOUS BLD VENIPUNCTURE: CPT

## 2025-08-03 PROCEDURE — 84080 ASSAY ALKALINE PHOSPHATASES: CPT | Mod: 90

## 2025-08-03 PROCEDURE — 99000 SPECIMEN HANDLING OFFICE-LAB: CPT

## 2025-08-03 PROCEDURE — 84075 ASSAY ALKALINE PHOSPHATASE: CPT | Mod: 90

## 2025-08-03 PROCEDURE — 76705 ECHO EXAM OF ABDOMEN: CPT

## 2025-08-04 LAB — H PYLORI AG STL QL IA: NEGATIVE

## 2025-08-06 LAB
ALP BONE SERPL-CCNC: 83 U/L
ALP LIVER SERPL-CCNC: 238 U/L
ALP OTHER SERPL-CCNC: 0 U/L
ALP SERPL-CCNC: 321 U/L

## 2025-08-07 ENCOUNTER — TELEPHONE (OUTPATIENT)
Dept: PULMONOLOGY | Facility: CLINIC | Age: 85
End: 2025-08-07
Payer: COMMERCIAL

## 2025-08-11 ENCOUNTER — PATIENT OUTREACH (OUTPATIENT)
Dept: CARE COORDINATION | Facility: CLINIC | Age: 85
End: 2025-08-11
Payer: COMMERCIAL

## 2025-08-13 ENCOUNTER — TELEPHONE (OUTPATIENT)
Dept: GASTROENTEROLOGY | Facility: CLINIC | Age: 85
End: 2025-08-13
Payer: COMMERCIAL

## 2025-08-19 ENCOUNTER — TELEPHONE (OUTPATIENT)
Dept: INTERNAL MEDICINE | Facility: CLINIC | Age: 85
End: 2025-08-19
Payer: COMMERCIAL

## 2025-08-20 ENCOUNTER — OFFICE VISIT (OUTPATIENT)
Dept: INTERNAL MEDICINE | Facility: CLINIC | Age: 85
End: 2025-08-20
Payer: COMMERCIAL

## 2025-08-20 VITALS
HEIGHT: 69 IN | HEART RATE: 71 BPM | OXYGEN SATURATION: 98 % | BODY MASS INDEX: 22.96 KG/M2 | WEIGHT: 155 LBS | TEMPERATURE: 97.8 F | SYSTOLIC BLOOD PRESSURE: 125 MMHG | DIASTOLIC BLOOD PRESSURE: 62 MMHG | RESPIRATION RATE: 16 BRPM

## 2025-08-20 DIAGNOSIS — R53.83 FATIGUE, UNSPECIFIED TYPE: ICD-10-CM

## 2025-08-20 DIAGNOSIS — M25.552 HIP PAIN, LEFT: ICD-10-CM

## 2025-08-20 DIAGNOSIS — G47.33 OBSTRUCTIVE SLEEP APNEA ON CPAP: ICD-10-CM

## 2025-08-20 DIAGNOSIS — R74.8 ELEVATED SERUM ALKALINE PHOSPHATASE LEVEL: ICD-10-CM

## 2025-08-20 DIAGNOSIS — Z85.46 HISTORY OF PROSTATE CANCER: ICD-10-CM

## 2025-08-20 DIAGNOSIS — I10 ESSENTIAL HYPERTENSION: ICD-10-CM

## 2025-08-20 DIAGNOSIS — G62.9 PERIPHERAL POLYNEUROPATHY: ICD-10-CM

## 2025-08-20 DIAGNOSIS — Z86.711 HISTORY OF PULMONARY EMBOLISM: ICD-10-CM

## 2025-08-20 DIAGNOSIS — R93.7 ABNORMAL MRI, LUMBAR SPINE: Primary | ICD-10-CM

## 2025-08-20 PROCEDURE — 3074F SYST BP LT 130 MM HG: CPT | Performed by: INTERNAL MEDICINE

## 2025-08-20 PROCEDURE — 99214 OFFICE O/P EST MOD 30 MIN: CPT | Performed by: INTERNAL MEDICINE

## 2025-08-20 PROCEDURE — G2211 COMPLEX E/M VISIT ADD ON: HCPCS | Performed by: INTERNAL MEDICINE

## 2025-08-20 PROCEDURE — 3078F DIAST BP <80 MM HG: CPT | Performed by: INTERNAL MEDICINE

## 2025-08-21 ENCOUNTER — PATIENT OUTREACH (OUTPATIENT)
Dept: ONCOLOGY | Facility: CLINIC | Age: 85
End: 2025-08-21
Payer: COMMERCIAL

## 2025-08-22 ENCOUNTER — PRE VISIT (OUTPATIENT)
Dept: GASTROENTEROLOGY | Facility: CLINIC | Age: 85
End: 2025-08-22

## 2025-08-29 ENCOUNTER — HOSPITAL ENCOUNTER (OUTPATIENT)
Dept: RADIOLOGY | Facility: HOSPITAL | Age: 85
Discharge: HOME | End: 2025-08-29
Attending: PHYSICIAN ASSISTANT
Payer: COMMERCIAL

## 2025-08-29 DIAGNOSIS — R13.19 OTHER DYSPHAGIA: ICD-10-CM

## 2025-08-29 DIAGNOSIS — R68.89 THROAT SYMPTOM: ICD-10-CM

## 2025-08-29 PROCEDURE — 74220 X-RAY XM ESOPHAGUS 1CNTRST: CPT

## 2025-08-29 PROCEDURE — 74230 X-RAY XM SWLNG FUNCJ C+: CPT

## 2025-08-29 RX ORDER — BARIUM SULFATE 400 MG/ML
SUSPENSION ORAL ONCE
Status: COMPLETED | OUTPATIENT
Start: 2025-08-29 | End: 2025-08-29

## 2025-08-29 RX ADMIN — BARIUM SULFATE: 400 SUSPENSION ORAL at 10:18

## 2025-09-08 ENCOUNTER — PRE VISIT (OUTPATIENT)
Dept: ONCOLOGY | Facility: HOSPITAL | Age: 85
End: 2025-09-08
Payer: COMMERCIAL

## 2025-10-27 ENCOUNTER — PRE VISIT (OUTPATIENT)
Dept: GASTROENTEROLOGY | Facility: CLINIC | Age: 85
End: 2025-10-27

## 2025-11-19 ENCOUNTER — PRE VISIT (OUTPATIENT)
Dept: SURGERY | Facility: CLINIC | Age: 85
End: 2025-11-19

## (undated) DEVICE — GLOVE PROTEXIS POWDER FREE 7.0 ORTHOPEDIC 2D73ET70

## (undated) DEVICE — LINEN ORTHO ACL PACK 5447

## (undated) DEVICE — SU STRATAFIX PDS PLUS 1 CT-1 12" SXPP1A443

## (undated) DEVICE — Device

## (undated) DEVICE — SPONGE LAP 18X18" X8435

## (undated) DEVICE — BLADE KNIFE SURG 10 371110

## (undated) DEVICE — STERILE PVP SLN

## (undated) DEVICE — PREP CHLORAPREP 26ML TINTED HI-LITE ORANGE 930815

## (undated) DEVICE — DRAPE C-ARM 60X42" 1013

## (undated) DEVICE — ESU GROUND PAD ADULT W/CORD E7507

## (undated) DEVICE — BUR OVAL 4X48MM CARBIDE  03-C0901

## (undated) DEVICE — SUCTION MANIFOLD NEPTUNE 2 SYS 4 PORT 0702-020-000

## (undated) DEVICE — SU STRATAFIX MONOCRYL 3-0 SPIRAL PS-1 45CM SXMP1B102

## (undated) DEVICE — DRSG STERI STRIP 1/2X4" R1547

## (undated) DEVICE — PACK SET-UP STD 9102

## (undated) DEVICE — ESU PENCIL W/SMOKE EVAC CVPLP2000

## (undated) DEVICE — DRAPE STERI TOWEL LG 1010

## (undated) DEVICE — SPECIMEN CONTAINER

## (undated) DEVICE — DRAPE CONVERTORS U-DRAPE 60X72" 8476

## (undated) DEVICE — SU STRATAFIX PDS PLUS 0 CT-1 18" SXPP1A401

## (undated) DEVICE — SOL NACL 0.9% IRRIG 3000ML BAG 2B7477

## (undated) DEVICE — GLOVE PROTEXIS BLUE W/NEU-THERA 6.5  2D73EB65

## (undated) DEVICE — LINEN POUCH DBL 5427

## (undated) DEVICE — DRAPE IOBAN INCISE 23X17" 6650EZ

## (undated) DEVICE — GLOVE PROTEXIS POWDER FREE 6.5 ORTHOPEDIC 2D73ET65

## (undated) DEVICE — SET HANDPIECE INTERPULSE W/COAXIAL FAN SPRAY TIP 0210118000

## (undated) DEVICE — DRAPE LEGGINGS 8421

## (undated) DEVICE — SYR BULB IRRIG 50ML LATEX FREE 0035280

## (undated) DEVICE — BAG CLEAR TRASH 1.3M 39X33" P4040C

## (undated) DEVICE — SUTURE VICRYL+ 2-0 CT-2 27" UND VCP269H

## (undated) DEVICE — LINEN HALF SHEET 5512

## (undated) DEVICE — GLOVE PROTEXIS BLUE W/NEU-THERA 7.5  2D73EB75

## (undated) DEVICE — SUCTION TIP YANKAUER W/O VENT K86

## (undated) DEVICE — BLADE SAW SAGITTAL STRK 25X90X1.27MM HD SYS 6 6125-127-090

## (undated) DEVICE — LINEN FULL SHEET 5511

## (undated) DEVICE — PACK SHOULDER RIDGES

## (undated) DEVICE — GLOVE PROTEXIS BLUE W/NEU-THERA 7.0  2D73EB70

## (undated) DEVICE — ESU BIPOLAR SEALER AQUAMANTYS 6MM 23-112-1

## (undated) DEVICE — SUTURE VICRYL+ 0 CT-1 18" DYED VIO VCP740D

## (undated) DEVICE — DRSG AQUACEL AG HYDROFIBER  3.5X10" 422605

## (undated) DEVICE — DRAPE SHOULDER PACK SPLITS 29365

## (undated) DEVICE — WRAP MCCONNELL ARM SUPPORT LG 12-401

## (undated) DEVICE — GLOVE PROTEXIS POWDER FREE 7.5 ORTHOPEDIC 2D73ET75

## (undated) DEVICE — SOL NACL 0.9% INJ 250ML BAG 2B1322Q

## (undated) DEVICE — IMM SHOULDER LG 79-84017

## (undated) RX ORDER — TRANEXAMIC ACID 650 MG/1
TABLET ORAL
Status: DISPENSED
Start: 2022-02-01

## (undated) RX ORDER — LIDOCAINE HYDROCHLORIDE 10 MG/ML
INJECTION, SOLUTION EPIDURAL; INFILTRATION; INTRACAUDAL; PERINEURAL
Status: DISPENSED
Start: 2022-02-01

## (undated) RX ORDER — FENTANYL CITRATE 50 UG/ML
INJECTION, SOLUTION INTRAMUSCULAR; INTRAVENOUS
Status: DISPENSED
Start: 2022-02-01

## (undated) RX ORDER — FENTANYL CITRATE-0.9 % NACL/PF 10 MCG/ML
PLASTIC BAG, INJECTION (ML) INTRAVENOUS
Status: DISPENSED
Start: 2022-02-01

## (undated) RX ORDER — CEFAZOLIN SODIUM/WATER 2 G/20 ML
SYRINGE (ML) INTRAVENOUS
Status: DISPENSED
Start: 2022-02-01

## (undated) RX ORDER — ONDANSETRON 2 MG/ML
INJECTION INTRAMUSCULAR; INTRAVENOUS
Status: DISPENSED
Start: 2022-02-01

## (undated) RX ORDER — PROPOFOL 10 MG/ML
INJECTION, EMULSION INTRAVENOUS
Status: DISPENSED
Start: 2022-02-01

## (undated) RX ORDER — GLYCOPYRROLATE 0.2 MG/ML
INJECTION INTRAMUSCULAR; INTRAVENOUS
Status: DISPENSED
Start: 2022-02-01

## (undated) RX ORDER — DEXAMETHASONE SODIUM PHOSPHATE 4 MG/ML
INJECTION, SOLUTION INTRA-ARTICULAR; INTRALESIONAL; INTRAMUSCULAR; INTRAVENOUS; SOFT TISSUE
Status: DISPENSED
Start: 2022-02-01

## (undated) RX ORDER — ACETAMINOPHEN 325 MG/1
TABLET ORAL
Status: DISPENSED
Start: 2022-02-01

## (undated) RX ORDER — VANCOMYCIN HYDROCHLORIDE 1 G/20ML
INJECTION, POWDER, LYOPHILIZED, FOR SOLUTION INTRAVENOUS
Status: DISPENSED
Start: 2022-02-01

## (undated) RX ORDER — EPHEDRINE SULFATE 50 MG/ML
INJECTION, SOLUTION INTRAMUSCULAR; INTRAVENOUS; SUBCUTANEOUS
Status: DISPENSED
Start: 2022-02-01